# Patient Record
Sex: FEMALE | Race: WHITE | NOT HISPANIC OR LATINO | Employment: OTHER | URBAN - METROPOLITAN AREA
[De-identification: names, ages, dates, MRNs, and addresses within clinical notes are randomized per-mention and may not be internally consistent; named-entity substitution may affect disease eponyms.]

---

## 2018-11-01 ENCOUNTER — OFFICE VISIT (OUTPATIENT)
Dept: CARDIOLOGY | Facility: CLINIC | Age: 73
End: 2018-11-01

## 2018-11-01 VITALS
DIASTOLIC BLOOD PRESSURE: 72 MMHG | SYSTOLIC BLOOD PRESSURE: 160 MMHG | HEART RATE: 106 BPM | HEIGHT: 67 IN | BODY MASS INDEX: 27.34 KG/M2 | WEIGHT: 174.2 LBS

## 2018-11-01 DIAGNOSIS — R06.02 SOB (SHORTNESS OF BREATH): Primary | ICD-10-CM

## 2018-11-01 DIAGNOSIS — I25.10 CORONARY ARTERY DISEASE INVOLVING NATIVE CORONARY ARTERY OF NATIVE HEART WITHOUT ANGINA PECTORIS: Chronic | ICD-10-CM

## 2018-11-01 DIAGNOSIS — R00.0 SINUS TACHYCARDIA: ICD-10-CM

## 2018-11-01 DIAGNOSIS — I25.2 HISTORY OF MYOCARDIAL INFARCTION: Chronic | ICD-10-CM

## 2018-11-01 PROCEDURE — 99204 OFFICE O/P NEW MOD 45 MIN: CPT | Performed by: INTERNAL MEDICINE

## 2018-11-01 PROCEDURE — 93000 ELECTROCARDIOGRAM COMPLETE: CPT | Performed by: INTERNAL MEDICINE

## 2018-11-01 RX ORDER — OMEPRAZOLE 20 MG/1
20 CAPSULE, DELAYED RELEASE ORAL
COMMUNITY
End: 2019-07-18

## 2018-11-01 RX ORDER — HYDROCODONE BITARTRATE AND ACETAMINOPHEN 5; 300 MG/1; MG/1
1 TABLET ORAL
COMMUNITY
Start: 2012-08-29 | End: 2019-11-20 | Stop reason: SDUPTHER

## 2018-11-01 RX ORDER — LOSARTAN POTASSIUM 50 MG/1
50 TABLET ORAL 2 TIMES DAILY
COMMUNITY
End: 2019-11-01 | Stop reason: SDUPTHER

## 2018-11-01 NOTE — PROGRESS NOTES
Subjective:     Encounter Date:11/01/2018      Patient ID: Effie Donnelly is a 73 y.o. female.    Chief Complaint:  History of Present Illness    Dear Dr. Terrazas,    I had the pleasure of seeing this patient in the office today for initial evaluation and consultation.  I appreciate the you sent her to see us.    I've seen her in the past, although it is been many years.  She was previously seen at [facility].  She has a history of CAD and myocardial infarction.    She's been having issues of fatigue.  She notes if she tries to do anything she just gets more fatigued and has less energy.  She has been having some dyspnea at times.  That's only with more activity.  No dyspnea at rest.  She denies any orthopnea or PND.  She's not had any dizziness or lightheadedness and no presyncope or syncope.  No recent hospitalization.  She apparently had an echocardiogram back in 2013 although we do not have the results from that as yet.  We've called for records from [facility] but not yet received anything.    She states that she scheduled to be seen in your office Monday lab work at that time.    She's been compliant with her medical regimen.  She states that usually her blood pressures under good control.    The following portions of the patient's history were reviewed and updated as appropriate: allergies, current medications, past family history, past medical history, past social history, past surgical history and problem list.    Past Medical History:   Diagnosis Date   • CAD (coronary artery disease)    • Cancer (CMS/HCC)     skin   • Hypertension    • Myocardial infarction (CMS/HCC)     PER PT        Past Surgical History:   Procedure Laterality Date   • CARDIAC CATHETERIZATION     • SHOULDER SURGERY     • SKIN CANCER EXCISION      ABOVE THE LIP.    • TOE SURGERY         Social History     Social History   • Marital status:      Spouse name: N/A   • Number of children: 5   • Years of education: N/A      Occupational History   • RETIRED       Social History Main Topics   • Smoking status: Former Smoker     Quit date: 11/1/1973   • Smokeless tobacco: Never Used      Comment: CAFFEINE USE: 1 CUP DAILY   • Alcohol use No   • Drug use: Unknown   • Sexual activity: Not on file     Other Topics Concern   • Not on file     Social History Narrative   • No narrative on file       Review of Systems   Constitution: Negative for chills, decreased appetite, fever and night sweats.   HENT: Negative for ear discharge, ear pain, hearing loss, nosebleeds and sore throat.    Eyes: Negative for blurred vision, double vision and pain.   Cardiovascular: Negative for cyanosis.   Respiratory: Negative for hemoptysis and sputum production.    Endocrine: Negative for cold intolerance and heat intolerance.   Hematologic/Lymphatic: Negative for adenopathy.   Skin: Negative for dry skin, itching, nail changes, rash and suspicious lesions.   Musculoskeletal: Negative for arthritis, gout, muscle cramps, muscle weakness, myalgias and neck pain.   Gastrointestinal: Negative for anorexia, bowel incontinence, constipation, diarrhea, dysphagia, hematemesis and jaundice.   Genitourinary: Negative for bladder incontinence, dysuria, flank pain, frequency, hematuria and nocturia.   Neurological: Negative for focal weakness, numbness, paresthesias and seizures.   Psychiatric/Behavioral: Negative for altered mental status, hallucinations, hypervigilance, suicidal ideas and thoughts of violence.   Allergic/Immunologic: Negative for persistent infections.         ECG 12 Lead  Date/Time: 11/1/2018 2:16 PM  Performed by: GLORIA RODRIGUEZ III  Authorized by: GLORIA RODRIGUEZ III   Comparison: compared with previous ECG   Similar to previous ECG  Rhythm: sinus rhythm  Ectopy: atrial premature contractions  Rate: normal  Conduction: conduction normal  ST Segments: ST segments normal  T depression: V2, V1, I and aVL  QRS axis: left  Other: no  "other findings  Q waves: V1 and V2  Clinical impression: abnormal ECG               Objective:     Vitals:    11/01/18 1313   BP: 160/72   BP Location: Right arm   Pulse: 106   Weight: 79 kg (174 lb 3.2 oz)   Height: 170.2 cm (67\")         Physical Exam   Constitutional: She is oriented to person, place, and time. She appears well-developed and well-nourished. No distress.   HENT:   Head: Normocephalic and atraumatic.   Nose: Nose normal.   Mouth/Throat: Oropharynx is clear and moist.   Eyes: Pupils are equal, round, and reactive to light. Conjunctivae and EOM are normal. Right eye exhibits no discharge. Left eye exhibits no discharge.   Neck: Normal range of motion. Neck supple. No tracheal deviation present. No thyromegaly present.   Cardiovascular: Normal rate, regular rhythm, S1 normal, S2 normal, normal heart sounds and normal pulses.  Exam reveals no S3.    Pulmonary/Chest: Effort normal and breath sounds normal. No stridor. No respiratory distress. She exhibits no tenderness.   Abdominal: Soft. Bowel sounds are normal. She exhibits no distension and no mass. There is no tenderness. There is no rebound and no guarding.   Musculoskeletal: Normal range of motion. She exhibits no tenderness or deformity.   Lymphadenopathy:     She has no cervical adenopathy.   Neurological: She is alert and oriented to person, place, and time. She has normal reflexes.   Skin: Skin is warm and dry. No rash noted. She is not diaphoretic. No erythema.   Psychiatric: She has a normal mood and affect. Thought content normal.       Lab Review:             Performed        Assessment:          Diagnosis Plan   1. SOB (shortness of breath)  Adult Transthoracic Echo Complete W/ Cont if Necessary Per Protocol    ECG 12 Lead   2. Sinus tachycardia  Adult Transthoracic Echo Complete W/ Cont if Necessary Per Protocol    ECG 12 Lead   3. Coronary artery disease involving native coronary artery of native heart without angina pectoris  Adult " Transthoracic Echo Complete W/ Cont if Necessary Per Protocol    ECG 12 Lead   4. History of myocardial infarction  Adult Transthoracic Echo Complete W/ Cont if Necessary Per Protocol    ECG 12 Lead          Plan:       1. Coronary Artery Disease  Assessment  • The patient has no angina    Plan  • Lifestyle modifications discussed include adhering to a heart healthy diet, avoidance of tobacco products, maintenance of a healthy weight, medication compliance, regular exercise and regular monitoring of cholesterol and blood pressure    Subjective - Objective  • There is a history of past MI  • Current antiplatelet therapy includes aspirin 81 mg    2.  Dyspnea and fatigue-she also has a resting tachycardia at 106 bpm.  Patient informs me that she is can have blood work in your office on Monday-I've asked that I would given her a note so she can get thyroid function tests obtained at the same time.  We will also obtain an echocardiogram.  Further evaluation and treatment will then be predicated on the results.    Thank you very much for allowing us to participate in the care of this pleasant patient.  Please don't hesitate to call if I can be of assistance in any way.      Current Outpatient Prescriptions:   •  Ascorbic Acid (VITAMIN C PO), Take  by mouth Daily., Disp: , Rfl:   •  aspirin 81 MG tablet, Take 81 mg by mouth., Disp: , Rfl:   •  Cyanocobalamin (VITAMIN B 12 PO), Take  by mouth. DAILY, Disp: , Rfl:   •  Hydrocodone-Acetaminophen (XODOL) 5-300 MG per tablet, Take 1 tablet by mouth. PRN, Disp: , Rfl:   •  losartan (COZAAR) 50 MG tablet, Take 50 mg by mouth 2 (Two) Times a Day., Disp: , Rfl:   •  Omega 3-6-9 Fatty Acids (OMEGA-3-6-9 PO), Take  by mouth., Disp: , Rfl:   •  omeprazole (priLOSEC) 20 MG capsule, Take 20 mg by mouth. AS NEEDED, Disp: , Rfl:

## 2018-12-06 ENCOUNTER — HOSPITAL ENCOUNTER (OUTPATIENT)
Dept: CARDIOLOGY | Facility: HOSPITAL | Age: 73
Discharge: HOME | End: 2018-12-06
Attending: INTERNAL MEDICINE | Admitting: INTERNAL MEDICINE

## 2018-12-06 VITALS — HEIGHT: 67 IN | WEIGHT: 174 LBS | BODY MASS INDEX: 27.31 KG/M2

## 2018-12-06 PROCEDURE — 93306 TTE W/DOPPLER COMPLETE: CPT

## 2018-12-06 PROCEDURE — 25010000002 PERFLUTREN (DEFINITY) 8.476 MG IN SODIUM CHLORIDE 0.9 % 10 ML INJECTION: Performed by: INTERNAL MEDICINE

## 2018-12-06 PROCEDURE — 93306 TTE W/DOPPLER COMPLETE: CPT | Performed by: INTERNAL MEDICINE

## 2018-12-06 RX ADMIN — PERFLUTREN 2 ML: 6.52 INJECTION, SUSPENSION INTRAVENOUS at 12:50

## 2018-12-24 ENCOUNTER — APPOINTMENT (OUTPATIENT)
Dept: GENERAL RADIOLOGY | Facility: HOSPITAL | Age: 73
End: 2018-12-24

## 2018-12-24 ENCOUNTER — HOSPITAL ENCOUNTER (EMERGENCY)
Facility: HOSPITAL | Age: 73
Discharge: HOME | End: 2018-12-24
Attending: EMERGENCY MEDICINE | Admitting: EMERGENCY MEDICINE

## 2018-12-24 VITALS
RESPIRATION RATE: 18 BRPM | DIASTOLIC BLOOD PRESSURE: 71 MMHG | WEIGHT: 171.96 LBS | TEMPERATURE: 98.2 F | HEART RATE: 72 BPM | HEIGHT: 64 IN | SYSTOLIC BLOOD PRESSURE: 145 MMHG | BODY MASS INDEX: 29.36 KG/M2 | OXYGEN SATURATION: 99 %

## 2018-12-24 DIAGNOSIS — R19.7 DIARRHEA, UNSPECIFIED TYPE: ICD-10-CM

## 2018-12-24 DIAGNOSIS — E87.1 HYPONATREMIA: ICD-10-CM

## 2018-12-24 DIAGNOSIS — R11.2 NON-INTRACTABLE VOMITING WITH NAUSEA, UNSPECIFIED VOMITING TYPE: ICD-10-CM

## 2018-12-24 DIAGNOSIS — J10.1 INFLUENZA A: Primary | ICD-10-CM

## 2018-12-24 LAB
ALBUMIN SERPL-MCNC: 3.8 G/DL (ref 3.5–5.2)
ALBUMIN/GLOB SERPL: 1.1 G/DL
ALP SERPL-CCNC: 99 U/L (ref 40–129)
ALT SERPL W P-5'-P-CCNC: 15 U/L (ref 5–33)
ANION GAP SERPL CALCULATED.3IONS-SCNC: 14.8 MMOL/L
AST SERPL-CCNC: 22 U/L (ref 5–32)
BASOPHILS # BLD AUTO: 0.01 10*3/MM3 (ref 0–0.2)
BASOPHILS NFR BLD AUTO: 0.1 % (ref 0–2)
BILIRUB SERPL-MCNC: 0.5 MG/DL (ref 0.2–1.2)
BILIRUB UR QL STRIP: ABNORMAL
BUN BLD-MCNC: 13 MG/DL (ref 8–23)
BUN/CREAT SERPL: 16.5 (ref 7–25)
CALCIUM SPEC-SCNC: 8.6 MG/DL (ref 8.8–10.5)
CHLORIDE SERPL-SCNC: 92 MMOL/L (ref 98–107)
CLARITY UR: CLEAR
CO2 SERPL-SCNC: 22.2 MMOL/L (ref 22–29)
COLOR UR: ABNORMAL
CREAT BLD-MCNC: 0.79 MG/DL (ref 0.57–1)
DEPRECATED RDW RBC AUTO: 41.2 FL (ref 37–54)
EOSINOPHIL # BLD AUTO: 0.01 10*3/MM3 (ref 0.1–0.3)
EOSINOPHIL NFR BLD AUTO: 0.1 % (ref 0–4)
ERYTHROCYTE [DISTWIDTH] IN BLOOD BY AUTOMATED COUNT: 13.4 % (ref 11.5–14.5)
GFR SERPL CREATININE-BSD FRML MDRD: 72 ML/MIN/1.73
GLOBULIN UR ELPH-MCNC: 3.6 GM/DL
GLUCOSE BLD-MCNC: 102 MG/DL (ref 65–99)
GLUCOSE UR STRIP-MCNC: NEGATIVE MG/DL
HCT VFR BLD AUTO: 41.7 % (ref 37–47)
HGB BLD-MCNC: 13.6 G/DL (ref 12–16)
HGB UR QL STRIP.AUTO: NEGATIVE
IMM GRANULOCYTES # BLD AUTO: 0.03 10*3/MM3 (ref 0–0.03)
IMM GRANULOCYTES NFR BLD AUTO: 0.4 % (ref 0–0.5)
KETONES UR QL STRIP: ABNORMAL
LEUKOCYTE ESTERASE UR QL STRIP.AUTO: NEGATIVE
LIPASE SERPL-CCNC: 29 U/L (ref 13–60)
LYMPHOCYTES # BLD AUTO: 0.67 10*3/MM3 (ref 0.6–4.8)
LYMPHOCYTES NFR BLD AUTO: 7.9 % (ref 20–45)
MCH RBC QN AUTO: 27.4 PG (ref 27–31)
MCHC RBC AUTO-ENTMCNC: 32.6 G/DL (ref 31–37)
MCV RBC AUTO: 83.9 FL (ref 81–99)
MONOCYTES # BLD AUTO: 0.88 10*3/MM3 (ref 0–1)
MONOCYTES NFR BLD AUTO: 10.4 % (ref 3–8)
NEUTROPHILS # BLD AUTO: 6.89 10*3/MM3 (ref 1.5–8.3)
NEUTROPHILS NFR BLD AUTO: 81.1 % (ref 45–70)
NITRITE UR QL STRIP: NEGATIVE
NRBC BLD AUTO-RTO: 0 /100 WBC (ref 0–0)
PH UR STRIP.AUTO: 5.5 [PH] (ref 4.5–8)
PLATELET # BLD AUTO: 190 10*3/MM3 (ref 140–500)
PMV BLD AUTO: 12 FL (ref 7.4–10.4)
POTASSIUM BLD-SCNC: 3.9 MMOL/L (ref 3.5–5.2)
PROT SERPL-MCNC: 7.4 G/DL (ref 6–8.5)
PROT UR QL STRIP: NEGATIVE
RBC # BLD AUTO: 4.97 10*6/MM3 (ref 4.2–5.4)
SODIUM BLD-SCNC: 129 MMOL/L (ref 136–145)
SP GR UR STRIP: 1.02 (ref 1–1.03)
TROPONIN T SERPL-MCNC: <0.01 NG/ML (ref 0–0.03)
UROBILINOGEN UR QL STRIP: ABNORMAL
WBC NRBC COR # BLD: 8.49 10*3/MM3 (ref 4.8–10.8)

## 2018-12-24 PROCEDURE — 87070 CULTURE OTHR SPECIMN AEROBIC: CPT | Performed by: PHYSICIAN ASSISTANT

## 2018-12-24 PROCEDURE — 81003 URINALYSIS AUTO W/O SCOPE: CPT | Performed by: PHYSICIAN ASSISTANT

## 2018-12-24 PROCEDURE — 84484 ASSAY OF TROPONIN QUANT: CPT | Performed by: PHYSICIAN ASSISTANT

## 2018-12-24 PROCEDURE — 83690 ASSAY OF LIPASE: CPT | Performed by: PHYSICIAN ASSISTANT

## 2018-12-24 PROCEDURE — 96375 TX/PRO/DX INJ NEW DRUG ADDON: CPT

## 2018-12-24 PROCEDURE — 96361 HYDRATE IV INFUSION ADD-ON: CPT

## 2018-12-24 PROCEDURE — 80053 COMPREHEN METABOLIC PANEL: CPT | Performed by: PHYSICIAN ASSISTANT

## 2018-12-24 PROCEDURE — 99284 EMERGENCY DEPT VISIT MOD MDM: CPT | Performed by: PHYSICIAN ASSISTANT

## 2018-12-24 PROCEDURE — 96376 TX/PRO/DX INJ SAME DRUG ADON: CPT

## 2018-12-24 PROCEDURE — 96374 THER/PROPH/DIAG INJ IV PUSH: CPT

## 2018-12-24 PROCEDURE — 71046 X-RAY EXAM CHEST 2 VIEWS: CPT

## 2018-12-24 PROCEDURE — 87205 SMEAR GRAM STAIN: CPT | Performed by: PHYSICIAN ASSISTANT

## 2018-12-24 PROCEDURE — 85025 COMPLETE CBC W/AUTO DIFF WBC: CPT | Performed by: PHYSICIAN ASSISTANT

## 2018-12-24 PROCEDURE — 25010000002 ONDANSETRON PER 1 MG: Performed by: PHYSICIAN ASSISTANT

## 2018-12-24 PROCEDURE — 99284 EMERGENCY DEPT VISIT MOD MDM: CPT

## 2018-12-24 RX ORDER — ONDANSETRON 2 MG/ML
4 INJECTION INTRAMUSCULAR; INTRAVENOUS ONCE
Status: COMPLETED | OUTPATIENT
Start: 2018-12-24 | End: 2018-12-24

## 2018-12-24 RX ORDER — OSELTAMIVIR PHOSPHATE 75 MG/1
75 CAPSULE ORAL 2 TIMES DAILY
COMMUNITY
End: 2019-03-25 | Stop reason: ALTCHOICE

## 2018-12-24 RX ORDER — SACCHAROMYCES BOULARDII 250 MG
250 CAPSULE ORAL 2 TIMES DAILY
Qty: 28 CAPSULE | Refills: 0 | Status: SHIPPED | OUTPATIENT
Start: 2018-12-24 | End: 2019-01-07

## 2018-12-24 RX ORDER — ONDANSETRON 4 MG/1
4 TABLET, ORALLY DISINTEGRATING ORAL 4 TIMES DAILY PRN
Qty: 12 TABLET | Refills: 0 | Status: SHIPPED | OUTPATIENT
Start: 2018-12-24 | End: 2019-03-25 | Stop reason: ALTCHOICE

## 2018-12-24 RX ORDER — SODIUM CHLORIDE 0.9 % (FLUSH) 0.9 %
10 SYRINGE (ML) INJECTION AS NEEDED
Status: DISCONTINUED | OUTPATIENT
Start: 2018-12-24 | End: 2018-12-24 | Stop reason: HOSPADM

## 2018-12-24 RX ADMIN — SODIUM CHLORIDE 1000 ML: 9 INJECTION, SOLUTION INTRAVENOUS at 18:34

## 2018-12-24 RX ADMIN — FAMOTIDINE 20 MG: 10 INJECTION, SOLUTION INTRAVENOUS at 18:35

## 2018-12-24 RX ADMIN — ONDANSETRON 4 MG: 2 INJECTION, SOLUTION INTRAMUSCULAR; INTRAVENOUS at 18:34

## 2018-12-24 RX ADMIN — ONDANSETRON 4 MG: 2 INJECTION, SOLUTION INTRAMUSCULAR; INTRAVENOUS at 19:08

## 2018-12-26 LAB
BACTERIA SPEC RESP CULT: NORMAL
GRAM STN SPEC: NORMAL

## 2019-03-22 ENCOUNTER — TELEPHONE (OUTPATIENT)
Dept: CARDIOLOGY | Facility: CLINIC | Age: 74
End: 2019-03-22

## 2019-03-22 NOTE — TELEPHONE ENCOUNTER
No ECG in system, so I will have to see e ECG first.  I could see her Monday at [facility] if we need to- but will decide after we can see the ECG

## 2019-03-22 NOTE — TELEPHONE ENCOUNTER
Pt will need cardiac clearance for breast lumpectomy for dx of breast cancer.  PAT noted an abn ekg and elevated bp.     Pt's last OV 11/01/18.   Surgery is scheduled for Tues, but can be r/s if clearance is an issue.     Note: Surgeon is Dr. Gordon Carty.  I called back, but had to leave a msg.  I requested a copy of the ekg tracing to be faxed to the office if needed.

## 2019-03-25 ENCOUNTER — OFFICE VISIT (OUTPATIENT)
Dept: CARDIOLOGY | Facility: CLINIC | Age: 74
End: 2019-03-25

## 2019-03-25 VITALS
HEIGHT: 64 IN | SYSTOLIC BLOOD PRESSURE: 116 MMHG | DIASTOLIC BLOOD PRESSURE: 80 MMHG | BODY MASS INDEX: 29.71 KG/M2 | WEIGHT: 174 LBS | HEART RATE: 72 BPM

## 2019-03-25 DIAGNOSIS — Z01.810 PREOP CARDIOVASCULAR EXAM: Primary | ICD-10-CM

## 2019-03-25 DIAGNOSIS — I25.2 HISTORY OF MYOCARDIAL INFARCTION: Chronic | ICD-10-CM

## 2019-03-25 DIAGNOSIS — I25.10 CORONARY ARTERY DISEASE INVOLVING NATIVE CORONARY ARTERY OF NATIVE HEART WITHOUT ANGINA PECTORIS: Chronic | ICD-10-CM

## 2019-03-25 PROCEDURE — 93000 ELECTROCARDIOGRAM COMPLETE: CPT | Performed by: INTERNAL MEDICINE

## 2019-03-25 PROCEDURE — 99214 OFFICE O/P EST MOD 30 MIN: CPT | Performed by: INTERNAL MEDICINE

## 2019-03-25 RX ORDER — MELATONIN
4000 DAILY
COMMUNITY

## 2019-03-25 NOTE — PROGRESS NOTES
Subjective:     Encounter Date:3/25/2019      Patient ID: Effie Donnelly is a 73 y.o. female.    Chief Complaint: preop cards  History of Present Illness    Dear Dr. Terrazas,    I had the pleasure of seeing this patient in the office today for evaluation of cardiac risk for lumpectomy tomorrow.    I have seen her once in the past, last November. She was previously seen at [facility].  She has a history of CAD and myocardial infarction.  She had an echocardiogram in November that showed normal function.  She does have a history of an abnormal EKG with LVH as well as diffuse repolarization changes.  She just had an EKG performed at [facility] there was unchanged from prior EKGs.    She denies any chest pain or chest discomfort.  She does have some baseline dyspnea-she has a history of COPD.  She used to smoke but is stopped for many years.  She has not had any symptoms to suggest angina pectoris.  No lower extremity edema.  No orthopnea or PND.  No sensation tachycardia palpitations.  No presyncope or syncope.      She had an Echo 11/2018:  Interpretation Summary     · Left ventricular wall thickness is consistent with hypertrophy. Sigmoid-shaped ventricular septum is present.  · Left ventricular systolic function is normal. Estimated EF = 55%.  · Left ventricular diastolic dysfunction (grade I a) consistent with impaired relaxation.  · Left atrial cavity size is moderately dilated.  · Mild tricuspid valve regurgitation is present.  · Calculated right ventricular systolic pressure from tricuspid regurgitation is 29 mmHg.  · Mild pulmonic valve regurgitation is present.           The following portions of the patient's history were reviewed and updated as appropriate: allergies, current medications, past family history, past medical history, past social history, past surgical history and problem list.    Past Medical History:   Diagnosis Date   • CAD (coronary artery disease)    • Cancer (CMS/HCC)      skin   • Coronary artery disease involving native coronary artery of native heart without angina pectoris 2018   • History of myocardial infarction 2018   • Hypertension    • Myocardial infarction (CMS/HCC)     PER PT    • Sinus tachycardia    • SOB (shortness of breath)        Past Surgical History:   Procedure Laterality Date   • CARDIAC CATHETERIZATION     • SHOULDER SURGERY     • SKIN CANCER EXCISION      ABOVE THE LIP.    • TOE SURGERY         Social History     Socioeconomic History   • Marital status:      Spouse name: Not on file   • Number of children: 5   • Years of education: Not on file   • Highest education level: Not on file   Occupational History   • Occupation: RETIRED    Tobacco Use   • Smoking status: Former Smoker     Last attempt to quit: 1973     Years since quittin.4   • Smokeless tobacco: Never Used   • Tobacco comment: CAFFEINE USE: 1 CUP DAILY   Substance and Sexual Activity   • Alcohol use: No       Review of Systems   Constitution: Negative for chills, decreased appetite, fever and night sweats.   HENT: Negative for ear discharge, ear pain, hearing loss, nosebleeds and sore throat.    Eyes: Negative for blurred vision, double vision and pain.   Cardiovascular: Negative for cyanosis.   Respiratory: Negative for hemoptysis and sputum production.    Endocrine: Negative for cold intolerance and heat intolerance.   Hematologic/Lymphatic: Negative for adenopathy.   Skin: Negative for dry skin, itching, nail changes, rash and suspicious lesions.   Musculoskeletal: Negative for arthritis, gout, muscle cramps, muscle weakness, myalgias and neck pain.   Gastrointestinal: Negative for anorexia, bowel incontinence, constipation, diarrhea, dysphagia, hematemesis and jaundice.   Genitourinary: Negative for bladder incontinence, dysuria, flank pain, frequency, hematuria and nocturia.   Neurological: Negative for focal weakness, numbness, paresthesias and  "seizures.   Psychiatric/Behavioral: Negative for altered mental status, hallucinations, hypervigilance, suicidal ideas and thoughts of violence.   Allergic/Immunologic: Negative for persistent infections.         ECG 12 Lead  Date/Time: 3/25/2019 2:02 PM  Performed by: Jose Maria Brand III, MD  Authorized by: Jose Maria Brand III, MD   Comparison: compared with previous ECG   Similar to previous ECG  Rhythm: sinus rhythm  Rate: normal  Conduction: conduction normal  ST Segments: ST segments normal  T Waves: T waves normal  QRS axis: normal  Other findings: left ventricular hypertrophy with strain    Clinical impression: non-specific ECG               Objective:     Vitals:    03/25/19 1314   BP: 116/80   Pulse: 72   Weight: 78.9 kg (174 lb)   Height: 162.6 cm (64\")         Physical Exam   Constitutional: She is oriented to person, place, and time. She appears well-developed and well-nourished. No distress.   HENT:   Head: Normocephalic and atraumatic.   Nose: Nose normal.   Mouth/Throat: Oropharynx is clear and moist.   Eyes: Conjunctivae and EOM are normal. Pupils are equal, round, and reactive to light. Right eye exhibits no discharge. Left eye exhibits no discharge.   Neck: Normal range of motion. Neck supple. No tracheal deviation present. No thyromegaly present.   Cardiovascular: Normal rate, regular rhythm, S1 normal, S2 normal, normal heart sounds and normal pulses. Exam reveals no S3.   Pulmonary/Chest: Effort normal and breath sounds normal. No stridor. No respiratory distress. She exhibits no tenderness.   Abdominal: Soft. Bowel sounds are normal. She exhibits no distension and no mass. There is no tenderness. There is no rebound and no guarding.   Musculoskeletal: Normal range of motion. She exhibits no tenderness or deformity.   Lymphadenopathy:     She has no cervical adenopathy.   Neurological: She is alert and oriented to person, place, and time. She has normal reflexes.   Skin: Skin is warm and dry. No " rash noted. She is not diaphoretic. No erythema.   Psychiatric: She has a normal mood and affect. Thought content normal.       Lab Review:             Performed        Assessment:          Diagnosis Plan   1. Preop cardiovascular exam  ECG 12 Lead   2. Coronary artery disease involving native coronary artery of native heart without angina pectoris  ECG 12 Lead   3. History of myocardial infarction  ECG 12 Lead          Plan:       1. Coronary Artery Disease  Assessment  • The patient has no angina    Plan  • Lifestyle modifications discussed include adhering to a heart healthy diet, avoidance of tobacco products, maintenance of a healthy weight, medication compliance, regular exercise and regular monitoring of cholesterol and blood pressure    Subjective - Objective  • There is a history of past MI  • Current antiplatelet therapy includes aspirin 81 mg    2.   Preoperative cardiovascular exam-this patient is at low cardiac risk for the anticipated surgical procedure.  She has an estimated risk probability for perioperative myocardial infarction or cardiac arrest of 0.02% (Miranda).  She does not meet guideline recommendations for any additional cardiac testing at this time.  3.  Abnormal EKG-unchanged from prior, LVH with diffuse repolarization changes    Thank you very much for allowing us to participate in the care of this pleasant patient.  Please don't hesitate to call if I can be of assistance in any way.      Current Outpatient Medications:   •  aspirin 81 MG tablet, Take 81 mg by mouth., Disp: , Rfl:   •  B Complex Vitamins (VITAMIN B COMPLEX PO), Take 1 tablet by mouth Daily., Disp: , Rfl:   •  Balsalazide Disodium (COLAZAL PO), Take 750 mg by mouth 3 (Three) Times a Day. Takes 3 caps   2250mg   tid, Disp: , Rfl:   •  Cholecalciferol (VITAMIN D) 2000 units capsule, Take 2,000 Units by mouth Daily., Disp: , Rfl:   •  CRANBERRY EXTRACT PO, Take 1 capsule by mouth Daily., Disp: , Rfl:   •   Hydrocodone-Acetaminophen (XODOL) 5-300 MG per tablet, Take 1 tablet by mouth. PRN, Disp: , Rfl:   •  losartan (COZAAR) 50 MG tablet, Take 50 mg by mouth 2 (Two) Times a Day., Disp: , Rfl:   •  MEGARED OMEGA-3 KRILL OIL PO, Take 1 capsule by mouth Daily., Disp: , Rfl:   •  omeprazole (priLOSEC) 20 MG capsule, Take 20 mg by mouth. AS NEEDED, Disp: , Rfl:   •  PROBIOTIC PRODUCT PO, Take 2 capsules by mouth Daily., Disp: , Rfl:

## 2019-04-08 RX ORDER — BACLOFEN 10 MG/1
TABLET ORAL
Qty: 30 TABLET | Refills: 2 | Status: SHIPPED | OUTPATIENT
Start: 2019-04-08 | End: 2019-07-10 | Stop reason: SDUPTHER

## 2019-04-25 ENCOUNTER — TELEPHONE (OUTPATIENT)
Dept: SURGERY | Facility: CLINIC | Age: 74
End: 2019-04-25

## 2019-05-30 RX ORDER — LORATADINE/PSEUDOEPHEDRINE 10MG-240MG
TABLET, EXTENDED RELEASE 24 HR ORAL
Qty: 30 TABLET | Refills: 2 | Status: SHIPPED | OUTPATIENT
Start: 2019-05-30 | End: 2019-08-30 | Stop reason: SDUPTHER

## 2019-07-11 RX ORDER — BACLOFEN 10 MG/1
TABLET ORAL
Qty: 30 TABLET | Refills: 2 | Status: SHIPPED | OUTPATIENT
Start: 2019-07-11 | End: 2019-10-10 | Stop reason: SDUPTHER

## 2019-07-18 ENCOUNTER — OFFICE VISIT (OUTPATIENT)
Dept: FAMILY MEDICINE CLINIC | Facility: CLINIC | Age: 74
End: 2019-07-18

## 2019-07-18 ENCOUNTER — HOSPITAL ENCOUNTER (EMERGENCY)
Facility: HOSPITAL | Age: 74
Discharge: HOME | End: 2019-07-19
Attending: EMERGENCY MEDICINE | Admitting: EMERGENCY MEDICINE

## 2019-07-18 ENCOUNTER — APPOINTMENT (OUTPATIENT)
Dept: CT IMAGING | Facility: HOSPITAL | Age: 74
End: 2019-07-18

## 2019-07-18 ENCOUNTER — HOSPITAL ENCOUNTER (OUTPATIENT)
Dept: GENERAL RADIOLOGY | Facility: HOSPITAL | Age: 74
Discharge: HOME | End: 2019-07-18
Admitting: INTERNAL MEDICINE

## 2019-07-18 VITALS
HEIGHT: 64 IN | WEIGHT: 172 LBS | TEMPERATURE: 97.6 F | OXYGEN SATURATION: 96 % | RESPIRATION RATE: 14 BRPM | DIASTOLIC BLOOD PRESSURE: 84 MMHG | HEART RATE: 100 BPM | SYSTOLIC BLOOD PRESSURE: 158 MMHG | BODY MASS INDEX: 29.37 KG/M2

## 2019-07-18 DIAGNOSIS — I10 ESSENTIAL HYPERTENSION: Primary | ICD-10-CM

## 2019-07-18 DIAGNOSIS — K21.9 GASTROESOPHAGEAL REFLUX DISEASE, ESOPHAGITIS PRESENCE NOT SPECIFIED: ICD-10-CM

## 2019-07-18 DIAGNOSIS — J18.9 PNEUMONIA OF LEFT LOWER LOBE DUE TO INFECTIOUS ORGANISM: Primary | ICD-10-CM

## 2019-07-18 DIAGNOSIS — J06.9 ACUTE URI: ICD-10-CM

## 2019-07-18 DIAGNOSIS — M19.90 OSTEOARTHRITIS, UNSPECIFIED OSTEOARTHRITIS TYPE, UNSPECIFIED SITE: ICD-10-CM

## 2019-07-18 DIAGNOSIS — K21.9 GASTROESOPHAGEAL REFLUX DISEASE WITHOUT ESOPHAGITIS: ICD-10-CM

## 2019-07-18 PROBLEM — D64.9 ANEMIA: Status: ACTIVE | Noted: 2019-07-18

## 2019-07-18 PROBLEM — K51.90 ULCERATIVE COLITIS: Status: ACTIVE | Noted: 2019-07-18

## 2019-07-18 PROBLEM — D05.11 DUCTAL CARCINOMA IN SITU (DCIS) OF RIGHT BREAST: Status: ACTIVE | Noted: 2019-03-18

## 2019-07-18 LAB
ALBUMIN SERPL-MCNC: 3.8 G/DL (ref 3.5–5.2)
ALBUMIN SERPL-MCNC: 4.1 G/DL (ref 3.5–5.2)
ALBUMIN/GLOB SERPL: 1.1 G/DL
ALBUMIN/GLOB SERPL: 1.5 G/DL
ALP SERPL-CCNC: 118 U/L (ref 39–117)
ALP SERPL-CCNC: 125 U/L (ref 39–117)
ALT SERPL W P-5'-P-CCNC: 13 U/L (ref 1–33)
ALT SERPL-CCNC: 15 U/L (ref 1–33)
ANION GAP SERPL CALCULATED.3IONS-SCNC: 15.7 MMOL/L (ref 5–15)
APTT PPP: 40.1 SECONDS (ref 24.3–38.1)
AST SERPL-CCNC: 15 U/L (ref 1–32)
AST SERPL-CCNC: 18 U/L (ref 1–32)
BASOPHILS # BLD AUTO: 0.04 10*3/MM3 (ref 0–0.2)
BASOPHILS # BLD AUTO: 0.04 10*3/MM3 (ref 0–0.2)
BASOPHILS NFR BLD AUTO: 0.5 % (ref 0–1.5)
BASOPHILS NFR BLD AUTO: 0.5 % (ref 0–1.5)
BILIRUB SERPL-MCNC: 0.6 MG/DL (ref 0.2–1.2)
BILIRUB SERPL-MCNC: 0.9 MG/DL (ref 0.2–1.2)
BUN BLD-MCNC: 15 MG/DL (ref 8–23)
BUN SERPL-MCNC: 18 MG/DL (ref 8–23)
BUN/CREAT SERPL: 17.6 (ref 7–25)
BUN/CREAT SERPL: 20.2 (ref 7–25)
CALCIUM SERPL-MCNC: 9.1 MG/DL (ref 8.6–10.5)
CALCIUM SPEC-SCNC: 9 MG/DL (ref 8.6–10.5)
CHLORIDE SERPL-SCNC: 102 MMOL/L (ref 98–107)
CHLORIDE SERPL-SCNC: 103 MMOL/L (ref 98–107)
CHOLEST SERPL-MCNC: 177 MG/DL (ref 0–200)
CHOLEST/HDLC SERPL: 2.24 {RATIO}
CO2 SERPL-SCNC: 21.3 MMOL/L (ref 22–29)
CO2 SERPL-SCNC: 26 MMOL/L (ref 22–29)
CREAT BLD-MCNC: 0.85 MG/DL (ref 0.57–1)
CREAT SERPL-MCNC: 0.89 MG/DL (ref 0.57–1)
DEPRECATED RDW RBC AUTO: 43.2 FL (ref 37–54)
EOSINOPHIL # BLD AUTO: 0.09 10*3/MM3 (ref 0–0.4)
EOSINOPHIL # BLD AUTO: 0.11 10*3/MM3 (ref 0–0.4)
EOSINOPHIL NFR BLD AUTO: 1.2 % (ref 0.3–6.2)
EOSINOPHIL NFR BLD AUTO: 1.3 % (ref 0.3–6.2)
ERYTHROCYTE [DISTWIDTH] IN BLOOD BY AUTOMATED COUNT: 13.8 % (ref 12.3–15.4)
ERYTHROCYTE [DISTWIDTH] IN BLOOD BY AUTOMATED COUNT: 14 % (ref 12.3–15.4)
GFR SERPL CREATININE-BSD FRML MDRD: 66 ML/MIN/1.73
GLOBULIN SER CALC-MCNC: 2.8 GM/DL
GLOBULIN UR ELPH-MCNC: 3.6 GM/DL
GLUCOSE BLD-MCNC: 92 MG/DL (ref 65–99)
GLUCOSE SERPL-MCNC: 79 MG/DL (ref 65–99)
HCT VFR BLD AUTO: 41.7 % (ref 34–46.6)
HCT VFR BLD AUTO: 41.8 % (ref 34–46.6)
HDLC SERPL-MCNC: 79 MG/DL (ref 40–60)
HGB BLD-MCNC: 13 G/DL (ref 12–15.9)
HGB BLD-MCNC: 13.5 G/DL (ref 12–15.9)
IMM GRANULOCYTES # BLD AUTO: 0.03 10*3/MM3 (ref 0–0.05)
IMM GRANULOCYTES # BLD AUTO: 0.03 10*3/MM3 (ref 0–0.05)
IMM GRANULOCYTES NFR BLD AUTO: 0.4 % (ref 0–0.5)
IMM GRANULOCYTES NFR BLD AUTO: 0.4 % (ref 0–0.5)
INR PPP: 1.1 (ref 0.9–1.1)
LDLC SERPL CALC-MCNC: 80 MG/DL (ref 0–100)
LYMPHOCYTES # BLD AUTO: 1.29 10*3/MM3 (ref 0.7–3.1)
LYMPHOCYTES # BLD AUTO: 1.81 10*3/MM3 (ref 0.7–3.1)
LYMPHOCYTES NFR BLD AUTO: 16.7 % (ref 19.6–45.3)
LYMPHOCYTES NFR BLD AUTO: 21.6 % (ref 19.6–45.3)
MCH RBC QN AUTO: 27.2 PG (ref 26.6–33)
MCH RBC QN AUTO: 27.6 PG (ref 26.6–33)
MCHC RBC AUTO-ENTMCNC: 31.1 G/DL (ref 31.5–35.7)
MCHC RBC AUTO-ENTMCNC: 32.4 G/DL (ref 31.5–35.7)
MCV RBC AUTO: 85.3 FL (ref 79–97)
MCV RBC AUTO: 87.4 FL (ref 79–97)
MONOCYTES # BLD AUTO: 0.77 10*3/MM3 (ref 0.1–0.9)
MONOCYTES # BLD AUTO: 0.89 10*3/MM3 (ref 0.1–0.9)
MONOCYTES NFR BLD AUTO: 10.6 % (ref 5–12)
MONOCYTES NFR BLD AUTO: 9.9 % (ref 5–12)
NEUTROPHILS # BLD AUTO: 5.5 10*3/MM3 (ref 1.7–7)
NEUTROPHILS # BLD AUTO: 5.52 10*3/MM3 (ref 1.7–7)
NEUTROPHILS NFR BLD AUTO: 65.6 % (ref 42.7–76)
NEUTROPHILS NFR BLD AUTO: 71.3 % (ref 42.7–76)
NRBC BLD AUTO-RTO: 0 /100 WBC (ref 0–0.2)
NRBC BLD AUTO-RTO: 0 /100 WBC (ref 0–0.2)
NT-PROBNP SERPL-MCNC: 4801 PG/ML (ref 5–900)
PLATELET # BLD AUTO: 199 10*3/MM3 (ref 140–450)
PLATELET # BLD AUTO: 205 10*3/MM3 (ref 140–450)
PMV BLD AUTO: 11.7 FL (ref 6–12)
POTASSIUM BLD-SCNC: 4.5 MMOL/L (ref 3.5–5.2)
POTASSIUM SERPL-SCNC: 4.3 MMOL/L (ref 3.5–5.2)
PROT SERPL-MCNC: 6.9 G/DL (ref 6–8.5)
PROT SERPL-MCNC: 7.4 G/DL (ref 6–8.5)
PROTHROMBIN TIME: 13.9 SECONDS (ref 12.1–15)
RBC # BLD AUTO: 4.78 10*6/MM3 (ref 3.77–5.28)
RBC # BLD AUTO: 4.89 10*6/MM3 (ref 3.77–5.28)
SODIUM BLD-SCNC: 140 MMOL/L (ref 136–145)
SODIUM SERPL-SCNC: 139 MMOL/L (ref 136–145)
TRIGL SERPL-MCNC: 88 MG/DL (ref 0–150)
TROPONIN T SERPL-MCNC: <0.01 NG/ML (ref 0–0.03)
TSH SERPL DL<=0.005 MIU/L-ACNC: 3.26 MIU/ML (ref 0.27–4.2)
VLDLC SERPL CALC-MCNC: 17.6 MG/DL
WBC # BLD AUTO: 7.74 10*3/MM3 (ref 3.4–10.8)
WBC NRBC COR # BLD: 8.38 10*3/MM3 (ref 3.4–10.8)

## 2019-07-18 PROCEDURE — 71046 X-RAY EXAM CHEST 2 VIEWS: CPT

## 2019-07-18 PROCEDURE — 80053 COMPREHEN METABOLIC PANEL: CPT | Performed by: PHYSICIAN ASSISTANT

## 2019-07-18 PROCEDURE — 99284 EMERGENCY DEPT VISIT MOD MDM: CPT

## 2019-07-18 PROCEDURE — 83880 ASSAY OF NATRIURETIC PEPTIDE: CPT | Performed by: PHYSICIAN ASSISTANT

## 2019-07-18 PROCEDURE — 85610 PROTHROMBIN TIME: CPT | Performed by: PHYSICIAN ASSISTANT

## 2019-07-18 PROCEDURE — 96365 THER/PROPH/DIAG IV INF INIT: CPT

## 2019-07-18 PROCEDURE — 99214 OFFICE O/P EST MOD 30 MIN: CPT | Performed by: INTERNAL MEDICINE

## 2019-07-18 PROCEDURE — 0 IOPAMIDOL PER 1 ML: Performed by: EMERGENCY MEDICINE

## 2019-07-18 PROCEDURE — 93010 ELECTROCARDIOGRAM REPORT: CPT | Performed by: INTERNAL MEDICINE

## 2019-07-18 PROCEDURE — 84484 ASSAY OF TROPONIN QUANT: CPT | Performed by: PHYSICIAN ASSISTANT

## 2019-07-18 PROCEDURE — 93005 ELECTROCARDIOGRAM TRACING: CPT

## 2019-07-18 PROCEDURE — 85730 THROMBOPLASTIN TIME PARTIAL: CPT | Performed by: PHYSICIAN ASSISTANT

## 2019-07-18 PROCEDURE — 25010000002 CEFTRIAXONE SODIUM-DEXTROSE 1-3.74 GM-%(50ML) RECONSTITUTED SOLUTION: Performed by: PHYSICIAN ASSISTANT

## 2019-07-18 PROCEDURE — 99284 EMERGENCY DEPT VISIT MOD MDM: CPT | Performed by: PHYSICIAN ASSISTANT

## 2019-07-18 PROCEDURE — 93005 ELECTROCARDIOGRAM TRACING: CPT | Performed by: EMERGENCY MEDICINE

## 2019-07-18 PROCEDURE — 71275 CT ANGIOGRAPHY CHEST: CPT

## 2019-07-18 PROCEDURE — 85025 COMPLETE CBC W/AUTO DIFF WBC: CPT | Performed by: PHYSICIAN ASSISTANT

## 2019-07-18 RX ORDER — LIDOCAINE HYDROCHLORIDE 20 MG/ML
15 SOLUTION OROPHARYNGEAL ONCE
Status: COMPLETED | OUTPATIENT
Start: 2019-07-18 | End: 2019-07-18

## 2019-07-18 RX ORDER — AZITHROMYCIN 250 MG/1
TABLET, FILM COATED ORAL
Qty: 6 TABLET | Refills: 0 | Status: SHIPPED | OUTPATIENT
Start: 2019-07-18 | End: 2019-10-29

## 2019-07-18 RX ORDER — ALUMINA, MAGNESIA, AND SIMETHICONE 2400; 2400; 240 MG/30ML; MG/30ML; MG/30ML
15 SUSPENSION ORAL ONCE
Status: COMPLETED | OUTPATIENT
Start: 2019-07-18 | End: 2019-07-18

## 2019-07-18 RX ORDER — SODIUM CHLORIDE 9 MG/ML
125 INJECTION, SOLUTION INTRAVENOUS CONTINUOUS
Status: DISCONTINUED | OUTPATIENT
Start: 2019-07-18 | End: 2019-07-19 | Stop reason: HOSPADM

## 2019-07-18 RX ORDER — SODIUM CHLORIDE 0.9 % (FLUSH) 0.9 %
10 SYRINGE (ML) INJECTION AS NEEDED
Status: DISCONTINUED | OUTPATIENT
Start: 2019-07-18 | End: 2019-07-19 | Stop reason: HOSPADM

## 2019-07-18 RX ORDER — CEFTRIAXONE 1 G/50ML
1 INJECTION, SOLUTION INTRAVENOUS ONCE
Status: COMPLETED | OUTPATIENT
Start: 2019-07-18 | End: 2019-07-19

## 2019-07-18 RX ORDER — RANITIDINE 150 MG/1
150 CAPSULE ORAL 2 TIMES DAILY
Qty: 28 CAPSULE | Refills: 0 | Status: SHIPPED | OUTPATIENT
Start: 2019-07-18 | End: 2019-08-01

## 2019-07-18 RX ADMIN — SODIUM CHLORIDE 500 ML: 9 INJECTION, SOLUTION INTRAVENOUS at 21:52

## 2019-07-18 RX ADMIN — ALUMINUM HYDROXIDE, MAGNESIUM HYDROXIDE, AND DIMETHICONE: 400; 400; 40 SUSPENSION ORAL at 22:29

## 2019-07-18 RX ADMIN — CEFTRIAXONE 1 G: 1 INJECTION, SOLUTION INTRAVENOUS at 23:08

## 2019-07-18 RX ADMIN — LIDOCAINE HYDROCHLORIDE 15 ML: 20 SOLUTION ORAL; TOPICAL at 22:29

## 2019-07-18 RX ADMIN — IOPAMIDOL 100 ML: 755 INJECTION, SOLUTION INTRAVENOUS at 22:31

## 2019-07-18 RX ADMIN — SODIUM CHLORIDE 125 ML/HR: 9 INJECTION, SOLUTION INTRAVENOUS at 22:46

## 2019-07-18 RX ADMIN — IOPAMIDOL 50 ML: 755 INJECTION, SOLUTION INTRAVENOUS at 22:31

## 2019-07-18 NOTE — PROGRESS NOTES
CHANDAGIN@  Effie Donnelly is a 73 y.o. female.     Chief Complaint   Patient presents with   • URI     with productive cough- green   • Chest Pain     under breast   Hypertension, hyperlipidemia, DJD    History of Present Illness   Patient here for acute onset of cough, congestion, green sputum production, felt feverish no shortness of breath.  Reports that across upper abdomen epigastric pain.  No chest pain.  Pain mostly after coughing.  Symptoms for last 4 days.  Patient also has not been here for her regular checkup for blood pressure, DJD, hyperlipidemia.  She was newly diagnosed with the breast cancer had lumpectomy, seen Dr. Leonel Lyons for chemotherapy.  No chemotherapy done yet.  Reports her lymph nodes were negative.  She has seen Dr. Brand for preop clearance in March 2019 no work-up was recommended.  The following portions of the patient's history were reviewed and updated as appropriate: allergies, current medications, past family history, past medical history, past social history, past surgical history and problem list.    Review of Systems   Constitutional: Positive for chills and fatigue. Negative for activity change and appetite change.   HENT: Negative for congestion, ear discharge, ear pain, facial swelling, postnasal drip, rhinorrhea, sinus pressure, sneezing and sore throat.    Respiratory: Positive for cough. Negative for chest tightness, shortness of breath, wheezing and stridor.    Cardiovascular: Negative for chest pain, palpitations and leg swelling.   Gastrointestinal: Negative for abdominal distention, abdominal pain, anal bleeding, blood in stool, constipation, diarrhea and GERD.        Complaints of upper abdomen across pain with cough   Genitourinary: Negative for flank pain, frequency, genital sores and hematuria.   Neurological: Negative for dizziness, facial asymmetry, light-headedness, numbness, headache, memory problem and confusion.   Psychiatric/Behavioral: Negative for  behavioral problems, decreased concentration, dysphoric mood and depressed mood.       Allergies   Allergen Reactions   • Adhesive Tape Other (See Comments)     BLISTERS UNDER TAGADERM   • Nsaids Other (See Comments)     CROHN'S DISEASE       Current Outpatient Medications on File Prior to Visit   Medication Sig Dispense Refill   • aspirin 81 MG tablet Take 81 mg by mouth.     • B Complex Vitamins (VITAMIN B COMPLEX PO) Take 1 tablet by mouth Daily.     • baclofen (LIORESAL) 10 MG tablet TAKE ONE TABLET BY MOUTH EVERY NIGHT AT BEDTIME 30 tablet 2   • Cholecalciferol (VITAMIN D) 2000 units capsule Take 2,000 Units by mouth Daily.     • CRANBERRY EXTRACT PO Take 1 capsule by mouth Daily.     • Hydrocodone-Acetaminophen (XODOL) 5-300 MG per tablet Take 1 tablet by mouth. PRN     • LORATADINE-D 24HR  MG per 24 hr tablet TAKE ONE TABLET BY MOUTH DAILY 30 tablet 2   • losartan (COZAAR) 50 MG tablet Take 50 mg by mouth 2 (Two) Times a Day.     • MEGARED OMEGA-3 KRILL OIL PO Take 1 capsule by mouth Daily.     • omeprazole (priLOSEC) 20 MG capsule Take 20 mg by mouth. AS NEEDED     • Balsalazide Disodium (COLAZAL PO) Take 750 mg by mouth 3 (Three) Times a Day. Takes 3 caps   2250mg   tid     • PROBIOTIC PRODUCT PO Take 2 capsules by mouth Daily.       No current facility-administered medications on file prior to visit.        Family History   Problem Relation Age of Onset   • Hypertension Sister    • Cancer Sister    • Heart disease Brother 65        VALVE REPLACEMENT    • Hypertension Brother    • Hypertension Sister    • Hypertension Sister    • Hypertension Brother    • Diabetes Brother    • Hypertension Brother    • Cancer Brother    • Hypertension Brother    • Hypertension Brother    • Hypertension Brother        Past Medical History:   Diagnosis Date   • CAD (coronary artery disease)    • Cancer (CMS/HCC)     skin   • Coronary artery disease involving native coronary artery of native heart without angina pectoris  2018   • History of myocardial infarction 2018   • Hypertension    • Myocardial infarction (CMS/HCC)     PER PT    • Sinus tachycardia    • SOB (shortness of breath)        Past Surgical History:   Procedure Laterality Date   • CARDIAC CATHETERIZATION     • SHOULDER SURGERY     • SKIN CANCER EXCISION      ABOVE THE LIP.    • TOE SURGERY         Social History     Socioeconomic History   • Marital status:      Spouse name: Not on file   • Number of children: 5   • Years of education: Not on file   • Highest education level: Not on file   Occupational History   • Occupation: RETIRED    Tobacco Use   • Smoking status: Former Smoker     Last attempt to quit: 1973     Years since quittin.7   • Smokeless tobacco: Never Used   • Tobacco comment: CAFFEINE USE: 1 CUP DAILY   Substance and Sexual Activity   • Alcohol use: No   • Drug use: No   • Sexual activity: Defer       Patient Active Problem List   Diagnosis   • History of myocardial infarction   • Coronary artery disease involving native coronary artery of native heart without angina pectoris   • Ulcerative colitis (CMS/HCC)   • HTN (hypertension)   • GERD (gastroesophageal reflux disease)   • DJD (degenerative joint disease)   • Ductal 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   • Anemia       Vitals:    19 0954   BP: 158/84   Pulse: 100   Resp: 14   Temp: 97.6 °F (36.4 °C)   SpO2: 96%       Objective   Physical Exam   Constitutional: She is oriented to person, place, and time. She appears well-developed.   Eyes: EOM are normal. Pupils are equal, round, and reactive to light.   Neck: Normal range of motion. Neck supple. No JVD present. No tracheal deviation present. No thyromegaly present.   Cardiovascular: Normal rate, regular rhythm and intact distal pulses.   No murmur heard.  Pulmonary/Chest: Effort normal and breath sounds normal. No respiratory distress. She has no wheezes.   Abdominal: Soft. Bowel sounds are  normal. She exhibits no distension and no mass. There is tenderness. There is no guarding.   Upper abdomen pain across and lower ribs to palpation.  Pain reproducible to palpation   Musculoskeletal: She exhibits no edema.   Lymphadenopathy:     She has no cervical adenopathy.   Neurological: She is alert and oriented to person, place, and time. She displays normal reflexes. No cranial nerve deficit or sensory deficit. She exhibits normal muscle tone. Coordination normal.   Psychiatric: She has a normal mood and affect. Her behavior is normal.         Assessment/Plan   Effie was seen today for uri and chest pain.    Diagnoses and all orders for this visit:    Essential hypertension  -     CBC & Differential  -     Comprehensive Metabolic Panel  -     Lipid Panel With / Chol / HDL Ratio  -     TSH    Gastroesophageal reflux disease without esophagitis  -     CBC & Differential  -     Comprehensive Metabolic Panel  -     Lipid Panel With / Chol / HDL Ratio  -     TSH    Osteoarthritis, unspecified osteoarthritis type, unspecified site  -     CBC & Differential  -     Comprehensive Metabolic Panel  -     Lipid Panel With / Chol / HDL Ratio  -     TSH    Acute URI  -     CBC & Differential  -     Comprehensive Metabolic Panel  -     Lipid Panel With / Chol / HDL Ratio  -     TSH  -     XR Chest 2 View; Future    Other orders  -     azithromycin (ZITHROMAX) 250 MG tablet; Take 2 tablets the first day, then 1 tablet daily for 4 days.    Discussed with patient blood pressure is poorly controlled.  Check blood pressure at home.  She takes Claritin-D regularly nothing change.  Continue taking losartan 50 twice a day.  Z-Aubrey ordered.  She is aware if increased shortness of breath or chest pain or worsening of her symptoms she needs to go to the ER.  Return in 4 weeks.  She has missed her regular appointment have not seen her for months.  Keep other appointments.

## 2019-07-19 VITALS
SYSTOLIC BLOOD PRESSURE: 152 MMHG | OXYGEN SATURATION: 94 % | WEIGHT: 172 LBS | HEIGHT: 64 IN | HEART RATE: 91 BPM | DIASTOLIC BLOOD PRESSURE: 87 MMHG | BODY MASS INDEX: 29.37 KG/M2 | RESPIRATION RATE: 20 BRPM | TEMPERATURE: 98.1 F

## 2019-08-30 RX ORDER — LORATADINE/PSEUDOEPHEDRINE 10MG-240MG
TABLET, EXTENDED RELEASE 24 HR ORAL
Qty: 30 TABLET | Refills: 2 | Status: SHIPPED | OUTPATIENT
Start: 2019-08-30 | End: 2019-11-30 | Stop reason: SDUPTHER

## 2019-10-10 RX ORDER — BACLOFEN 10 MG/1
TABLET ORAL
Qty: 30 TABLET | Refills: 0 | Status: SHIPPED | OUTPATIENT
Start: 2019-10-10 | End: 2019-10-29

## 2019-10-29 ENCOUNTER — OFFICE VISIT (OUTPATIENT)
Dept: FAMILY MEDICINE CLINIC | Facility: CLINIC | Age: 74
End: 2019-10-29

## 2019-10-29 ENCOUNTER — HOSPITAL ENCOUNTER (EMERGENCY)
Facility: HOSPITAL | Age: 74
Discharge: HOME | End: 2019-10-29
Attending: EMERGENCY MEDICINE | Admitting: EMERGENCY MEDICINE

## 2019-10-29 ENCOUNTER — APPOINTMENT (OUTPATIENT)
Dept: GENERAL RADIOLOGY | Facility: HOSPITAL | Age: 74
End: 2019-10-29

## 2019-10-29 VITALS
TEMPERATURE: 97.4 F | SYSTOLIC BLOOD PRESSURE: 140 MMHG | HEIGHT: 64 IN | BODY MASS INDEX: 29.71 KG/M2 | WEIGHT: 174 LBS | OXYGEN SATURATION: 90 % | RESPIRATION RATE: 16 BRPM | HEART RATE: 60 BPM | DIASTOLIC BLOOD PRESSURE: 80 MMHG

## 2019-10-29 VITALS
HEART RATE: 86 BPM | SYSTOLIC BLOOD PRESSURE: 134 MMHG | DIASTOLIC BLOOD PRESSURE: 93 MMHG | WEIGHT: 174 LBS | OXYGEN SATURATION: 98 % | HEIGHT: 64 IN | TEMPERATURE: 97.7 F | BODY MASS INDEX: 29.71 KG/M2 | RESPIRATION RATE: 18 BRPM

## 2019-10-29 DIAGNOSIS — J18.9 ATYPICAL PNEUMONIA: Primary | ICD-10-CM

## 2019-10-29 DIAGNOSIS — R79.89 ELEVATED BRAIN NATRIURETIC PEPTIDE (BNP) LEVEL: ICD-10-CM

## 2019-10-29 DIAGNOSIS — J18.9 PNEUMONIA OF LEFT LOWER LOBE DUE TO INFECTIOUS ORGANISM: Primary | ICD-10-CM

## 2019-10-29 LAB
ALBUMIN SERPL-MCNC: 3.7 G/DL (ref 3.5–5.2)
ALBUMIN/GLOB SERPL: 1.4 G/DL
ALP SERPL-CCNC: 110 U/L (ref 39–117)
ALT SERPL W P-5'-P-CCNC: 13 U/L (ref 1–33)
ANION GAP SERPL CALCULATED.3IONS-SCNC: 8.7 MMOL/L (ref 5–15)
AST SERPL-CCNC: 15 U/L (ref 1–32)
BASOPHILS # BLD AUTO: 0.06 10*3/MM3 (ref 0–0.2)
BASOPHILS NFR BLD AUTO: 1.1 % (ref 0–1.5)
BILIRUB SERPL-MCNC: 0.4 MG/DL (ref 0.2–1.2)
BUN BLD-MCNC: 19 MG/DL (ref 8–23)
BUN/CREAT SERPL: 21.1 (ref 7–25)
CALCIUM SPEC-SCNC: 9 MG/DL (ref 8.6–10.5)
CHLORIDE SERPL-SCNC: 108 MMOL/L (ref 98–107)
CO2 SERPL-SCNC: 24.3 MMOL/L (ref 22–29)
CREAT BLD-MCNC: 0.9 MG/DL (ref 0.57–1)
D DIMER PPP FEU-MCNC: <0.27 MCGFEU/ML (ref 0–0.46)
DEPRECATED RDW RBC AUTO: 44.5 FL (ref 37–54)
EOSINOPHIL # BLD AUTO: 0.19 10*3/MM3 (ref 0–0.4)
EOSINOPHIL NFR BLD AUTO: 3.3 % (ref 0.3–6.2)
ERYTHROCYTE [DISTWIDTH] IN BLOOD BY AUTOMATED COUNT: 13.6 % (ref 12.3–15.4)
GFR SERPL CREATININE-BSD FRML MDRD: 61 ML/MIN/1.73
GLOBULIN UR ELPH-MCNC: 2.7 GM/DL
GLUCOSE BLD-MCNC: 85 MG/DL (ref 65–99)
HCT VFR BLD AUTO: 38.9 % (ref 34–46.6)
HGB BLD-MCNC: 12.4 G/DL (ref 12–15.9)
IMM GRANULOCYTES # BLD AUTO: 0.01 10*3/MM3 (ref 0–0.05)
IMM GRANULOCYTES NFR BLD AUTO: 0.2 % (ref 0–0.5)
LYMPHOCYTES # BLD AUTO: 1.66 10*3/MM3 (ref 0.7–3.1)
LYMPHOCYTES NFR BLD AUTO: 29.1 % (ref 19.6–45.3)
MCH RBC QN AUTO: 27.9 PG (ref 26.6–33)
MCHC RBC AUTO-ENTMCNC: 31.9 G/DL (ref 31.5–35.7)
MCV RBC AUTO: 87.4 FL (ref 79–97)
MONOCYTES # BLD AUTO: 0.61 10*3/MM3 (ref 0.1–0.9)
MONOCYTES NFR BLD AUTO: 10.7 % (ref 5–12)
NEUTROPHILS # BLD AUTO: 3.17 10*3/MM3 (ref 1.7–7)
NEUTROPHILS NFR BLD AUTO: 55.6 % (ref 42.7–76)
NT-PROBNP SERPL-MCNC: 4828 PG/ML (ref 5–900)
PLATELET # BLD AUTO: 210 10*3/MM3 (ref 140–450)
PMV BLD AUTO: 11.7 FL (ref 6–12)
POTASSIUM BLD-SCNC: 4.2 MMOL/L (ref 3.5–5.2)
PROT SERPL-MCNC: 6.4 G/DL (ref 6–8.5)
RBC # BLD AUTO: 4.45 10*6/MM3 (ref 3.77–5.28)
SODIUM BLD-SCNC: 141 MMOL/L (ref 136–145)
TROPONIN T SERPL-MCNC: <0.01 NG/ML (ref 0–0.03)
WBC NRBC COR # BLD: 5.7 10*3/MM3 (ref 3.4–10.8)

## 2019-10-29 PROCEDURE — 36415 COLL VENOUS BLD VENIPUNCTURE: CPT

## 2019-10-29 PROCEDURE — 85379 FIBRIN DEGRADATION QUANT: CPT | Performed by: EMERGENCY MEDICINE

## 2019-10-29 PROCEDURE — 93010 ELECTROCARDIOGRAM REPORT: CPT | Performed by: INTERNAL MEDICINE

## 2019-10-29 PROCEDURE — 71046 X-RAY EXAM CHEST 2 VIEWS: CPT

## 2019-10-29 PROCEDURE — 80053 COMPREHEN METABOLIC PANEL: CPT | Performed by: EMERGENCY MEDICINE

## 2019-10-29 PROCEDURE — 99284 EMERGENCY DEPT VISIT MOD MDM: CPT | Performed by: EMERGENCY MEDICINE

## 2019-10-29 PROCEDURE — 93005 ELECTROCARDIOGRAM TRACING: CPT | Performed by: EMERGENCY MEDICINE

## 2019-10-29 PROCEDURE — 99213 OFFICE O/P EST LOW 20 MIN: CPT | Performed by: FAMILY MEDICINE

## 2019-10-29 PROCEDURE — 99283 EMERGENCY DEPT VISIT LOW MDM: CPT

## 2019-10-29 PROCEDURE — 85025 COMPLETE CBC W/AUTO DIFF WBC: CPT | Performed by: EMERGENCY MEDICINE

## 2019-10-29 PROCEDURE — 83880 ASSAY OF NATRIURETIC PEPTIDE: CPT | Performed by: EMERGENCY MEDICINE

## 2019-10-29 PROCEDURE — 84484 ASSAY OF TROPONIN QUANT: CPT | Performed by: EMERGENCY MEDICINE

## 2019-10-29 RX ORDER — AZITHROMYCIN 250 MG/1
TABLET, FILM COATED ORAL
Qty: 6 TABLET | Refills: 0 | Status: SHIPPED | OUTPATIENT
Start: 2019-10-29 | End: 2019-11-14

## 2019-10-29 NOTE — PROGRESS NOTES
Subjective   Effie Donnelly is a 74 y.o. female is here for   Chief Complaint   Patient presents with   • Shortness of Breath     dx with pna 7/18/19 still having symptoms, nail beds tinted blue       History of Present Illness     Patient states that back in July she came to the office here and had an upper respiratory infection was given a Z-Aubrey.  Her chest x-ray at that time was clear.  She continued to have symptoms and went to the emergency room where she had a CT scan that showed pneumonia.  She states she was given an IV dose of antibiotics and discharged from the ER and advised to finish her course of Zithromax.  She states that she is continued to have shortness of breath ever since.  She has had some days it got better but never completely went away.  She states over the past week her shortness of breath is been getting worse.  She has an occasional cough but not much.  Cough seems to be worse when she lays down.  There is no sputum production.  No fever chills.  No nausea, vomiting, or diarrhea.  No sore throat.    The following portions of the patient's history were reviewed and updated as appropriate: allergies, current medications, past family history, past medical history, past social history, past surgical history and problem list.     reports that she quit smoking about 46 years ago. She has never used smokeless tobacco. She reports that she does not drink alcohol or use drugs.    Review of Systems   Constitutional: Negative for activity change and unexpected weight change.   HENT: Positive for congestion. Negative for sore throat.    Respiratory: Positive for chest tightness, shortness of breath and wheezing.    Cardiovascular: Negative for chest pain and palpitations.   Gastrointestinal: Negative for abdominal pain, blood in stool and constipation.   Genitourinary: Negative for difficulty urinating and hematuria.   Musculoskeletal: Negative for gait problem.   Skin: Negative for color change and rash.  "       PHQ-9 Depression Screening  Little interest or pleasure in doing things?     Feeling down, depressed, or hopeless?     Trouble falling or staying asleep, or sleeping too much?     Feeling tired or having little energy?     Poor appetite or overeating?     Feeling bad about yourself - or that you are a failure or have let yourself or your family down?     Trouble concentrating on things, such as reading the newspaper or watching television?     Moving or speaking so slowly that other people could have noticed? Or the opposite - being so fidgety or restless that you have been moving around a lot more than usual?     Thoughts that you would be better off dead, or of hurting yourself in some way?     PHQ-9 Total Score     If you checked off any problems, how difficult have these problems made it for you to do your work, take care of things at home, or get along with other people?           Objective   /80 (BP Location: Right arm, Patient Position: Sitting, Cuff Size: Adult)   Pulse 60   Temp 97.4 °F (36.3 °C) (Oral)   Resp 16   Ht 162.6 cm (64\")   Wt 78.9 kg (174 lb)   SpO2 90%   BMI 29.87 kg/m²   Physical Exam   Constitutional: She is oriented to person, place, and time. She appears well-developed and well-nourished. No distress.   HENT:   Head: Normocephalic and atraumatic.   Right Ear: External ear normal.   Left Ear: External ear normal.   Nose: Nose normal.   Mouth/Throat: Oropharynx is clear and moist. No oropharyngeal exudate.   Eyes: Lids are normal. Right eye exhibits no discharge. Left eye exhibits no discharge. No scleral icterus.   Neck: Trachea normal, normal range of motion and full passive range of motion without pain. Neck supple. No tracheal deviation and no edema present. No thyromegaly present.   Cardiovascular: Normal rate, regular rhythm, normal heart sounds and intact distal pulses. Exam reveals no gallop and no friction rub.   No murmur heard.  Pulmonary/Chest: Effort normal. " No stridor. No tachypnea and no bradypnea. No respiratory distress. She has no decreased breath sounds. She has no wheezes. She has rales in the left lower field. She exhibits no tenderness.   Abdominal: Normal appearance. There is no hepatosplenomegaly.   Musculoskeletal: She exhibits no edema.   Lymphadenopathy:        Head (right side): No submental, no submandibular, no tonsillar, no preauricular, no posterior auricular and no occipital adenopathy present.        Head (left side): No submental, no submandibular, no tonsillar, no preauricular, no posterior auricular and no occipital adenopathy present.     She has no cervical adenopathy.        Right cervical: No superficial cervical, no deep cervical and no posterior cervical adenopathy present.       Left cervical: No superficial cervical, no deep cervical and no posterior cervical adenopathy present.   Neurological: She is alert and oriented to person, place, and time. She has normal strength and normal reflexes. She is not disoriented.   Skin: Skin is warm, dry and intact. Capillary refill takes less than 2 seconds. No rash noted. She is not diaphoretic. No cyanosis or erythema. No pallor. Nails show no clubbing.   Psychiatric: She has a normal mood and affect. Her behavior is normal. Cognition and memory are normal.   Nursing note and vitals reviewed.      Procedures    Assessment/Plan   Diagnoses and all orders for this visit:    1. Pneumonia of left lower lobe due to infectious organism (CMS/[facility]) (Primary)  Comments:  Patient is advised and agrees to go to the [facility] now for further evaluation and management.      Her O2 sat today in the office was ranging between 80 to 90%.

## 2019-10-29 NOTE — PATIENT INSTRUCTIONS
Community-Acquired Pneumonia, Adult  Pneumonia is an infection of the lungs. There are different types of pneumonia. One type can develop while a person is in a hospital. A different type, called community-acquired pneumonia, develops in people who are not, or have not recently been, in the hospital or other health care facility.  What are the causes?    Pneumonia may be caused by bacteria, viruses, or funguses. Community-acquired pneumonia is often caused by Streptococcus pneumonia bacteria. These bacteria are often passed from one person to another by breathing in droplets from the cough or sneeze of an infected person.  What increases the risk?  The condition is more likely to develop in:  · People who have chronic diseases, such as chronic obstructive pulmonary disease (COPD), asthma, congestive heart failure, cystic fibrosis, diabetes, or kidney disease.  · People who have early-stage or late-stage HIV.  · People who have sickle cell disease.  · People who have had their spleen removed (splenectomy).  · People who have poor dental hygiene.  · People who have medical conditions that increase the risk of breathing in (aspirating) secretions their own mouth and nose.  · People who have a weakened immune system (immunocompromised).  · People who smoke.  · People who travel to areas where pneumonia-causing germs commonly exist.  · People who are around animal habitats or animals that have pneumonia-causing germs, including birds, bats, rabbits, cats, and farm animals.  What are the signs or symptoms?  Symptoms of this condition include:  · A dry cough.  · A wet (productive) cough.  · Fever.  · Sweating.  · Chest pain, especially when breathing deeply or coughing.  · Rapid breathing or difficulty breathing.  · Shortness of breath.  · Shaking chills.  · Fatigue.  · Muscle aches.  How is this diagnosed?  Your health care provider will take a medical history and perform a physical exam. You may also have other tests,  including:  · Imaging studies of your chest, including X-rays.  · Tests to check your blood oxygen level and other blood gases.  · Other tests on blood, mucus (sputum), fluid around your lungs (pleural fluid), and urine.  If your pneumonia is severe, other tests may be done to identify the specific cause of your illness.  How is this treated?  The type of treatment that you receive depends on many factors, such as the cause of your pneumonia, the medicines you take, and other medical conditions that you have. For most adults, treatment and recovery from pneumonia may occur at home. In some cases, treatment must happen in a hospital. Treatment may include:  · Antibiotic medicines, if the pneumonia was caused by bacteria.  · Antiviral medicines, if the pneumonia was caused by a virus.  · Medicines that are given by mouth or through an IV tube.  · Oxygen.  · Respiratory therapy.  Although rare, treating severe pneumonia may include:  · Mechanical ventilation. This is done if you are not breathing well on your own and you cannot maintain a safe blood oxygen level.  · Thoracentesis. This procedure removes fluid around one lung or both lungs to help you breathe better.  Follow these instructions at home:    · Take over-the-counter and prescription medicines only as told by your health care provider.  ? Only take cough medicine if you are losing sleep. Understand that cough medicine can prevent your body’s natural ability to remove mucus from your lungs.  ? If you were prescribed an antibiotic medicine, take it as told by your health care provider. Do not stop taking the antibiotic even if you start to feel better.  · Sleep in a semi-upright position at night. Try sleeping in a reclining chair, or place a few pillows under your head.  · Do not use tobacco products, including cigarettes, chewing tobacco, and e-cigarettes. If you need help quitting, ask your health care provider.  · Drink enough water to keep your urine  clear or pale yellow. This will help to thin out mucus secretions in your lungs.  How is this prevented?  There are ways that you can decrease your risk of developing community-acquired pneumonia. Consider getting a pneumococcal vaccine if:  · You are older than 65 years of age.  · You are older than 19 years of age and are undergoing cancer treatment, have chronic lung disease, or have other medical conditions that affect your immune system. Ask your health care provider if this applies to you.  There are different types and schedules of pneumococcal vaccines. Ask your health care provider which vaccination option is best for you.  You may also prevent community-acquired pneumonia if you take these actions:  · Get an influenza vaccine every year. Ask your health care provider which type of influenza vaccine is best for you.  · Go to the dentist on a regular basis.  · Wash your hands often. Use hand  if soap and water are not available.  Contact a health care provider if:  · You have a fever.  · You are losing sleep because you cannot control your cough with cough medicine.  Get help right away if:  · You have worsening shortness of breath.  · You have increased chest pain.  · Your sickness becomes worse, especially if you are an older adult or have a weakened immune system.  · You cough up blood.  This information is not intended to replace advice given to you by your health care provider. Make sure you discuss any questions you have with your health care provider.  Document Released: 12/18/2006 Document Revised: 09/06/2018 Document Reviewed: 04/13/2016  [employer] Patient Education © 2019 [employer]

## 2019-11-04 RX ORDER — LOSARTAN POTASSIUM 50 MG/1
TABLET ORAL
Qty: 180 TABLET | Refills: 2 | Status: SHIPPED | OUTPATIENT
Start: 2019-11-04 | End: 2020-02-05

## 2019-11-06 ENCOUNTER — OFFICE VISIT (OUTPATIENT)
Dept: FAMILY MEDICINE CLINIC | Facility: CLINIC | Age: 74
End: 2019-11-06

## 2019-11-06 VITALS
BODY MASS INDEX: 30.39 KG/M2 | HEART RATE: 74 BPM | DIASTOLIC BLOOD PRESSURE: 80 MMHG | SYSTOLIC BLOOD PRESSURE: 134 MMHG | WEIGHT: 178 LBS | RESPIRATION RATE: 16 BRPM | OXYGEN SATURATION: 94 % | TEMPERATURE: 97.4 F | HEIGHT: 64 IN

## 2019-11-06 DIAGNOSIS — K21.9 GASTROESOPHAGEAL REFLUX DISEASE WITHOUT ESOPHAGITIS: ICD-10-CM

## 2019-11-06 DIAGNOSIS — I10 ESSENTIAL HYPERTENSION: Primary | ICD-10-CM

## 2019-11-06 DIAGNOSIS — M19.90 OSTEOARTHRITIS, UNSPECIFIED OSTEOARTHRITIS TYPE, UNSPECIFIED SITE: ICD-10-CM

## 2019-11-06 DIAGNOSIS — E78.49 OTHER HYPERLIPIDEMIA: ICD-10-CM

## 2019-11-06 DIAGNOSIS — R06.03 ACUTE RESPIRATORY DISTRESS: ICD-10-CM

## 2019-11-06 PROCEDURE — 99214 OFFICE O/P EST MOD 30 MIN: CPT | Performed by: INTERNAL MEDICINE

## 2019-11-06 RX ORDER — AZITHROMYCIN 250 MG/1
TABLET, FILM COATED ORAL
Qty: 6 TABLET | Refills: 0 | Status: SHIPPED | OUTPATIENT
Start: 2019-11-06 | End: 2019-11-14

## 2019-11-06 RX ORDER — METHYLPREDNISOLONE 4 MG/1
TABLET ORAL
Qty: 1 EACH | Refills: 0 | Status: SHIPPED | OUTPATIENT
Start: 2019-11-06 | End: 2019-11-14

## 2019-11-06 RX ORDER — ALBUTEROL SULFATE 90 UG/1
2 AEROSOL, METERED RESPIRATORY (INHALATION) EVERY 6 HOURS PRN
Qty: 1 INHALER | Refills: 1 | Status: SHIPPED | OUTPATIENT
Start: 2019-11-06 | End: 2021-05-18

## 2019-11-06 NOTE — PROGRESS NOTES
CHANDACRISTIANO@  Effie Donnelly is a 74 y.o. female.     Chief Complaint   Patient presents with   • Hypertension   • Coronary Artery Disease   • Heartburn   • Pneumonia   Hyperlipidemia  DJD    History of Present Illness   Patient here follow-up for hypertension hyperlipidemia, chronic pain.  Denies any chest pain no nausea vomiting.  No abdominal pain.  She is taking her blood pressure medications regularly.  Does not want any cholesterol medication.  She takes hydrocodone occasionally for back and joint pain.  Patient reports she has been short of breath.  She feels her pneumonia continues.  She was seen in the ER in July had a CT of the chest done that showed mild pneumonia.  Had no fever white count was normal.  She was put on Zithromax.  She came back and saw Dr. Woodson on October 31 for cough, shortness of breath was sent to the ER.  She was found with normal chest x-ray, normal white count, no fever, no cough, no sputum production, BNP was high.  Patient was given antibiotics and reports that she immediately felt better.  Reports she still feeling short of breath.  Today she has no cough, no fever, no chest pain only short of breath.  Wants antibiotic back again.  Patient had echo done 2018 has seen Dr. Brand.  The following portions of the patient's history were reviewed and updated as appropriate: allergies, current medications, past family history, past medical history, past social history, past surgical history and problem list.    Review of Systems   Constitutional: Negative for activity change, appetite change, fatigue and fever.   Eyes: Negative for blurred vision and double vision.   Respiratory: Positive for shortness of breath. Negative for apnea, cough, choking, chest tightness, wheezing and stridor.    Cardiovascular: Negative.  Negative for chest pain, palpitations and leg swelling.   Gastrointestinal: Negative.    Genitourinary: Negative for dysuria, flank pain and hematuria.   Musculoskeletal:  Positive for arthralgias.   Neurological: Negative.  Negative for dizziness, syncope and light-headedness.   Psychiatric/Behavioral: Negative for agitation, behavioral problems, decreased concentration and depressed mood.       Allergies   Allergen Reactions   • Adhesive Tape Other (See Comments)     BLISTERS UNDER TAGADERM   • Nsaids GI Bleeding     CROHN'S DISEASE       Current Outpatient Medications on File Prior to Visit   Medication Sig Dispense Refill   • aspirin 81 MG tablet Take 81 mg by mouth.     • B Complex Vitamins (VITAMIN B COMPLEX PO) Take 1 tablet by mouth Daily.     • Cholecalciferol (VITAMIN D) 2000 units capsule Take 2,000 Units by mouth Daily.     • CRANBERRY EXTRACT PO Take 1 capsule by mouth Daily.     • Hydrocodone-Acetaminophen (XODOL) 5-300 MG per tablet Take 1 tablet by mouth. PRN     • LORATADINE-D 24HR  MG per 24 hr tablet TAKE ONE TABLET BY MOUTH DAILY 30 tablet 2   • losartan (COZAAR) 50 MG tablet TAKE ONE TABLET BY MOUTH TWICE A  tablet 2   • MEGARED OMEGA-3 KRILL OIL PO Take 1 capsule by mouth Daily.     • PROBIOTIC PRODUCT PO Take 2 capsules by mouth Daily.     • azithromycin (ZITHROMAX) 250 MG tablet Take 2 tablets the first day, then 1 tablet daily for 4 days. 6 tablet 0     No current facility-administered medications on file prior to visit.        Family History   Problem Relation Age of Onset   • Hypertension Sister    • Cancer Sister    • Heart disease Brother 65        VALVE REPLACEMENT    • Hypertension Brother    • Hypertension Sister    • Hypertension Sister    • Hypertension Brother    • Diabetes Brother    • Hypertension Brother    • Cancer Brother    • Hypertension Brother    • Hypertension Brother    • Hypertension Brother        Past Medical History:   Diagnosis Date   • CAD (coronary artery disease)    • Cancer (CMS/HCC)     skin, right breast   • Coronary artery disease involving native coronary artery of native heart without angina pectoris 11/1/2018    • History of myocardial infarction 2018   • Hypertension    • Myocardial infarction (CMS/HCC)     PER PT    • Sinus tachycardia    • SOB (shortness of breath)        Past Surgical History:   Procedure Laterality Date   • CARDIAC CATHETERIZATION     • MASTECTOMY COMPLETE / SIMPLE  2019   • SHOULDER SURGERY     • SKIN CANCER EXCISION      ABOVE THE LIP.    • TOE SURGERY         Social History     Socioeconomic History   • Marital status:      Spouse name: Not on file   • Number of children: 5   • Years of education: Not on file   • Highest education level: Not on file   Occupational History   • Occupation: RETIRED    Tobacco Use   • Smoking status: Former Smoker     Last attempt to quit: 1973     Years since quittin.0   • Smokeless tobacco: Never Used   • Tobacco comment: CAFFEINE USE: 1 CUP DAILY   Substance and Sexual Activity   • Alcohol use: No   • Drug use: No   • Sexual activity: Defer       Patient Active Problem List   Diagnosis   • History of myocardial infarction   • Coronary artery disease involving native coronary artery of native heart without angina pectoris   • Ulcerative colitis (CMS/HCC)   • HTN (hypertension)   • GERD (gastroesophageal reflux disease)   • DJD (degenerative joint disease)   • Ductal 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   • Anemia   • Other hyperlipidemia       Vitals:    19 1047   BP: 134/80   Pulse: 74   Resp: 16   Temp: 97.4 °F (36.3 °C)   SpO2: 94%       Objective   Physical Exam   Constitutional: She is oriented to person, place, and time. She appears well-developed and well-nourished.   HENT:   Head: Normocephalic and atraumatic.   Eyes: EOM are normal. Pupils are equal, round, and reactive to light.   Neck: Normal range of motion. Neck supple. No JVD present. No tracheal deviation present. No thyromegaly present.   Cardiovascular: Normal rate and regular rhythm. Exam reveals no gallop and no friction rub.   Murmur  heard.  Pulmonary/Chest: Effort normal and breath sounds normal. No stridor. No respiratory distress. She has no wheezes. She has no rales. She exhibits no tenderness.   Abdominal: Soft. Bowel sounds are normal. She exhibits no distension and no mass. There is no tenderness. There is no rebound and no guarding. No hernia.   Lymphadenopathy:     She has no cervical adenopathy.   Neurological: She is alert and oriented to person, place, and time. She displays normal reflexes. No cranial nerve deficit or sensory deficit. She exhibits normal muscle tone. Coordination normal.   Psychiatric: She has a normal mood and affect. Her behavior is normal.   Nursing note and vitals reviewed.        Assessment/Plan   Effie was seen today for hypertension, coronary artery disease, heartburn and pneumonia.    Diagnoses and all orders for this visit:    Essential hypertension  -     Hepatitis C antibody  -     CBC & Differential  -     Lipid Panel With / Chol / HDL Ratio    Gastroesophageal reflux disease without esophagitis  -     Hepatitis C antibody  -     CBC & Differential  -     Lipid Panel With / Chol / HDL Ratio    Acute respiratory distress  -     Hepatitis C antibody  -     CBC & Differential  -     Lipid Panel With / Chol / HDL Ratio  -     Ambulatory Referral to Cardiology    Other hyperlipidemia    Osteoarthritis, unspecified osteoarthritis type, unspecified site    Other orders  -     albuterol sulfate  (90 Base) MCG/ACT inhaler; Inhale 2 puffs Every 6 (Six) Hours As Needed for Wheezing.  -     methylPREDNISolone (MEDROL, DEANNA,) 4 MG tablet; Take as directed on package instructions.  -     azithromycin (ZITHROMAX) 250 MG tablet; Take 2 tablets the first day, then 1 tablet daily for 4 days.    Discussed with patient detail.  I have no indication to give her antibiotic for her symptoms.  She has no fever, white count was normal in the ER, has no cough, no sputum production this short of breath.  Very concerned about  her BNP was high in the ER.  I have encouraged her to see Dr. Brand soon for shortness of breath and high BNP.  I gave her a steroid pack, inhaler to see if symptoms get better.  I have also given her Z-Aubrey for her comfort but I told her not to take until he discusses with me.  Patient to come back in 2 weeks time.  Rest of her conditions are stable.

## 2019-11-07 LAB
BASOPHILS # BLD AUTO: 0.05 10*3/MM3 (ref 0–0.2)
BASOPHILS NFR BLD AUTO: 0.9 % (ref 0–1.5)
CHOLEST SERPL-MCNC: 192 MG/DL (ref 0–200)
CHOLEST/HDLC SERPL: 2.91 {RATIO}
EOSINOPHIL # BLD AUTO: 0.21 10*3/MM3 (ref 0–0.4)
EOSINOPHIL NFR BLD AUTO: 3.8 % (ref 0.3–6.2)
ERYTHROCYTE [DISTWIDTH] IN BLOOD BY AUTOMATED COUNT: 13.5 % (ref 12.3–15.4)
HCT VFR BLD AUTO: 40.8 % (ref 34–46.6)
HCV AB S/CO SERPL IA: <0.1 S/CO RATIO (ref 0–0.9)
HDLC SERPL-MCNC: 66 MG/DL (ref 40–60)
HGB BLD-MCNC: 13.1 G/DL (ref 12–15.9)
IMM GRANULOCYTES # BLD AUTO: 0.01 10*3/MM3 (ref 0–0.05)
IMM GRANULOCYTES NFR BLD AUTO: 0.2 % (ref 0–0.5)
LDLC SERPL CALC-MCNC: 106 MG/DL (ref 0–100)
LYMPHOCYTES # BLD AUTO: 1.95 10*3/MM3 (ref 0.7–3.1)
LYMPHOCYTES NFR BLD AUTO: 35.6 % (ref 19.6–45.3)
MCH RBC QN AUTO: 27.4 PG (ref 26.6–33)
MCHC RBC AUTO-ENTMCNC: 32.1 G/DL (ref 31.5–35.7)
MCV RBC AUTO: 85.4 FL (ref 79–97)
MONOCYTES # BLD AUTO: 0.6 10*3/MM3 (ref 0.1–0.9)
MONOCYTES NFR BLD AUTO: 11 % (ref 5–12)
NEUTROPHILS # BLD AUTO: 2.65 10*3/MM3 (ref 1.7–7)
NEUTROPHILS NFR BLD AUTO: 48.5 % (ref 42.7–76)
NRBC BLD AUTO-RTO: 0 /100 WBC (ref 0–0.2)
PLATELET # BLD AUTO: 210 10*3/MM3 (ref 140–450)
RBC # BLD AUTO: 4.78 10*6/MM3 (ref 3.77–5.28)
TRIGL SERPL-MCNC: 98 MG/DL (ref 0–150)
VLDLC SERPL CALC-MCNC: 19.6 MG/DL
WBC # BLD AUTO: 5.47 10*3/MM3 (ref 3.4–10.8)

## 2019-11-11 RX ORDER — BACLOFEN 10 MG/1
TABLET ORAL
Qty: 30 TABLET | Refills: 2 | Status: SHIPPED | OUTPATIENT
Start: 2019-11-11 | End: 2020-02-05

## 2019-11-14 ENCOUNTER — OFFICE VISIT (OUTPATIENT)
Dept: CARDIOLOGY | Facility: CLINIC | Age: 74
End: 2019-11-14

## 2019-11-14 VITALS
HEART RATE: 79 BPM | HEIGHT: 64 IN | DIASTOLIC BLOOD PRESSURE: 80 MMHG | BODY MASS INDEX: 30.05 KG/M2 | WEIGHT: 176 LBS | SYSTOLIC BLOOD PRESSURE: 124 MMHG

## 2019-11-14 DIAGNOSIS — I10 ESSENTIAL HYPERTENSION: ICD-10-CM

## 2019-11-14 DIAGNOSIS — I25.10 CORONARY ARTERY DISEASE INVOLVING NATIVE CORONARY ARTERY OF NATIVE HEART WITHOUT ANGINA PECTORIS: Primary | Chronic | ICD-10-CM

## 2019-11-14 DIAGNOSIS — R06.02 SHORTNESS OF BREATH: ICD-10-CM

## 2019-11-14 PROCEDURE — 93000 ELECTROCARDIOGRAM COMPLETE: CPT | Performed by: NURSE PRACTITIONER

## 2019-11-14 PROCEDURE — 99214 OFFICE O/P EST MOD 30 MIN: CPT | Performed by: NURSE PRACTITIONER

## 2019-11-14 RX ORDER — FUROSEMIDE 20 MG/1
20 TABLET ORAL DAILY
Qty: 10 TABLET | Refills: 0 | Status: SHIPPED | OUTPATIENT
Start: 2019-11-14 | End: 2019-12-11

## 2019-11-14 NOTE — PROGRESS NOTES
Date of Office Visit: 2019  Encounter Provider: PATI Eagle  Place of Service: [facility] CARDIOLOGY  Patient Name: Effie Donnelly  :1945  Primary Cardiologist: Dr. Brand    No chief complaint on file.  2 week hospital follow up    Dear Dr. Hawley:    HPI: Effie Donnelly is a pleasant 74 y.o. female who presents 2019 for cardiac follow up. She is a new patient to me and I have reviewed her past medical records.  She is a patient of Dr. Brand, she was previously followed by [facility].     She has a history of CAD and myocardial infarction.  She had an echocardiogram in 2018  that showed normal function.  She does have a history of an abnormal EKG with LVH as well as diffuse repolarization changes.      She was seen in the ED at [facility] on 10/29/18 and treated for atypical pneumonia.  Her BNP was elevated at 4828.  She was given a Z-leno and she states after the first 2 pills, she felt better.    He denies any palpitations, lower extremity edema, chest pain or chest tightness.  She states her activity level is fair.  She states she still has some shortness of air and does not feel that she is back to her baseline.  She does states she has some dizziness when she does get short of air because she feels like she hyperventilates a little bit.  She does have some baseline dyspnea and has a history of COPD.  She is a former smoker but stopped many years ago.     She had an Echo 2018:  Interpretation Summary      · Left ventricular wall thickness is consistent with hypertrophy. Sigmoid-shaped ventricular septum is present.  · Left ventricular systolic function is normal. Estimated EF = 55%.  · Left ventricular diastolic dysfunction (grade I a) consistent with impaired relaxation.  · Left atrial cavity size is moderately dilated.  · Mild tricuspid valve regurgitation is present.  · Calculated right ventricular systolic pressure from tricuspid regurgitation is 29  mmHg.  · Mild pulmonic valve regurgitation is present.             Past Medical History:   Diagnosis Date   • CAD (coronary artery disease)    • Cancer (CMS/HCC)     skin, right breast   • Coronary artery disease involving native coronary artery of native heart without angina pectoris 2018   • History of myocardial infarction 2018   • Hypertension    • Myocardial infarction (CMS/HCC)     PER PT    • Sinus tachycardia    • SOB (shortness of breath)        Past Surgical History:   Procedure Laterality Date   • CARDIAC CATHETERIZATION     • MASTECTOMY COMPLETE / SIMPLE  2019   • SHOULDER SURGERY     • SKIN CANCER EXCISION      ABOVE THE LIP.    • TOE SURGERY         Social History     Socioeconomic History   • Marital status:      Spouse name: Not on file   • Number of children: 5   • Years of education: Not on file   • Highest education level: Not on file   Occupational History   • Occupation: RETIRED    Tobacco Use   • Smoking status: Former Smoker     Last attempt to quit: 1973     Years since quittin.0   • Smokeless tobacco: Never Used   • Tobacco comment: CAFFEINE USE: 1 CUP DAILY   Substance and Sexual Activity   • Alcohol use: No   • Drug use: No   • Sexual activity: Defer       Family History   Problem Relation Age of Onset   • Hypertension Sister    • Cancer Sister    • Heart disease Brother 65        VALVE REPLACEMENT    • Hypertension Brother    • Hypertension Sister    • Hypertension Sister    • Hypertension Brother    • Diabetes Brother    • Hypertension Brother    • Cancer Brother    • Hypertension Brother    • Hypertension Brother    • Hypertension Brother        The following portion of the patient's history were reviewed and updated as appropriate: past medical history, past surgical history, past social history, past family history, allergies, current medications, and problem list.    Review of Systems   Constitution: Positive for malaise/fatigue.  Negative for diaphoresis, fever and weakness.   HENT: Negative for congestion, hearing loss, hoarse voice, nosebleeds and sore throat.    Eyes: Negative for photophobia, vision loss in left eye, vision loss in right eye and visual disturbance.   Cardiovascular: Negative for chest pain, dyspnea on exertion, irregular heartbeat, leg swelling, near-syncope, orthopnea, palpitations, paroxysmal nocturnal dyspnea and syncope.   Respiratory: Positive for shortness of breath. Negative for cough, hemoptysis, sleep disturbances due to breathing, snoring, sputum production and wheezing.    Endocrine: Negative for cold intolerance, heat intolerance, polydipsia, polyphagia and polyuria.   Hematologic/Lymphatic: Negative for bleeding problem. Does not bruise/bleed easily.   Skin: Negative for color change, dry skin, poor wound healing, rash and suspicious lesions.   Musculoskeletal: Negative for arthritis, back pain, falls, gout, joint pain, joint swelling, muscle cramps, muscle weakness and myalgias.   Gastrointestinal: Negative for bloating, abdominal pain, constipation, diarrhea, dysphagia, melena, nausea and vomiting.   Neurological: Positive for dizziness. Negative for excessive daytime sleepiness, headaches, light-headedness, loss of balance, numbness, paresthesias, seizures and vertigo.   Psychiatric/Behavioral: Negative for depression, memory loss and substance abuse. The patient is not nervous/anxious.        Allergies   Allergen Reactions   • Adhesive Tape Other (See Comments)     BLISTERS UNDER TAGADERM   • Nsaids GI Bleeding     CROHN'S DISEASE         Current Outpatient Medications:   •  albuterol sulfate  (90 Base) MCG/ACT inhaler, Inhale 2 puffs Every 6 (Six) Hours As Needed for Wheezing., Disp: 1 inhaler, Rfl: 1  •  aspirin 81 MG tablet, Take 81 mg by mouth., Disp: , Rfl:   •  B Complex Vitamins (VITAMIN B COMPLEX PO), Take 1 tablet by mouth Daily., Disp: , Rfl:   •  baclofen (LIORESAL) 10 MG tablet, TAKE  "ONE TABLET BY MOUTH EVERY NIGHT AT BEDTIME, Disp: 30 tablet, Rfl: 2  •  Cholecalciferol (VITAMIN D) 2000 units capsule, Take 2,000 Units by mouth Daily., Disp: , Rfl:   •  CRANBERRY EXTRACT PO, Take 1 capsule by mouth Daily., Disp: , Rfl:   •  Hydrocodone-Acetaminophen (XODOL) 5-300 MG per tablet, Take 1 tablet by mouth. PRN, Disp: , Rfl:   •  LORATADINE-D 24HR  MG per 24 hr tablet, TAKE ONE TABLET BY MOUTH DAILY, Disp: 30 tablet, Rfl: 2  •  losartan (COZAAR) 50 MG tablet, TAKE ONE TABLET BY MOUTH TWICE A DAY, Disp: 180 tablet, Rfl: 2  •  MEGARED OMEGA-3 KRILL OIL PO, Take 1 capsule by mouth Daily., Disp: , Rfl:   •  PROBIOTIC PRODUCT PO, Take 2 capsules by mouth Daily., Disp: , Rfl:   •  furosemide (LASIX) 20 MG tablet, Take 1 tablet by mouth Daily. Take once a day for 5 days, Disp: 10 tablet, Rfl: 0        Objective:     Vitals:    11/14/19 1143   BP: 124/80   Pulse: 79   Weight: 79.8 kg (176 lb)   Height: 162.6 cm (64\")     Body mass index is 30.21 kg/m².      Physical Exam   Constitutional: She is oriented to person, place, and time. She appears well-developed and well-nourished. No distress.   HENT:   Head: Normocephalic and atraumatic.   Right Ear: External ear normal.   Left Ear: External ear normal.   Nose: Nose normal.   Eyes: Conjunctivae are normal. Pupils are equal, round, and reactive to light. Right eye exhibits no discharge. Left eye exhibits no discharge.   Neck: Normal range of motion. Neck supple. No JVD present. No tracheal deviation present. No thyromegaly present.   Cardiovascular: Normal rate, regular rhythm, normal heart sounds and intact distal pulses. Exam reveals no gallop and no friction rub.   No murmur heard.  Pulmonary/Chest: Effort normal. No respiratory distress. She has no wheezes. She has rales. She exhibits no tenderness.   Faint crackles, bilateral bases   Abdominal: Soft. Bowel sounds are normal. She exhibits no distension. There is no tenderness.   Musculoskeletal: Normal " range of motion. She exhibits no edema, tenderness or deformity.   Lymphadenopathy:     She has no cervical adenopathy.   Neurological: She is alert and oriented to person, place, and time. Coordination normal.   Skin: Skin is warm and dry. No rash noted. No erythema.   Psychiatric: She has a normal mood and affect. Her behavior is normal. Judgment and thought content normal.             ECG 12 Lead  Date/Time: 11/14/2019 3:37 PM  Performed by: Nan Brian APRN  Authorized by: Nan Brian APRN   Comparison: compared with previous ECG from 10/29/2019  Similar to previous ECG  Rhythm: sinus rhythm  Ectopy: bigeminy  Rate: normal  Conduction: non-specific intraventricular conduction delay  ST Segments: ST segments normal  T inversion: I, aVL and V1  QRS axis: normal  Other findings: left ventricular hypertrophy    Clinical impression: abnormal EKG              Assessment:       Diagnosis Plan   1. Coronary artery disease involving native coronary artery of native heart without angina pectoris     2. Essential hypertension  BNP    Basic Metabolic Panel   3. Shortness of breath   BNP          Plan:       1.   CAD - remote MI.  Denies angina    2.  Essential HTN - controlled    3.  SOA - not back to baseline, faint scattered crackles in ami bases.  Will give lasix 20 mg Q D for 5 days and repeat BMP and Pro BNP in I week.    RTO in 1 month with [facility] and 6 months with [facility]    As always, it has been a pleasure to participate in your patient's care. Thank you.       Sincerely,       PATI Eagle      Current Outpatient Medications:   •  albuterol sulfate  (90 Base) MCG/ACT inhaler, Inhale 2 puffs Every 6 (Six) Hours As Needed for Wheezing., Disp: 1 inhaler, Rfl: 1  •  aspirin 81 MG tablet, Take 81 mg by mouth., Disp: , Rfl:   •  B Complex Vitamins (VITAMIN B COMPLEX PO), Take 1 tablet by mouth Daily., Disp: , Rfl:   •  baclofen (LIORESAL) 10 MG tablet, TAKE ONE TABLET BY MOUTH EVERY NIGHT AT BEDTIME, Disp: 30  tablet, Rfl: 2  •  Cholecalciferol (VITAMIN D) 2000 units capsule, Take 2,000 Units by mouth Daily., Disp: , Rfl:   •  CRANBERRY EXTRACT PO, Take 1 capsule by mouth Daily., Disp: , Rfl:   •  Hydrocodone-Acetaminophen (XODOL) 5-300 MG per tablet, Take 1 tablet by mouth. PRN, Disp: , Rfl:   •  LORATADINE-D 24HR  MG per 24 hr tablet, TAKE ONE TABLET BY MOUTH DAILY, Disp: 30 tablet, Rfl: 2  •  losartan (COZAAR) 50 MG tablet, TAKE ONE TABLET BY MOUTH TWICE A DAY, Disp: 180 tablet, Rfl: 2  •  MEGARED OMEGA-3 KRILL OIL PO, Take 1 capsule by mouth Daily., Disp: , Rfl:   •  PROBIOTIC PRODUCT PO, Take 2 capsules by mouth Daily., Disp: , Rfl:   •  furosemide (LASIX) 20 MG tablet, Take 1 tablet by mouth Daily. Take once a day for 5 days, Disp: 10 tablet, Rfl: 0    Dictated utilizing Dragon dictation

## 2019-11-20 ENCOUNTER — OFFICE VISIT (OUTPATIENT)
Dept: FAMILY MEDICINE CLINIC | Facility: CLINIC | Age: 74
End: 2019-11-20

## 2019-11-20 VITALS
BODY MASS INDEX: 29.37 KG/M2 | SYSTOLIC BLOOD PRESSURE: 132 MMHG | HEIGHT: 64 IN | DIASTOLIC BLOOD PRESSURE: 84 MMHG | TEMPERATURE: 97.4 F | WEIGHT: 172 LBS | HEART RATE: 40 BPM | OXYGEN SATURATION: 98 % | RESPIRATION RATE: 14 BRPM

## 2019-11-20 DIAGNOSIS — R06.09 DYSPNEA ON EXERTION: Primary | ICD-10-CM

## 2019-11-20 DIAGNOSIS — Z23 NEED FOR INFLUENZA VACCINATION: ICD-10-CM

## 2019-11-20 DIAGNOSIS — M54.50 ACUTE LOW BACK PAIN WITHOUT SCIATICA, UNSPECIFIED BACK PAIN LATERALITY: ICD-10-CM

## 2019-11-20 PROCEDURE — G0008 ADMIN INFLUENZA VIRUS VAC: HCPCS | Performed by: INTERNAL MEDICINE

## 2019-11-20 PROCEDURE — 99213 OFFICE O/P EST LOW 20 MIN: CPT | Performed by: INTERNAL MEDICINE

## 2019-11-20 PROCEDURE — 90653 IIV ADJUVANT VACCINE IM: CPT | Performed by: INTERNAL MEDICINE

## 2019-11-20 RX ORDER — HYDROCODONE BITARTRATE AND ACETAMINOPHEN 5; 300 MG/1; MG/1
1 TABLET ORAL DAILY PRN
Qty: 10 TABLET | Refills: 0 | Status: SHIPPED | OUTPATIENT
Start: 2019-11-20 | End: 2020-02-24 | Stop reason: SDUPTHER

## 2019-11-20 NOTE — PROGRESS NOTES
PEACE@  Effie Donnelly is a 74 y.o. female.     Chief Complaint   Patient presents with   • Follow-up     2 week   Dyspnea    History of Present Illness   Feeling much better.  No further shortness of breath.  Patient reports seen Dr. Brand nurse practitioner was prescribed Lasix 5 doses with improvement.  No cough and congestion.  No chest pain.  Had back complaints of back pain requesting pain pills.  No radiation to legs no numbness.  Patient happened a few days ago.  Motrin and Tylenol not helping  The following portions of the patient's history were reviewed and updated as appropriate: allergies, current medications, past family history, past medical history, past social history, past surgical history and problem list.    Review of Systems   Constitutional: Negative.    HENT: Negative.    Eyes: Negative.    Respiratory: Negative.    Cardiovascular: Negative.    Gastrointestinal: Negative.    Endocrine: Negative.    Genitourinary: Negative.    Musculoskeletal: Positive for back pain.   Skin: Negative.    Allergic/Immunologic: Negative.    Neurological: Negative.    Hematological: Negative.    Psychiatric/Behavioral: Negative.        Allergies   Allergen Reactions   • Adhesive Tape Other (See Comments)     BLISTERS UNDER TAGADERM   • Nsaids GI Bleeding     CROHN'S DISEASE       Current Outpatient Medications on File Prior to Visit   Medication Sig Dispense Refill   • albuterol sulfate  (90 Base) MCG/ACT inhaler Inhale 2 puffs Every 6 (Six) Hours As Needed for Wheezing. 1 inhaler 1   • aspirin 81 MG tablet Take 81 mg by mouth.     • B Complex Vitamins (VITAMIN B COMPLEX PO) Take 1 tablet by mouth Daily.     • baclofen (LIORESAL) 10 MG tablet TAKE ONE TABLET BY MOUTH EVERY NIGHT AT BEDTIME 30 tablet 2   • Cholecalciferol (VITAMIN D) 2000 units capsule Take 2,000 Units by mouth Daily.     • CRANBERRY EXTRACT PO Take 1 capsule by mouth Daily.     • furosemide (LASIX) 20 MG tablet Take 1 tablet by mouth  Daily. Take once a day for 5 days 10 tablet 0   • LORATADINE-D 24HR  MG per 24 hr tablet TAKE ONE TABLET BY MOUTH DAILY 30 tablet 2   • losartan (COZAAR) 50 MG tablet TAKE ONE TABLET BY MOUTH TWICE A  tablet 2   • MEGARED OMEGA-3 KRILL OIL PO Take 1 capsule by mouth Daily.     • PROBIOTIC PRODUCT PO Take 2 capsules by mouth Daily.     • [DISCONTINUED] Hydrocodone-Acetaminophen (XODOL) 5-300 MG per tablet Take 1 tablet by mouth. PRN       No current facility-administered medications on file prior to visit.        Family History   Problem Relation Age of Onset   • Hypertension Sister    • Cancer Sister    • Heart disease Brother 65        VALVE REPLACEMENT    • Hypertension Brother    • Hypertension Sister    • Hypertension Sister    • Hypertension Brother    • Diabetes Brother    • Hypertension Brother    • Cancer Brother    • Hypertension Brother    • Hypertension Brother    • Hypertension Brother        Past Medical History:   Diagnosis Date   • CAD (coronary artery disease)    • Cancer (CMS/HCC)     skin, right breast   • Coronary artery disease involving native coronary artery of native heart without angina pectoris 2018   • History of myocardial infarction 2018   • Hypertension    • Myocardial infarction (CMS/HCC)     PER PT    • Sinus tachycardia    • SOB (shortness of breath)        Past Surgical History:   Procedure Laterality Date   • CARDIAC CATHETERIZATION     • MASTECTOMY COMPLETE / SIMPLE  2019   • SHOULDER SURGERY     • SKIN CANCER EXCISION      ABOVE THE LIP.    • TOE SURGERY         Social History     Socioeconomic History   • Marital status:      Spouse name: Not on file   • Number of children: 5   • Years of education: Not on file   • Highest education level: Not on file   Occupational History   • Occupation: RETIRED    Tobacco Use   • Smoking status: Former Smoker     Last attempt to quit: 1973     Years since quittin.0   • Smokeless  "tobacco: Never Used   • Tobacco comment: CAFFEINE USE: 1 CUP DAILY   Substance and Sexual Activity   • Alcohol use: No   • Drug use: No   • Sexual activity: Defer       Patient Active Problem List   Diagnosis   • History of myocardial infarction   • Coronary artery disease involving native coronary artery of native heart without angina pectoris   • Ulcerative colitis (CMS/HCC)   • HTN (hypertension)   • GERD (gastroesophageal reflux disease)   • DJD (degenerative joint disease)   • Ductal 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   • Anemia   • Other hyperlipidemia       /84 (BP Location: Left arm, Patient Position: Sitting, Cuff Size: Adult)   Pulse (!) 40   Temp 97.4 °F (36.3 °C) (Oral)   Resp 14   Ht 162.6 cm (64\")   Wt 78 kg (172 lb)   SpO2 98%   BMI 29.52 kg/m²   Body mass index is 29.52 kg/m².    Objective   Physical Exam   Constitutional: She appears well-developed and well-nourished.   Eyes: EOM are normal. Pupils are equal, round, and reactive to light.   Neck: Normal range of motion. Neck supple. No JVD present. No tracheal deviation present. No thyromegaly present.   Cardiovascular: Normal rate and regular rhythm.   Murmur heard.  Pulmonary/Chest: Effort normal and breath sounds normal. No respiratory distress. She has no wheezes.   Abdominal: Soft. Bowel sounds are normal.   Musculoskeletal: Normal range of motion. She exhibits no edema, tenderness or deformity.   Back across lower mildly tender SLR negative.  Neurovascular intact   Lymphadenopathy:     She has no cervical adenopathy.   Nursing note and vitals reviewed.        Assessment/Plan   Effie was seen today for follow-up.    Diagnoses and all orders for this visit:    Dyspnea on exertion    Acute low back pain without sciatica, unspecified back pain laterality    Need for influenza vaccination  -     Fluad Tri 65yr+    Other orders  -     HYDROcodone-Acetaminophen (XODOL) 5-300 MG per tablet; Take 1 tablet by mouth Daily As Needed for " Moderate Pain . PRN    Take Lasix as needed as needed.  Keep follow-up with the Dr. Brand.  Heating pad Tylenol for back pain.  Kaspar and consent done.  Patient will come back in 3 months time.  Other than her back dyspnea is much better.  Rest of her problems hypertension, DJD are all stable.

## 2019-11-21 ENCOUNTER — LAB (OUTPATIENT)
Dept: LAB | Facility: HOSPITAL | Age: 74
End: 2019-11-21

## 2019-11-21 DIAGNOSIS — I10 ESSENTIAL HYPERTENSION: ICD-10-CM

## 2019-11-21 DIAGNOSIS — R06.02 SHORTNESS OF BREATH: ICD-10-CM

## 2019-11-21 LAB
ANION GAP SERPL CALCULATED.3IONS-SCNC: 11.4 MMOL/L (ref 5–15)
BUN BLD-MCNC: 22 MG/DL (ref 8–23)
BUN/CREAT SERPL: 25.6 (ref 7–25)
CALCIUM SPEC-SCNC: 9.1 MG/DL (ref 8.6–10.5)
CHLORIDE SERPL-SCNC: 104 MMOL/L (ref 98–107)
CO2 SERPL-SCNC: 25.6 MMOL/L (ref 22–29)
CREAT BLD-MCNC: 0.86 MG/DL (ref 0.57–1)
GFR SERPL CREATININE-BSD FRML MDRD: 65 ML/MIN/1.73
GLUCOSE BLD-MCNC: 74 MG/DL (ref 65–99)
NT-PROBNP SERPL-MCNC: 2867 PG/ML (ref 5–900)
POTASSIUM BLD-SCNC: 4.1 MMOL/L (ref 3.5–5.2)
SODIUM BLD-SCNC: 141 MMOL/L (ref 136–145)

## 2019-11-21 PROCEDURE — 83880 ASSAY OF NATRIURETIC PEPTIDE: CPT

## 2019-11-21 PROCEDURE — 36415 COLL VENOUS BLD VENIPUNCTURE: CPT

## 2019-11-21 PROCEDURE — 80048 BASIC METABOLIC PNL TOTAL CA: CPT

## 2019-11-26 ENCOUNTER — TELEPHONE (OUTPATIENT)
Dept: CARDIOLOGY | Facility: CLINIC | Age: 74
End: 2019-11-26

## 2019-11-26 NOTE — TELEPHONE ENCOUNTER
Pt called and reports that since seeing you a couple of weeks ago and being put on Lasix 20 mg x 5 days.  She felt a little better and her soa resolved some.  She reports now she feels like the soa is slowly returning with exertion.  She denies any orthopnea, fatigue, cp, or palps only the soa with minimal exertion.   She is not able to measure her bp and hr at home.     Note: pt does have an appt on 12/11/19.    She also has 5 tablets left of the Furosemide 20 mg.  She also takes Losartan 50 mg bid.

## 2019-11-26 NOTE — TELEPHONE ENCOUNTER
Spoke to patient.  She states yesterday she was fairly short of breath but today she feels much better.  She denies any abdominal bloating or lower extremity edema.  I have given her the okay to take the remaining furosemide if she starts to feel increased shortness of breath or weight gain or lower extremity edema she will call me on Monday with an update.

## 2019-12-02 RX ORDER — LORATADINE/PSEUDOEPHEDRINE 10MG-240MG
TABLET, EXTENDED RELEASE 24 HR ORAL
Qty: 30 TABLET | Refills: 2 | Status: SHIPPED | OUTPATIENT
Start: 2019-12-02 | End: 2020-02-24 | Stop reason: SDUPTHER

## 2019-12-11 ENCOUNTER — OFFICE VISIT (OUTPATIENT)
Dept: CARDIOLOGY | Facility: CLINIC | Age: 74
End: 2019-12-11

## 2019-12-11 VITALS
HEIGHT: 64 IN | SYSTOLIC BLOOD PRESSURE: 160 MMHG | DIASTOLIC BLOOD PRESSURE: 90 MMHG | WEIGHT: 176 LBS | BODY MASS INDEX: 30.05 KG/M2 | OXYGEN SATURATION: 92 % | HEART RATE: 104 BPM | RESPIRATION RATE: 16 BRPM

## 2019-12-11 DIAGNOSIS — R07.2 PRECORDIAL PAIN: ICD-10-CM

## 2019-12-11 DIAGNOSIS — I10 ESSENTIAL HYPERTENSION: ICD-10-CM

## 2019-12-11 DIAGNOSIS — R06.02 SHORTNESS OF BREATH: ICD-10-CM

## 2019-12-11 DIAGNOSIS — E78.49 OTHER HYPERLIPIDEMIA: ICD-10-CM

## 2019-12-11 DIAGNOSIS — I25.10 CORONARY ARTERY DISEASE INVOLVING NATIVE CORONARY ARTERY OF NATIVE HEART WITHOUT ANGINA PECTORIS: Primary | Chronic | ICD-10-CM

## 2019-12-11 PROCEDURE — 99214 OFFICE O/P EST MOD 30 MIN: CPT | Performed by: NURSE PRACTITIONER

## 2019-12-11 PROCEDURE — 93000 ELECTROCARDIOGRAM COMPLETE: CPT | Performed by: NURSE PRACTITIONER

## 2019-12-11 NOTE — PROGRESS NOTES
Date of Office Visit: 2019  Encounter Provider: PATI Eagle  Place of Service: [facility] CARDIOLOGY  Patient Name: Effie Donnelly  :1945  Primary Cardiologist: Dr. Brand    CC: one month follow up    Dear Dr. Terrazas:    HPI: Effie Donnelly is a pleasant 74 y.o. female who presents 2019 for cardiac follow up.      She has a history of CAD and myocardial infarction.  She had an echocardiogram in 2018  that showed normal function.  She does have a history of an abnormal EKG with LVH as well as diffuse repolarization changes.       She was seen in the ED at [facility] on 10/29/18 and treated for atypical pneumonia.  Her BNP was elevated at 4828.  She was given a Z-leno and she states after the first 2 pills, she felt better.     She does have some baseline dyspnea and has a history of COPD.  She is a former smoker but stopped many years ago.     She had an Echo 2018:  Interpretation Summary      · Left ventricular wall thickness is consistent with hypertrophy. Sigmoid-shaped ventricular septum is present.  · Left ventricular systolic function is normal. Estimated EF = 55%.  · Left ventricular diastolic dysfunction (grade I a) consistent with impaired relaxation.  · Left atrial cavity size is moderately dilated.  · Mild tricuspid valve regurgitation is present.  · Calculated right ventricular systolic pressure from tricuspid regurgitation is 29 mmHg.  · Mild pulmonic valve regurgitation is present.       On 19 she called the office.  She has stated that after having the 5 days of Lasix 20 mg she did felt better and her shortness of air resolved some.  She stated she again was feeling short of breath with exertion.  I spoke to her and she said the day before, she was fairly short of breath but today she feels much better.  She denied any lower extremity edema.  I did give her the okay to take the remaining 5 days of the furosemide if she felt her  shortness of breath was worsening, had weight gain, or lower extremity edema.  She voiced understanding.     She returns today and states that she continues to have shortness of breath and wakes up in the mornings with it.  She also describes some intermittent midsternal chest pressure that can happen anytime with or without exertion.  She states is a tightness around her left breast chest wall area.  He denies any palpitations, lower extremity edema, she will have some dizzy with sudden changes in position, she does complain of fatigue, she denies any leg pain, numbness or tingling her lower extremities.  She denies snoring, she denies having any PND or cough, she does admit to orthopnea.  She denies any unexplained bleeding.  She has been taking her medications as directed.    Past Medical History:   Diagnosis Date   • CAD (coronary artery disease)    • Cancer (CMS/HCC)     skin, right breast   • Coronary artery disease involving native coronary artery of native heart without angina pectoris 2018   • History of myocardial infarction 2018   • Hypertension    • Myocardial infarction (CMS/HCC)     PER PT    • Sinus tachycardia    • SOB (shortness of breath)        Past Surgical History:   Procedure Laterality Date   • CARDIAC CATHETERIZATION     • MASTECTOMY COMPLETE / SIMPLE  2019   • SHOULDER SURGERY     • SKIN CANCER EXCISION      ABOVE THE LIP.    • TOE SURGERY         Social History     Socioeconomic History   • Marital status:      Spouse name: Not on file   • Number of children: 5   • Years of education: Not on file   • Highest education level: Not on file   Occupational History   • Occupation: RETIRED    Tobacco Use   • Smoking status: Former Smoker     Last attempt to quit: 1973     Years since quittin.1   • Smokeless tobacco: Never Used   • Tobacco comment: CAFFEINE USE: 1 CUP DAILY   Substance and Sexual Activity   • Alcohol use: No   • Drug use: No   • Sexual  activity: Defer       Family History   Problem Relation Age of Onset   • Hypertension Sister    • Cancer Sister    • Heart disease Brother 65        VALVE REPLACEMENT    • Hypertension Brother    • Hypertension Sister    • Hypertension Sister    • Hypertension Brother    • Diabetes Brother    • Hypertension Brother    • Cancer Brother    • Hypertension Brother    • Hypertension Brother    • Hypertension Brother        The following portion of the patient's history were reviewed and updated as appropriate: past medical history, past surgical history, past social history, past family history, allergies, current medications, and problem list.    Review of Systems   Constitution: Positive for malaise/fatigue. Negative for diaphoresis and fever.   HENT: Negative for congestion, hearing loss, hoarse voice, nosebleeds and sore throat.    Eyes: Negative for photophobia, vision loss in left eye, vision loss in right eye and visual disturbance.   Cardiovascular: Positive for chest pain, dyspnea on exertion and orthopnea. Negative for irregular heartbeat, leg swelling, near-syncope, palpitations, paroxysmal nocturnal dyspnea and syncope.   Respiratory: Positive for shortness of breath. Negative for cough, hemoptysis, sleep disturbances due to breathing, snoring, sputum production and wheezing.    Endocrine: Negative for cold intolerance, heat intolerance, polydipsia, polyphagia and polyuria.   Hematologic/Lymphatic: Negative for bleeding problem. Does not bruise/bleed easily.   Skin: Negative for color change, dry skin, poor wound healing, rash and suspicious lesions.   Musculoskeletal: Negative for arthritis, back pain, falls, gout, joint pain, joint swelling, muscle cramps, muscle weakness and myalgias.   Gastrointestinal: Negative for bloating, abdominal pain, constipation, diarrhea, dysphagia, melena, nausea and vomiting.   Neurological: Positive for dizziness (with quick positional changes). Negative for excessive daytime  "sleepiness, headaches, light-headedness, loss of balance, numbness, paresthesias, seizures, vertigo and weakness.   Psychiatric/Behavioral: Negative for depression, memory loss and substance abuse. The patient is not nervous/anxious.        Allergies   Allergen Reactions   • Adhesive Tape Other (See Comments)     BLISTERS UNDER TAGADERM   • Nsaids GI Bleeding     CROHN'S DISEASE         Current Outpatient Medications:   •  albuterol sulfate  (90 Base) MCG/ACT inhaler, Inhale 2 puffs Every 6 (Six) Hours As Needed for Wheezing., Disp: 1 inhaler, Rfl: 1  •  aspirin 81 MG tablet, Take 81 mg by mouth., Disp: , Rfl:   •  B Complex Vitamins (VITAMIN B COMPLEX PO), Take 1 tablet by mouth Daily., Disp: , Rfl:   •  baclofen (LIORESAL) 10 MG tablet, TAKE ONE TABLET BY MOUTH EVERY NIGHT AT BEDTIME, Disp: 30 tablet, Rfl: 2  •  Cholecalciferol (VITAMIN D) 2000 units capsule, Take 2,000 Units by mouth Daily., Disp: , Rfl:   •  CRANBERRY EXTRACT PO, Take 1 capsule by mouth Daily., Disp: , Rfl:   •  HYDROcodone-Acetaminophen (XODOL) 5-300 MG per tablet, Take 1 tablet by mouth Daily As Needed for Moderate Pain . PRN, Disp: 10 tablet, Rfl: 0  •  LORATADINE-D 24HR  MG per 24 hr tablet, TAKE ONE TABLET BY MOUTH DAILY, Disp: 30 tablet, Rfl: 2  •  losartan (COZAAR) 50 MG tablet, TAKE ONE TABLET BY MOUTH TWICE A DAY, Disp: 180 tablet, Rfl: 2  •  MEGARED OMEGA-3 KRILL OIL PO, Take 1 capsule by mouth Daily., Disp: , Rfl:   •  PROBIOTIC PRODUCT PO, Take 2 capsules by mouth Daily., Disp: , Rfl:         Objective:     Vitals:    12/11/19 1046   BP: 160/90   BP Location: Left arm   Patient Position: Sitting   Cuff Size: Adult   Pulse: 104   Resp: 16   SpO2: 92%   Weight: 79.8 kg (176 lb)   Height: 162.6 cm (64\")     Body mass index is 30.21 kg/m².      Physical Exam   Constitutional: She is oriented to person, place, and time. She appears well-developed and well-nourished. No distress.   HENT:   Head: Normocephalic and atraumatic. "   Right Ear: External ear normal.   Left Ear: External ear normal.   Nose: Nose normal.   Eyes: Pupils are equal, round, and reactive to light. Conjunctivae are normal. Right eye exhibits no discharge. Left eye exhibits no discharge.   Neck: Normal range of motion. Neck supple. No JVD present. No tracheal deviation present. No thyromegaly present.   Cardiovascular: Normal rate, regular rhythm, normal heart sounds and intact distal pulses. Exam reveals no gallop and no friction rub.   No murmur heard.  Pulmonary/Chest: Effort normal and breath sounds normal. No respiratory distress. She has no wheezes. She has no rales. She exhibits no tenderness.   Abdominal: Soft. Bowel sounds are normal. She exhibits no distension. There is no tenderness.   Musculoskeletal: Normal range of motion. She exhibits no edema, tenderness or deformity.   Lymphadenopathy:     She has no cervical adenopathy.   Neurological: She is alert and oriented to person, place, and time. Coordination normal.   Skin: Skin is warm and dry. No rash noted. No erythema.   Psychiatric: She has a normal mood and affect. Her behavior is normal. Judgment and thought content normal.             ECG 12 Lead  Date/Time: 12/11/2019 10:50 AM  Performed by: Nan Brian APRN  Authorized by: Nan Brian APRN   Comparison: compared with previous ECG from 11/14/2019  Similar to previous ECG  Rhythm: sinus rhythm  Ectopy: unifocal PVCs and bigeminy  Rate: normal  Conduction: incomplete right bundle branch block and non-specific intraventricular conduction delay  ST Depression: I  T inversion: I, aVR and aVL  QRS axis: normal  Other findings: left ventricular hypertrophy    Clinical impression: abnormal EKG              Assessment:       Diagnosis Plan   1. Coronary artery disease involving native coronary artery of native heart without angina pectoris  Adult Transthoracic Echo Complete W/ Cont if Necessary Per Protocol    Stress Test With Myocardial Perfusion One  Day   2. Shortness of breath   Adult Transthoracic Echo Complete W/ Cont if Necessary Per Protocol    Stress Test With Myocardial Perfusion One Day   3. Precordial pain  Adult Transthoracic Echo Complete W/ Cont if Necessary Per Protocol    Stress Test With Myocardial Perfusion One Day   4. Essential hypertension     5. Other hyperlipidemia            Plan:       1/2/3. CAD/ SOA/ Angina - Remote history of MI.  Continues to complain of SOA and fatigue, intermittent chest pain with and without activity.  Check nuclear and echo.    4. Essential HTN - elevated today, normally controlled    5. Hyperlipidemia     Check echo and nuclear stress test.  Further evaluation and treatment will be based on those results.    As always, it has been a pleasure to participate in your patient's care. Thank you.       Sincerely,       PATI Eagle      Current Outpatient Medications:   •  albuterol sulfate  (90 Base) MCG/ACT inhaler, Inhale 2 puffs Every 6 (Six) Hours As Needed for Wheezing., Disp: 1 inhaler, Rfl: 1  •  aspirin 81 MG tablet, Take 81 mg by mouth., Disp: , Rfl:   •  B Complex Vitamins (VITAMIN B COMPLEX PO), Take 1 tablet by mouth Daily., Disp: , Rfl:   •  baclofen (LIORESAL) 10 MG tablet, TAKE ONE TABLET BY MOUTH EVERY NIGHT AT BEDTIME, Disp: 30 tablet, Rfl: 2  •  Cholecalciferol (VITAMIN D) 2000 units capsule, Take 2,000 Units by mouth Daily., Disp: , Rfl:   •  CRANBERRY EXTRACT PO, Take 1 capsule by mouth Daily., Disp: , Rfl:   •  HYDROcodone-Acetaminophen (XODOL) 5-300 MG per tablet, Take 1 tablet by mouth Daily As Needed for Moderate Pain . PRN, Disp: 10 tablet, Rfl: 0  •  LORATADINE-D 24HR  MG per 24 hr tablet, TAKE ONE TABLET BY MOUTH DAILY, Disp: 30 tablet, Rfl: 2  •  losartan (COZAAR) 50 MG tablet, TAKE ONE TABLET BY MOUTH TWICE A DAY, Disp: 180 tablet, Rfl: 2  •  MEGARED OMEGA-3 KRILL OIL PO, Take 1 capsule by mouth Daily., Disp: , Rfl:   •  PROBIOTIC PRODUCT PO, Take 2 capsules by mouth  Daily., Disp: , Rfl:   Dictated utilizing Dragon dictation

## 2019-12-23 ENCOUNTER — TELEPHONE (OUTPATIENT)
Dept: CARDIOLOGY | Facility: CLINIC | Age: 74
End: 2019-12-23

## 2019-12-23 RX ORDER — FUROSEMIDE 20 MG/1
20 TABLET ORAL DAILY
Qty: 30 TABLET | Refills: 5 | Status: SHIPPED | OUTPATIENT
Start: 2019-12-23 | End: 2020-01-22 | Stop reason: SDUPTHER

## 2019-12-23 RX ORDER — FUROSEMIDE 20 MG/1
20 TABLET ORAL DAILY
COMMUNITY
End: 2019-12-23 | Stop reason: SDUPTHER

## 2019-12-23 NOTE — TELEPHONE ENCOUNTER
Please call patient and let her know we are sending in RX for lasix 20 mg once a day.  She has an echo and stress appt for 1/6/20.  Please make an appt for her to see me on 1/13/20

## 2019-12-23 NOTE — TELEPHONE ENCOUNTER
Pt is requesting a refill on furosemide 20 mg. She says it seems to be helping her a lot.   Pharmacy: [facility]  PT HOME PHONE: [phone]

## 2019-12-23 NOTE — TELEPHONE ENCOUNTER
Scheduled Pt's appointment for 1/13, Pt agreeable. Rx for Furosemide 20 mg once daily as requested. Verbalized understanding

## 2020-01-06 ENCOUNTER — HOSPITAL ENCOUNTER (OUTPATIENT)
Dept: NUCLEAR MEDICINE | Facility: HOSPITAL | Age: 75
Discharge: HOME | End: 2020-01-06

## 2020-01-06 ENCOUNTER — HOSPITAL ENCOUNTER (OUTPATIENT)
Dept: CARDIOLOGY | Facility: HOSPITAL | Age: 75
Discharge: HOME | End: 2020-01-06

## 2020-01-06 ENCOUNTER — HOSPITAL ENCOUNTER (OUTPATIENT)
Dept: CARDIOLOGY | Facility: HOSPITAL | Age: 75
Discharge: HOME | End: 2020-01-06
Admitting: NURSE PRACTITIONER

## 2020-01-06 VITALS
BODY MASS INDEX: 30.05 KG/M2 | HEIGHT: 64 IN | SYSTOLIC BLOOD PRESSURE: 166 MMHG | DIASTOLIC BLOOD PRESSURE: 59 MMHG | WEIGHT: 176 LBS

## 2020-01-06 DIAGNOSIS — R07.2 PRECORDIAL PAIN: ICD-10-CM

## 2020-01-06 DIAGNOSIS — I25.10 CORONARY ARTERY DISEASE INVOLVING NATIVE CORONARY ARTERY OF NATIVE HEART WITHOUT ANGINA PECTORIS: ICD-10-CM

## 2020-01-06 DIAGNOSIS — R06.02 SHORTNESS OF BREATH: ICD-10-CM

## 2020-01-06 LAB
BH CV ECHO MEAS - ACS: 2.3 CM
BH CV ECHO MEAS - AO MAX PG (FULL): 3.2 MMHG
BH CV ECHO MEAS - AO MAX PG: 6.7 MMHG
BH CV ECHO MEAS - AO MEAN PG (FULL): 1 MMHG
BH CV ECHO MEAS - AO MEAN PG: 3 MMHG
BH CV ECHO MEAS - AO ROOT AREA (BSA CORRECTED): 1.7
BH CV ECHO MEAS - AO ROOT AREA: 7.5 CM^2
BH CV ECHO MEAS - AO ROOT DIAM: 3.1 CM
BH CV ECHO MEAS - AO V2 MAX: 129 CM/SEC
BH CV ECHO MEAS - AO V2 MEAN: 82.6 CM/SEC
BH CV ECHO MEAS - AO V2 VTI: 23.5 CM
BH CV ECHO MEAS - AVA(I,A): 2.6 CM^2
BH CV ECHO MEAS - AVA(I,D): 2.6 CM^2
BH CV ECHO MEAS - AVA(V,A): 2.7 CM^2
BH CV ECHO MEAS - AVA(V,D): 2.7 CM^2
BH CV ECHO MEAS - BSA(HAYCOCK): 1.9 M^2
BH CV ECHO MEAS - BSA: 1.9 M^2
BH CV ECHO MEAS - BZI_BMI: 30.2 KILOGRAMS/M^2
BH CV ECHO MEAS - BZI_METRIC_HEIGHT: 162.6 CM
BH CV ECHO MEAS - BZI_METRIC_WEIGHT: 79.8 KG
BH CV ECHO MEAS - EDV(CUBED): 290.1 ML
BH CV ECHO MEAS - EDV(MOD-SP2): 146 ML
BH CV ECHO MEAS - EDV(MOD-SP4): 233 ML
BH CV ECHO MEAS - EDV(TEICH): 225.1 ML
BH CV ECHO MEAS - EF(CUBED): 44.5 %
BH CV ECHO MEAS - EF(MOD-BP): 29 %
BH CV ECHO MEAS - EF(MOD-SP2): 38.6 %
BH CV ECHO MEAS - EF(MOD-SP4): 23.2 %
BH CV ECHO MEAS - EF(TEICH): 36.2 %
BH CV ECHO MEAS - ESV(CUBED): 161 ML
BH CV ECHO MEAS - ESV(MOD-SP2): 89.6 ML
BH CV ECHO MEAS - ESV(MOD-SP4): 179 ML
BH CV ECHO MEAS - ESV(TEICH): 143.7 ML
BH CV ECHO MEAS - FS: 17.8 %
BH CV ECHO MEAS - IVS/LVPW: 1.1
BH CV ECHO MEAS - IVSD: 1.1 CM
BH CV ECHO MEAS - LAT PEAK E' VEL: 4 CM/SEC
BH CV ECHO MEAS - LV DIASTOLIC VOL/BSA (35-75): 125.8 ML/M^2
BH CV ECHO MEAS - LV MASS(C)D: 321.9 GRAMS
BH CV ECHO MEAS - LV MASS(C)DI: 173.7 GRAMS/M^2
BH CV ECHO MEAS - LV MAX PG: 3.5 MMHG
BH CV ECHO MEAS - LV MEAN PG: 2 MMHG
BH CV ECHO MEAS - LV SYSTOLIC VOL/BSA (12-30): 96.6 ML/M^2
BH CV ECHO MEAS - LV V1 MAX: 93.1 CM/SEC
BH CV ECHO MEAS - LV V1 MEAN: 57.5 CM/SEC
BH CV ECHO MEAS - LV V1 VTI: 16.2 CM
BH CV ECHO MEAS - LVIDD: 6.6 CM
BH CV ECHO MEAS - LVIDS: 5.4 CM
BH CV ECHO MEAS - LVLD AP2: 7.7 CM
BH CV ECHO MEAS - LVLD AP4: 8.1 CM
BH CV ECHO MEAS - LVLS AP2: 6.5 CM
BH CV ECHO MEAS - LVLS AP4: 7.5 CM
BH CV ECHO MEAS - LVOT AREA (M): 3.8 CM^2
BH CV ECHO MEAS - LVOT AREA: 3.8 CM^2
BH CV ECHO MEAS - LVOT DIAM: 2.2 CM
BH CV ECHO MEAS - LVPWD: 1 CM
BH CV ECHO MEAS - MED PEAK E' VEL: 5 CM/SEC
BH CV ECHO MEAS - MV A DUR: 0.17 SEC
BH CV ECHO MEAS - MV A MAX VEL: 130 CM/SEC
BH CV ECHO MEAS - MV DEC SLOPE: 288 CM/SEC^2
BH CV ECHO MEAS - MV DEC TIME: 251 SEC
BH CV ECHO MEAS - MV E MAX VEL: 67.9 CM/SEC
BH CV ECHO MEAS - MV E/A: 0.52
BH CV ECHO MEAS - MV MAX PG: 6.5 MMHG
BH CV ECHO MEAS - MV MEAN PG: 2 MMHG
BH CV ECHO MEAS - MV P1/2T MAX VEL: 86.1 CM/SEC
BH CV ECHO MEAS - MV P1/2T: 87.6 MSEC
BH CV ECHO MEAS - MV V2 MAX: 127 CM/SEC
BH CV ECHO MEAS - MV V2 MEAN: 64 CM/SEC
BH CV ECHO MEAS - MV V2 VTI: 30.1 CM
BH CV ECHO MEAS - MVA P1/2T LCG: 2.6 CM^2
BH CV ECHO MEAS - MVA(P1/2T): 2.5 CM^2
BH CV ECHO MEAS - MVA(VTI): 2 CM^2
BH CV ECHO MEAS - PA ACC TIME: 0.1 SEC
BH CV ECHO MEAS - PA MAX PG (FULL): 3 MMHG
BH CV ECHO MEAS - PA MAX PG: 5 MMHG
BH CV ECHO MEAS - PA PR(ACCEL): 34 MMHG
BH CV ECHO MEAS - PA V2 MAX: 112 CM/SEC
BH CV ECHO MEAS - PI END-D VEL: 95 CM/SEC
BH CV ECHO MEAS - PULM A REVS DUR: 0.11 SEC
BH CV ECHO MEAS - PULM A REVS VEL: 39.5 CM/SEC
BH CV ECHO MEAS - PULM DIAS VEL: 34.3 CM/SEC
BH CV ECHO MEAS - PULM S/D: 2
BH CV ECHO MEAS - PULM SYS VEL: 67.1 CM/SEC
BH CV ECHO MEAS - PVA(V,A): 1.6 CM^2
BH CV ECHO MEAS - PVA(V,D): 1.6 CM^2
BH CV ECHO MEAS - QP/QS: 0.59
BH CV ECHO MEAS - RAP SYSTOLE: 3 MMHG
BH CV ECHO MEAS - RV MAX PG: 2 MMHG
BH CV ECHO MEAS - RV MEAN PG: 1 MMHG
BH CV ECHO MEAS - RV V1 MAX: 70.9 CM/SEC
BH CV ECHO MEAS - RV V1 MEAN: 48.6 CM/SEC
BH CV ECHO MEAS - RV V1 VTI: 14.2 CM
BH CV ECHO MEAS - RVOT AREA: 2.5 CM^2
BH CV ECHO MEAS - RVOT DIAM: 1.8 CM
BH CV ECHO MEAS - RVSP: 28.3 MMHG
BH CV ECHO MEAS - SI(AO): 95.7 ML/M^2
BH CV ECHO MEAS - SI(CUBED): 69.7 ML/M^2
BH CV ECHO MEAS - SI(LVOT): 33.2 ML/M^2
BH CV ECHO MEAS - SI(MOD-SP2): 30.4 ML/M^2
BH CV ECHO MEAS - SI(MOD-SP4): 29.1 ML/M^2
BH CV ECHO MEAS - SI(TEICH): 43.9 ML/M^2
BH CV ECHO MEAS - SV(AO): 177.4 ML
BH CV ECHO MEAS - SV(CUBED): 129.1 ML
BH CV ECHO MEAS - SV(LVOT): 61.6 ML
BH CV ECHO MEAS - SV(MOD-SP2): 56.4 ML
BH CV ECHO MEAS - SV(MOD-SP4): 54 ML
BH CV ECHO MEAS - SV(RVOT): 36.1 ML
BH CV ECHO MEAS - SV(TEICH): 81.4 ML
BH CV ECHO MEAS - TAPSE (>1.6): 2.5 CM
BH CV ECHO MEAS - TR MAX VEL: 251 CM/SEC
BH CV ECHO MEASUREMENTS AVERAGE E/E' RATIO: 15.09
BH CV NUCLEAR PRIOR STUDY: 3
BH CV STRESS BP STAGE 1: NORMAL
BH CV STRESS COMMENTS STAGE 1: NORMAL
BH CV STRESS DOSE REGADENOSON STAGE 1: 0.4
BH CV STRESS DURATION MIN STAGE 1: 0
BH CV STRESS DURATION SEC STAGE 1: 30
BH CV STRESS HR STAGE 1: 71
BH CV STRESS O2 STAGE 1: 98
BH CV STRESS PROTOCOL 1: NORMAL
BH CV STRESS RECOVERY BP: NORMAL MMHG
BH CV STRESS RECOVERY HR: 78 BPM
BH CV STRESS RECOVERY O2: 99 %
BH CV STRESS STAGE 1: 1
BH CV VAS BP LEFT ARM: NORMAL MMHG
BH CV XLRA - RV BASE: 2.9 CM
BH CV XLRA - TDI S': 19 CM/SEC
LEFT ATRIUM VOLUME INDEX: 52 ML/M2
LV EF 2D ECHO EST: 30 %
LV EF NUC BP: 29 %
MAXIMAL PREDICTED HEART RATE: 147 BPM
MAXIMAL PREDICTED HEART RATE: 147 BPM
PERCENT MAX PREDICTED HR: 60.54 %
STRESS BASELINE BP: NORMAL MMHG
STRESS BASELINE HR: 68 BPM
STRESS O2 SAT REST: 100 %
STRESS PERCENT HR: 71 %
STRESS POST ESTIMATED WORKLOAD: 1 METS
STRESS POST EXERCISE DUR SEC: 30 SEC
STRESS POST O2 SAT PEAK: 100 %
STRESS POST PEAK BP: NORMAL MMHG
STRESS POST PEAK HR: 89 BPM
STRESS TARGET HR: 125 BPM
STRESS TARGET HR: 125 BPM

## 2020-01-06 PROCEDURE — 25010000002 PERFLUTREN (DEFINITY) 8.476 MG IN SODIUM CHLORIDE 0.9 % 10 ML INJECTION: Performed by: NURSE PRACTITIONER

## 2020-01-06 PROCEDURE — 93306 TTE W/DOPPLER COMPLETE: CPT

## 2020-01-06 PROCEDURE — 0 TECHNETIUM SESTAMIBI: Performed by: NURSE PRACTITIONER

## 2020-01-06 PROCEDURE — 25010000002 REGADENOSON 0.4 MG/5ML SOLUTION: Performed by: NURSE PRACTITIONER

## 2020-01-06 PROCEDURE — A9500 TC99M SESTAMIBI: HCPCS | Performed by: NURSE PRACTITIONER

## 2020-01-06 PROCEDURE — 93018 CV STRESS TEST I&R ONLY: CPT | Performed by: INTERNAL MEDICINE

## 2020-01-06 PROCEDURE — 78452 HT MUSCLE IMAGE SPECT MULT: CPT | Performed by: INTERNAL MEDICINE

## 2020-01-06 PROCEDURE — 93306 TTE W/DOPPLER COMPLETE: CPT | Performed by: INTERNAL MEDICINE

## 2020-01-06 PROCEDURE — 78452 HT MUSCLE IMAGE SPECT MULT: CPT

## 2020-01-06 PROCEDURE — 93016 CV STRESS TEST SUPVJ ONLY: CPT | Performed by: INTERNAL MEDICINE

## 2020-01-06 PROCEDURE — 93017 CV STRESS TEST TRACING ONLY: CPT

## 2020-01-06 RX ADMIN — TECHNETIUM TC 99M SESTAMIBI 1 DOSE: 1 INJECTION INTRAVENOUS at 08:14

## 2020-01-06 RX ADMIN — REGADENOSON 0.4 MG: 0.08 INJECTION, SOLUTION INTRAVENOUS at 10:21

## 2020-01-06 RX ADMIN — PERFLUTREN 1.5 ML: 6.52 INJECTION, SUSPENSION INTRAVENOUS at 09:24

## 2020-01-06 RX ADMIN — TECHNETIUM TC 99M SESTAMIBI 1 DOSE: 1 INJECTION INTRAVENOUS at 10:21

## 2020-01-08 DIAGNOSIS — R06.02 SHORTNESS OF BREATH: ICD-10-CM

## 2020-01-08 DIAGNOSIS — I25.10 CORONARY ARTERY DISEASE INVOLVING NATIVE CORONARY ARTERY OF NATIVE HEART WITHOUT ANGINA PECTORIS: Primary | Chronic | ICD-10-CM

## 2020-01-08 DIAGNOSIS — I42.9 CARDIOMYOPATHY, UNSPECIFIED TYPE (HCC): ICD-10-CM

## 2020-01-13 ENCOUNTER — TRANSCRIBE ORDERS (OUTPATIENT)
Dept: CARDIOLOGY | Facility: CLINIC | Age: 75
End: 2020-01-13

## 2020-01-13 ENCOUNTER — LAB (OUTPATIENT)
Dept: LAB | Facility: HOSPITAL | Age: 75
End: 2020-01-13

## 2020-01-13 DIAGNOSIS — Z13.6 SCREENING FOR ISCHEMIC HEART DISEASE: ICD-10-CM

## 2020-01-13 DIAGNOSIS — Z01.810 PRE-OPERATIVE CARDIOVASCULAR EXAMINATION: Primary | ICD-10-CM

## 2020-01-13 DIAGNOSIS — I25.10 DISEASE OF CARDIOVASCULAR SYSTEM: ICD-10-CM

## 2020-01-13 PROBLEM — I42.9 CARDIOMYOPATHY (HCC): Status: ACTIVE | Noted: 2020-01-13

## 2020-01-13 PROBLEM — R06.02 SHORTNESS OF BREATH: Status: ACTIVE | Noted: 2020-01-13

## 2020-01-13 LAB
ANION GAP SERPL CALCULATED.3IONS-SCNC: 11.6 MMOL/L (ref 5–15)
BASOPHILS # BLD AUTO: 0.05 10*3/MM3 (ref 0–0.2)
BASOPHILS NFR BLD AUTO: 0.8 % (ref 0–1.5)
BUN BLD-MCNC: 18 MG/DL (ref 8–23)
BUN/CREAT SERPL: 18.6 (ref 7–25)
CALCIUM SPEC-SCNC: 9.5 MG/DL (ref 8.6–10.5)
CHLORIDE SERPL-SCNC: 101 MMOL/L (ref 98–107)
CO2 SERPL-SCNC: 24.4 MMOL/L (ref 22–29)
CREAT BLD-MCNC: 0.97 MG/DL (ref 0.57–1)
DEPRECATED RDW RBC AUTO: 39.5 FL (ref 37–54)
EOSINOPHIL # BLD AUTO: 0.19 10*3/MM3 (ref 0–0.4)
EOSINOPHIL NFR BLD AUTO: 3.1 % (ref 0.3–6.2)
ERYTHROCYTE [DISTWIDTH] IN BLOOD BY AUTOMATED COUNT: 13.1 % (ref 12.3–15.4)
GFR SERPL CREATININE-BSD FRML MDRD: 56 ML/MIN/1.73
GLUCOSE BLD-MCNC: 81 MG/DL (ref 65–99)
HCT VFR BLD AUTO: 38.6 % (ref 34–46.6)
HGB BLD-MCNC: 12.6 G/DL (ref 12–15.9)
IMM GRANULOCYTES # BLD AUTO: 0.02 10*3/MM3 (ref 0–0.05)
IMM GRANULOCYTES NFR BLD AUTO: 0.3 % (ref 0–0.5)
LYMPHOCYTES # BLD AUTO: 1.87 10*3/MM3 (ref 0.7–3.1)
LYMPHOCYTES NFR BLD AUTO: 30.4 % (ref 19.6–45.3)
MCH RBC QN AUTO: 27.1 PG (ref 26.6–33)
MCHC RBC AUTO-ENTMCNC: 32.6 G/DL (ref 31.5–35.7)
MCV RBC AUTO: 83 FL (ref 79–97)
MONOCYTES # BLD AUTO: 0.59 10*3/MM3 (ref 0.1–0.9)
MONOCYTES NFR BLD AUTO: 9.6 % (ref 5–12)
NEUTROPHILS # BLD AUTO: 3.44 10*3/MM3 (ref 1.7–7)
NEUTROPHILS NFR BLD AUTO: 55.8 % (ref 42.7–76)
NRBC BLD AUTO-RTO: 0 /100 WBC (ref 0–0.2)
PLATELET # BLD AUTO: 269 10*3/MM3 (ref 140–450)
PMV BLD AUTO: 11.5 FL (ref 6–12)
POTASSIUM BLD-SCNC: 4 MMOL/L (ref 3.5–5.2)
RBC # BLD AUTO: 4.65 10*6/MM3 (ref 3.77–5.28)
SODIUM BLD-SCNC: 137 MMOL/L (ref 136–145)
WBC NRBC COR # BLD: 6.16 10*3/MM3 (ref 3.4–10.8)

## 2020-01-13 PROCEDURE — 85025 COMPLETE CBC W/AUTO DIFF WBC: CPT

## 2020-01-13 PROCEDURE — 36415 COLL VENOUS BLD VENIPUNCTURE: CPT

## 2020-01-13 PROCEDURE — 80048 BASIC METABOLIC PNL TOTAL CA: CPT

## 2020-01-14 ENCOUNTER — HOSPITAL ENCOUNTER (OUTPATIENT)
Facility: HOSPITAL | Age: 75
Setting detail: HOSPITAL OUTPATIENT SURGERY
Discharge: HOME | End: 2020-01-14
Attending: INTERNAL MEDICINE | Admitting: INTERNAL MEDICINE

## 2020-01-14 VITALS
HEART RATE: 65 BPM | OXYGEN SATURATION: 97 % | WEIGHT: 168 LBS | TEMPERATURE: 98.1 F | RESPIRATION RATE: 16 BRPM | HEIGHT: 65 IN | SYSTOLIC BLOOD PRESSURE: 159 MMHG | BODY MASS INDEX: 27.99 KG/M2 | DIASTOLIC BLOOD PRESSURE: 74 MMHG

## 2020-01-14 DIAGNOSIS — I42.9 CARDIOMYOPATHY, UNSPECIFIED TYPE (HCC): ICD-10-CM

## 2020-01-14 DIAGNOSIS — R06.02 SHORTNESS OF BREATH: ICD-10-CM

## 2020-01-14 DIAGNOSIS — I25.10 CORONARY ARTERY DISEASE INVOLVING NATIVE CORONARY ARTERY OF NATIVE HEART WITHOUT ANGINA PECTORIS: ICD-10-CM

## 2020-01-14 LAB
HCT VFR BLDA CALC: 34 % (ref 38–51)
HCT VFR BLDA CALC: 34 % (ref 38–51)
HGB BLDA-MCNC: 11.6 G/DL (ref 12–17)
HGB BLDA-MCNC: 11.6 G/DL (ref 12–17)
SAO2 % BLDA: 66 % (ref 95–98)
SAO2 % BLDA: 96 % (ref 95–98)

## 2020-01-14 PROCEDURE — C1760 CLOSURE DEV, VASC: HCPCS | Performed by: INTERNAL MEDICINE

## 2020-01-14 PROCEDURE — 85014 HEMATOCRIT: CPT

## 2020-01-14 PROCEDURE — 93460 R&L HRT ART/VENTRICLE ANGIO: CPT | Performed by: INTERNAL MEDICINE

## 2020-01-14 PROCEDURE — C1894 INTRO/SHEATH, NON-LASER: HCPCS | Performed by: INTERNAL MEDICINE

## 2020-01-14 PROCEDURE — 25010000002 FENTANYL CITRATE (PF) 100 MCG/2ML SOLUTION: Performed by: INTERNAL MEDICINE

## 2020-01-14 PROCEDURE — C1769 GUIDE WIRE: HCPCS | Performed by: INTERNAL MEDICINE

## 2020-01-14 PROCEDURE — 25010000002 MIDAZOLAM PER 1 MG: Performed by: INTERNAL MEDICINE

## 2020-01-14 PROCEDURE — 85018 HEMOGLOBIN: CPT

## 2020-01-14 PROCEDURE — 0 IOPAMIDOL PER 1 ML: Performed by: INTERNAL MEDICINE

## 2020-01-14 RX ORDER — SODIUM CHLORIDE 9 MG/ML
250 INJECTION, SOLUTION INTRAVENOUS ONCE AS NEEDED
Status: DISCONTINUED | OUTPATIENT
Start: 2020-01-14 | End: 2020-01-14 | Stop reason: HOSPADM

## 2020-01-14 RX ORDER — SODIUM CHLORIDE 0.9 % (FLUSH) 0.9 %
3 SYRINGE (ML) INJECTION EVERY 12 HOURS SCHEDULED
Status: DISCONTINUED | OUTPATIENT
Start: 2020-01-14 | End: 2020-01-14 | Stop reason: HOSPADM

## 2020-01-14 RX ORDER — SODIUM CHLORIDE 9 MG/ML
75 INJECTION, SOLUTION INTRAVENOUS CONTINUOUS
Status: DISCONTINUED | OUTPATIENT
Start: 2020-01-14 | End: 2020-01-14 | Stop reason: HOSPADM

## 2020-01-14 RX ORDER — SODIUM CHLORIDE 0.9 % (FLUSH) 0.9 %
10 SYRINGE (ML) INJECTION AS NEEDED
Status: DISCONTINUED | OUTPATIENT
Start: 2020-01-14 | End: 2020-01-14 | Stop reason: HOSPADM

## 2020-01-14 RX ORDER — MIDAZOLAM HYDROCHLORIDE 1 MG/ML
INJECTION INTRAMUSCULAR; INTRAVENOUS AS NEEDED
Status: DISCONTINUED | OUTPATIENT
Start: 2020-01-14 | End: 2020-01-14 | Stop reason: HOSPADM

## 2020-01-14 RX ORDER — LIDOCAINE HYDROCHLORIDE 10 MG/ML
0.1 INJECTION, SOLUTION EPIDURAL; INFILTRATION; INTRACAUDAL; PERINEURAL ONCE AS NEEDED
Status: DISCONTINUED | OUTPATIENT
Start: 2020-01-14 | End: 2020-01-14 | Stop reason: HOSPADM

## 2020-01-14 RX ORDER — SODIUM CHLORIDE 9 MG/ML
100 INJECTION, SOLUTION INTRAVENOUS CONTINUOUS
Status: DISCONTINUED | OUTPATIENT
Start: 2020-01-14 | End: 2020-01-14 | Stop reason: HOSPADM

## 2020-01-14 RX ORDER — LIDOCAINE HYDROCHLORIDE 20 MG/ML
INJECTION, SOLUTION INFILTRATION; PERINEURAL AS NEEDED
Status: DISCONTINUED | OUTPATIENT
Start: 2020-01-14 | End: 2020-01-14 | Stop reason: HOSPADM

## 2020-01-14 RX ORDER — FENTANYL CITRATE 50 UG/ML
INJECTION, SOLUTION INTRAMUSCULAR; INTRAVENOUS AS NEEDED
Status: DISCONTINUED | OUTPATIENT
Start: 2020-01-14 | End: 2020-01-14 | Stop reason: HOSPADM

## 2020-01-14 RX ADMIN — SODIUM CHLORIDE 75 ML/HR: 9 INJECTION, SOLUTION INTRAVENOUS at 07:08

## 2020-01-14 NOTE — H&P
[facility] History and Physical       Encounter Date:20  Patient:Effie Donnelly  :1945  MRN:[id number]    Date of Admission: 2020  Date of Encounter Visit: 20  Encounter Provider: Bin Chacko MD  Place of Service: [facility]  Patient Care Team:  Terrence Hawley MD as PCP - General (Internal Medicine)      Chief Complaint: Shortness of breath      History of Presenting Illness:      Ms. Donnelly is a 74-year-old woman with past medical history notable for coronary artery disease who was recently evaluated in the cardiology office due to worsening dyspnea on exertion and shortness of breath.  Work-up at that time demonstrated a newly reduced ejection fraction of 30% with a nuclear stress test suggesting an anterior wall defect.  Given her symptoms and high risk stress test findings she was referred for cardiac catheterization.      Review of Systems:  Review of Systems   Constitution: Positive for malaise/fatigue.   HENT: Negative.    Eyes: Negative.    Cardiovascular: Positive for dyspnea on exertion.   Respiratory: Positive for shortness of breath.    Endocrine: Negative.    Hematologic/Lymphatic: Negative.    Skin: Negative.    Musculoskeletal: Negative.    Genitourinary: Negative.    Neurological: Negative.    Psychiatric/Behavioral: Negative.        Medications:    Current Facility-Administered Medications:   •  lidocaine PF 1% (XYLOCAINE) injection 0.1 mL, 0.1 mL, Intradermal, Once PRN, Bin Chacko MD  •  sodium chloride 0.9 % flush 10 mL, 10 mL, Intravenous, PRN, Bin Chacko MD  •  sodium chloride 0.9 % flush 3 mL, 3 mL, Intravenous, Q12H, Bin Chacko MD  •  sodium chloride 0.9 % infusion, 75 mL/hr, Intravenous, Continuous, Bin Chacko MD    Allergies   Allergen Reactions   • Adhesive Tape Other (See Comments)     BLISTERS UNDER TAGADERM   • Nsaids GI Bleeding     CROHN'S DISEASE       Past Medical History:   Diagnosis  Date   • CAD (coronary artery disease)    • Cancer (CMS/HCC)     skin, right breast   • CHF (congestive heart failure) (CMS/HCC)    • Coronary artery disease involving native coronary artery of native heart without angina pectoris 2018   • Heart murmur    • History of myocardial infarction 2018   • Hypertension    • Myocardial infarction (CMS/HCC)     PER PT    • Sinus tachycardia    • SOB (shortness of breath)        Past Surgical History:   Procedure Laterality Date   • BUNIONECTOMY Bilateral    • CARDIAC CATHETERIZATION     • MASTECTOMY COMPLETE / SIMPLE  2019   • SHOULDER SURGERY Right    • SKIN CANCER EXCISION      ABOVE THE LIP.        Social History     Socioeconomic History   • Marital status:      Spouse name: Not on file   • Number of children: 5   • Years of education: Not on file   • Highest education level: Not on file   Occupational History   • Occupation: RETIRED    Tobacco Use   • Smoking status: Former Smoker     Last attempt to quit: 1973     Years since quittin.2   • Smokeless tobacco: Never Used   • Tobacco comment: CAFFEINE USE: 1 CUP DAILY   Substance and Sexual Activity   • Alcohol use: No   • Drug use: No   • Sexual activity: Defer       Family History   Problem Relation Age of Onset   • Hypertension Sister    • Cancer Sister    • Heart disease Brother 65        VALVE REPLACEMENT    • Hypertension Brother    • Hypertension Sister    • Hypertension Sister    • Hypertension Brother    • Diabetes Brother    • Hypertension Brother    • Cancer Brother    • Hypertension Brother    • Hypertension Brother    • Hypertension Brother        The following portions of the patient's history were reviewed and updated as appropriate: allergies, current medications, past family history, past medical history, past social history, past surgical history and problem list.         Objective:      Temp:  [98.1 °F (36.7 °C)] 98.1 °F (36.7 °C)  Heart Rate:  [79]  79  Resp:  [20] 20  BP: (165)/(75) 165/75   No intake or output data in the 24 hours ending 01/14/20 0707  Body mass index is 27.96 kg/m².      01/14/20  0704   Weight: 76.2 kg (168 lb)           Physical Exam:  Constitutional: Well appearing, well developed, no acute distress   HENT: Oropharynx clear and membrane moist  Eyes: Normal conjunctiva, no sclera icterus.  Neck: Supple, no carotid bruit bilaterally.  Cardiovascular: Regular rate and rhythm, No Murmur, No bilateral lower extremity edema.  Pulmonary: Normal respiratory effort, normal lung sounds, no wheezing.  Abdominal: Soft, nontender, no hepatosplenomegaly, liver is non-pulsatile.  Neurological: Alert and orient x 3.   Skin: Warm, dry, no ecchymosis, no rash.  Psych: Appropriate mood and affect. Normal judgment and insight.        Lab Review:   Results from last 7 days   Lab Units 01/13/20  1253   SODIUM mmol/L 137   POTASSIUM mmol/L 4.0   CHLORIDE mmol/L 101   CO2 mmol/L 24.4   BUN mg/dL 18   CREATININE mg/dL 0.97   GLUCOSE mg/dL 81   CALCIUM mg/dL 9.5         Results from last 7 days   Lab Units 01/13/20  1253   WBC 10*3/mm3 6.16   HEMOGLOBIN g/dL 12.6   HEMATOCRIT % 38.6   PLATELETS 10*3/mm3 269                           Assessment:           Coronary artery disease involving native coronary artery of native heart without angina pectoris    Shortness of breath    Cardiomyopathy (CMS/HCC)         Plan:       Ms. Donnelly is a 74-year-old woman with history of coronary disease and prior myocardial infarction who presents for elective cardiac catheterization given new symptoms of dyspnea on exertion concerning for cardiac angina as well as a newly diagnosed cardiomyopathy with ejection fraction of 30% and anterior wall defect on stress testing.  She does have a high risk stress testing given her symptoms she was referred for elective cardiac catheterization and will proceed with that today.    Coronary artery disease with stable angina:  · High risk stress  test finding  · New cardiomyopathy  · Plan for cardiac catheterization    Chronic systolic congestive heart failure:  · Unclear etiology but concerning for ischemic etiology we will plan to further define with cardiac catheterization             Bin Chacko MD  [facility]  01/14/20  7:07 AM

## 2020-01-14 NOTE — DISCHARGE INSTRUCTIONS
[facility]  [address] [place] [address]    Coronary Angiogram (Radial/Ulnar Approach) After Care    Refer to this sheet in the next few weeks. These instructions provide you with information on caring for yourself after your procedure. Your caregiver may also give you more specific instructions. Your treatment has been planned according to current medical practices, but problems sometimes occur. Call your caregiver if you have any problems or questions after your procedure.    Home Care Instructions:  · You may shower the day after the procedure. Remove the bandage (dressing) and gently wash the site with plain soap and water. Gently pat the site dry. You may apply a band aid daily for 2 days if desired.    · Do not apply powder or lotion to the site.  · Do not submerge the affected site in water for 3 to 5 days or until the site is completely healed.   · Do not lift, push or pull anything over 10 pounds for 2 days after your procedure.  · Inspect the site at least twice daily. You may notice some bruising at the site and it may be tender for 1 to 2 weeks.     · Increase your fluid intake for the next 2 days.    · Keep arm elevated for 24 hours. For the remainder of the day, keep your arm in “Pledge of Allegiance” position when up and about.     · You may drive 24 hours after the procedure unless otherwise instructed by your caregiver.  · Do not operate machinery or power tools for 24 hours.  · A responsible adult should be with you for the first 24 hours after you arrive home. Do not make any important legal decisions or sign legal papers for 24 hours.  Do not drink alcohol for 24 hours.    · Metformin or any medications containing Metformin should not be taken for 48 hours after your procedure.      Call Your Doctor if:   · You have unusual pain at the radial/ulnar (wrist) site.  · You have redness, warmth, swelling, or pain at the radial/ulnar (wrist) site.  · You have drainage (other  than a small amount of blood on the dressing).  · You have chills or a fever > 101.  · Your arm becomes pale or dark, cool, tingly, or numb.  · You have heavy bleeding from the site, hold pressure on the site for 20 minutes.  If the bleeding stops, apply a fresh bandage and call your cardiologist.  However, if you continue to have bleeding, call 911.          [facility]  [address] [place] [address]      Coronary Angiogram (Femoral Approach) After Care     Refer to this sheet in the next few weeks. These instructions provide you with information on caring for yourself after your procedure. Your health care provider may also give you more specific instructions. Your treatment has been planned according to current medical practices, but problems sometimes occur. Call your health care provider if you have any problems or questions after your procedure.      What to Expect After the Procedure:  After your procedure, it is typical to have the following sensations:  · Minor discomfort or tenderness and a small bump at the catheter insertion site. The bump should usually decrease in size and tenderness within 1 to 2 weeks.  · Any bruising will usually fade within 2 to 4 weeks.  Home Care Instructions:  · Do not apply powder or lotion to the site.  · Do not take baths, swim, or use a hot tub until your health care provider approves and the site is completely healed.  · Do not bend, squat, or lift anything over 20 lb (9 kg) or as directed by your health care provider. However, we recommend lifting nothing heavier than a gallon of milk.    · You may shower 24 hours after the procedure. Remove the bandage (dressing) and gently wash the site with plain soap and water. Gently pat the site dry. You may apply a band aid daily for 2 days if desired.    · Inspect the site at least twice daily.  · Increase your fluid intake for the next 2 days.    · Limit your activity for the first 48 hours. .    · Avoid  strenuous activity for 1 week or as advised by your physician.    · Follow instructions about when you can drive or return to work as directed by your physician.    · Hold direct pressure over the site when you cough, sneeze, laugh or change positions.  Do this for the next 2 days.    · Do not operate machinery or power tools for 24 hours.  · A responsible adult should be with you for the first 24 hours after you arrive home. Do not make any important legal decisions or sign legal papers for 24 hours.  Do not drink alcohol for 24 hours.  · Metformin or any medications containing Metformin should not be taken for 48 hours after your procedure.    Call Your Doctor If:  · You have drainage (other than a small amount of blood on the dressing).  · You have chills or a fever > 101.  · You have redness, warmth, swelling(larger than a walnut), or pain at the insertion site  · .You have heavy bleeding from the site. If this happens, hold pressure on the site and call 911.  · You develop chest pain or shortness of breath, feel faint, or pass out.  · You develop pain, discoloration, coldness, numbness, tingling, or severe bruising in the leg that held the catheter.  · You develop bleeding from any other place, such as the bowels.    Make Sure You:  · Understand these instructions.  · Will watch your condition.  · Will get help right away if you are not doing well or get worse.

## 2020-01-20 NOTE — PROGRESS NOTES
Date of Office Visit: 2020  Encounter Provider: PATI Eagle  Place of Service: [facility] CARDIOLOGY  Patient Name: Effie Donnelly  :1945  Primary Cardiologist: Dr. Brand    CC:  !-2 Week hospital follow up    Dear      HPI: Effie Donnelly is a pleasant 74 y.o. female who presents 2020 for cardiac follow up.      She has a history of CAD and myocardial infarction.  She had an echocardiogram in 2018  that showed normal function.  She does have a history of an abnormal EKG with LVH as well as diffuse repolarization changes.       She was seen in the ED at [facility] on 10/29/18 and treated for atypical pneumonia.  Her BNP was elevated at 4828.  She was given a Z-leno and she states after the first 2 pills, she felt better.     She does have some baseline dyspnea and has a history of COPD.  She is a former smoker but stopped many years ago.     She had an Echo 2018:  Interpretation Summary      · Left ventricular wall thickness is consistent with hypertrophy. Sigmoid-shaped ventricular septum is present.  · Left ventricular systolic function is normal. Estimated EF = 55%.  · Left ventricular diastolic dysfunction (grade I a) consistent with impaired relaxation.  · Left atrial cavity size is moderately dilated.  · Mild tricuspid valve regurgitation is present.  · Calculated right ventricular systolic pressure from tricuspid regurgitation is 29 mmHg.  · Mild pulmonic valve regurgitation is present.       On 19 she called the office.  She has stated that after having the 5 days of Lasix 20 mg she did felt better and her shortness of air resolved some.  She stated she again was feeling short of breath with exertion.  I spoke to her and she said the day before, she was fairly short of breath but today she feels much better.  She denied any lower extremity edema.  I did give her the okay to take the remaining 5 days of the furosemide if she felt  her shortness of breath was worsening, had weight gain, or lower extremity edema.  She voiced understanding.      I saw her on 12/11/19 with continued complaints of shortness of breath and wakes up in the mornings with it.  She also describes some intermittent midsternal chest pressure that can happen anytime with or without exertion.  She states is a tightness around her left breast chest wall area.  He denies any palpitations, lower extremity edema, she will have some dizzy with sudden changes in position, she does complain of fatigue, she denies any leg pain, numbness or tingling her lower extremities.  She denies snoring, she denies having any PND or cough, she does admit to orthopnea. An echo and stress test were ordered with the following results:    Echo performed 1/6/2020:  · The left ventricular cavity is mildly dilated.  · Left ventricular wall thickness is consistent with hypertrophy. Sigmoid-shaped ventricular septum is present.  · Estimated EF = 30%.  · Left ventricular diastolic dysfunction (grade I a) consistent with impaired relaxation.  · Left atrial cavity size is severely dilated.  · Mild tricuspid valve regurgitation is present.  · Calculated right ventricular systolic pressure from tricuspid regurgitation is 28.3 mmHg.  · The following left ventricular wall segments are hypokinetic: mid anterior, apical anterior, basal anterolateral, mid anterolateral, apical lateral, basal inferolateral, mid inferolateral, apical inferior, mid inferior, apical septal, basal inferoseptal, mid inferoseptal, apex hypokinetic, mid anteroseptal, basal anterior, basal inferior and basal inferoseptal.     Stress test 1/6/2020:  Interpretation Summary     · Myocardial perfusion imaging indicates a medium-sized infarct located in the anterior wall with no significant ischemia noted.  · Left ventricular ejection fraction is severely reduced (Calculated EF = 29%).  · Impressions are consistent with an intermediate risk  study.  · Anterior wall defect noted may be artifact, this study may be compatible with nonischemic cardiomyopathy.        She was then sent for cardiac catheterization:  Conclusions: 1/14/2020  1. Left dominant system with diminutive LAD which may explain her stress test findings but otherwise nonischemic cardiomyopathy with only a 30 to 40% proximal LAD stenoses.  2. Currently elevated left-sided filling pressures with EDP of 23 mmHg  3. Currently reduced cardiac output and index of 3.5 and 1.9     Recommendations:   1. Further optimization of congestive heart failure regimen and diuresis per primary cardiologist    She states that she is feeling better since she is taking the Lasix daily.  Her shortness of breath is improving.  She denies any palpitations, lower extremity edema, chest pain or chest tightness.  She denies any dizziness lightheadedness or fatigue.  She has had no leg pain, numbness or tingling or upper lower extremities.  She denies any snoring, PND, cough, orthopnea.  She is been taking her medications as directed.  Long discussion about the results of her catheterization and optimizing her medical therapy for new diagnosis of nonischemic cardiomyopathy.     Past Medical History:   Diagnosis Date   • CAD (coronary artery disease)    • Cancer (CMS/HCC)     skin, right breast   • CHF (congestive heart failure) (CMS/HCC)    • Coronary artery disease involving native coronary artery of native heart without angina pectoris 11/1/2018   • Heart murmur    • History of myocardial infarction 11/1/2018   • Hypertension    • Myocardial infarction (CMS/HCC)     PER PT    • Sinus tachycardia    • SOB (shortness of breath)        Past Surgical History:   Procedure Laterality Date   • BUNIONECTOMY Bilateral    • CARDIAC CATHETERIZATION     • CARDIAC CATHETERIZATION N/A 1/14/2020    Procedure: Right and Left Heart Cath;  Surgeon: Bin Chacko MD;  Location: [facility] CATH INVASIVE LOCATION;  Service:  Cardiovascular   • CARDIAC CATHETERIZATION N/A 2020    Procedure: Coronary angiography;  Surgeon: Bin Chacko MD;  Location:  [facility] CATH INVASIVE LOCATION;  Service: Cardiovascular   • CARDIAC CATHETERIZATION N/A 2020    Procedure: Left ventriculography;  Surgeon: iBn Chacko MD;  Location:  [facility] CATH INVASIVE LOCATION;  Service: Cardiovascular   • MASTECTOMY COMPLETE / SIMPLE  2019   • SHOULDER SURGERY Right    • SKIN CANCER EXCISION      ABOVE THE LIP.        Social History     Socioeconomic History   • Marital status:      Spouse name: Not on file   • Number of children: 5   • Years of education: Not on file   • Highest education level: Not on file   Occupational History   • Occupation: RETIRED    Tobacco Use   • Smoking status: Former Smoker     Last attempt to quit: 1973     Years since quittin.2   • Smokeless tobacco: Never Used   • Tobacco comment: CAFFEINE USE: 1 CUP DAILY   Substance and Sexual Activity   • Alcohol use: No   • Drug use: No   • Sexual activity: Defer       Family History   Problem Relation Age of Onset   • Hypertension Sister    • Cancer Sister    • Heart disease Brother 65        VALVE REPLACEMENT    • Hypertension Brother    • Hypertension Sister    • Hypertension Sister    • Hypertension Brother    • Diabetes Brother    • Hypertension Brother    • Cancer Brother    • Hypertension Brother    • Hypertension Brother    • Hypertension Brother        The following portion of the patient's history were reviewed and updated as appropriate: past medical history, past surgical history, past social history, past family history, allergies, current medications, and problem list.    Review of Systems   Constitution: Negative for diaphoresis, fever and malaise/fatigue.   HENT: Negative for congestion, hearing loss, hoarse voice, nosebleeds and sore throat.    Eyes: Negative for photophobia, vision loss in left eye, vision loss in right eye  and visual disturbance.   Cardiovascular: Negative for chest pain, dyspnea on exertion, irregular heartbeat, leg swelling, near-syncope, orthopnea, palpitations, paroxysmal nocturnal dyspnea and syncope.   Respiratory: Negative for cough, hemoptysis, sleep disturbances due to breathing, snoring, sputum production and wheezing. Shortness of breath: improving.    Endocrine: Negative for cold intolerance, heat intolerance, polydipsia, polyphagia and polyuria.   Hematologic/Lymphatic: Negative for bleeding problem. Does not bruise/bleed easily.   Skin: Negative for color change, dry skin, poor wound healing, rash and suspicious lesions.   Musculoskeletal: Negative for arthritis, back pain, falls, gout, joint pain, joint swelling, muscle cramps, muscle weakness and myalgias.   Gastrointestinal: Negative for bloating, abdominal pain, constipation, diarrhea, dysphagia, melena, nausea and vomiting.   Neurological: Negative for excessive daytime sleepiness, dizziness, headaches, light-headedness, loss of balance, numbness, paresthesias, seizures, vertigo and weakness.   Psychiatric/Behavioral: Negative for depression, memory loss and substance abuse. The patient is not nervous/anxious.        Allergies   Allergen Reactions   • Adhesive Tape Other (See Comments)     BLISTERS UNDER TAGADERM   • Nsaids GI Bleeding     CROHN'S DISEASE         Current Outpatient Medications:   •  albuterol sulfate  (90 Base) MCG/ACT inhaler, Inhale 2 puffs Every 6 (Six) Hours As Needed for Wheezing., Disp: 1 inhaler, Rfl: 1  •  aspirin 81 MG tablet, Take 81 mg by mouth., Disp: , Rfl:   •  B Complex Vitamins (VITAMIN B COMPLEX PO), Take 1 tablet by mouth Daily., Disp: , Rfl:   •  baclofen (LIORESAL) 10 MG tablet, TAKE ONE TABLET BY MOUTH EVERY NIGHT AT BEDTIME (Patient taking differently: Take 10 mg by mouth Every Night.), Disp: 30 tablet, Rfl: 2  •  Cholecalciferol (VITAMIN D) 2000 units capsule, Take 2,000 Units by mouth Daily., Disp: ,  "Rfl:   •  CRANBERRY EXTRACT PO, Take 1 capsule by mouth Daily., Disp: , Rfl:   •  furosemide (LASIX) 20 MG tablet, Take 1 tablet by mouth Daily., Disp: 30 tablet, Rfl: 5  •  HYDROcodone-Acetaminophen (XODOL) 5-300 MG per tablet, Take 1 tablet by mouth Daily As Needed for Moderate Pain . PRN, Disp: 10 tablet, Rfl: 0  •  LORATADINE-D 24HR  MG per 24 hr tablet, TAKE ONE TABLET BY MOUTH DAILY (Patient taking differently: Take 1 tablet by mouth Daily.), Disp: 30 tablet, Rfl: 2  •  losartan (COZAAR) 50 MG tablet, TAKE ONE TABLET BY MOUTH TWICE A DAY (Patient taking differently: Take 50 mg by mouth 2 (Two) Times a Day.), Disp: 180 tablet, Rfl: 2  •  MEGARED OMEGA-3 KRILL OIL PO, Take 1 capsule by mouth Daily., Disp: , Rfl:   •  PROBIOTIC PRODUCT PO, Take 2 capsules by mouth Daily., Disp: , Rfl:         Objective:     Vitals:    01/22/20 1311   BP: 148/92   BP Location: Left arm   Patient Position: Sitting   Cuff Size: Adult   Pulse: 69   Resp: 16   SpO2: 98%   Weight: 79.4 kg (175 lb)   Height: 165.1 cm (65\")     Body mass index is 29.12 kg/m².      Physical Exam   Constitutional: She is oriented to person, place, and time. She appears well-developed and well-nourished. No distress.   HENT:   Head: Normocephalic and atraumatic.   Right Ear: External ear normal.   Left Ear: External ear normal.   Nose: Nose normal.   Eyes: Pupils are equal, round, and reactive to light. Conjunctivae are normal. Right eye exhibits no discharge. Left eye exhibits no discharge.   Neck: Normal range of motion. Neck supple. No JVD present. No tracheal deviation present. No thyromegaly present.   Cardiovascular: Normal rate, regular rhythm, normal heart sounds and intact distal pulses. Exam reveals no gallop and no friction rub.   No murmur heard.  Pulmonary/Chest: Effort normal and breath sounds normal. No respiratory distress. She has no wheezes. She has no rales. She exhibits no tenderness.   Abdominal: Soft. Bowel sounds are normal. She " exhibits no distension. There is no tenderness.   Musculoskeletal: Normal range of motion. She exhibits no edema, tenderness or deformity.   Lymphadenopathy:     She has no cervical adenopathy.   Neurological: She is alert and oriented to person, place, and time. Coordination normal.   Skin: Skin is warm and dry. No rash noted. No erythema.   Psychiatric: She has a normal mood and affect. Her behavior is normal. Judgment and thought content normal.             ECG 12 Lead  Date/Time: 1/22/2020 1:13 PM  Performed by: Nan Brian APRN  Authorized by: Nan Brian APRN   Comparison: compared with previous ECG from 12/11/2019  Similar to previous ECG  Rhythm: sinus rhythm  Ectopy: unifocal PVCs  Rate: normal  Conduction: non-specific intraventricular conduction delay  ST Segments: ST segments normal  T inversion: I, II, aVL, aVF, V2 and V4  T flattening: V5 and V6  QRS axis: normal  Other findings: left ventricular hypertrophy    Clinical impression: abnormal EKG              Assessment:       Diagnosis Plan   1. Cardiomyopathy, unspecified type (CMS/HCC)     2. Coronary artery disease involving native coronary artery of native heart without angina pectoris     3. Essential hypertension     4. Other hyperlipidemia            Plan:       1. Cardiomyopathy-  Non ischemic - Per cardiac catheterization 1/2020 - EF 20%.  Long discussion about guideline directed medical therapy  and reason for each class of medications.  Will continue on losartan 50 mg twice daily.  Will add carvedilol 6.25 mg twice daily.  She will return to the office in 2 weeks for follow-up.  At that time plan to change to Entresto.  She voiced understanding.    2. CAD- non obsructing -per cardiac catheterization 1/2020  - Left dominant system with diminutive LAD which may explain her stress test findings but otherwise nonischemic cardiomyopathy with only a 30 to 40% proximal LAD stenoses. Currently elevated left-sided filling pressures with EDP of  23 mmHg Currently reduced cardiac output and index of 3.5 and 1.9.  She is on aspirin 81 mg daily.    3.  HTN-borderline.  Starting carvedilol 6.25 mg twice daily.     4. Hyperlipidemia - she is taking Krill oil    Return to office in 2 weeks at that point plan to initiate Entresto.    As always, it has been a pleasure to participate in your patient's care. Thank you.       Sincerely,       PATI Eagle      Current Outpatient Medications:   •  albuterol sulfate  (90 Base) MCG/ACT inhaler, Inhale 2 puffs Every 6 (Six) Hours As Needed for Wheezing., Disp: 1 inhaler, Rfl: 1  •  aspirin 81 MG tablet, Take 81 mg by mouth., Disp: , Rfl:   •  B Complex Vitamins (VITAMIN B COMPLEX PO), Take 1 tablet by mouth Daily., Disp: , Rfl:   •  baclofen (LIORESAL) 10 MG tablet, TAKE ONE TABLET BY MOUTH EVERY NIGHT AT BEDTIME (Patient taking differently: Take 10 mg by mouth Every Night.), Disp: 30 tablet, Rfl: 2  •  Cholecalciferol (VITAMIN D) 2000 units capsule, Take 2,000 Units by mouth Daily., Disp: , Rfl:   •  CRANBERRY EXTRACT PO, Take 1 capsule by mouth Daily., Disp: , Rfl:   •  furosemide (LASIX) 20 MG tablet, Take 1 tablet by mouth Daily., Disp: 90 tablet, Rfl: 3  •  HYDROcodone-Acetaminophen (XODOL) 5-300 MG per tablet, Take 1 tablet by mouth Daily As Needed for Moderate Pain . PRN, Disp: 10 tablet, Rfl: 0  •  LORATADINE-D 24HR  MG per 24 hr tablet, TAKE ONE TABLET BY MOUTH DAILY (Patient taking differently: Take 1 tablet by mouth Daily.), Disp: 30 tablet, Rfl: 2  •  losartan (COZAAR) 50 MG tablet, TAKE ONE TABLET BY MOUTH TWICE A DAY (Patient taking differently: Take 50 mg by mouth 2 (Two) Times a Day.), Disp: 180 tablet, Rfl: 2  •  MEGARED OMEGA-3 KRILL OIL PO, Take 1 capsule by mouth Daily., Disp: , Rfl:   •  PROBIOTIC PRODUCT PO, Take 2 capsules by mouth Daily., Disp: , Rfl:   •  carvedilol (COREG) 6.25 MG tablet, Take 1 tablet by mouth 2 (Two) Times a Day., Disp: 60 tablet, Rfl: 3    Dictated utilizing  Mckay dictation

## 2020-01-22 ENCOUNTER — OFFICE VISIT (OUTPATIENT)
Dept: CARDIOLOGY | Facility: CLINIC | Age: 75
End: 2020-01-22

## 2020-01-22 VITALS
DIASTOLIC BLOOD PRESSURE: 92 MMHG | HEIGHT: 65 IN | RESPIRATION RATE: 16 BRPM | OXYGEN SATURATION: 98 % | BODY MASS INDEX: 29.16 KG/M2 | SYSTOLIC BLOOD PRESSURE: 148 MMHG | WEIGHT: 175 LBS | HEART RATE: 69 BPM

## 2020-01-22 DIAGNOSIS — E78.49 OTHER HYPERLIPIDEMIA: ICD-10-CM

## 2020-01-22 DIAGNOSIS — I42.9 CARDIOMYOPATHY, UNSPECIFIED TYPE (HCC): Primary | ICD-10-CM

## 2020-01-22 DIAGNOSIS — I25.10 CORONARY ARTERY DISEASE INVOLVING NATIVE CORONARY ARTERY OF NATIVE HEART WITHOUT ANGINA PECTORIS: Chronic | ICD-10-CM

## 2020-01-22 DIAGNOSIS — I10 ESSENTIAL HYPERTENSION: ICD-10-CM

## 2020-01-22 PROCEDURE — 99214 OFFICE O/P EST MOD 30 MIN: CPT | Performed by: NURSE PRACTITIONER

## 2020-01-22 PROCEDURE — 93000 ELECTROCARDIOGRAM COMPLETE: CPT | Performed by: NURSE PRACTITIONER

## 2020-01-22 RX ORDER — CARVEDILOL 6.25 MG/1
6.25 TABLET ORAL 2 TIMES DAILY
Qty: 60 TABLET | Refills: 3 | Status: SHIPPED | OUTPATIENT
Start: 2020-01-22 | End: 2020-02-05

## 2020-01-22 RX ORDER — FUROSEMIDE 20 MG/1
20 TABLET ORAL DAILY
Qty: 90 TABLET | Refills: 3 | Status: SHIPPED | OUTPATIENT
Start: 2020-01-22 | End: 2021-01-06

## 2020-02-04 NOTE — PROGRESS NOTES
Date of Office Visit: 2020  Encounter Provider: PATI Eagle  Place of Service: [facility] CARDIOLOGY  Patient Name: Effie Donnelly  :1945  Primary Cardiologist: Dr. Brand    CC: 2 week follow up    Dear      HPI: Effie Donnelly is a pleasant 74 y.o. female who presents 2020 for cardiac follow up.    She has a history of CAD and myocardial infarction.  She had an echocardiogram in 2018  that showed normal function.  She does have a history of an abnormal EKG with LVH as well as diffuse repolarization changes.       She was seen in the ED at [facility] on 10/29/18 and treated for atypical pneumonia.  Her BNP was elevated at 4828.  She was given a Z-leno and she states after the first 2 pills, she felt better.     She does have some baseline dyspnea and has a history of COPD.  She is a former smoker but stopped many years ago.     She had an Echo 2018:  Interpretation Summary      · Left ventricular wall thickness is consistent with hypertrophy. Sigmoid-shaped ventricular septum is present.  · Left ventricular systolic function is normal. Estimated EF = 55%.  · Left ventricular diastolic dysfunction (grade I a) consistent with impaired relaxation.  · Left atrial cavity size is moderately dilated.  · Mild tricuspid valve regurgitation is present.  · Calculated right ventricular systolic pressure from tricuspid regurgitation is 29 mmHg.  · Mild pulmonic valve regurgitation is present.       On 19 she called the office.  She has stated that after having the 5 days of Lasix 20 mg she did felt better and her shortness of air resolved some.  She stated she again was feeling short of breath with exertion.  I spoke to her and she said the day before, she was fairly short of breath but today she feels much better.  She denied any lower extremity edema.  I did give her the okay to take the remaining 5 days of the furosemide if she felt her shortness of  breath was worsening, had weight gain, or lower extremity edema.  She voiced understanding.      I saw her on 12/11/19 with continued complaints of shortness of breath and wakes up in the mornings with it.  She also describes some intermittent midsternal chest pressure that can happen anytime with or without exertion.  She states is a tightness around her left breast chest wall area.  He denies any palpitations, lower extremity edema, she will have some dizzy with sudden changes in position, she does complain of fatigue, she denies any leg pain, numbness or tingling her lower extremities.  She denies snoring, she denies having any PND or cough, she does admit to orthopnea. An echo and stress test were ordered with the following results:     Echo performed 1/6/2020:  · The left ventricular cavity is mildly dilated.  · Left ventricular wall thickness is consistent with hypertrophy. Sigmoid-shaped ventricular septum is present.  · Estimated EF = 30%.  · Left ventricular diastolic dysfunction (grade I a) consistent with impaired relaxation.  · Left atrial cavity size is severely dilated.  · Mild tricuspid valve regurgitation is present.  · Calculated right ventricular systolic pressure from tricuspid regurgitation is 28.3 mmHg.  · The following left ventricular wall segments are hypokinetic: mid anterior, apical anterior, basal anterolateral, mid anterolateral, apical lateral, basal inferolateral, mid inferolateral, apical inferior, mid inferior, apical septal, basal inferoseptal, mid inferoseptal, apex hypokinetic, mid anteroseptal, basal anterior, basal inferior and basal inferoseptal.     Stress test 1/6/2020:  Interpretation Summary      · Myocardial perfusion imaging indicates a medium-sized infarct located in the anterior wall with no significant ischemia noted.  · Left ventricular ejection fraction is severely reduced (Calculated EF = 29%).  · Impressions are consistent with an intermediate risk  study.  · Anterior wall defect noted may be artifact, this study may be compatible with nonischemic cardiomyopathy.         She was then sent for cardiac catheterization:  Conclusions: 1/14/2020  1. Left dominant system with diminutive LAD which may explain her stress test findings but otherwise nonischemic cardiomyopathy with only a 30 to 40% proximal LAD stenoses.  2. Currently elevated left-sided filling pressures with EDP of 23 mmHg  3. Currently reduced cardiac output and index of 3.5 and 1.9     Recommendations:   1. Further optimization of congestive heart failure regimen and diuresis per primary cardiologist     At her last visit following her hospitalization, she stated she felt better and her breathing had improved.  We had a long discussion about the results of her catheterization and about optimizing her medical therapy for her new diagnosis of nonischemic cardiomyopathy.  She was started on carvedilol with plans to switch her losartan over to Entresto.    She states she is feeling better and better every day.  She will occasionally feel some palpitations.  Her shortness of breath is greatly improved.  She is had no episode of chest pain or chest tightness.  She has had no lower extremity edema.  She is taking her Lasix daily.  She denies any dizziness or lightheadedness.  She denies any fatigue.  She has had no unexplained leg pain, numbness or tingling or upper lower extremities.  She denies any snoring, PND, cough, orthopnea.  She taken her medications as directed.  She has had no unexplained bleeding.      Past Medical History:   Diagnosis Date   • CAD (coronary artery disease)    • Cancer (CMS/HCC)     skin, right breast   • CHF (congestive heart failure) (CMS/[facility])    • Coronary artery disease involving native coronary artery of native heart without angina pectoris 11/1/2018   • Heart murmur    • History of myocardial infarction 11/1/2018   • Hypertension    • Myocardial infarction (CMS/HCC)     PER  PT    • Sinus tachycardia    • SOB (shortness of breath)        Past Surgical History:   Procedure Laterality Date   • BUNIONECTOMY Bilateral    • CARDIAC CATHETERIZATION     • CARDIAC CATHETERIZATION N/A 2020    Procedure: Right and Left Heart Cath;  Surgeon: Bin Chacko MD;  Location:  [facility];  Service: Cardiovascular   • CARDIAC CATHETERIZATION N/A 2020    Procedure: Coronary angiography;  Surgeon: Bin Chacko MD;  Location:  [facility];  Service: Cardiovascular   • CARDIAC CATHETERIZATION N/A 2020    Procedure: Left ventriculography;  Surgeon: Bin Chacko MD;  Location:  [facility];  Service: Cardiovascular   • MASTECTOMY COMPLETE / SIMPLE  2019   • SHOULDER SURGERY Right    • SKIN CANCER EXCISION      ABOVE THE LIP.        Social History     Socioeconomic History   • Marital status:      Spouse name: Not on file   • Number of children: 5   • Years of education: Not on file   • Highest education level: Not on file   Occupational History   • Occupation: RETIRED    Tobacco Use   • Smoking status: Former Smoker     Last attempt to quit: 1973     Years since quittin.2   • Smokeless tobacco: Never Used   • Tobacco comment: CAFFEINE USE: 1 CUP DAILY   Substance and Sexual Activity   • Alcohol use: No   • Drug use: No   • Sexual activity: Defer       Family History   Problem Relation Age of Onset   • Hypertension Sister    • Cancer Sister    • Heart disease Brother 65        VALVE REPLACEMENT    • Hypertension Brother    • Hypertension Sister    • Hypertension Sister    • Hypertension Brother    • Diabetes Brother    • Hypertension Brother    • Cancer Brother    • Hypertension Brother    • Hypertension Brother    • Hypertension Brother        The following portion of the patient's history were reviewed and updated as appropriate: past medical history, past surgical history, past social  history, past family history, allergies, current medications, and problem list.    Review of Systems   Constitution: Negative for diaphoresis, fever and malaise/fatigue.   HENT: Negative for congestion, hearing loss, hoarse voice, nosebleeds and sore throat.    Eyes: Negative for photophobia, vision loss in left eye, vision loss in right eye and visual disturbance.   Cardiovascular: Negative for chest pain, dyspnea on exertion, irregular heartbeat, leg swelling, near-syncope, orthopnea, palpitations, paroxysmal nocturnal dyspnea and syncope.   Respiratory: Positive for shortness of breath (vastly improved). Negative for cough, hemoptysis, sleep disturbances due to breathing, snoring, sputum production and wheezing.    Endocrine: Negative for cold intolerance, heat intolerance, polydipsia, polyphagia and polyuria.   Hematologic/Lymphatic: Negative for bleeding problem. Does not bruise/bleed easily.   Skin: Negative for color change, dry skin, poor wound healing, rash and suspicious lesions.   Musculoskeletal: Negative for arthritis, back pain, falls, gout, joint pain, joint swelling, muscle cramps, muscle weakness and myalgias.   Gastrointestinal: Negative for bloating, abdominal pain, constipation, diarrhea, dysphagia, melena, nausea and vomiting.   Neurological: Negative for excessive daytime sleepiness, dizziness, headaches, light-headedness, loss of balance, numbness, paresthesias, seizures, vertigo and weakness.   Psychiatric/Behavioral: Negative for depression, memory loss and substance abuse. The patient is not nervous/anxious.        Allergies   Allergen Reactions   • Adhesive Tape Other (See Comments)     BLISTERS UNDER TAGADERM   • Nsaids GI Bleeding     CROHN'S DISEASE         Current Outpatient Medications:   •  aspirin 81 MG tablet, Take 81 mg by mouth., Disp: , Rfl:   •  B Complex Vitamins (VITAMIN B COMPLEX PO), Take 1 tablet by mouth Daily., Disp: , Rfl:   •  baclofen (LIORESAL) 10 MG tablet, TAKE ONE  "TABLET BY MOUTH EVERY NIGHT AT BEDTIME (Patient taking differently: Take 10 mg by mouth Every Night.), Disp: 30 tablet, Rfl: 2  •  Cholecalciferol (VITAMIN D) 2000 units capsule, Take 2,000 Units by mouth Daily., Disp: , Rfl:   •  CRANBERRY EXTRACT PO, Take 1 capsule by mouth Daily., Disp: , Rfl:   •  furosemide (LASIX) 20 MG tablet, Take 1 tablet by mouth Daily., Disp: 90 tablet, Rfl: 3  •  HYDROcodone-Acetaminophen (XODOL) 5-300 MG per tablet, Take 1 tablet by mouth Daily As Needed for Moderate Pain . PRN, Disp: 10 tablet, Rfl: 0  •  LORATADINE-D 24HR  MG per 24 hr tablet, TAKE ONE TABLET BY MOUTH DAILY (Patient taking differently: Take 1 tablet by mouth Daily.), Disp: 30 tablet, Rfl: 2  •  MEGARED OMEGA-3 KRILL OIL PO, Take 1 capsule by mouth Daily., Disp: , Rfl:   •  PROBIOTIC PRODUCT PO, Take 2 capsules by mouth Daily., Disp: , Rfl:   •  albuterol sulfate  (90 Base) MCG/ACT inhaler, Inhale 2 puffs Every 6 (Six) Hours As Needed for Wheezing., Disp: 1 inhaler, Rfl: 1  •  carvedilol (COREG) 12.5 MG tablet, Take 1 tablet by mouth 2 (Two) Times a Day., Disp: 180 tablet, Rfl: 3  •  sacubitril-valsartan (ENTRESTO) 24-26 MG tablet, Take 1 tablet by mouth 2 (Two) Times a Day., Disp: 60 tablet, Rfl: 1        Objective:     Vitals:    02/05/20 1112   BP: 150/74   BP Location: Left arm   Patient Position: Sitting   Cuff Size: Adult   Pulse: 67   Resp: 16   SpO2: 98%   Weight: 80.3 kg (177 lb)   Height: 165.1 cm (65\")     Body mass index is 29.45 kg/m².      Physical Exam   Constitutional: She is oriented to person, place, and time. She appears well-developed and well-nourished. No distress.   HENT:   Head: Normocephalic and atraumatic.   Right Ear: External ear normal.   Left Ear: External ear normal.   Nose: Nose normal.   Eyes: Pupils are equal, round, and reactive to light. Conjunctivae are normal. Right eye exhibits no discharge. Left eye exhibits no discharge.   Neck: Normal range of motion. Neck supple. " No JVD present. No tracheal deviation present. No thyromegaly present.   Cardiovascular: Normal rate, regular rhythm, normal heart sounds and intact distal pulses. Exam reveals no gallop and no friction rub.   No murmur heard.  Pulmonary/Chest: Effort normal and breath sounds normal. No respiratory distress. She has no wheezes. She has no rales. She exhibits no tenderness.   Abdominal: Soft. Bowel sounds are normal. She exhibits no distension. There is no tenderness.   Musculoskeletal: Normal range of motion. She exhibits no edema, tenderness or deformity.   Lymphadenopathy:     She has no cervical adenopathy.   Neurological: She is alert and oriented to person, place, and time. Coordination normal.   Skin: Skin is warm and dry. No rash noted. No erythema.   Psychiatric: She has a normal mood and affect. Her behavior is normal. Judgment and thought content normal.             ECG 12 Lead  Date/Time: 2/5/2020 11:18 AM  Performed by: Nan Brian APRN  Authorized by: Nan Brian APRN   Comparison: compared with previous ECG from 1/22/2020  Similar to previous ECG  Rhythm: sinus rhythm  Rate: normal  Conduction: conduction normal  Conduction: incomplete right bundle branch block and non-specific intraventricular conduction delay  T inversion: I, aVL, V2 and V4  T flattening: V5 and V6  QRS axis: left  Other findings: left ventricular hypertrophy    Clinical impression: abnormal EKG              Assessment:       Diagnosis Plan   1. Other cardiomyopathy (CMS/HCC)     2. Essential hypertension  Basic Metabolic Panel   3. Coronary artery disease involving native coronary artery of native heart without angina pectoris     4. Other hyperlipidemia            Plan:       1.  Nonischemic cardiomyopathy- Per cardiac catheterization 1/2020 - EF 20%. we are increasing her guideline directed medical therapy.  She will stop her losartan today and start taking Entresto 24/26 Thursday evening.  We will also increase her  carvedilol to 12.5 mg twice daily.  I have written out step-by-step instructions for her.  We will check a BP and BMP in 1 week.     2.  Hypertension-still mildly elevated.  Changing from losartan to Entresto and increasing Coreg.  BP check in 1 week    3.  CAD- very mild nonobstructive per cardiac catheterization 1/2020.  Treat medically.  No angina.    4.  Hyperlipidemia- he is currently taking Krill oil.  Lipids from November 2019 show total cholesterol 192, triglycerides 98, HDL 66, .      RTO in 1 week for BP check and BMP    Keep appt with JA for 3/2020     As always, it has been a pleasure to participate in your patient's care. Thank you.       Sincerely,       PATI Eagle      Current Outpatient Medications:   •  aspirin 81 MG tablet, Take 81 mg by mouth., Disp: , Rfl:   •  B Complex Vitamins (VITAMIN B COMPLEX PO), Take 1 tablet by mouth Daily., Disp: , Rfl:   •  baclofen (LIORESAL) 10 MG tablet, TAKE ONE TABLET BY MOUTH EVERY NIGHT AT BEDTIME (Patient taking differently: Take 10 mg by mouth Every Night.), Disp: 30 tablet, Rfl: 2  •  Cholecalciferol (VITAMIN D) 2000 units capsule, Take 2,000 Units by mouth Daily., Disp: , Rfl:   •  CRANBERRY EXTRACT PO, Take 1 capsule by mouth Daily., Disp: , Rfl:   •  furosemide (LASIX) 20 MG tablet, Take 1 tablet by mouth Daily., Disp: 90 tablet, Rfl: 3  •  HYDROcodone-Acetaminophen (XODOL) 5-300 MG per tablet, Take 1 tablet by mouth Daily As Needed for Moderate Pain . PRN, Disp: 10 tablet, Rfl: 0  •  LORATADINE-D 24HR  MG per 24 hr tablet, TAKE ONE TABLET BY MOUTH DAILY (Patient taking differently: Take 1 tablet by mouth Daily.), Disp: 30 tablet, Rfl: 2  •  MEGARED OMEGA-3 KRILL OIL PO, Take 1 capsule by mouth Daily., Disp: , Rfl:   •  PROBIOTIC PRODUCT PO, Take 2 capsules by mouth Daily., Disp: , Rfl:   •  albuterol sulfate  (90 Base) MCG/ACT inhaler, Inhale 2 puffs Every 6 (Six) Hours As Needed for Wheezing., Disp: 1 inhaler, Rfl: 1  •   carvedilol (COREG) 12.5 MG tablet, Take 1 tablet by mouth 2 (Two) Times a Day., Disp: 180 tablet, Rfl: 3  •  sacubitril-valsartan (ENTRESTO) 24-26 MG tablet, Take 1 tablet by mouth 2 (Two) Times a Day., Disp: 60 tablet, Rfl: 1    Dictated utilizing Dragon dictation

## 2020-02-05 ENCOUNTER — OFFICE VISIT (OUTPATIENT)
Dept: CARDIOLOGY | Facility: CLINIC | Age: 75
End: 2020-02-05

## 2020-02-05 VITALS
HEART RATE: 67 BPM | BODY MASS INDEX: 29.49 KG/M2 | WEIGHT: 177 LBS | HEIGHT: 65 IN | RESPIRATION RATE: 16 BRPM | DIASTOLIC BLOOD PRESSURE: 74 MMHG | SYSTOLIC BLOOD PRESSURE: 150 MMHG | OXYGEN SATURATION: 98 %

## 2020-02-05 DIAGNOSIS — I25.10 CORONARY ARTERY DISEASE INVOLVING NATIVE CORONARY ARTERY OF NATIVE HEART WITHOUT ANGINA PECTORIS: Chronic | ICD-10-CM

## 2020-02-05 DIAGNOSIS — E78.49 OTHER HYPERLIPIDEMIA: ICD-10-CM

## 2020-02-05 DIAGNOSIS — I10 ESSENTIAL HYPERTENSION: ICD-10-CM

## 2020-02-05 DIAGNOSIS — I42.8 OTHER CARDIOMYOPATHY (HCC): ICD-10-CM

## 2020-02-05 PROBLEM — R06.02 SHORTNESS OF BREATH: Status: RESOLVED | Noted: 2020-01-13 | Resolved: 2020-02-05

## 2020-02-05 PROCEDURE — 99214 OFFICE O/P EST MOD 30 MIN: CPT | Performed by: NURSE PRACTITIONER

## 2020-02-05 PROCEDURE — 93000 ELECTROCARDIOGRAM COMPLETE: CPT | Performed by: NURSE PRACTITIONER

## 2020-02-05 RX ORDER — CARVEDILOL 12.5 MG/1
12.5 TABLET ORAL 2 TIMES DAILY
Qty: 180 TABLET | Refills: 3 | Status: SHIPPED | OUTPATIENT
Start: 2020-02-05 | End: 2021-03-05 | Stop reason: SDUPTHER

## 2020-02-05 RX ORDER — BACLOFEN 10 MG/1
10 TABLET ORAL NIGHTLY
Qty: 30 TABLET | Refills: 2 | Status: SHIPPED | OUTPATIENT
Start: 2020-02-05 | End: 2020-05-11

## 2020-02-12 ENCOUNTER — LAB (OUTPATIENT)
Dept: LAB | Facility: HOSPITAL | Age: 75
End: 2020-02-12

## 2020-02-12 ENCOUNTER — CLINICAL SUPPORT (OUTPATIENT)
Dept: CARDIOLOGY | Facility: CLINIC | Age: 75
End: 2020-02-12

## 2020-02-12 DIAGNOSIS — I10 ESSENTIAL HYPERTENSION: ICD-10-CM

## 2020-02-12 LAB
ANION GAP SERPL CALCULATED.3IONS-SCNC: 10.1 MMOL/L (ref 5–15)
BUN BLD-MCNC: 25 MG/DL (ref 8–23)
BUN/CREAT SERPL: 27.8 (ref 7–25)
CALCIUM SPEC-SCNC: 8.7 MG/DL (ref 8.6–10.5)
CHLORIDE SERPL-SCNC: 104 MMOL/L (ref 98–107)
CO2 SERPL-SCNC: 25.9 MMOL/L (ref 22–29)
CREAT BLD-MCNC: 0.9 MG/DL (ref 0.57–1)
GFR SERPL CREATININE-BSD FRML MDRD: 61 ML/MIN/1.73
GLUCOSE BLD-MCNC: 83 MG/DL (ref 65–99)
POTASSIUM BLD-SCNC: 4.1 MMOL/L (ref 3.5–5.2)
SODIUM BLD-SCNC: 140 MMOL/L (ref 136–145)

## 2020-02-12 PROCEDURE — 36415 COLL VENOUS BLD VENIPUNCTURE: CPT

## 2020-02-12 PROCEDURE — 80048 BASIC METABOLIC PNL TOTAL CA: CPT

## 2020-02-12 NOTE — PROGRESS NOTES
"Pwas last seen on 2/5/2020 in office where she was instructed to stop her Losartan, start Entresto 24/26 twice daily and increase her Carvedilol to 12.5 mg twice daily. Pt reports that she has had 2 mild episodes of rapid heart rate while at rest she did not measure her HR during this time. She reports that she continues to have shortness of breath at rest, it is not any worse than when seen in office. Pt reports that she has some intermittent dizziness with sudden changes in position. Pt reports that she has not had any change or improvement in her fatigue. She states that in general she has not \"felt as good\" since starting the entresto and increasing her carvedilol.    BP   L 130/80, 50 95%  R 140/72, 50  95%    She is not tolerating the increased dose of the carvedilol.  Will decrease to 6.25 mg BID and continue the rest of her medications.  She will call me in a week with an update.  Keep appt with MEGHANA in 2 months.  Labs today.  "

## 2020-02-24 ENCOUNTER — OFFICE VISIT (OUTPATIENT)
Dept: FAMILY MEDICINE CLINIC | Facility: CLINIC | Age: 75
End: 2020-02-24

## 2020-02-24 VITALS
HEART RATE: 102 BPM | HEIGHT: 65 IN | SYSTOLIC BLOOD PRESSURE: 132 MMHG | TEMPERATURE: 98.2 F | BODY MASS INDEX: 29.16 KG/M2 | OXYGEN SATURATION: 96 % | WEIGHT: 175 LBS | DIASTOLIC BLOOD PRESSURE: 80 MMHG

## 2020-02-24 DIAGNOSIS — I10 ESSENTIAL HYPERTENSION: ICD-10-CM

## 2020-02-24 DIAGNOSIS — K21.9 GASTROESOPHAGEAL REFLUX DISEASE WITHOUT ESOPHAGITIS: ICD-10-CM

## 2020-02-24 DIAGNOSIS — I25.10 CORONARY ARTERY DISEASE INVOLVING NATIVE CORONARY ARTERY OF NATIVE HEART WITHOUT ANGINA PECTORIS: Primary | Chronic | ICD-10-CM

## 2020-02-24 DIAGNOSIS — M25.50 CHRONIC JOINT PAIN: ICD-10-CM

## 2020-02-24 DIAGNOSIS — E78.49 OTHER HYPERLIPIDEMIA: ICD-10-CM

## 2020-02-24 DIAGNOSIS — I42.8 OTHER CARDIOMYOPATHY (HCC): ICD-10-CM

## 2020-02-24 DIAGNOSIS — G89.29 CHRONIC JOINT PAIN: ICD-10-CM

## 2020-02-24 PROCEDURE — 99214 OFFICE O/P EST MOD 30 MIN: CPT | Performed by: INTERNAL MEDICINE

## 2020-02-24 RX ORDER — HYDROCODONE BITARTRATE AND ACETAMINOPHEN 5; 300 MG/1; MG/1
1 TABLET ORAL DAILY PRN
Qty: 15 TABLET | Refills: 0 | Status: SHIPPED | OUTPATIENT
Start: 2020-02-24 | End: 2021-03-05

## 2020-02-24 NOTE — PROGRESS NOTES
PEACE@  Effie Donnelly is a 74 y.o. female.     Chief Complaint   Patient presents with   • Hypertension   • Coronary Artery Disease   Chronic pain  Cardiomyopathy, hyperlipidemia, breast cancer  History of Present Illness   Patient here follow-up for hypertension, cardiomyopathy, congestive heart failure.  Patient also has chronic pain.  She is on Coreg aspirin Lasix and Entresto.  Patient reports being compliant.  Follows with Dr. Brand.  No further shortness of breath, chest pain.  She had a cardiac cath did not show obstructive coronary disease.  However her ejection fraction was 20%.  Does not have any swelling on her legs.  She has chronic pain and joint back pain wants refill on her Norco.  She does not take it every day.  She has a chronic anemia.  Not take any medication for GERD stable at this time.  Dieting and exercising.  Blood pressure is under control.  The following portions of the patient's history were reviewed and updated as appropriate: allergies, current medications, past family history, past medical history, past social history, past surgical history and problem list.    Review of Systems   Constitutional: Negative for activity change, appetite change, fatigue and fever.   Eyes: Negative for blurred vision and double vision.   Respiratory: Negative.  Negative for shortness of breath.    Cardiovascular: Negative for chest pain, palpitations and leg swelling.   Genitourinary: Negative for dysuria, flank pain and hematuria.   Musculoskeletal: Positive for arthralgias and back pain.        Both chronic   Neurological: Negative.  Negative for dizziness, syncope and light-headedness.   Psychiatric/Behavioral: Negative for agitation, behavioral problems, self-injury, sleep disturbance and suicidal ideas.       Allergies   Allergen Reactions   • Adhesive Tape Other (See Comments)     BLISTERS UNDER TAGADERM   • Nsaids GI Bleeding     CROHN'S DISEASE       Current Outpatient Medications on File  Prior to Visit   Medication Sig Dispense Refill   • albuterol sulfate  (90 Base) MCG/ACT inhaler Inhale 2 puffs Every 6 (Six) Hours As Needed for Wheezing. 1 inhaler 1   • aspirin 81 MG tablet Take 81 mg by mouth.     • B Complex Vitamins (VITAMIN B COMPLEX PO) Take 1 tablet by mouth Daily.     • baclofen (LIORESAL) 10 MG tablet Take 1 tablet by mouth Every Night. 30 tablet 2   • carvedilol (COREG) 12.5 MG tablet Take 1 tablet by mouth 2 (Two) Times a Day. 180 tablet 3   • Cholecalciferol (VITAMIN D) 2000 units capsule Take 2,000 Units by mouth Daily.     • CRANBERRY EXTRACT PO Take 1 capsule by mouth Daily.     • furosemide (LASIX) 20 MG tablet Take 1 tablet by mouth Daily. 90 tablet 3   • MEGARED OMEGA-3 KRILL OIL PO Take 1 capsule by mouth Daily.     • PROBIOTIC PRODUCT PO Take 2 capsules by mouth Daily.     • sacubitril-valsartan (ENTRESTO) 24-26 MG tablet Take 1 tablet by mouth 2 (Two) Times a Day. 60 tablet 1   • [DISCONTINUED] HYDROcodone-Acetaminophen (XODOL) 5-300 MG per tablet Take 1 tablet by mouth Daily As Needed for Moderate Pain . PRN 10 tablet 0   • [DISCONTINUED] LORATADINE-D 24HR  MG per 24 hr tablet TAKE ONE TABLET BY MOUTH DAILY (Patient taking differently: Take 1 tablet by mouth Daily.) 30 tablet 2     No current facility-administered medications on file prior to visit.        Family History   Problem Relation Age of Onset   • Hypertension Sister    • Cancer Sister    • Heart disease Brother 65        VALVE REPLACEMENT    • Hypertension Brother    • Hypertension Sister    • Hypertension Sister    • Hypertension Brother    • Diabetes Brother    • Hypertension Brother    • Cancer Brother    • Hypertension Brother    • Hypertension Brother    • Hypertension Brother        Past Medical History:   Diagnosis Date   • CAD (coronary artery disease)    • Cancer (CMS/HCC)     skin, right breast   • CHF (congestive heart failure) (CMS/HCC)    • Coronary artery disease involving native coronary  artery of native heart without angina pectoris 2018   • Heart murmur    • History of myocardial infarction 2018   • Hypertension    • Myocardial infarction (CMS/HCC)     PER PT    • Sinus tachycardia    • SOB (shortness of breath)        Past Surgical History:   Procedure Laterality Date   • BUNIONECTOMY Bilateral    • CARDIAC CATHETERIZATION     • CARDIAC CATHETERIZATION N/A 2020    Procedure: Right and Left Heart Cath;  Surgeon: Bin Chacko MD;  Location:  [facility];  Service: Cardiovascular   • CARDIAC CATHETERIZATION N/A 2020    Procedure: Coronary angiography;  Surgeon: Bin Chacko MD;  Location:  [facility];  Service: Cardiovascular   • CARDIAC CATHETERIZATION N/A 2020    Procedure: Left ventriculography;  Surgeon: Bin Chacko MD;  Location:  [facility];  Service: Cardiovascular   • MASTECTOMY COMPLETE / SIMPLE  2019   • SHOULDER SURGERY Right    • SKIN CANCER EXCISION      ABOVE THE LIP.        Social History     Socioeconomic History   • Marital status:      Spouse name: Not on file   • Number of children: 5   • Years of education: Not on file   • Highest education level: Not on file   Occupational History   • Occupation: RETIRED    Tobacco Use   • Smoking status: Former Smoker     Last attempt to quit: 1973     Years since quittin.3   • Smokeless tobacco: Never Used   • Tobacco comment: CAFFEINE USE: 1 CUP DAILY   Substance and Sexual Activity   • Alcohol use: No   • Drug use: No   • Sexual activity: Defer       Patient Active Problem List   Diagnosis   • History of myocardial infarction   • Coronary artery disease involving native coronary artery of native heart without angina pectoris   • Ulcerative colitis (CMS/HCC)   • HTN (hypertension)   • GERD (gastroesophageal reflux disease)   • DJD (degenerative joint disease)   • Ductal 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  "  • Anemia   • Other hyperlipidemia   • Cardiomyopathy (CMS/HCC)       /80 (BP Location: Left arm, Patient Position: Sitting, Cuff Size: Adult)   Pulse 102   Temp 98.2 °F (36.8 °C) (Oral)   Ht 165.1 cm (65\")   Wt 79.4 kg (175 lb)   SpO2 96%   BMI 29.12 kg/m²   Body mass index is 29.12 kg/m².    Objective   Physical Exam   Constitutional: She is oriented to person, place, and time. She appears well-developed and well-nourished.   HENT:   Head: Normocephalic and atraumatic.   Eyes: Pupils are equal, round, and reactive to light. EOM are normal.   Neck: Normal range of motion. Neck supple. No JVD present. No tracheal deviation present. No thyromegaly present.   Cardiovascular: Normal rate and regular rhythm. Exam reveals no friction rub.   No murmur heard.  Pulmonary/Chest: Effort normal and breath sounds normal. No respiratory distress. She has no wheezes. She exhibits no tenderness.   Abdominal: Soft. Bowel sounds are normal. She exhibits no distension and no mass. There is no tenderness. There is no guarding.   Musculoskeletal: She exhibits tenderness. She exhibits no edema.   Lymphadenopathy:     She has no cervical adenopathy.   Neurological: She is alert and oriented to person, place, and time. She displays normal reflexes. No cranial nerve deficit or sensory deficit. She exhibits normal muscle tone. Coordination normal.   Skin: Skin is warm and dry. No rash noted.   Psychiatric: She has a normal mood and affect. Her behavior is normal.   Nursing note and vitals reviewed.        Assessment/Plan   Effie was seen today for hypertension and coronary artery disease.    Diagnoses and all orders for this visit:    Coronary artery disease involving native coronary artery of native heart without angina pectoris  -     CBC & Differential; Future  -     Comprehensive Metabolic Panel; Future  -     Lipid Panel With / Chol / HDL Ratio; Future  -     TSH; Future    Essential hypertension  -     CBC & Differential; " Future  -     Comprehensive Metabolic Panel; Future  -     Lipid Panel With / Chol / HDL Ratio; Future  -     TSH; Future    Other hyperlipidemia  -     CBC & Differential; Future  -     Comprehensive Metabolic Panel; Future  -     Lipid Panel With / Chol / HDL Ratio; Future  -     TSH; Future    Other cardiomyopathy (CMS/HCC)  -     CBC & Differential; Future  -     Comprehensive Metabolic Panel; Future  -     Lipid Panel With / Chol / HDL Ratio; Future  -     TSH; Future    Gastroesophageal reflux disease without esophagitis  -     CBC & Differential; Future  -     Comprehensive Metabolic Panel; Future  -     Lipid Panel With / Chol / HDL Ratio; Future  -     TSH; Future    Chronic joint pain  -     HYDROcodone-Acetaminophen (XODOL) 5-300 MG per tablet; Take 1 tablet by mouth Daily As Needed for Moderate Pain . PRN    Continue diet and exercise.  Continue all medications.  Including Lasix, Coreg, Entresto.  Continue aspirin.  Pain medication was given patient to take occasionally.  She wants to come back tomorrow to get labs done.  She is not fasting today.  Keep appointment with specialist.  Regarding her cancer reports she is in remission.  No radiation therapy was needed.  Not getting any chemotherapy.  All of her problems are chronic and stable.  Return in 3 months

## 2020-02-25 ENCOUNTER — RESULTS ENCOUNTER (OUTPATIENT)
Dept: FAMILY MEDICINE CLINIC | Facility: CLINIC | Age: 75
End: 2020-02-25

## 2020-02-25 DIAGNOSIS — I42.8 OTHER CARDIOMYOPATHY (HCC): ICD-10-CM

## 2020-02-25 DIAGNOSIS — E78.49 OTHER HYPERLIPIDEMIA: ICD-10-CM

## 2020-02-25 DIAGNOSIS — I10 ESSENTIAL HYPERTENSION: ICD-10-CM

## 2020-02-25 DIAGNOSIS — K21.9 GASTROESOPHAGEAL REFLUX DISEASE WITHOUT ESOPHAGITIS: ICD-10-CM

## 2020-02-25 DIAGNOSIS — I25.10 CORONARY ARTERY DISEASE INVOLVING NATIVE CORONARY ARTERY OF NATIVE HEART WITHOUT ANGINA PECTORIS: Chronic | ICD-10-CM

## 2020-02-27 LAB
ALBUMIN SERPL-MCNC: 3.9 G/DL (ref 3.5–5.2)
ALBUMIN/GLOB SERPL: 1.6 G/DL
ALP SERPL-CCNC: 95 U/L (ref 39–117)
ALT SERPL-CCNC: 16 U/L (ref 1–33)
AST SERPL-CCNC: 13 U/L (ref 1–32)
BASOPHILS # BLD AUTO: 0.05 10*3/MM3 (ref 0–0.2)
BASOPHILS NFR BLD AUTO: 1.1 % (ref 0–1.5)
BILIRUB SERPL-MCNC: 0.5 MG/DL (ref 0.2–1.2)
BUN SERPL-MCNC: 27 MG/DL (ref 8–23)
BUN/CREAT SERPL: 31.4 (ref 7–25)
CALCIUM SERPL-MCNC: 8.7 MG/DL (ref 8.6–10.5)
CHLORIDE SERPL-SCNC: 104 MMOL/L (ref 98–107)
CHOLEST SERPL-MCNC: 175 MG/DL (ref 0–200)
CHOLEST/HDLC SERPL: 3.65 {RATIO}
CO2 SERPL-SCNC: 27.9 MMOL/L (ref 22–29)
CREAT SERPL-MCNC: 0.86 MG/DL (ref 0.57–1)
EOSINOPHIL # BLD AUTO: 0.23 10*3/MM3 (ref 0–0.4)
EOSINOPHIL NFR BLD AUTO: 5.3 % (ref 0.3–6.2)
ERYTHROCYTE [DISTWIDTH] IN BLOOD BY AUTOMATED COUNT: 13.6 % (ref 12.3–15.4)
GLOBULIN SER CALC-MCNC: 2.5 GM/DL
GLUCOSE SERPL-MCNC: 85 MG/DL (ref 65–99)
HCT VFR BLD AUTO: 38.7 % (ref 34–46.6)
HDLC SERPL-MCNC: 48 MG/DL (ref 40–60)
HGB BLD-MCNC: 13.1 G/DL (ref 12–15.9)
IMM GRANULOCYTES # BLD AUTO: 0.01 10*3/MM3 (ref 0–0.05)
IMM GRANULOCYTES NFR BLD AUTO: 0.2 % (ref 0–0.5)
LDLC SERPL CALC-MCNC: 95 MG/DL (ref 0–100)
LYMPHOCYTES # BLD AUTO: 1.63 10*3/MM3 (ref 0.7–3.1)
LYMPHOCYTES NFR BLD AUTO: 37.5 % (ref 19.6–45.3)
MCH RBC QN AUTO: 28.5 PG (ref 26.6–33)
MCHC RBC AUTO-ENTMCNC: 33.9 G/DL (ref 31.5–35.7)
MCV RBC AUTO: 84.1 FL (ref 79–97)
MONOCYTES # BLD AUTO: 0.41 10*3/MM3 (ref 0.1–0.9)
MONOCYTES NFR BLD AUTO: 9.4 % (ref 5–12)
NEUTROPHILS # BLD AUTO: 2.02 10*3/MM3 (ref 1.7–7)
NEUTROPHILS NFR BLD AUTO: 46.5 % (ref 42.7–76)
NRBC BLD AUTO-RTO: 0 /100 WBC (ref 0–0.2)
PLATELET # BLD AUTO: 197 10*3/MM3 (ref 140–450)
POTASSIUM SERPL-SCNC: 4.5 MMOL/L (ref 3.5–5.2)
PROT SERPL-MCNC: 6.4 G/DL (ref 6–8.5)
RBC # BLD AUTO: 4.6 10*6/MM3 (ref 3.77–5.28)
SODIUM SERPL-SCNC: 142 MMOL/L (ref 136–145)
TRIGL SERPL-MCNC: 160 MG/DL (ref 0–150)
TSH SERPL DL<=0.005 MIU/L-ACNC: 3.73 UIU/ML (ref 0.27–4.2)
VLDLC SERPL CALC-MCNC: 32 MG/DL
WBC # BLD AUTO: 4.35 10*3/MM3 (ref 3.4–10.8)

## 2020-03-19 ENCOUNTER — TELEPHONE (OUTPATIENT)
Dept: CARDIOLOGY | Facility: CLINIC | Age: 75
End: 2020-03-19

## 2020-03-19 NOTE — TELEPHONE ENCOUNTER
I spoke to the pt regarding her appointment. She was not having any symptoms or concern at this time for . She agree to postpone her appointment for 3-6 month. She is aware that we will place her on a recall list and call her to schedule her an appointment in the future.    Thanks Elle

## 2020-03-31 ENCOUNTER — TELEPHONE (OUTPATIENT)
Dept: CARDIOLOGY | Facility: CLINIC | Age: 75
End: 2020-03-31

## 2020-03-31 NOTE — TELEPHONE ENCOUNTER
[facility]     [facility] is pending.    Thanks Elle

## 2020-05-11 RX ORDER — BACLOFEN 10 MG/1
TABLET ORAL
Qty: 30 TABLET | Refills: 2 | Status: SHIPPED | OUTPATIENT
Start: 2020-05-11 | End: 2020-08-10

## 2020-07-08 ENCOUNTER — OFFICE VISIT (OUTPATIENT)
Dept: CARDIOLOGY | Facility: CLINIC | Age: 75
End: 2020-07-08

## 2020-07-08 VITALS
BODY MASS INDEX: 29.02 KG/M2 | SYSTOLIC BLOOD PRESSURE: 119 MMHG | DIASTOLIC BLOOD PRESSURE: 55 MMHG | HEART RATE: 67 BPM | HEIGHT: 64 IN | WEIGHT: 170 LBS

## 2020-07-08 DIAGNOSIS — I25.2 HISTORY OF MYOCARDIAL INFARCTION: Chronic | ICD-10-CM

## 2020-07-08 DIAGNOSIS — I42.8 NICM (NONISCHEMIC CARDIOMYOPATHY) (HCC): Chronic | ICD-10-CM

## 2020-07-08 DIAGNOSIS — I10 ESSENTIAL HYPERTENSION: ICD-10-CM

## 2020-07-08 DIAGNOSIS — I25.10 CORONARY ARTERY DISEASE INVOLVING NATIVE CORONARY ARTERY OF NATIVE HEART WITHOUT ANGINA PECTORIS: Primary | Chronic | ICD-10-CM

## 2020-07-08 PROCEDURE — 99442 PR PHYS/QHP TELEPHONE EVALUATION 11-20 MIN: CPT | Performed by: INTERNAL MEDICINE

## 2020-07-08 NOTE — PROGRESS NOTES
Subjective:     Encounter Date: 07/08/20        Patient ID: Effie Donnelly is a 74 y.o. female.    Chief Complaint: preop cards  History of Present Illness    Dear Dr. Terrazas,    This patient has consented to a telehealth visit via audio. The visit was scheduled as a audio visit to comply with patient safety concerns in accordance with CDC recommendations.  All vitals recorded within this visit are reported by the patient.  I spent  20 minutes in total including but not limited to the 14 minutes spent in direct conversation with this patient.     She has a history of a nonischemic cardiomyopathy.  She also has a history of underlying mild CAD and a old myocardial infarction.  She is on guideline directed medical therapy, which was titrated in January.    She has been doing well. In the past she had some SOB and that is much better. She has rare CONNELL with greater degrees of activity. Mrs Donnelly denies any chest pain, pressure, tightness, squeezing, or heartburn.  She has not experienced any feeling of palpitations, tachycardia or heart racing and no presyncope or syncope.  There has not been any problems with dizziness or lightheadedness.  There has not been any orthopnea or PND, and no problems with lower extremity edema.  She denies any shortness of breath at rest or with activity and has not had any wheezing.  She has continued to perform daily activities of living without any specific problem or change in the level of activity.        Cath 1/2020:  Findings:  1. Coronary Artery Anatomy:  Dominance: Left  Left Main: Angiographically normal  Left Anterior Descending: The LAD is somewhat small in caliber size.  Contains a 30 to 40% proximal segment stenosis followed by luminal irregularities distally.  There are no appreciable diagonals from the LAD.  Circumflex Artery: Large in caliber size.  Luminal irregularities throughout supplies a mid and inferior marginal branch vessels.  Also supplies a left-sided PDA  which contains luminal regularities.  Ramus: Large ramus almost takes over for LAD and is branching and bifurcating.  Luminal irregularities throughout  Right Coronary Artery: Nondominant     2. Hemodynamics:  Right Atrium: 8/5/4 mmHg  Right Ventricle: 31/2/5 mmHg  Pulmonary Artery: 34/11/23 mmHg  Pulmonary Wedge: 22/21/20 mmHg  Left Ventricle: 184/16/23 mmHg  Aorta: 178/64/107 mmHg  Cardiac Output/Index: 3.53 L/min 1.92 L/min/m2  PA Sat: 66 %  AO Sat: 96 %     3. Left Ventriculogram:  Ejection Fraction: 20 %  Wall Motion: Global  Mitral Regurgitation: None     Conclusions:  1. Left dominant system with diminutive LAD which may explain her stress test findings but otherwise nonischemic cardiomyopathy with only a 30 to 40% proximal LAD stenoses.  2. Currently elevated left-sided filling pressures with EDP of 23 mmHg  3. Currently reduced cardiac output and index of 3.5 and 1.9       I saw her initially November 2018. She was previously seen at [facility].  She has a history of CAD and myocardial infarction.  She had an echocardiogram in November that showed normal function.  She does have a history of an abnormal EKG with LVH as well as diffuse repolarization changes.  She just had an EKG performed at [facility] there was unchanged from prior EKGs.              The following portions of the patient's history were reviewed and updated as appropriate: allergies, current medications, past family history, past medical history, past social history, past surgical history and problem list.    Past Medical History:   Diagnosis Date   • CAD (coronary artery disease)    • Cancer (CMS/HCC)     skin, right breast   • CHF (congestive heart failure) (CMS/HCC)    • Coronary artery disease involving native coronary artery of native heart without angina pectoris 11/1/2018   • Heart murmur    • History of myocardial infarction 11/1/2018   • Hypertension    • Myocardial infarction (CMS/HCC)     PER PT    • Sinus  tachycardia    • SOB (shortness of breath)        Past Surgical History:   Procedure Laterality Date   • BUNIONECTOMY Bilateral    • CARDIAC CATHETERIZATION     • CARDIAC CATHETERIZATION N/A 2020    Procedure: Right and Left Heart Cath;  Surgeon: Bin Chacko MD;  Location:  [facility];  Service: Cardiovascular   • CARDIAC CATHETERIZATION N/A 2020    Procedure: Coronary angiography;  Surgeon: Bin Chacko MD;  Location:  [facility];  Service: Cardiovascular   • CARDIAC CATHETERIZATION N/A 2020    Procedure: Left ventriculography;  Surgeon: Bin Chacko MD;  Location:  [facility];  Service: Cardiovascular   • MASTECTOMY COMPLETE / SIMPLE  2019   • SHOULDER SURGERY Right    • SKIN CANCER EXCISION      ABOVE THE LIP.        Social History     Socioeconomic History   • Marital status:      Spouse name: Not on file   • Number of children: 5   • Years of education: Not on file   • Highest education level: Not on file   Occupational History   • Occupation: RETIRED    Tobacco Use   • Smoking status: Former Smoker     Last attempt to quit: 1973     Years since quittin.7   • Smokeless tobacco: Never Used   • Tobacco comment: CAFFEINE USE: 1 CUP DAILY   Substance and Sexual Activity   • Alcohol use: No   • Drug use: No   • Sexual activity: Defer       Review of Systems   Constitution: Negative for chills, decreased appetite, fever and night sweats.   HENT: Negative for ear discharge, ear pain, hearing loss, nosebleeds and sore throat.    Eyes: Negative for blurred vision, double vision and pain.   Cardiovascular: Negative for cyanosis.   Respiratory: Negative for hemoptysis and sputum production.    Endocrine: Negative for cold intolerance and heat intolerance.   Hematologic/Lymphatic: Negative for adenopathy.   Skin: Negative for dry skin, itching, nail changes, rash and suspicious lesions.  "  Musculoskeletal: Negative for arthritis, gout, muscle cramps, muscle weakness, myalgias and neck pain.   Gastrointestinal: Negative for anorexia, bowel incontinence, constipation, diarrhea, dysphagia, hematemesis and jaundice.   Genitourinary: Negative for bladder incontinence, dysuria, flank pain, frequency, hematuria and nocturia.   Neurological: Negative for focal weakness, numbness, paresthesias and seizures.   Psychiatric/Behavioral: Negative for altered mental status, hallucinations, hypervigilance, suicidal ideas and thoughts of violence.   Allergic/Immunologic: Negative for persistent infections.       Procedures       Objective:     Vitals:    07/08/20 1334   BP: 119/55   Pulse: 67   Weight: 77.1 kg (170 lb)   Height: 162.6 cm (64\")     Alert and oriented x3, thought processes normal, judgment normal, speaking in full sentences with no wheezing and no respiratory distress. Hearing is appropriate without difficulty.      Lab Review:             Performed        Assessment:          Diagnosis Plan   1. Coronary artery disease involving native coronary artery of native heart without angina pectoris  Adult Transthoracic Echo Complete W/ Cont if Necessary Per Protocol   2. NICM (nonischemic cardiomyopathy) (CMS/HCC)  Adult Transthoracic Echo Complete W/ Cont if Necessary Per Protocol   3. Essential hypertension  Adult Transthoracic Echo Complete W/ Cont if Necessary Per Protocol   4. History of myocardial infarction  Adult Transthoracic Echo Complete W/ Cont if Necessary Per Protocol          Plan:       1. Coronary Artery Disease  Assessment  • The patient has no angina    Plan  • Lifestyle modifications discussed include adhering to a heart healthy diet, avoidance of tobacco products, maintenance of a healthy weight, medication compliance, regular exercise and regular monitoring of cholesterol and blood pressure    Subjective - Objective  • There is a history of past MI  • Current antiplatelet therapy " includes aspirin 81 mg    2.   NICM with chronic systolic CHF-continue guideline directed medical therapy, we will repeat an echocardiogram.    Thank you very much for allowing us to participate in the care of this pleasant patient.  Please don't hesitate to call if I can be of assistance in any way.      Current Outpatient Medications:   •  albuterol sulfate  (90 Base) MCG/ACT inhaler, Inhale 2 puffs Every 6 (Six) Hours As Needed for Wheezing., Disp: 1 inhaler, Rfl: 1  •  aspirin 81 MG tablet, Take 81 mg by mouth., Disp: , Rfl:   •  B Complex Vitamins (VITAMIN B COMPLEX PO), Take 1 tablet by mouth Daily., Disp: , Rfl:   •  baclofen (LIORESAL) 10 MG tablet, TAKE ONE TABLET BY MOUTH EVERY NIGHT, Disp: 30 tablet, Rfl: 2  •  carvedilol (COREG) 12.5 MG tablet, Take 1 tablet by mouth 2 (Two) Times a Day., Disp: 180 tablet, Rfl: 3  •  Cholecalciferol (VITAMIN D) 2000 units capsule, Take 2,000 Units by mouth Daily., Disp: , Rfl:   •  CRANBERRY EXTRACT PO, Take 1 capsule by mouth Daily., Disp: , Rfl:   •  furosemide (LASIX) 20 MG tablet, Take 1 tablet by mouth Daily., Disp: 90 tablet, Rfl: 3  •  HYDROcodone-Acetaminophen (XODOL) 5-300 MG per tablet, Take 1 tablet by mouth Daily As Needed for Moderate Pain . PRN, Disp: 15 tablet, Rfl: 0  •  loratadine-pseudoephedrine (Loratadine-D 24HR)  MG per 24 hr tablet, Take 1 tablet by mouth Daily., Disp: 30 tablet, Rfl: 2  •  MEGARED OMEGA-3 KRILL OIL PO, Take 1 capsule by mouth Daily., Disp: , Rfl:   •  PROBIOTIC PRODUCT PO, Take 2 capsules by mouth Daily., Disp: , Rfl:   •  sacubitril-valsartan (ENTRESTO) 24-26 MG tablet, Take 1 tablet by mouth 2 (Two) Times a Day., Disp: 180 tablet, Rfl: 1

## 2020-07-22 ENCOUNTER — HOSPITAL ENCOUNTER (OUTPATIENT)
Dept: CARDIOLOGY | Facility: HOSPITAL | Age: 75
Discharge: HOME | End: 2020-07-22
Admitting: INTERNAL MEDICINE

## 2020-07-22 VITALS
HEIGHT: 64 IN | SYSTOLIC BLOOD PRESSURE: 188 MMHG | BODY MASS INDEX: 29.02 KG/M2 | DIASTOLIC BLOOD PRESSURE: 75 MMHG | WEIGHT: 170 LBS

## 2020-07-22 DIAGNOSIS — I25.2 HISTORY OF MYOCARDIAL INFARCTION: ICD-10-CM

## 2020-07-22 DIAGNOSIS — I10 ESSENTIAL HYPERTENSION: ICD-10-CM

## 2020-07-22 DIAGNOSIS — I42.8 NICM (NONISCHEMIC CARDIOMYOPATHY) (HCC): ICD-10-CM

## 2020-07-22 DIAGNOSIS — I25.10 CORONARY ARTERY DISEASE INVOLVING NATIVE CORONARY ARTERY OF NATIVE HEART WITHOUT ANGINA PECTORIS: ICD-10-CM

## 2020-07-22 LAB
BH CV ECHO MEAS - ACS: 2.3 CM
BH CV ECHO MEAS - AO MAX PG: 9 MMHG
BH CV ECHO MEAS - AO MEAN PG (FULL): 2.5 MMHG
BH CV ECHO MEAS - AO MEAN PG: 4.5 MMHG
BH CV ECHO MEAS - AO ROOT AREA (BSA CORRECTED): 2
BH CV ECHO MEAS - AO ROOT AREA: 10.2 CM^2
BH CV ECHO MEAS - AO ROOT DIAM: 3.6 CM
BH CV ECHO MEAS - AO V2 MAX: 153 CM/SEC
BH CV ECHO MEAS - AO V2 MEAN: 96 CM/SEC
BH CV ECHO MEAS - AO V2 VTI: 33.6 CM
BH CV ECHO MEAS - AVA(I,A): 2.8 CM^2
BH CV ECHO MEAS - AVA(I,D): 2.8 CM^2
BH CV ECHO MEAS - BSA(HAYCOCK): 1.9 M^2
BH CV ECHO MEAS - BSA: 1.8 M^2
BH CV ECHO MEAS - BZI_BMI: 29.2 KILOGRAMS/M^2
BH CV ECHO MEAS - BZI_METRIC_HEIGHT: 162.6 CM
BH CV ECHO MEAS - BZI_METRIC_WEIGHT: 77.1 KG
BH CV ECHO MEAS - CONTRAST EF 4CH: 45 CM2
BH CV ECHO MEAS - EDV(CUBED): 176.1 ML
BH CV ECHO MEAS - EDV(MOD-SP2): 116 ML
BH CV ECHO MEAS - EDV(MOD-SP4): 136 ML
BH CV ECHO MEAS - EDV(TEICH): 154 ML
BH CV ECHO MEAS - EF(CUBED): 29.4 %
BH CV ECHO MEAS - EF(MOD-BP): 45 %
BH CV ECHO MEAS - EF(MOD-SP2): 39.7 %
BH CV ECHO MEAS - EF(MOD-SP4): 46.6 %
BH CV ECHO MEAS - EF(TEICH): 23.6 %
BH CV ECHO MEAS - ESV(CUBED): 124.3 ML
BH CV ECHO MEAS - ESV(MOD-SP2): 70 ML
BH CV ECHO MEAS - ESV(MOD-SP4): 72.6 ML
BH CV ECHO MEAS - ESV(TEICH): 117.7 ML
BH CV ECHO MEAS - FS: 11 %
BH CV ECHO MEAS - IVS/LVPW: 1.1
BH CV ECHO MEAS - IVSD: 0.99 CM
BH CV ECHO MEAS - LAT PEAK E' VEL: 2.9 CM/SEC
BH CV ECHO MEAS - LV DIASTOLIC VOL/BSA (35-75): 74.5 ML/M^2
BH CV ECHO MEAS - LV MASS(C)D: 209 GRAMS
BH CV ECHO MEAS - LV MASS(C)DI: 114.5 GRAMS/M^2
BH CV ECHO MEAS - LV MAX PG: 4 MMHG
BH CV ECHO MEAS - LV MEAN PG: 2 MMHG
BH CV ECHO MEAS - LV SYSTOLIC VOL/BSA (12-30): 39.8 ML/M^2
BH CV ECHO MEAS - LV V1 MAX: 95 CM/SEC
BH CV ECHO MEAS - LV V1 MEAN: 66.5 CM/SEC
BH CV ECHO MEAS - LV V1 VTI: 22.3 CM
BH CV ECHO MEAS - LVIDD: 5.6 CM
BH CV ECHO MEAS - LVIDS: 5 CM
BH CV ECHO MEAS - LVLD AP2: 6.9 CM
BH CV ECHO MEAS - LVLD AP4: 7.6 CM
BH CV ECHO MEAS - LVLS AP2: 6.1 CM
BH CV ECHO MEAS - LVLS AP4: 6.3 CM
BH CV ECHO MEAS - LVOT AREA (M): 4.2 CM^2
BH CV ECHO MEAS - LVOT AREA: 4.2 CM^2
BH CV ECHO MEAS - LVOT DIAM: 2.3 CM
BH CV ECHO MEAS - LVPWD: 0.93 CM
BH CV ECHO MEAS - MED PEAK E' VEL: 3.9 CM/SEC
BH CV ECHO MEAS - MV A DUR: 0.17 SEC
BH CV ECHO MEAS - MV A MAX VEL: 102 CM/SEC
BH CV ECHO MEAS - MV DEC SLOPE: 171 CM/SEC^2
BH CV ECHO MEAS - MV DEC TIME: 306 SEC
BH CV ECHO MEAS - MV E MAX VEL: 68.9 CM/SEC
BH CV ECHO MEAS - MV E/A: 0.68
BH CV ECHO MEAS - MV MEAN PG: 1 MMHG
BH CV ECHO MEAS - MV P1/2T MAX VEL: 74.7 CM/SEC
BH CV ECHO MEAS - MV P1/2T: 127.9 MSEC
BH CV ECHO MEAS - MV V2 MEAN: 53.3 CM/SEC
BH CV ECHO MEAS - MV V2 VTI: 31.5 CM
BH CV ECHO MEAS - MVA P1/2T LCG: 2.9 CM^2
BH CV ECHO MEAS - MVA(P1/2T): 1.7 CM^2
BH CV ECHO MEAS - MVA(VTI): 2.9 CM^2
BH CV ECHO MEAS - PA ACC TIME: 0.13 SEC
BH CV ECHO MEAS - PA MAX PG: 5 MMHG
BH CV ECHO MEAS - PA PR(ACCEL): 21.9 MMHG
BH CV ECHO MEAS - PA V2 MAX: 112 CM/SEC
BH CV ECHO MEAS - PI END-D VEL: 88 CM/SEC
BH CV ECHO MEAS - PULM A REVS DUR: 0.16 SEC
BH CV ECHO MEAS - PULM A REVS VEL: 31.6 CM/SEC
BH CV ECHO MEAS - PULM DIAS VEL: 44 CM/SEC
BH CV ECHO MEAS - PULM S/D: 1.1
BH CV ECHO MEAS - PULM SYS VEL: 49.4 CM/SEC
BH CV ECHO MEAS - QP/QS: 0.5
BH CV ECHO MEAS - RAP SYSTOLE: 3 MMHG
BH CV ECHO MEAS - RV MEAN PG: 2 MMHG
BH CV ECHO MEAS - RV V1 MEAN: 59.7 CM/SEC
BH CV ECHO MEAS - RV V1 VTI: 20.4 CM
BH CV ECHO MEAS - RVOT AREA: 2.3 CM^2
BH CV ECHO MEAS - RVOT DIAM: 1.7 CM
BH CV ECHO MEAS - RVSP: 30 MMHG
BH CV ECHO MEAS - SI(AO): 187.1 ML/M^2
BH CV ECHO MEAS - SI(CUBED): 28.4 ML/M^2
BH CV ECHO MEAS - SI(LVOT): 50.8 ML/M^2
BH CV ECHO MEAS - SI(MOD-SP2): 25.2 ML/M^2
BH CV ECHO MEAS - SI(MOD-SP4): 34.7 ML/M^2
BH CV ECHO MEAS - SI(TEICH): 19.9 ML/M^2
BH CV ECHO MEAS - SV(AO): 341.5 ML
BH CV ECHO MEAS - SV(CUBED): 51.8 ML
BH CV ECHO MEAS - SV(LVOT): 92.7 ML
BH CV ECHO MEAS - SV(MOD-SP2): 46 ML
BH CV ECHO MEAS - SV(MOD-SP4): 63.4 ML
BH CV ECHO MEAS - SV(RVOT): 46.3 ML
BH CV ECHO MEAS - SV(TEICH): 36.3 ML
BH CV ECHO MEAS - TAPSE (>1.6): 2.3 CM
BH CV ECHO MEAS - TR MAX VEL: 259 CM/SEC
BH CV ECHO MEASUREMENTS AVERAGE E/E' RATIO: 20.26
BH CV VAS BP LEFT ARM: NORMAL MMHG
BH CV XLRA - RV BASE: 3.4 CM
BH CV XLRA - RV LENGTH: 5.9 CM
BH CV XLRA - RV MID: 2.3 CM
BH CV XLRA - TDI S': 17 CM/SEC
LEFT ATRIUM VOLUME INDEX: 53 ML/M2
LV EF 2D ECHO EST: 45 %
MAXIMAL PREDICTED HEART RATE: 147 BPM
STRESS TARGET HR: 125 BPM

## 2020-07-22 PROCEDURE — 25010000002 PERFLUTREN (DEFINITY) 8.476 MG IN SODIUM CHLORIDE 0.9 % 10 ML INJECTION: Performed by: INTERNAL MEDICINE

## 2020-07-22 PROCEDURE — 93306 TTE W/DOPPLER COMPLETE: CPT

## 2020-07-22 PROCEDURE — 93306 TTE W/DOPPLER COMPLETE: CPT | Performed by: INTERNAL MEDICINE

## 2020-07-22 RX ADMIN — PERFLUTREN 2 ML: 6.52 INJECTION, SUSPENSION INTRAVENOUS at 15:05

## 2020-08-10 RX ORDER — BACLOFEN 10 MG/1
TABLET ORAL
Qty: 30 TABLET | Refills: 1 | Status: SHIPPED | OUTPATIENT
Start: 2020-08-10 | End: 2020-09-24 | Stop reason: SDUPTHER

## 2020-09-21 RX ORDER — SACUBITRIL AND VALSARTAN 24; 26 MG/1; MG/1
TABLET, FILM COATED ORAL
Qty: 180 TABLET | Refills: 2 | Status: SHIPPED | OUTPATIENT
Start: 2020-09-21 | End: 2021-07-02

## 2020-09-24 RX ORDER — BACLOFEN 10 MG/1
10 TABLET ORAL
Qty: 30 TABLET | Refills: 1 | Status: SHIPPED | OUTPATIENT
Start: 2020-09-24 | End: 2021-03-05

## 2020-10-09 ENCOUNTER — APPOINTMENT (OUTPATIENT)
Dept: SLEEP MEDICINE | Facility: HOSPITAL | Age: 75
End: 2020-10-09

## 2020-10-30 ENCOUNTER — OFFICE VISIT (OUTPATIENT)
Dept: SLEEP MEDICINE | Facility: HOSPITAL | Age: 75
End: 2020-10-30

## 2020-10-30 VITALS
DIASTOLIC BLOOD PRESSURE: 64 MMHG | HEIGHT: 64 IN | BODY MASS INDEX: 31.76 KG/M2 | HEART RATE: 63 BPM | SYSTOLIC BLOOD PRESSURE: 166 MMHG | WEIGHT: 186 LBS

## 2020-10-30 DIAGNOSIS — G47.10 HYPERSOMNIA: ICD-10-CM

## 2020-10-30 DIAGNOSIS — G47.30 OBSERVED SLEEP APNEA: Primary | ICD-10-CM

## 2020-10-30 DIAGNOSIS — I50.22 CHRONIC SYSTOLIC CONGESTIVE HEART FAILURE (HCC): ICD-10-CM

## 2020-10-30 DIAGNOSIS — E66.09 CLASS 1 OBESITY DUE TO EXCESS CALORIES WITHOUT SERIOUS COMORBIDITY WITH BODY MASS INDEX (BMI) OF 31.0 TO 31.9 IN ADULT: ICD-10-CM

## 2020-10-30 DIAGNOSIS — I42.8 NICM (NONISCHEMIC CARDIOMYOPATHY) (HCC): Chronic | ICD-10-CM

## 2020-10-30 DIAGNOSIS — R06.83 SNORING: ICD-10-CM

## 2020-10-30 PROBLEM — E66.811 CLASS 1 OBESITY DUE TO EXCESS CALORIES WITHOUT SERIOUS COMORBIDITY WITH BODY MASS INDEX (BMI) OF 31.0 TO 31.9 IN ADULT: Status: ACTIVE | Noted: 2020-10-30

## 2020-10-30 PROCEDURE — G0463 HOSPITAL OUTPT CLINIC VISIT: HCPCS

## 2020-10-30 PROCEDURE — 99204 OFFICE O/P NEW MOD 45 MIN: CPT | Performed by: INTERNAL MEDICINE

## 2020-10-30 NOTE — PROGRESS NOTES
[facility]  [address]  [address]  [place] [address]  Phone   Fax       Effie Donnelly  1945  75 y.o.  female      Referring Provider and PCP Terrence Hawley MD    Type of service: Initial consult  Date of service: 10/30/2020      Chief Complaint   Patient presents with   • Witnessed Apnea   • Snoring   • Fatigue   • Daytime Sleepiness       History of present illness;  Thank you for asking to see Effie Donnelly, 75 y.o.  for evaluation of sleep apnea. The patient was seen today on 10/30/2020 at [facility].   This patient has been sent for evaluation of sleep apnea because of snoring.  She recently underwent a home sleep test in the month of September and it was a inconclusive test.  It was done somewhere else and I do not have any information apnea test.  However she reports that she has significance snoring and feels tired and exhausted.  She also has a history of cardiomyopathy with the congestive heart failure with ejection fraction of 20%.  She gets short of breath with exertion.    Patient gives the following sleep history.  Sleep schedule:  Bedtime: 12 midnight  Wake time: 7 AM  Normally takes about 30 minutes to fall asleep  Average hours of sleep 6-7  Number of naps per day none  When patient wakes up still feels tired and not rested  Snoring; yes  Witnessed apneas; yes  Have you ever awakened gasping for breath, coughing, choking: No      Past Medical History:   Diagnosis Date   • CAD (coronary artery disease)    • Cancer (CMS/HCC)     skin, right breast   • CHF (congestive heart failure) (CMS/[facility])    • Coronary artery disease involving native coronary artery of native heart without angina pectoris 11/1/2018   • Heart murmur    • History of myocardial infarction 11/1/2018   • Hypertension    • Myocardial infarction (CMS/HCC)     PER PT    • NICM (nonischemic cardiomyopathy) (CMS/HCC) 7/8/2020   • Sinus tachycardia    • SOB  (shortness of breath)        Past Surgical history   has a past surgical history that includes Shoulder surgery (Right); Cardiac catheterization; Skin cancer excision; Mastectomy complete / simple (06/2019); Bunionectomy (Bilateral); Cardiac catheterization (N/A, 1/14/2020); Cardiac catheterization (N/A, 1/14/2020); and Cardiac catheterization (N/A, 1/14/2020).     Social history:  Shift work: No  Tobacco use: No  Alcohol use: No  Caffeinated drinks: 1 cup of coffee  Over-the-counter sleeping aid and medications: None  Narcotic medications: Hydrocodone as needed    Family Hx  Family history of sleep apnea, no  Family History   Problem Relation Age of Onset   • Hypertension Sister    • Cancer Sister    • Heart disease Brother 65        VALVE REPLACEMENT    • Hypertension Brother    • Hypertension Sister    • Hypertension Sister    • Hypertension Brother    • Diabetes Brother    • Hypertension Brother    • Cancer Brother    • Hypertension Brother    • Hypertension Brother    • Hypertension Brother        Medications: reviewed    Current Outpatient Medications:   •  albuterol sulfate  (90 Base) MCG/ACT inhaler, Inhale 2 puffs Every 6 (Six) Hours As Needed for Wheezing., Disp: 1 inhaler, Rfl: 1  •  aspirin 81 MG tablet, Take 81 mg by mouth., Disp: , Rfl:   •  B Complex Vitamins (VITAMIN B COMPLEX PO), Take 1 tablet by mouth Daily., Disp: , Rfl:   •  baclofen (LIORESAL) 10 MG tablet, Take 1 tablet by mouth every night at bedtime., Disp: 30 tablet, Rfl: 1  •  carvedilol (COREG) 12.5 MG tablet, Take 1 tablet by mouth 2 (Two) Times a Day., Disp: 180 tablet, Rfl: 3  •  Cholecalciferol (VITAMIN D) 2000 units capsule, Take 2,000 Units by mouth Daily., Disp: , Rfl:   •  CRANBERRY EXTRACT PO, Take 1 capsule by mouth Daily., Disp: , Rfl:   •  Entresto 24-26 MG tablet, TAKE ONE TABLET BY MOUTH TWICE A DAY, Disp: 180 tablet, Rfl: 2  •  furosemide (LASIX) 20 MG tablet, Take 1 tablet by mouth Daily., Disp: 90 tablet, Rfl: 3  •  " HYDROcodone-Acetaminophen (XODOL) 5-300 MG per tablet, Take 1 tablet by mouth Daily As Needed for Moderate Pain . PRN, Disp: 15 tablet, Rfl: 0  •  loratadine-pseudoephedrine (Loratadine-D 24HR)  MG per 24 hr tablet, Take 1 tablet by mouth Daily., Disp: 30 tablet, Rfl: 2  •  MEGARED OMEGA-3 KRILL OIL PO, Take 1 capsule by mouth Daily., Disp: , Rfl:   •  PROBIOTIC PRODUCT PO, Take 2 capsules by mouth Daily., Disp: , Rfl:     Review of systems:  [place] Sleepiness Scale: Total score: 7   Positive for snoring, witnessed apneas, fatigue and daytime excessive sleepiness, complains of shortness of breath with exertion and also sometimes swelling of feet  Negative for cough, wheezing, chest pain, nausea and vomiting, palpitation,     Morning headaches: No  Awaken with sore throat or dry mouth : Yes  Leg jerking at night: No  Crawly feeling/urge sensation to move in the legs: No  Teeth grinding: No  Sleepwalking, nightmares, muscle weakness with laughing or anger,sleep paralysis: No  Nasal Congestion: No  Nocturia (how many times/night): 0-1  Memory Problem: No  Change in weight, no    Physical exam:  Vitals:    10/30/20 0900   BP: 166/64   Pulse: 63   Weight: 84.4 kg (186 lb)   Height: 162.6 cm (64\")    Body mass index is 31.93 kg/m². Neck Circumference: 14.25 inches  HEENT: Head is atraumatic, normocephalic   Eyes:pupils are round equal and reacting to light and accommodation, conjunctiva normal  Nose:no nasal septal defects or deviation and the nasal passages are clear, no nasal polyps,   Throat: tonsils are not enlarged, tongue normal size, oral airway Mallampati class 3  NECK: No lymphadenopathy, trachea is in the midline, thyroid not enlarged  RESPIRATORY SYSTEM: Breath sounds are equal on both sides, there are no wheezes or crackles  CARDIOVASULAR SYSTEM: Heart sounds are regular rhythm and dre rate, there are no murmurs or thrills  ABDOMEN: Soft, no hepatosplenomegaly, no evidence of ascites  EXTREMITES: No " cyanosis, clubbing or edema   NEUROLOGICAL SYSTEM: Oriented x 3, no gross neurological defects, gait normal    Medical records and labs reviewed in [facility] reviewed    Assessment and plan:  · Sleep apnea unspecified, (G47.30) Patient's symptoms and examination is consistent with sleep apnea.  I have talked to the patient about the signs and symptoms of sleep apnea and consequences of untreated sleep apnea. Discussed sleep testing.  I have placed a order in epic for a a polysomnography.  Patient will have a follow-up after this sleep test is done.  I am also concerned about central sleep apnea because of our heart failure  · Obesity, patient's BMI is Body mass index is 31.93 kg/m².. I have discussed the relationship between weight and sleep apnea.There is direct correction between weight and severity of sleep apnea.  Weight reduction is encouraged. I have also discussed with the patient diet and exercise.  · Congestive heart failure with ejection fraction of 20% on medical management  · Snoring secondary to sleep apnea  · Hypersomnia with [place] Sleepiness Scale of Total score: 7 due to sleep apnea.      I once again thank you for asking me to see this patient in consultation and I have forwarded my opinion and treatment plan.  Please do not hesitate to call me if you have any questions.     Ezra Junior MD  Sleep Medicine.(Board-certified)  [facility]  Medical Director for [facility]  10/30/2020 ,

## 2020-11-12 ENCOUNTER — APPOINTMENT (OUTPATIENT)
Dept: SLEEP MEDICINE | Facility: HOSPITAL | Age: 75
End: 2020-11-12

## 2020-11-17 ENCOUNTER — HOSPITAL ENCOUNTER (OUTPATIENT)
Dept: SLEEP MEDICINE | Facility: HOSPITAL | Age: 75
Discharge: HOME | End: 2020-11-17
Admitting: INTERNAL MEDICINE

## 2020-11-17 DIAGNOSIS — G47.30 OBSERVED SLEEP APNEA: ICD-10-CM

## 2020-11-17 DIAGNOSIS — E66.09 CLASS 1 OBESITY DUE TO EXCESS CALORIES WITHOUT SERIOUS COMORBIDITY WITH BODY MASS INDEX (BMI) OF 31.0 TO 31.9 IN ADULT: ICD-10-CM

## 2020-11-17 DIAGNOSIS — I50.22 CHRONIC SYSTOLIC CONGESTIVE HEART FAILURE (HCC): ICD-10-CM

## 2020-11-17 DIAGNOSIS — R06.83 SNORING: ICD-10-CM

## 2020-11-17 DIAGNOSIS — G47.10 HYPERSOMNIA: ICD-10-CM

## 2020-11-17 DIAGNOSIS — I42.8 NICM (NONISCHEMIC CARDIOMYOPATHY) (HCC): ICD-10-CM

## 2020-11-17 PROCEDURE — 95810 POLYSOM 6/> YRS 4/> PARAM: CPT

## 2020-11-17 PROCEDURE — 95810 POLYSOM 6/> YRS 4/> PARAM: CPT | Performed by: INTERNAL MEDICINE

## 2020-11-22 RX ORDER — LORATADINE/PSEUDOEPHEDRINE 10MG-240MG
TABLET, EXTENDED RELEASE 24 HR ORAL
Qty: 30 TABLET | Refills: 2 | Status: SHIPPED | OUTPATIENT
Start: 2020-11-22 | End: 2021-03-02

## 2020-11-23 ENCOUNTER — TELEPHONE (OUTPATIENT)
Dept: SLEEP MEDICINE | Facility: HOSPITAL | Age: 75
End: 2020-11-23

## 2020-12-04 ENCOUNTER — TELEPHONE (OUTPATIENT)
Dept: SLEEP MEDICINE | Facility: HOSPITAL | Age: 75
End: 2020-12-04

## 2020-12-18 ENCOUNTER — DOCUMENTATION (OUTPATIENT)
Dept: SLEEP MEDICINE | Facility: HOSPITAL | Age: 75
End: 2020-12-18

## 2020-12-18 NOTE — PROGRESS NOTES
Patient called in today and stated she thought someone had called her already to be set up on cpap. Did a little digging and it looks as though Dr Robles office sent orders to [facility]. Called [facility] to confirm this and yes they do have everything, called patient back to her her know to call Zo

## 2021-01-06 RX ORDER — FUROSEMIDE 20 MG/1
TABLET ORAL
Qty: 90 TABLET | Refills: 1 | Status: SHIPPED | OUTPATIENT
Start: 2021-01-06 | End: 2021-03-05 | Stop reason: SDUPTHER

## 2021-02-19 ENCOUNTER — APPOINTMENT (OUTPATIENT)
Dept: SLEEP MEDICINE | Facility: HOSPITAL | Age: 76
End: 2021-02-19

## 2021-03-02 RX ORDER — LORATADINE/PSEUDOEPHEDRINE 10MG-240MG
TABLET, EXTENDED RELEASE 24 HR ORAL
Qty: 30 TABLET | Refills: 1 | Status: SHIPPED | OUTPATIENT
Start: 2021-03-02 | End: 2021-05-05

## 2021-03-05 ENCOUNTER — LAB (OUTPATIENT)
Dept: LAB | Facility: HOSPITAL | Age: 76
End: 2021-03-05

## 2021-03-05 ENCOUNTER — OFFICE VISIT (OUTPATIENT)
Dept: SLEEP MEDICINE | Facility: HOSPITAL | Age: 76
End: 2021-03-05

## 2021-03-05 ENCOUNTER — OFFICE VISIT (OUTPATIENT)
Dept: CARDIOLOGY | Facility: CLINIC | Age: 76
End: 2021-03-05

## 2021-03-05 VITALS
WEIGHT: 179 LBS | HEART RATE: 72 BPM | DIASTOLIC BLOOD PRESSURE: 76 MMHG | BODY MASS INDEX: 30.56 KG/M2 | SYSTOLIC BLOOD PRESSURE: 118 MMHG | HEIGHT: 64 IN

## 2021-03-05 VITALS
OXYGEN SATURATION: 98 % | HEART RATE: 103 BPM | HEIGHT: 64 IN | SYSTOLIC BLOOD PRESSURE: 150 MMHG | WEIGHT: 177 LBS | DIASTOLIC BLOOD PRESSURE: 100 MMHG | BODY MASS INDEX: 30.22 KG/M2

## 2021-03-05 DIAGNOSIS — I42.8 NICM (NONISCHEMIC CARDIOMYOPATHY) (HCC): Chronic | ICD-10-CM

## 2021-03-05 DIAGNOSIS — I25.2 HISTORY OF MYOCARDIAL INFARCTION: Chronic | ICD-10-CM

## 2021-03-05 DIAGNOSIS — I25.10 CORONARY ARTERY DISEASE INVOLVING NATIVE CORONARY ARTERY OF NATIVE HEART WITHOUT ANGINA PECTORIS: Chronic | ICD-10-CM

## 2021-03-05 DIAGNOSIS — R06.02 SHORTNESS OF BREATH: ICD-10-CM

## 2021-03-05 DIAGNOSIS — E78.49 OTHER HYPERLIPIDEMIA: ICD-10-CM

## 2021-03-05 DIAGNOSIS — G47.33 OSA ON CPAP: Primary | ICD-10-CM

## 2021-03-05 DIAGNOSIS — I50.22 CHRONIC SYSTOLIC CONGESTIVE HEART FAILURE (HCC): ICD-10-CM

## 2021-03-05 DIAGNOSIS — I10 ESSENTIAL HYPERTENSION: ICD-10-CM

## 2021-03-05 DIAGNOSIS — Z99.89 OSA ON CPAP: Primary | ICD-10-CM

## 2021-03-05 DIAGNOSIS — G47.33 OSA ON CPAP: ICD-10-CM

## 2021-03-05 DIAGNOSIS — Z99.89 OSA ON CPAP: ICD-10-CM

## 2021-03-05 DIAGNOSIS — R00.0 SINUS TACHYCARDIA: ICD-10-CM

## 2021-03-05 DIAGNOSIS — E66.09 CLASS 1 OBESITY DUE TO EXCESS CALORIES WITHOUT SERIOUS COMORBIDITY WITH BODY MASS INDEX (BMI) OF 31.0 TO 31.9 IN ADULT: ICD-10-CM

## 2021-03-05 DIAGNOSIS — R06.02 SHORTNESS OF BREATH: Primary | ICD-10-CM

## 2021-03-05 PROBLEM — G47.10 HYPERSOMNIA: Status: RESOLVED | Noted: 2020-10-30 | Resolved: 2021-03-05

## 2021-03-05 PROBLEM — R06.83 SNORING: Status: RESOLVED | Noted: 2020-10-30 | Resolved: 2021-03-05

## 2021-03-05 LAB — NT-PROBNP SERPL-MCNC: 899.2 PG/ML (ref 0–900)

## 2021-03-05 PROCEDURE — 93000 ELECTROCARDIOGRAM COMPLETE: CPT | Performed by: NURSE PRACTITIONER

## 2021-03-05 PROCEDURE — 99214 OFFICE O/P EST MOD 30 MIN: CPT | Performed by: NURSE PRACTITIONER

## 2021-03-05 PROCEDURE — 36415 COLL VENOUS BLD VENIPUNCTURE: CPT

## 2021-03-05 PROCEDURE — 99213 OFFICE O/P EST LOW 20 MIN: CPT | Performed by: INTERNAL MEDICINE

## 2021-03-05 PROCEDURE — 83880 ASSAY OF NATRIURETIC PEPTIDE: CPT

## 2021-03-05 PROCEDURE — G0463 HOSPITAL OUTPT CLINIC VISIT: HCPCS

## 2021-03-05 RX ORDER — FUROSEMIDE 20 MG/1
20 TABLET ORAL DAILY
Qty: 90 TABLET | Refills: 3 | Status: SHIPPED | OUTPATIENT
Start: 2021-03-05 | End: 2022-02-25 | Stop reason: SDUPTHER

## 2021-03-05 RX ORDER — CARVEDILOL 12.5 MG/1
12.5 TABLET ORAL 2 TIMES DAILY
Qty: 180 TABLET | Refills: 3 | Status: SHIPPED | OUTPATIENT
Start: 2021-03-05 | End: 2021-07-19

## 2021-03-05 NOTE — PROGRESS NOTES
[facility]  [address]  [address]  [place] [address]  Phone   Fax       SLEEP CLINIC FOLLOW UP PROGRESS NOTE.    Effie Donnelly  1945  75 y.o.  female      PCP: Terrence Hawley MD   Copy to Dr. Jose Maria Brand MD        Date of visit: 3/5/2021    Chief Complaint   Patient presents with   • Sleep Apnea   • Follow-up       INTERM HISTORY:  This is a 75 y.o. years old patient who has a history of sleep apnea and is on CPAP.  She is on CPAP used it for couple of weeks but unfortunately stopped using it because it did not improve her shortness of breath.  She also has a history of congestive heart failure with ejection fraction of 20%.  She says that she is trying to sleep on a recliner now and feels much better with the shortness of breath.    I had a long talk with the patient about the sleep apnea and shortness of breath.  The CPAP treats the sleep apnea but not to shortness of breath.  Her shortness of breath is coming from heart failure.  I have explained the pathophysiology of shortness of breath due to heart failure and now she understands and will continue to use the CPAP.    The patient was previously seen on October 30, 2020, the note was reviewed.  The diagnostic study done on November 18, 2020, was also reviewed with the patient.  She had a polysomnography which showed sleep apnea moderate with AHI of 15/h.    Sleep schedule  Normally goes to bed at 11:30 PM  Wakes up at 7 AM  Number of times you wake up once asleep: 1  Do you feel refreshed after waking up ?  Yes    The Smart card downloaded on 3/5/2021 has been reviewed by me for compliance and discussed the data with the patient  Poor compliance  DME: [employer]      Past Medical History:   Diagnosis Date   • CAD (coronary artery disease)    • Cancer (CMS/HCC)     skin, right breast   • CHF (congestive heart failure) (CMS/HCC)    • Coronary artery disease involving native coronary artery of native heart  without angina pectoris 11/1/2018   • Heart murmur    • History of myocardial infarction 11/1/2018   • Hypertension    • Myocardial infarction (CMS/HCC)     PER PT    • NICM (nonischemic cardiomyopathy) (CMS/HCC) 7/8/2020   • Sinus tachycardia    • SOB (shortness of breath)        MEDICATIONS: reviewed by me    Current Outpatient Medications:   •  albuterol sulfate  (90 Base) MCG/ACT inhaler, Inhale 2 puffs Every 6 (Six) Hours As Needed for Wheezing., Disp: 1 inhaler, Rfl: 1  •  aspirin 81 MG tablet, Take 81 mg by mouth., Disp: , Rfl:   •  B Complex Vitamins (VITAMIN B COMPLEX PO), Take 1 tablet by mouth Daily., Disp: , Rfl:   •  baclofen (LIORESAL) 10 MG tablet, Take 1 tablet by mouth every night at bedtime., Disp: 30 tablet, Rfl: 1  •  carvedilol (COREG) 12.5 MG tablet, Take 1 tablet by mouth 2 (Two) Times a Day., Disp: 180 tablet, Rfl: 3  •  Cholecalciferol (VITAMIN D) 2000 units capsule, Take 2,000 Units by mouth Daily., Disp: , Rfl:   •  CRANBERRY EXTRACT PO, Take 1 capsule by mouth Daily., Disp: , Rfl:   •  Entresto 24-26 MG tablet, TAKE ONE TABLET BY MOUTH TWICE A DAY, Disp: 180 tablet, Rfl: 2  •  furosemide (LASIX) 20 MG tablet, TAKE ONE TABLET BY MOUTH DAILY, Disp: 90 tablet, Rfl: 1  •  HYDROcodone-Acetaminophen (XODOL) 5-300 MG per tablet, Take 1 tablet by mouth Daily As Needed for Moderate Pain . PRN, Disp: 15 tablet, Rfl: 0  •  Loratadine-D 24HR  MG per 24 hr tablet, TAKE ONE TABLET BY MOUTH DAILY, Disp: 30 tablet, Rfl: 1  •  MEGARED OMEGA-3 KRILL OIL PO, Take 1 capsule by mouth Daily., Disp: , Rfl:   •  PROBIOTIC PRODUCT PO, Take 2 capsules by mouth Daily., Disp: , Rfl:     Allergies   Allergen Reactions   • Adhesive Tape Other (See Comments)     BLISTERS UNDER TAGADERM   • Nsaids GI Bleeding     CROHN'S DISEASE    reviewed by me    SOCIAL, FAMILY HISTORY: Medical records are reviewed and noted by me.  History of smoking no  History of alcohol use no  Caffeine use 1 cup    REVIEW OF  "SYSTEMS:   [place] Sleepiness Scale :Total score: 0   Snoring: Resolved  Morning headache: No  Nasal congestion: No  Shortness of breath, yes    PHYSICAL EXAMINATION:  Vitals:    03/05/21 0900   BP: 118/76   Pulse: 72   Weight: 81.2 kg (179 lb)   Height: 162.6 cm (64\")    Body mass index is 30.73 kg/m².    HEENT: pupils are round equal and reacting to light and accommodation, nasal passage is clear, no nasal polyps, no lymphadenopathy, throat is clear, oral airway Mallampati class 3  RESPRATORY SYSTEM: Breath sounds are equal on both sides and are normal, no wheezes or crackles  CARDIOVASULAR SYSTEM: Heart rate is regular without murmur  ABDOMEN: Soft, no ascites, no hepatosplenomegaly.  EXTREMITIES: No cyanosis, clubbing or edema       ASSESSMENT AND PLAN:  · Obstructive sleep apnea, patient is using the device with poor compliance for treatment of sleep apnea.  I have reviewed the smart card down load and discussed with the patient the download data and encouarged the patient to continue to use the device.  When she uses the CPAP residual AHI is acceptable.  The device is benefiting the patient and the device is medically necessary.  Going to see her again in about 6 weeks for another compliance check.  · Obesity, patient's BMI is Body mass index is 30.73 kg/m²..  I have discussed weight reduction and the health benefits.  I have also discuss the relationship between the weight and sleep apnea and encouraged the patient to lose weight which is beneficial in treating sleep apnea. Discussed diet and exercise with the patient.  · Congestive heart failure, chronic systolic with ejection fraction of 20%.  I had a long talk with the patient about the shortness of breath and heart failure and the sleep apnea.  Now she understands that these 2 symptoms are different and she needs to use the CPAP on a regular basis even though she is trying to sleep in a recliner her CPAP will help her.  · Return to clinic in 2 months for " follow-up and patient also instructed to call the sleep clinic for any problems.  All the patient's questions were answered.        Ezra Junior MD  Sleep Medicine  Medical Director for [facility]  3/5/2021

## 2021-03-05 NOTE — PROGRESS NOTES
Date of Office Visit: 2021  Encounter Provider: PATI Eagle  Place of Service: [facility] CARDIOLOGY  Patient Name: Effie Donnelly  :1945  Primary Cardiologist: Dr. Brand    Chief Complaint   Patient presents with   • Cardiomyopathy     follow up    • Shortness of Breath   :     Dear Dr. Hawley    HPI: Effie Donnelly is a pleasant 75 y.o. female who presents 2021 for cardiac follow up.  I have reviewed past medical records including notes, testing and labs in preparation for today's visit.    She has a history of a nonischemic cardiomyopathy.  She also has a history of underlying mild CAD and a old myocardial infarction.  She is on guideline directed medical therapy, which was titrated in January.     She had a telehealth 2020 with Dr. Brand.  She had been doing well.  She did have some shortness of breath but had had gotten better.  She really had no other complaints.  She had had an echo as well.      Echo 2020 Interpretation Summary    · Left ventricular wall thickness is consistent with hypertrophy. Sigmoid-shaped ventricular septum is present.  · Estimated EF = 45%.  · Left ventricular systolic function is low normal.  · Left ventricular diastolic dysfunction (grade I a) consistent with impaired relaxation.  · Left atrial cavity size is severely dilated.  · Mild tricuspid valve regurgitation is present.  · Calculated right ventricular systolic pressure from tricuspid regurgitation is 30 mmHg.        Previous catheterization  Cath 2020:  Findings:  1. Coronary Artery Anatomy:  Dominance: Left  Left Main: Angiographically normal  Left Anterior Descending: The LAD is somewhat small in caliber size.  Contains a 30 to 40% proximal segment stenosis followed by luminal irregularities distally.  There are no appreciable diagonals from the LAD.  Circumflex Artery: Large in caliber size.  Luminal irregularities throughout supplies a mid and inferior marginal  branch vessels.  Also supplies a left-sided PDA which contains luminal regularities.  Ramus: Large ramus almost takes over for LAD and is branching and bifurcating.  Luminal irregularities throughout  Right Coronary Artery: Nondominant     2. Hemodynamics:  Right Atrium: 8/5/4 mmHg  Right Ventricle: 31/2/5 mmHg  Pulmonary Artery: 34/11/23 mmHg  Pulmonary Wedge: 22/21/20 mmHg  Left Ventricle: 184/16/23 mmHg  Aorta: 178/64/107 mmHg  Cardiac Output/Index: 3.53 L/min 1.92 L/min/m2  PA Sat: 66 %  AO Sat: 96 %     3. Left Ventriculogram:  Ejection Fraction: 20 %  Wall Motion: Global  Mitral Regurgitation: None     Conclusions:  1. Left dominant system with diminutive LAD which may explain her stress test findings but otherwise nonischemic cardiomyopathy with only a 30 to 40% proximal LAD stenoses.  2. Currently elevated left-sided filling pressures with EDP of 23 mmHg  3. Currently reduced cardiac output and index of 3.5 and 1.9     Dr. Brand initially saw her in November 2018. She was previously seen at [facility].  She has a history of CAD and myocardial infarction.  She had an echocardiogram in November that showed normal function.  She does have a history of an abnormal EKG with LVH as well as diffuse repolarization changes.  She just had an EKG performed at [facility] there was unchanged from prior EKGs.     She was an add-on today.  She actually had been scheduled in [place] but is a patient of [facility].  I was happy to see her.  She states she has just had increasing fatigue and shortness of breath over the last several weeks.  She also stated that over the last 3 to 4 months she has been sleeping at an incline that has been very beneficial.  She states by sleeping on an incline she felt so much better.  She also states she started using a CPAP in December 2020.  But noted she had been sleeping on the incline prior to that.  She denies any palpitations.  She cannot feel her heart racing or skipping.   She denies any lower extremity edema.  She has not had any chest pain or chest pressure.  She has occasional dizziness.  She states if she gets up to do just minimal activity she gets short of breath and cannot complete the task.  She feels that her abdomen gets swollen in the evenings.  She denies that it feels tight or hard.  Just a feeling of being swollen.  Her blood pressure is elevated at today's visit.  She states she has not missed any doses of her medications.       Past Medical History:   Diagnosis Date   • CAD (coronary artery disease)    • Cancer (CMS/[facility])     skin, right breast   • CHF (congestive heart failure) (CMS/[facility])    • Coronary artery disease involving native coronary artery of native heart without angina pectoris 11/1/2018   • Heart murmur    • History of myocardial infarction 11/1/2018   • Hypertension    • Myocardial infarction (CMS/[facility])     PER PT    • NICM (nonischemic cardiomyopathy) (CMS/[facility]) 7/8/2020   • DELORES on CPAP     AHI 15/h   • Sinus tachycardia    • SOB (shortness of breath)        Past Surgical History:   Procedure Laterality Date   • BUNIONECTOMY Bilateral    • CARDIAC CATHETERIZATION     • CARDIAC CATHETERIZATION N/A 1/14/2020    Procedure: Right and Left Heart Cath;  Surgeon: Bin Chacko MD;  Location:  [facility];  Service: Cardiovascular   • CARDIAC CATHETERIZATION N/A 1/14/2020    Procedure: Coronary angiography;  Surgeon: Bin Chacko MD;  Location:  [facility];  Service: Cardiovascular   • CARDIAC CATHETERIZATION N/A 1/14/2020    Procedure: Left ventriculography;  Surgeon: Bin Chacko MD;  Location:  [facility];  Service: Cardiovascular   • MASTECTOMY COMPLETE / SIMPLE  06/2019   • SHOULDER SURGERY Right    • SKIN CANCER EXCISION      ABOVE THE LIP.        Social History     Socioeconomic History   • Marital status:      Spouse name: Not on file   • Number of children: 5   • Years of  education: Not on file   • Highest education level: Not on file   Occupational History   • Occupation: RETIRED    Tobacco Use   • Smoking status: Former Smoker     Quit date: 1973     Years since quittin.3   • Smokeless tobacco: Never Used   • Tobacco comment: CAFFEINE USE: 1 CUP DAILY   Substance and Sexual Activity   • Alcohol use: No   • Drug use: No   • Sexual activity: Defer       Family History   Problem Relation Age of Onset   • Hypertension Sister    • Cancer Sister    • Heart disease Brother 65        VALVE REPLACEMENT    • Hypertension Brother    • Hypertension Sister    • Hypertension Sister    • Hypertension Brother    • Diabetes Brother    • Hypertension Brother    • Cancer Brother    • Hypertension Brother    • Hypertension Brother    • Hypertension Brother        The following portion of the patient's history were reviewed and updated as appropriate: past medical history, past surgical history, past social history, past family history, allergies, current medications, and problem list.    Review of Systems   Constitution: Positive for malaise/fatigue. Negative for diaphoresis and fever.   HENT: Negative for congestion, hearing loss, hoarse voice, nosebleeds and sore throat.    Eyes: Negative for photophobia, vision loss in left eye, vision loss in right eye and visual disturbance.   Cardiovascular: Positive for orthopnea. Negative for chest pain, dyspnea on exertion, irregular heartbeat, leg swelling, near-syncope, palpitations, paroxysmal nocturnal dyspnea and syncope.   Respiratory: Positive for shortness of breath. Negative for cough, hemoptysis, sleep disturbances due to breathing, snoring, sputum production and wheezing.    Endocrine: Negative for cold intolerance, heat intolerance, polydipsia, polyphagia and polyuria.   Hematologic/Lymphatic: Negative for bleeding problem. Does not bruise/bleed easily.   Skin: Negative for color change, dry skin, poor wound healing,  rash and suspicious lesions.   Musculoskeletal: Negative for arthritis, back pain, falls, gout, joint pain, joint swelling, muscle cramps, muscle weakness and myalgias.   Gastrointestinal: Positive for bloating. Negative for abdominal pain, constipation, diarrhea, dysphagia, melena, nausea and vomiting.   Neurological: Positive for dizziness (occasional). Negative for excessive daytime sleepiness, headaches, light-headedness, loss of balance, numbness, paresthesias, seizures, vertigo and weakness.   Psychiatric/Behavioral: Negative for depression, memory loss and substance abuse. The patient is not nervous/anxious.        Allergies   Allergen Reactions   • Adhesive Tape Other (See Comments)     BLISTERS UNDER TAGADERM   • Nsaids GI Bleeding     CROHN'S DISEASE         Current Outpatient Medications:   •  albuterol sulfate  (90 Base) MCG/ACT inhaler, Inhale 2 puffs Every 6 (Six) Hours As Needed for Wheezing., Disp: 1 inhaler, Rfl: 1  •  aspirin 81 MG tablet, Take 81 mg by mouth., Disp: , Rfl:   •  B Complex Vitamins (VITAMIN B COMPLEX PO), Take 1 tablet by mouth Daily., Disp: , Rfl:   •  carvedilol (COREG) 12.5 MG tablet, Take 1 tablet by mouth 2 (Two) Times a Day., Disp: 180 tablet, Rfl: 3  •  Cholecalciferol (VITAMIN D) 2000 units capsule, Take 2,000 Units by mouth Daily., Disp: , Rfl:   •  CRANBERRY EXTRACT PO, Take 1 capsule by mouth Daily., Disp: , Rfl:   •  Entresto 24-26 MG tablet, TAKE ONE TABLET BY MOUTH TWICE A DAY, Disp: 180 tablet, Rfl: 2  •  furosemide (LASIX) 20 MG tablet, Take 1 tablet by mouth Daily., Disp: 90 tablet, Rfl: 3  •  Loratadine-D 24HR  MG per 24 hr tablet, TAKE ONE TABLET BY MOUTH DAILY, Disp: 30 tablet, Rfl: 1  •  MEGARED OMEGA-3 KRILL OIL PO, Take 1 capsule by mouth Daily., Disp: , Rfl:   •  PROBIOTIC PRODUCT PO, Take 2 capsules by mouth Daily., Disp: , Rfl:         Objective:     Vitals:    03/05/21 1028   BP: 150/100   Pulse: 103   SpO2: 98%   Weight: 80.3 kg (177 lb)  "  Height: 162.6 cm (64\")     Body mass index is 30.38 kg/m².      Vitals signs reviewed.   Constitutional:       General: Not in acute distress.     Appearance: Normal and healthy appearance. Well-developed and well-groomed.   Eyes:      General:         Right eye: No discharge.         Left eye: No discharge.      Conjunctiva/sclera: Conjunctivae normal.   HENT:      Head: Normocephalic and atraumatic.      Right Ear: External ear normal.      Left Ear: External ear normal.      Nose: Nose normal.   Neck:      Musculoskeletal: Normal range of motion and neck supple.      Thyroid: No thyromegaly.      Vascular: No JVD.      Trachea: No tracheal deviation.      Lymphadenopathy: No cervical adenopathy.   Pulmonary:      Effort: Pulmonary effort is normal. No respiratory distress.      Breath sounds: Normal breath sounds. No wheezing. No rales.   Chest:      Chest wall: Not tender to palpatation.   Cardiovascular:      Tachycardia present. Regular rhythm.      No gallop.   Pulses:     Intact distal pulses.   Edema:     Peripheral edema absent.   Abdominal:      General: There is no distension.      Palpations: Abdomen is soft.      Tenderness: There is no abdominal tenderness.   Musculoskeletal: Normal range of motion.         General: No tenderness or deformity.   Skin:     General: Skin is warm and dry.      Findings: No erythema or rash.   Neurological:      Mental Status: Alert and oriented to person, place, and time.      Coordination: Coordination normal.   Psychiatric:         Attention and Perception: Attention normal.         Mood and Affect: Mood normal.         Speech: Speech normal.         Behavior: Behavior normal. Behavior is cooperative.         Thought Content: Thought content normal.         Cognition and Memory: Cognition normal.         Judgment: Judgment normal.               ECG 12 Lead    Date/Time: 3/5/2021 10:40 AM  Performed by: Nan Brian APRN  Authorized by: Nan Brian APRN "   Comparison: compared with previous ECG from 2/5/2020  Rhythm: sinus tachycardia  Ectopy: unifocal PVCs  Rate: tachycardic  Conduction: conduction normal  ST Segments: ST segments normal  ST Depression: aVL  T inversion: aVL and V2  T flattening: V6  QRS axis: left  Other findings: left ventricular hypertrophy    Clinical impression: abnormal EKG  Comments: D/W Dr. Tyler              Assessment:       Diagnosis Plan   1. Shortness of breath   Adult Transthoracic Echo Complete W/ Cont if Necessary Per Protocol    BNP    Holter Monitor - 72 Hour Up To 15 Days   2. NICM (nonischemic cardiomyopathy) (CMS/HCC)  Adult Transthoracic Echo Complete W/ Cont if Necessary Per Protocol    BNP   3. Coronary artery disease involving native coronary artery of native heart without angina pectoris     4. History of myocardial infarction     5. Essential hypertension     6. Chronic systolic congestive heart failure (CMS/HCC)  Adult Transthoracic Echo Complete W/ Cont if Necessary Per Protocol    BNP   7. Other hyperlipidemia     8. DELORES on CPAP     9. Sinus tachycardia  Adult Transthoracic Echo Complete W/ Cont if Necessary Per Protocol    Holter Monitor - 72 Hour Up To 15 Days          Plan:       1.  NICM - She is on GDMT.  Last echo 7/2020.  Since she is symptomatic with a heart rate of 103, I am going to recheck an echo, 48 Hour Holter and ProBNP.    2./3 Coronary Artery Disease  Assessment  • Denies angina   Plan  • Lifestyle modifications discussed include adhering to a heart healthy diet, avoidance of tobacco products, maintenance of a healthy weight, medication compliance, regular exercise and regular monitoring of cholesterol and blood pressure   Subjective - Objective  • There is a history of past MI  • Current antiplatelet therapy includes aspirin 81 mg     4.  HTN - high at today's visit.  For her increased HR and symptoms of CHF, I am increasing her lasix to 40 mg for the next 3 days and also her carvedilol to 25 mg  "BID.  I will callher on Monday to see if she feels better.    5.  Chronic sCHF /SOA- she is having symptoms of SOA and abdominal \"swelling\".  Increasing lasix and coreg for the next 3 days and will call Monday for an update.  Also check ProBNP and echo.    6.  Hyperlipidemia - currently not treated    7- DELORES - started therapy in December 2020    8.  Sinus tachycardia - I  Am increasing her BB to 25 mg BID.    Check ProBNP, echo and 48 hour Holter.  Increase lasix to 40 mg daily and carvedilol to 25 mg BID.  Will call on Monday.     As always, it has been a pleasure to participate in your patient's care. Thank you.       Sincerely,       PATI Eagle      Current Outpatient Medications:   •  albuterol sulfate  (90 Base) MCG/ACT inhaler, Inhale 2 puffs Every 6 (Six) Hours As Needed for Wheezing., Disp: 1 inhaler, Rfl: 1  •  aspirin 81 MG tablet, Take 81 mg by mouth., Disp: , Rfl:   •  B Complex Vitamins (VITAMIN B COMPLEX PO), Take 1 tablet by mouth Daily., Disp: , Rfl:   •  carvedilol (COREG) 12.5 MG tablet, Take 1 tablet by mouth 2 (Two) Times a Day., Disp: 180 tablet, Rfl: 3  •  Cholecalciferol (VITAMIN D) 2000 units capsule, Take 2,000 Units by mouth Daily., Disp: , Rfl:   •  CRANBERRY EXTRACT PO, Take 1 capsule by mouth Daily., Disp: , Rfl:   •  Entresto 24-26 MG tablet, TAKE ONE TABLET BY MOUTH TWICE A DAY, Disp: 180 tablet, Rfl: 2  •  furosemide (LASIX) 20 MG tablet, Take 1 tablet by mouth Daily., Disp: 90 tablet, Rfl: 3  •  Loratadine-D 24HR  MG per 24 hr tablet, TAKE ONE TABLET BY MOUTH DAILY, Disp: 30 tablet, Rfl: 1  •  MEGARED OMEGA-3 KRILL OIL PO, Take 1 capsule by mouth Daily., Disp: , Rfl:   •  PROBIOTIC PRODUCT PO, Take 2 capsules by mouth Daily., Disp: , Rfl:     Dictated utilizing Dragon dictation        "

## 2021-03-08 NOTE — PROGRESS NOTES
PATI Kwan spoke to patient with instructions from me.  Patient sttes she is feeling about the same.  Was told BNP OK.  Will decrease the lasix back to 20 mg daily.  Await results of testing.

## 2021-03-08 NOTE — PROGRESS NOTES
PATI Keller spoke to patient today to check to see how she was feeling.  She states she really did not feel too much different.  Also gave her the results of her pro BNP which were within normal limits.  We will decrease the Lasix back to 20 mg daily.  She will continue on the increased dose of carvedilol.  Await the results of echo and Holter.  Patient voiced understanding.

## 2021-03-23 ENCOUNTER — HOSPITAL ENCOUNTER (OUTPATIENT)
Dept: CARDIOLOGY | Facility: HOSPITAL | Age: 76
Discharge: HOME | End: 2021-03-23
Admitting: NURSE PRACTITIONER

## 2021-03-23 VITALS
DIASTOLIC BLOOD PRESSURE: 89 MMHG | HEART RATE: 68 BPM | BODY MASS INDEX: 30.11 KG/M2 | HEIGHT: 64 IN | WEIGHT: 176.37 LBS | SYSTOLIC BLOOD PRESSURE: 165 MMHG

## 2021-03-23 DIAGNOSIS — I50.22 CHRONIC SYSTOLIC CONGESTIVE HEART FAILURE (HCC): ICD-10-CM

## 2021-03-23 DIAGNOSIS — R00.0 SINUS TACHYCARDIA: ICD-10-CM

## 2021-03-23 DIAGNOSIS — R06.02 SHORTNESS OF BREATH: ICD-10-CM

## 2021-03-23 DIAGNOSIS — I42.8 NICM (NONISCHEMIC CARDIOMYOPATHY) (HCC): ICD-10-CM

## 2021-03-23 LAB
BH CV ECHO MEAS - ACS: 2.2 CM
BH CV ECHO MEAS - AO MAX PG: 7 MMHG
BH CV ECHO MEAS - AO MEAN PG (FULL): 1 MMHG
BH CV ECHO MEAS - AO MEAN PG: 3 MMHG
BH CV ECHO MEAS - AO ROOT AREA (BSA CORRECTED): 1.9
BH CV ECHO MEAS - AO ROOT AREA: 9.1 CM^2
BH CV ECHO MEAS - AO ROOT DIAM: 3.4 CM
BH CV ECHO MEAS - AO V2 MAX: 130 CM/SEC
BH CV ECHO MEAS - AO V2 MEAN: 84.4 CM/SEC
BH CV ECHO MEAS - AO V2 VTI: 27.4 CM
BH CV ECHO MEAS - AVA(I,A): 3.1 CM^2
BH CV ECHO MEAS - AVA(I,D): 3.1 CM^2
BH CV ECHO MEAS - BSA(HAYCOCK): 1.8 M^2
BH CV ECHO MEAS - BSA: 1.8 M^2
BH CV ECHO MEAS - BZI_BMI: 28.2 KILOGRAMS/M^2
BH CV ECHO MEAS - BZI_METRIC_HEIGHT: 162 CM
BH CV ECHO MEAS - BZI_METRIC_WEIGHT: 74 KG
BH CV ECHO MEAS - EDV(CUBED): 125 ML
BH CV ECHO MEAS - EDV(MOD-SP2): 129 ML
BH CV ECHO MEAS - EDV(MOD-SP4): 142 ML
BH CV ECHO MEAS - EDV(TEICH): 118.2 ML
BH CV ECHO MEAS - EF(CUBED): 60.5 %
BH CV ECHO MEAS - EF(MOD-BP): 40.3 %
BH CV ECHO MEAS - EF(MOD-SP2): 45.4 %
BH CV ECHO MEAS - EF(MOD-SP4): 36.5 %
BH CV ECHO MEAS - EF(TEICH): 51.8 %
BH CV ECHO MEAS - ESV(CUBED): 49.4 ML
BH CV ECHO MEAS - ESV(MOD-SP2): 70.4 ML
BH CV ECHO MEAS - ESV(MOD-SP4): 90.1 ML
BH CV ECHO MEAS - ESV(TEICH): 57 ML
BH CV ECHO MEAS - FS: 26.6 %
BH CV ECHO MEAS - IVS/LVPW: 0.98
BH CV ECHO MEAS - IVSD: 1.3 CM
BH CV ECHO MEAS - LAT PEAK E' VEL: 9.3 CM/SEC
BH CV ECHO MEAS - LV DIASTOLIC VOL/BSA (35-75): 79.4 ML/M^2
BH CV ECHO MEAS - LV MASS(C)D: 251.8 GRAMS
BH CV ECHO MEAS - LV MASS(C)DI: 140.7 GRAMS/M^2
BH CV ECHO MEAS - LV MAX PG: 4.2 MMHG
BH CV ECHO MEAS - LV MEAN PG: 2 MMHG
BH CV ECHO MEAS - LV SYSTOLIC VOL/BSA (12-30): 50.4 ML/M^2
BH CV ECHO MEAS - LV V1 MAX: 103 CM/SEC
BH CV ECHO MEAS - LV V1 MEAN: 68.9 CM/SEC
BH CV ECHO MEAS - LV V1 VTI: 24.4 CM
BH CV ECHO MEAS - LVIDD: 5 CM
BH CV ECHO MEAS - LVIDS: 3.7 CM
BH CV ECHO MEAS - LVLD AP2: 7 CM
BH CV ECHO MEAS - LVLD AP4: 7.1 CM
BH CV ECHO MEAS - LVLS AP2: 6.1 CM
BH CV ECHO MEAS - LVLS AP4: 6.4 CM
BH CV ECHO MEAS - LVOT AREA (M): 3.5 CM^2
BH CV ECHO MEAS - LVOT AREA: 3.5 CM^2
BH CV ECHO MEAS - LVOT DIAM: 2.1 CM
BH CV ECHO MEAS - LVPWD: 1.3 CM
BH CV ECHO MEAS - MED PEAK E' VEL: 4.8 CM/SEC
BH CV ECHO MEAS - MV A DUR: 185 SEC
BH CV ECHO MEAS - MV A MAX VEL: 92.6 CM/SEC
BH CV ECHO MEAS - MV DEC SLOPE: 274 CM/SEC^2
BH CV ECHO MEAS - MV DEC TIME: 201 SEC
BH CV ECHO MEAS - MV E MAX VEL: 50.4 CM/SEC
BH CV ECHO MEAS - MV E/A: 0.54
BH CV ECHO MEAS - MV MEAN PG: 1 MMHG
BH CV ECHO MEAS - MV P1/2T MAX VEL: 84.2 CM/SEC
BH CV ECHO MEAS - MV P1/2T: 90 MSEC
BH CV ECHO MEAS - MV V2 MEAN: 52.2 CM/SEC
BH CV ECHO MEAS - MV V2 VTI: 29.2 CM
BH CV ECHO MEAS - MVA P1/2T LCG: 2.6 CM^2
BH CV ECHO MEAS - MVA(P1/2T): 2.4 CM^2
BH CV ECHO MEAS - MVA(VTI): 2.9 CM^2
BH CV ECHO MEAS - PA ACC TIME: 0.11 SEC
BH CV ECHO MEAS - PA MAX PG: 3.8 MMHG
BH CV ECHO MEAS - PA PR(ACCEL): 31.3 MMHG
BH CV ECHO MEAS - PA V2 MAX: 97.1 CM/SEC
BH CV ECHO MEAS - RAP SYSTOLE: 3 MMHG
BH CV ECHO MEAS - RV MEAN PG: 1 MMHG
BH CV ECHO MEAS - RV V1 MEAN: 53.8 CM/SEC
BH CV ECHO MEAS - RV V1 VTI: 17.9 CM
BH CV ECHO MEAS - RVSP: 30 MMHG
BH CV ECHO MEAS - SI(AO): 139 ML/M^2
BH CV ECHO MEAS - SI(CUBED): 42.2 ML/M^2
BH CV ECHO MEAS - SI(LVOT): 47.2 ML/M^2
BH CV ECHO MEAS - SI(MOD-SP2): 32.7 ML/M^2
BH CV ECHO MEAS - SI(MOD-SP4): 29 ML/M^2
BH CV ECHO MEAS - SI(TEICH): 34.2 ML/M^2
BH CV ECHO MEAS - SV(AO): 248.8 ML
BH CV ECHO MEAS - SV(CUBED): 75.6 ML
BH CV ECHO MEAS - SV(LVOT): 84.5 ML
BH CV ECHO MEAS - SV(MOD-SP2): 58.6 ML
BH CV ECHO MEAS - SV(MOD-SP4): 51.9 ML
BH CV ECHO MEAS - SV(TEICH): 61.2 ML
BH CV ECHO MEAS - TAPSE (>1.6): 3.3 CM
BH CV ECHO MEAS - TR MAX PG: 27 MMHG
BH CV ECHO MEAS - TR MAX VEL: 262 CM/SEC
BH CV ECHO MEASUREMENTS AVERAGE E/E' RATIO: 7.15
BH CV XLRA - RV BASE: 3.8 CM
BH CV XLRA - RV LENGTH: 6.2 CM
BH CV XLRA - RV MID: 2.3 CM
BH CV XLRA - TDI S': 18.3 CM/SEC
LEFT ATRIUM VOLUME INDEX: 44.2 ML/M2
MAXIMAL PREDICTED HEART RATE: 146 BPM
STRESS TARGET HR: 124 BPM

## 2021-03-23 PROCEDURE — 93306 TTE W/DOPPLER COMPLETE: CPT | Performed by: INTERNAL MEDICINE

## 2021-03-23 PROCEDURE — 93306 TTE W/DOPPLER COMPLETE: CPT

## 2021-03-23 PROCEDURE — 25010000002 PERFLUTREN (DEFINITY) 8.476 MG IN SODIUM CHLORIDE (PF) 0.9 % 10 ML INJECTION: Performed by: NURSE PRACTITIONER

## 2021-03-23 RX ADMIN — SODIUM CHLORIDE 2 ML: 9 INJECTION INTRAMUSCULAR; INTRAVENOUS; SUBCUTANEOUS at 14:25

## 2021-03-26 ENCOUNTER — TELEPHONE (OUTPATIENT)
Dept: CARDIOLOGY | Facility: CLINIC | Age: 76
End: 2021-03-26

## 2021-03-26 NOTE — TELEPHONE ENCOUNTER
----- Message from PATI Madrid sent at 3/25/2021  4:42 PM EDT -----  Please call and see how she is feeling.  I increased her carvedilol and lasix for 3 days and she was supposed to call the following Monday but did not.  Is she still taking the increased dose, or did she go back to her normal dose after 3 days.

## 2021-03-26 NOTE — TELEPHONE ENCOUNTER
Remained the increased dose of carvediloland stopped the increased of lasix on Monday 3/8/2021 per TK    Pt states that there has been no real change in her symptoms    She did complete her echo and she has to go back and get her heart monitor

## 2021-03-26 NOTE — PROGRESS NOTES
Patient was supposed to have a holter the same day that was not ordered.  That will now be scheduled.  Will talk with Dr. Brand about results of echo while we wait for the holter.

## 2021-04-01 ENCOUNTER — HOSPITAL ENCOUNTER (OUTPATIENT)
Dept: CARDIOLOGY | Facility: HOSPITAL | Age: 76
Discharge: HOME | End: 2021-04-01
Admitting: NURSE PRACTITIONER

## 2021-04-01 DIAGNOSIS — R06.02 SHORTNESS OF BREATH: ICD-10-CM

## 2021-04-01 DIAGNOSIS — R00.0 SINUS TACHYCARDIA: ICD-10-CM

## 2021-04-01 PROCEDURE — 93246 EXT ECG>7D<15D RECORDING: CPT

## 2021-04-16 ENCOUNTER — APPOINTMENT (OUTPATIENT)
Dept: SLEEP MEDICINE | Facility: HOSPITAL | Age: 76
End: 2021-04-16

## 2021-04-23 ENCOUNTER — APPOINTMENT (OUTPATIENT)
Dept: SLEEP MEDICINE | Facility: HOSPITAL | Age: 76
End: 2021-04-23

## 2021-04-26 PROCEDURE — 93248 EXT ECG>7D<15D REV&INTERPJ: CPT | Performed by: INTERNAL MEDICINE

## 2021-05-05 RX ORDER — LORATADINE/PSEUDOEPHEDRINE 10MG-240MG
TABLET, EXTENDED RELEASE 24 HR ORAL
Qty: 30 TABLET | Refills: 3 | Status: SHIPPED | OUTPATIENT
Start: 2021-05-05 | End: 2021-09-09

## 2021-05-07 ENCOUNTER — OFFICE VISIT (OUTPATIENT)
Dept: SLEEP MEDICINE | Facility: HOSPITAL | Age: 76
End: 2021-05-07

## 2021-05-07 VITALS
WEIGHT: 171 LBS | SYSTOLIC BLOOD PRESSURE: 164 MMHG | HEIGHT: 64 IN | BODY MASS INDEX: 29.19 KG/M2 | DIASTOLIC BLOOD PRESSURE: 69 MMHG | HEART RATE: 58 BPM

## 2021-05-07 DIAGNOSIS — I50.22 CHRONIC SYSTOLIC CONGESTIVE HEART FAILURE (HCC): ICD-10-CM

## 2021-05-07 DIAGNOSIS — R06.83 SNORING: ICD-10-CM

## 2021-05-07 DIAGNOSIS — G47.33 OSA ON CPAP: Primary | ICD-10-CM

## 2021-05-07 DIAGNOSIS — Z99.89 OSA ON CPAP: Primary | ICD-10-CM

## 2021-05-07 DIAGNOSIS — I42.8 OTHER CARDIOMYOPATHY (HCC): ICD-10-CM

## 2021-05-07 PROCEDURE — 99214 OFFICE O/P EST MOD 30 MIN: CPT | Performed by: INTERNAL MEDICINE

## 2021-05-07 PROCEDURE — G0463 HOSPITAL OUTPT CLINIC VISIT: HCPCS

## 2021-05-07 NOTE — PROGRESS NOTES
"  [facility]  [address] [address]  [place] [address]  Phone   Fax       SLEEP CLINIC FOLLOW UP PROGRESS NOTE.    Effie Donnelly  1945  75 y.o.  female      PCP: Terrence Hawley MD      Date of visit: 5/7/2021    Chief Complaint   Patient presents with   • Sleep Apnea   • Daytime Sleepiness   • Snoring       HPI:  This is a 75 y.o. years old patient who has a history of obstructive sleep apnea is here for  the initial compliance follow-up.  Sleep apnea is moderate in severity with a AHI of 15/hr. It was diagnosed with a polysomnography sleep study.  Patient is not using positive airway pressure therapy with auto CPAP. Normally goes to bed at 1130 and wakes up at 7 AM.  The patient wakes up 0 time(s) during the night and has no problem going back to sleep.  Feels refreshed after waking up.  Patient also denies headaches and nasal congestion.   Unfortunately patient is not using the machine.  Patient has no specific reason but I feel that she has little understanding about her sleep apnea.    Mediactions and allergies are reviewed by me and documented in the encounter.     SOCIAL ( habits pertaining to sleep medicine)  History of smoking:No   History of alcohol use: 0 per week  Caffeine use: 1     REVIEW OF SYSTEMS:   [place] Sleepiness Scale :Total score: 0   Nsaal congestion:No   Dry mouth/nose:No   Post nasal drip; No   Snoring, yes  Fatigue, yes    PHYSICAL EXAMINATION:  CONSTITUTIONAL:  Vitals:    05/07/21 1100   BP: 164/69   Pulse: 58   Weight: 77.6 kg (171 lb)   Height: 162.6 cm (64\")    Body mass index is 29.35 kg/m².     NOSE: nasal passages are clear, no nasal polyps, septum in the midline.  THROAT: throat is clear, oral airway Mallampati class 3  RESP SYSTEM: Breath sounds are normal, no wheezes or crackles  CARDIOVASULAR: Heart rate is regular without murmur. No edema      Data reviewed:  The Smart card downloaded on 5/7/2021 has been reviewed " independently by me for compliance and discussed   Compliance poor she used it only 4 days in the last 30 days  DME: [facility]      ASSESSMENT AND PLAN:  · Obstructive sleep apnea ( G 47.33).  The symptoms of sleep apnea have not improved with the device and the treatment.  I had a long talk with the patient about the sleep study and explained that she has moderate sleep apnea and she was stopped breathing 15 times an hour which translates to about every 4 minutes.  After talking to her about the findings on the sleep study she understands the gravity of the situation given the fact that she has heart disease with cardiomyopathy and hand heart failure it is very important for her to use the CPAP.  Without proper control of sleep apnea and good compliance there is a increased risk for hypertension, diabetes mellitus and nonrestorative sleep with hypersomnia which can increase risk for motor vehicle accidents.  Untreated sleep apnea is also a risk factor for development of atrial fibrillation, pulmonary hypertension and stroke.  She expressed understanding of the importance of using the CPAP and she is going to start using the CPAP and see me back in about 6 hours for another compliance check.  · Obesity, class 1 with BMI is Body mass index is 29.35 kg/m².. I have discuss the relationship between the weight and sleep apnea. The benefit of weight loss in reducing severity of sleep apnea was discussed. Discussed diet and exercise with the patient to archive ideal BMI.  · Cardiomyopathy, enlarged heart is also associated with untreated sleep apnea.  Treating sleep apnea would improve the outcome  · Congestive heart failure on medical therapy and 1 more reason for her to use CPAP on a regular basis.  · Return in about 6 weeks (around 6/18/2021) for Recheck with smart card down load. . Patient's questions were answered.        Ezra Junior MD  Sleep Medicine  Medical Director for [facility]  5/7/2021

## 2021-05-18 ENCOUNTER — OFFICE VISIT (OUTPATIENT)
Dept: CARDIOLOGY | Facility: CLINIC | Age: 76
End: 2021-05-18

## 2021-05-18 VITALS
DIASTOLIC BLOOD PRESSURE: 68 MMHG | SYSTOLIC BLOOD PRESSURE: 140 MMHG | HEART RATE: 65 BPM | RESPIRATION RATE: 16 BRPM | OXYGEN SATURATION: 96 % | BODY MASS INDEX: 28.34 KG/M2 | WEIGHT: 166 LBS | HEIGHT: 64 IN

## 2021-05-18 DIAGNOSIS — I42.8 NICM (NONISCHEMIC CARDIOMYOPATHY) (HCC): Chronic | ICD-10-CM

## 2021-05-18 DIAGNOSIS — G47.33 OSA ON CPAP: ICD-10-CM

## 2021-05-18 DIAGNOSIS — I25.10 CORONARY ARTERY DISEASE INVOLVING NATIVE CORONARY ARTERY OF NATIVE HEART WITHOUT ANGINA PECTORIS: Primary | Chronic | ICD-10-CM

## 2021-05-18 DIAGNOSIS — E66.09 CLASS 1 OBESITY DUE TO EXCESS CALORIES WITHOUT SERIOUS COMORBIDITY WITH BODY MASS INDEX (BMI) OF 31.0 TO 31.9 IN ADULT: ICD-10-CM

## 2021-05-18 DIAGNOSIS — I50.22 CHRONIC SYSTOLIC CONGESTIVE HEART FAILURE (HCC): ICD-10-CM

## 2021-05-18 DIAGNOSIS — E78.49 OTHER HYPERLIPIDEMIA: ICD-10-CM

## 2021-05-18 DIAGNOSIS — I10 ESSENTIAL HYPERTENSION: ICD-10-CM

## 2021-05-18 DIAGNOSIS — Z99.89 OSA ON CPAP: ICD-10-CM

## 2021-05-18 PROCEDURE — 93000 ELECTROCARDIOGRAM COMPLETE: CPT | Performed by: NURSE PRACTITIONER

## 2021-05-18 PROCEDURE — 99214 OFFICE O/P EST MOD 30 MIN: CPT | Performed by: NURSE PRACTITIONER

## 2021-05-18 NOTE — PROGRESS NOTES
Date of Office Visit: 2021  Encounter Provider: PATI Eagle  Place of Service: [facility] CARDIOLOGY  Patient Name: Effie Donnelly  :1945  Primary Cardiologist:      CC:  2 week follow up    Dear Dr. Hawley    HPI: Effie Donnelly is a pleasant 75 y.o. female who presents 2021 for cardiac follow up. I have reviewed her past medical records including labs, notes and testing in preparation for today's visit.    She has a history of a nonischemic cardiomyopathy.  She also has a history of underlying mild CAD and a old myocardial infarction.  She is on guideline directed medical therapy, which was titrated in January.     Dr. Brand initially saw her in 2018. She was previously seen at [facility].  She has a history of CAD and myocardial infarction.  She had an echocardiogram in November that showed normal function.  She does have a history of an abnormal EKG with LVH as well as diffuse repolarization changes.  She just had an EKG performed at [facility] there was unchanged from prior EKGs.    Previous catheterization  Cath 2020: Findings:  1. Coronary Artery Anatomy:  Dominance: Left  Left Main: Angiographically normal  Left Anterior Descending: The LAD is somewhat small in caliber size.  Contains a 30 to 40% proximal segment stenosis followed by luminal irregularities distally.  There are no appreciable diagonals from the LAD.  Circumflex Artery: Large in caliber size.  Luminal irregularities throughout supplies a mid and inferior marginal branch vessels.  Also supplies a left-sided PDA which contains luminal regularities.  Ramus: Large ramus almost takes over for LAD and is branching and bifurcating.  Luminal irregularities throughout  Right Coronary Artery: Nondominant   2. Hemodynamics:  Right Atrium: 8/5/4 mmHg  Right Ventricle: 31/2/5 mmHg  Pulmonary Artery: 34/11/23 mmHg  Pulmonary Wedge: 22/21/20 mmHg  Left Ventricle: 184/16/23  mmHg  Aorta: 178/64/107 mmHg  Cardiac Output/Index: 3.53 L/min 1.92 L/min/m2  PA Sat: 66 %  AO Sat: 96 %   3. Left Ventriculogram:  Ejection Fraction: 20 %  Wall Motion: Global  Mitral Regurgitation: None   Conclusions:  1. Left dominant system with diminutive LAD which may explain her stress test findings but otherwise nonischemic cardiomyopathy with only a 30 to 40% proximal LAD stenoses.  2. Currently elevated left-sided filling pressures with EDP of 23 mmHg  3. Currently reduced cardiac output and index of 3.5 and 1.9      Echo 7/2020 Interpretation Summary     · Left ventricular wall thickness is consistent with hypertrophy. Sigmoid-shaped ventricular septum is present.  · Estimated EF = 45%.  · Left ventricular systolic function is low normal.  · Left ventricular diastolic dysfunction (grade I a) consistent with impaired relaxation.  · Left atrial cavity size is severely dilated.  · Mild tricuspid valve regurgitation is present.  · Calculated right ventricular systolic pressure from tricuspid regurgitation is 30 mmHg.         I saw her on 3/25/2021. She  had increasing fatigue and shortness of breath over the prior several weeks.  She also stated that over the prior 3 to 4 months she had been sleeping at an incline that had been very beneficial.  She started using a CPAP in December 2020, but noted she had been sleeping on the incline prior to that.  She denied any palpitations.  She cannot feel her heart racing or skipping.  She had occasional dizziness.  She states if she gets up to do just minimal activity she gets short of breath and cannot complete the task.  She complained of her abdomen getting swollen in the evenings.  A ProBNP was within normal limits. I increased her lasix and carvedilol. I ordered an echo and Zio as below:     Echo 3/23/2021 - Interpretation Summary  · Calculated left ventricular EF = 40.3% Estimated left ventricular EF was in disagreement with the calculated left ventricular EF.  Left ventricular ejection fraction appears to be 51 - 55%. Left ventricular systolic function is low normal. Normal left ventricular cavity size noted. Left ventricular wall thickness is consistent with moderate concentric hypertrophy. All left ventricular wall segments contract normally. Left ventricular diastolic function is consistent with (grade I) impaired relaxation. [place] appears to exhibit more hypertrophy than the other walls, but not in a spade shape. The apex is foreshortened, and that may be the cause, although an atypical apical hypertrophic cardiomyopathy cannot be excluded.         Destiney 4/26/2021- Interpretation Summary  · An abnormal monitor study.  · Multiple supraventricular tachycardias reported. The longest episode lasted for 6 hours and 56 minutes with an average heart rate of 116. P wave morphology during some of the reported SVT looked l more like underlying sinus rhythm P waves uggesting sinus tachycardia.  · Total PVC burden of 1.9% reported but no ventricular runs.  · No patient diaries submitted        She returns today in follow-up.  She states she is feeling so much better.  She still has some fatigue but that is improved greatly.  She denies any palpitations, she cannot feel her heart racing or skipping.  She has not had any trouble with shortness of breath, lower extremity edema, dizziness or lightheadedness.  She has not had any chest pain or chest pressure.  She states she has had intermittent success with her CPAP and has an appointment coming up soon with sleep medicine.  She is taking her medications as directed.    Past Medical History:   Diagnosis Date   • CAD (coronary artery disease)    • Cancer (CMS/HCC)     skin, right breast   • CHF (congestive heart failure) (CMS/HCC)    • Coronary artery disease involving native coronary artery of native heart without angina pectoris 11/1/2018   • Heart murmur    • History of myocardial infarction 11/1/2018   • Hypertension    • Myocardial  infarction (CMS/HCC)     PER PT    • NICM (nonischemic cardiomyopathy) (CMS/HCC) 2020   • DELORES on CPAP     AHI 15/h   • Sinus tachycardia    • SOB (shortness of breath)        Past Surgical History:   Procedure Laterality Date   • BUNIONECTOMY Bilateral    • CARDIAC CATHETERIZATION     • CARDIAC CATHETERIZATION N/A 2020    Procedure: Right and Left Heart Cath;  Surgeon: Bin Chacko MD;  Location:  [facility] INVASIVE [facility];  Service: Cardiovascular   • CARDIAC CATHETERIZATION N/A 2020    Procedure: Coronary angiography;  Surgeon: Bin Chacko MD;  Location:  [facility];  Service: Cardiovascular   • CARDIAC CATHETERIZATION N/A 2020    Procedure: Left ventriculography;  Surgeon: Bin Chcako MD;  Location:  [facility] INVAS[facility];  Service: Cardiovascular   • MASTECTOMY COMPLETE / SIMPLE  2019   • SHOULDER SURGERY Right    • SKIN CANCER EXCISION      ABOVE THE LIP.        Social History     Socioeconomic History   • Marital status:      Spouse name: Not on file   • Number of children: 5   • Years of education: Not on file   • Highest education level: Not on file   Tobacco Use   • Smoking status: Former Smoker     Quit date: 1973     Years since quittin.5   • Smokeless tobacco: Never Used   • Tobacco comment: CAFFEINE USE: 1 CUP DAILY   Substance and Sexual Activity   • Alcohol use: No   • Drug use: No   • Sexual activity: Defer       Family History   Problem Relation Age of Onset   • Hypertension Sister    • Cancer Sister    • Heart disease Brother 65        VALVE REPLACEMENT    • Hypertension Brother    • Hypertension Sister    • Hypertension Sister    • Hypertension Brother    • Diabetes Brother    • Hypertension Brother    • Cancer Brother    • Hypertension Brother    • Hypertension Brother    • Hypertension Brother        The following portion of the patient's history were reviewed and updated as appropriate: past medical history,  past surgical history, past social history, past family history, allergies, current medications, and problem list.    Review of Systems   Constitutional: Negative for diaphoresis, fever and malaise/fatigue.   HENT: Negative for congestion, hearing loss, hoarse voice, nosebleeds and sore throat.    Eyes: Negative for photophobia, vision loss in left eye, vision loss in right eye and visual disturbance.   Cardiovascular: Negative for chest pain, dyspnea on exertion, irregular heartbeat, leg swelling, near-syncope, orthopnea, palpitations, paroxysmal nocturnal dyspnea and syncope.   Respiratory: Negative for cough, hemoptysis, shortness of breath, sleep disturbances due to breathing, snoring, sputum production and wheezing.    Endocrine: Negative for cold intolerance, heat intolerance, polydipsia, polyphagia and polyuria.   Hematologic/Lymphatic: Negative for bleeding problem. Does not bruise/bleed easily.   Skin: Negative for color change, dry skin, poor wound healing, rash and suspicious lesions.   Musculoskeletal: Negative for arthritis, back pain, falls, gout, joint pain, joint swelling, muscle cramps, muscle weakness and myalgias.   Gastrointestinal: Negative for bloating, abdominal pain, constipation, diarrhea, dysphagia, melena, nausea and vomiting.   Neurological: Negative for excessive daytime sleepiness, dizziness, headaches, light-headedness, loss of balance, numbness, paresthesias, seizures, vertigo and weakness.   Psychiatric/Behavioral: Negative for depression, memory loss and substance abuse. The patient is not nervous/anxious.        Allergies   Allergen Reactions   • Adhesive Tape Other (See Comments)     BLISTERS UNDER TAGADERM   • Nsaids GI Bleeding     CROHN'S DISEASE         Current Outpatient Medications:   •  aspirin 81 MG tablet, Take 81 mg by mouth., Disp: , Rfl:   •  B Complex Vitamins (VITAMIN B COMPLEX PO), Take 1 tablet by mouth Daily., Disp: , Rfl:   •  carvedilol (COREG) 12.5 MG tablet,  "Take 1 tablet by mouth 2 (Two) Times a Day., Disp: 180 tablet, Rfl: 3  •  Cholecalciferol (VITAMIN D) 2000 units capsule, Take 2,000 Units by mouth Daily., Disp: , Rfl:   •  CRANBERRY EXTRACT PO, Take 1 capsule by mouth Daily., Disp: , Rfl:   •  dilTIAZem (Cardizem) 30 MG tablet, Take 1 tablet by mouth 2 (two) times a day., Disp: 180 tablet, Rfl: 3  •  Entresto 24-26 MG tablet, TAKE ONE TABLET BY MOUTH TWICE A DAY, Disp: 180 tablet, Rfl: 2  •  furosemide (LASIX) 20 MG tablet, Take 1 tablet by mouth Daily., Disp: 90 tablet, Rfl: 3  •  Loratadine-D 24HR  MG per 24 hr tablet, TAKE ONE TABLET BY MOUTH DAILY, Disp: 30 tablet, Rfl: 3  •  MEGARED OMEGA-3 KRILL OIL PO, Take 1 capsule by mouth Daily., Disp: , Rfl:   •  PROBIOTIC PRODUCT PO, Take 2 capsules by mouth Daily., Disp: , Rfl:         Objective:     Vitals:    05/18/21 1134   BP: 140/68   Pulse: 65   Resp: 16   SpO2: 96%   Weight: 75.3 kg (166 lb)   Height: 162.6 cm (64\")     Body mass index is 28.49 kg/m².      Vitals reviewed.   Constitutional:       General: Not in acute distress.     Appearance: Healthy appearance. Well-developed and well-groomed.   Eyes:      General:         Right eye: No discharge.         Left eye: No discharge.      Conjunctiva/sclera: Conjunctivae normal.   HENT:      Head: Normocephalic and atraumatic.      Right Ear: External ear normal.      Left Ear: External ear normal.      Nose: Nose normal.   Neck:      Thyroid: No thyromegaly.      Vascular: No JVD.      Trachea: No tracheal deviation.      Lymphadenopathy: No cervical adenopathy.   Pulmonary:      Effort: Pulmonary effort is normal. No respiratory distress.      Breath sounds: Normal breath sounds. No wheezing. No rales.   Chest:      Chest wall: Not tender to palpatation.   Cardiovascular:      Normal rate. Regular rhythm.      No gallop.   Pulses:     Intact distal pulses.   Edema:     Peripheral edema absent.   Abdominal:      General: There is no distension.      " Palpations: Abdomen is soft.      Tenderness: There is no abdominal tenderness.   Musculoskeletal: Normal range of motion.         General: No tenderness or deformity.      Cervical back: Normal range of motion and neck supple. Skin:     General: Skin is warm and dry.      Findings: No erythema or rash.   Neurological:      Mental Status: Alert and oriented to person, place, and time.      Coordination: Coordination normal.   Psychiatric:         Attention and Perception: Attention normal.         Mood and Affect: Mood normal.         Speech: Speech normal.         Behavior: Behavior normal. Behavior is cooperative.         Thought Content: Thought content normal.         Cognition and Memory: Cognition normal.         Judgment: Judgment normal.               ECG 12 Lead    Date/Time: 5/18/2021 11:39 AM  Performed by: Nan Brian APRN  Authorized by: Nan Brian APRN   Comparison: compared with previous ECG from 3/5/2021  Similar to previous ECG  Rhythm: sinus rhythm  Rate: normal  Conduction: conduction normal  ST Depression: I and aVL  T inversion: I, aVL, V2, V5 and V6  QRS axis: left  Other findings: left ventricular hypertrophy    Clinical impression: abnormal EKG              Assessment:       Diagnosis Plan   1. Coronary artery disease involving native coronary artery of native heart without angina pectoris     2. Chronic systolic congestive heart failure (CMS/HCC)     3. NICM (nonischemic cardiomyopathy) (CMS/HCC)     4. Essential hypertension     5. Other hyperlipidemia     6. Class 1 obesity due to excess calories without serious comorbidity with body mass index (BMI) of 31.0 to 31.9 in adult     7. DELORES on CPAP            Plan:       1.  NICM - She is on GDMT. Normal BNP.  Echo shows ED 51-55%.  Did discuss with Dr. Brand.  Doing well, appears euvolemic     2./3 Coronary Artery Disease  Assessment  Denies angina  Plan  • Lifestyle modifications discussed include adhering to a heart healthy  diet, avoidance of tobacco products, maintenance of a healthy weight, medication compliance, regular exercise and regular monitoring of cholesterol and blood pressure   Subjective - Objective  • There is a history of past MI  • Current antiplatelet therapy includes aspirin 81 mg      4.  HTN -  controlled     5.  Chronic sCHF /SOA-  Feeling much better.  Echo as above.  Continue DGMT.     6.  Hyperlipidemia - currently not treated     7- DELORES - intermittent success.  Sees sleep medicine next month     8.  Sinus tachycardia -  SR 60s.  Doing well    No changes  RTO in 6 months with JA    As always, it has been a pleasure to participate in your patient's care. Thank you.       Sincerely,       PATI Egale      Current Outpatient Medications:   •  aspirin 81 MG tablet, Take 81 mg by mouth., Disp: , Rfl:   •  B Complex Vitamins (VITAMIN B COMPLEX PO), Take 1 tablet by mouth Daily., Disp: , Rfl:   •  carvedilol (COREG) 12.5 MG tablet, Take 1 tablet by mouth 2 (Two) Times a Day., Disp: 180 tablet, Rfl: 3  •  Cholecalciferol (VITAMIN D) 2000 units capsule, Take 2,000 Units by mouth Daily., Disp: , Rfl:   •  CRANBERRY EXTRACT PO, Take 1 capsule by mouth Daily., Disp: , Rfl:   •  dilTIAZem (Cardizem) 30 MG tablet, Take 1 tablet by mouth 2 (two) times a day., Disp: 180 tablet, Rfl: 3  •  Entresto 24-26 MG tablet, TAKE ONE TABLET BY MOUTH TWICE A DAY, Disp: 180 tablet, Rfl: 2  •  furosemide (LASIX) 20 MG tablet, Take 1 tablet by mouth Daily., Disp: 90 tablet, Rfl: 3  •  Loratadine-D 24HR  MG per 24 hr tablet, TAKE ONE TABLET BY MOUTH DAILY, Disp: 30 tablet, Rfl: 3  •  MEGARED OMEGA-3 KRILL OIL PO, Take 1 capsule by mouth Daily., Disp: , Rfl:   •  PROBIOTIC PRODUCT PO, Take 2 capsules by mouth Daily., Disp: , Rfl:     Dictated utilizing Dragon dictation

## 2021-06-25 ENCOUNTER — APPOINTMENT (OUTPATIENT)
Dept: SLEEP MEDICINE | Facility: HOSPITAL | Age: 76
End: 2021-06-25

## 2021-07-02 RX ORDER — SACUBITRIL AND VALSARTAN 24; 26 MG/1; MG/1
TABLET, FILM COATED ORAL
Qty: 180 TABLET | Refills: 3 | Status: SHIPPED | OUTPATIENT
Start: 2021-07-02 | End: 2022-02-25 | Stop reason: SDUPTHER

## 2021-07-19 RX ORDER — CARVEDILOL 12.5 MG/1
TABLET ORAL
Qty: 180 TABLET | Refills: 2 | Status: SHIPPED | OUTPATIENT
Start: 2021-07-19 | End: 2021-07-22 | Stop reason: SDUPTHER

## 2021-07-22 RX ORDER — CARVEDILOL 25 MG/1
25 TABLET ORAL 2 TIMES DAILY
Qty: 180 TABLET | Refills: 2 | Status: SHIPPED | OUTPATIENT
Start: 2021-07-22 | End: 2022-02-25 | Stop reason: SDUPTHER

## 2021-07-22 NOTE — TELEPHONE ENCOUNTER
Pt has been taking 2 tablets of 12.5 mg twice daily. She was instructed to do so back in 3/2021.  New rx was not submitted. Can you please send in a new a Rx. Pt is aware that it will now be a 25 mg tablet, and to only take one tablet twice daily

## 2021-09-02 LAB
MAXIMAL PREDICTED HEART RATE: 146 BPM
STRESS TARGET HR: 124 BPM

## 2021-09-08 RX ORDER — LORATADINE/PSEUDOEPHEDRINE 10MG-240MG
TABLET, EXTENDED RELEASE 24 HR ORAL
Qty: 30 TABLET | Refills: 3 | OUTPATIENT
Start: 2021-09-08

## 2021-09-09 ENCOUNTER — OFFICE VISIT (OUTPATIENT)
Dept: FAMILY MEDICINE CLINIC | Facility: CLINIC | Age: 76
End: 2021-09-09

## 2021-09-09 VITALS
WEIGHT: 172 LBS | OXYGEN SATURATION: 97 % | TEMPERATURE: 96.9 F | HEART RATE: 57 BPM | DIASTOLIC BLOOD PRESSURE: 72 MMHG | HEIGHT: 64 IN | BODY MASS INDEX: 29.37 KG/M2 | SYSTOLIC BLOOD PRESSURE: 128 MMHG

## 2021-09-09 DIAGNOSIS — J01.10 ACUTE NON-RECURRENT FRONTAL SINUSITIS: Primary | ICD-10-CM

## 2021-09-09 PROCEDURE — 99213 OFFICE O/P EST LOW 20 MIN: CPT | Performed by: INTERNAL MEDICINE

## 2021-09-09 RX ORDER — LORATADINE/PSEUDOEPHEDRINE 10MG-240MG
TABLET, EXTENDED RELEASE 24 HR ORAL
Qty: 30 TABLET | Refills: 3 | Status: SHIPPED | OUTPATIENT
Start: 2021-09-09

## 2021-09-09 RX ORDER — AMOXICILLIN AND CLAVULANATE POTASSIUM 500; 125 MG/1; MG/1
1 TABLET, FILM COATED ORAL 3 TIMES DAILY
Qty: 30 TABLET | Refills: 0 | Status: SHIPPED | OUTPATIENT
Start: 2021-09-09 | End: 2021-10-07

## 2021-09-09 NOTE — PROGRESS NOTES
Subjective   Effie Donnelly is a 75 y.o. female.     Chief Complaint   Patient presents with   • Sinusitis       History of Present Illness   Complains of sinus infection going on for about couple weeks.  Yellow-green drainage.  No fever or chills.  Patient is reports that she has been followed by cardiologist.  I do not see any blood work done except for BNP in March.  Last time I saw her was February 2020.  She reports Dr. Brand her cardiologist has cleared her to take Claritin-D.  The following portions of the patient's history were reviewed and updated as appropriate: allergies, current medications, past family history, past medical history, past social history, past surgical history and problem list.    Review of Systems   Constitutional: Negative for activity change, appetite change, fatigue and fever.   HENT: Positive for congestion, postnasal drip and sinus pressure.    Eyes: Negative for blurred vision and double vision.   Respiratory: Negative.  Negative for shortness of breath.    Cardiovascular: Negative for chest pain, palpitations and leg swelling.   Gastrointestinal: Negative.    Neurological: Negative for dizziness, syncope, light-headedness and headache.       Allergies   Allergen Reactions   • Adhesive Tape Other (See Comments)     BLISTERS UNDER TAGADERM   • Nsaids GI Bleeding     CROHN'S DISEASE       Current Outpatient Medications on File Prior to Visit   Medication Sig Dispense Refill   • aspirin 81 MG tablet Take 81 mg by mouth.     • B Complex Vitamins (VITAMIN B COMPLEX PO) Take 1 tablet by mouth Daily.     • carvedilol (COREG) 25 MG tablet Take 1 tablet by mouth 2 (Two) Times a Day. 180 tablet 2   • Cholecalciferol (VITAMIN D) 2000 units capsule Take 2,000 Units by mouth Daily.     • CRANBERRY EXTRACT PO Take 1 capsule by mouth Daily.     • dilTIAZem (Cardizem) 30 MG tablet Take 1 tablet by mouth 2 (two) times a day. 180 tablet 3   • Entresto 24-26 MG tablet TAKE ONE TABLET BY MOUTH TWICE A   tablet 3   • furosemide (LASIX) 20 MG tablet Take 1 tablet by mouth Daily. 90 tablet 3   • Loratadine-D 24HR  MG per 24 hr tablet TAKE ONE TABLET BY MOUTH DAILY 30 tablet 3   • MEGARED OMEGA-3 KRILL OIL PO Take 1 capsule by mouth Daily.     • PROBIOTIC PRODUCT PO Take 2 capsules by mouth Daily.       No current facility-administered medications on file prior to visit.       Family History   Problem Relation Age of Onset   • Hypertension Sister    • Cancer Sister    • Heart disease Brother 65        VALVE REPLACEMENT    • Hypertension Brother    • Hypertension Sister    • Hypertension Sister    • Hypertension Brother    • Diabetes Brother    • Hypertension Brother    • Cancer Brother    • Hypertension Brother    • Hypertension Brother    • Hypertension Brother        Past Medical History:   Diagnosis Date   • CAD (coronary artery disease)    • Cancer (CMS/HCC)     skin, right breast   • CHF (congestive heart failure) (CMS/HCC)    • Coronary artery disease involving native coronary artery of native heart without angina pectoris 11/1/2018   • Heart murmur    • History of myocardial infarction 11/1/2018   • Hypertension    • Myocardial infarction (CMS/HCC)     PER PT    • NICM (nonischemic cardiomyopathy) (CMS/HCC) 7/8/2020   • DELORES on CPAP     AHI 15/h   • Sinus tachycardia    • SOB (shortness of breath)        Past Surgical History:   Procedure Laterality Date   • BUNIONECTOMY Bilateral    • CARDIAC CATHETERIZATION     • CARDIAC CATHETERIZATION N/A 1/14/2020    Procedure: Right and Left Heart Cath;  Surgeon: Bin Chacko MD;  Location: [facility] CATH INVASIVE LOCATION;  Service: Cardiovascular   • CARDIAC CATHETERIZATION N/A 1/14/2020    Procedure: Coronary angiography;  Surgeon: Bin Chacko MD;  Location: [facility] CATH INVASIVE LOCATION;  Service: Cardiovascular   • CARDIAC CATHETERIZATION N/A 1/14/2020    Procedure: Left ventriculography;  Surgeon: Bin Chacko MD;  Location: [facility] CATH  "INVASIVE LOCATION;  Service: Cardiovascular   • MASTECTOMY COMPLETE / SIMPLE  2019   • SHOULDER SURGERY Right    • SKIN CANCER EXCISION      ABOVE THE LIP.        Social History     Socioeconomic History   • Marital status:      Spouse name: Not on file   • Number of children: 5   • Years of education: Not on file   • Highest education level: Not on file   Tobacco Use   • Smoking status: Former Smoker     Quit date: 1973     Years since quittin.8   • Smokeless tobacco: Never Used   • Tobacco comment: CAFFEINE USE: 1 CUP DAILY   Substance and Sexual Activity   • Alcohol use: No   • Drug use: No   • Sexual activity: Defer       Patient Active Problem List   Diagnosis   • History of myocardial infarction   • Coronary artery disease involving native coronary artery of native heart without angina pectoris   • Ulcerative colitis (CMS/HCC)   • HTN (hypertension)   • GERD (gastroesophageal reflux disease)   • DJD (degenerative joint disease)   • Ductal 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   • Anemia   • Other hyperlipidemia   • NICM (nonischemic cardiomyopathy) (CMS/HCC)   • DELORES on CPAP   • Snoring   • Class 1 obesity due to excess calories without serious comorbidity with body mass index (BMI) of 31.0 to 31.9 in adult   • Chronic systolic congestive heart failure (CMS/HCC)       /72 (BP Location: Left arm, Patient Position: Sitting, Cuff Size: Large Adult)   Pulse 57   Temp 96.9 °F (36.1 °C) (Temporal)   Ht 162.6 cm (64\")   Wt 78 kg (172 lb)   SpO2 97%   BMI 29.52 kg/m²   Body mass index is 29.52 kg/m².    Objective   Physical Exam  Constitutional:       Appearance: She is well-developed.   HENT:      Right Ear: Tympanic membrane and ear canal normal.      Left Ear: Tympanic membrane normal.      Mouth/Throat:      Mouth: Mucous membranes are dry.      Pharynx: Oropharynx is clear. No oropharyngeal exudate or posterior oropharyngeal erythema.   Eyes:      Pupils: Pupils are equal, round, " and reactive to light.   Neck:      Thyroid: No thyromegaly.      Vascular: No JVD.      Trachea: No tracheal deviation.   Cardiovascular:      Rate and Rhythm: Normal rate and regular rhythm.      Heart sounds: No murmur heard.     Pulmonary:      Effort: Pulmonary effort is normal. No respiratory distress.      Breath sounds: Normal breath sounds. No wheezing.   Musculoskeletal:      Cervical back: Normal range of motion and neck supple.   Lymphadenopathy:      Cervical: No cervical adenopathy.   Neurological:      Mental Status: She is alert and oriented to person, place, and time.           Assessment/Plan   Diagnoses and all orders for this visit:    1. Acute non-recurrent frontal sinusitis (Primary)    Other orders  -     amoxicillin-clavulanate (Augmentin) 500-125 MG per tablet; Take 1 tablet by mouth 3 (Three) Times a Day.  Dispense: 30 tablet; Refill: 0    Discussed with patient she is on several medications.  Have not had any blood work done.  I am not sure cardiology is aware of it.  Patient is willing to come back he is going to come back within next 2 to 4 weeks to see me for a regular appointment.  We will do blood work at that time.  Continue to follow-up with cardiology.

## 2021-10-07 ENCOUNTER — OFFICE VISIT (OUTPATIENT)
Dept: FAMILY MEDICINE CLINIC | Facility: CLINIC | Age: 76
End: 2021-10-07

## 2021-10-07 VITALS
HEART RATE: 74 BPM | TEMPERATURE: 97.1 F | OXYGEN SATURATION: 93 % | BODY MASS INDEX: 29.37 KG/M2 | WEIGHT: 172 LBS | DIASTOLIC BLOOD PRESSURE: 74 MMHG | HEIGHT: 64 IN | SYSTOLIC BLOOD PRESSURE: 120 MMHG

## 2021-10-07 DIAGNOSIS — Z12.31 ENCOUNTER FOR SCREENING MAMMOGRAM FOR MALIGNANT NEOPLASM OF BREAST: ICD-10-CM

## 2021-10-07 DIAGNOSIS — I50.22 CHRONIC SYSTOLIC CONGESTIVE HEART FAILURE (HCC): ICD-10-CM

## 2021-10-07 DIAGNOSIS — I10 PRIMARY HYPERTENSION: ICD-10-CM

## 2021-10-07 DIAGNOSIS — I25.10 CORONARY ARTERY DISEASE INVOLVING NATIVE CORONARY ARTERY OF NATIVE HEART WITHOUT ANGINA PECTORIS: Chronic | ICD-10-CM

## 2021-10-07 DIAGNOSIS — Z23 FLU VACCINE NEED: Primary | ICD-10-CM

## 2021-10-07 DIAGNOSIS — D05.11 DUCTAL CARCINOMA IN SITU (DCIS) OF RIGHT BREAST: ICD-10-CM

## 2021-10-07 DIAGNOSIS — K21.9 GASTROESOPHAGEAL REFLUX DISEASE WITHOUT ESOPHAGITIS: ICD-10-CM

## 2021-10-07 DIAGNOSIS — E78.49 OTHER HYPERLIPIDEMIA: ICD-10-CM

## 2021-10-07 LAB
ALBUMIN SERPL-MCNC: 4.4 G/DL (ref 3.5–5.2)
ALBUMIN/GLOB SERPL: 2 G/DL
ALP SERPL-CCNC: 79 U/L (ref 39–117)
ALT SERPL-CCNC: 15 U/L (ref 1–33)
AST SERPL-CCNC: 18 U/L (ref 1–32)
BASOPHILS # BLD AUTO: 0.04 10*3/MM3 (ref 0–0.2)
BASOPHILS NFR BLD AUTO: 0.7 % (ref 0–1.5)
BILIRUB SERPL-MCNC: 0.3 MG/DL (ref 0–1.2)
BUN SERPL-MCNC: 31 MG/DL (ref 8–23)
BUN/CREAT SERPL: 24.6 (ref 7–25)
CALCIUM SERPL-MCNC: 9.2 MG/DL (ref 8.6–10.5)
CHLORIDE SERPL-SCNC: 107 MMOL/L (ref 98–107)
CHOLEST SERPL-MCNC: 199 MG/DL (ref 0–200)
CHOLEST/HDLC SERPL: 2.97 {RATIO}
CO2 SERPL-SCNC: 26.6 MMOL/L (ref 22–29)
CREAT SERPL-MCNC: 1.26 MG/DL (ref 0.57–1)
EOSINOPHIL # BLD AUTO: 0.24 10*3/MM3 (ref 0–0.4)
EOSINOPHIL NFR BLD AUTO: 4.1 % (ref 0.3–6.2)
ERYTHROCYTE [DISTWIDTH] IN BLOOD BY AUTOMATED COUNT: 13 % (ref 12.3–15.4)
GLOBULIN SER CALC-MCNC: 2.2 GM/DL
GLUCOSE SERPL-MCNC: 94 MG/DL (ref 65–99)
HCT VFR BLD AUTO: 40.5 % (ref 34–46.6)
HDLC SERPL-MCNC: 67 MG/DL (ref 40–60)
HGB BLD-MCNC: 12.8 G/DL (ref 12–15.9)
IMM GRANULOCYTES # BLD AUTO: 0.01 10*3/MM3 (ref 0–0.05)
IMM GRANULOCYTES NFR BLD AUTO: 0.2 % (ref 0–0.5)
LDLC SERPL CALC-MCNC: 116 MG/DL (ref 0–100)
LYMPHOCYTES # BLD AUTO: 1.9 10*3/MM3 (ref 0.7–3.1)
LYMPHOCYTES NFR BLD AUTO: 32.1 % (ref 19.6–45.3)
MCH RBC QN AUTO: 28.7 PG (ref 26.6–33)
MCHC RBC AUTO-ENTMCNC: 31.6 G/DL (ref 31.5–35.7)
MCV RBC AUTO: 90.8 FL (ref 79–97)
MONOCYTES # BLD AUTO: 0.52 10*3/MM3 (ref 0.1–0.9)
MONOCYTES NFR BLD AUTO: 8.8 % (ref 5–12)
NEUTROPHILS # BLD AUTO: 3.21 10*3/MM3 (ref 1.7–7)
NEUTROPHILS NFR BLD AUTO: 54.1 % (ref 42.7–76)
NRBC BLD AUTO-RTO: 0 /100 WBC (ref 0–0.2)
PLATELET # BLD AUTO: 218 10*3/MM3 (ref 140–450)
POTASSIUM SERPL-SCNC: 4.3 MMOL/L (ref 3.5–5.2)
PROT SERPL-MCNC: 6.6 G/DL (ref 6–8.5)
RBC # BLD AUTO: 4.46 10*6/MM3 (ref 3.77–5.28)
SODIUM SERPL-SCNC: 144 MMOL/L (ref 136–145)
TRIGL SERPL-MCNC: 92 MG/DL (ref 0–150)
TSH SERPL DL<=0.005 MIU/L-ACNC: 3.35 UIU/ML (ref 0.27–4.2)
VLDLC SERPL CALC-MCNC: 16 MG/DL (ref 5–40)
WBC # BLD AUTO: 5.92 10*3/MM3 (ref 3.4–10.8)

## 2021-10-07 PROCEDURE — 99214 OFFICE O/P EST MOD 30 MIN: CPT | Performed by: INTERNAL MEDICINE

## 2021-10-07 PROCEDURE — 90662 IIV NO PRSV INCREASED AG IM: CPT | Performed by: INTERNAL MEDICINE

## 2021-10-07 PROCEDURE — G0008 ADMIN INFLUENZA VIRUS VAC: HCPCS | Performed by: INTERNAL MEDICINE

## 2021-10-07 NOTE — PROGRESS NOTES
Subjective   Effie Donnelly is a 76 y.o. female.     Chief Complaint   Patient presents with   • 4 wk f/u   Hypertension, hyperlipidemia, CHF, GERD, ulcerative colitis, chronic sinusitis.    History of Present Illness   Patient follow-up for multiple medical problems.  She has had no labs done for 1 year.  Her son symptoms have resolved.  Seen follow-up for Hypertension, hyperlipidemia, CHF, GERD, ulcerative colitis, chronic sinusitis.  Denies any chest pain or shortness of breath.  Went to the list of medications.  Patient had a mammogram since her mastectomy on the right side.  Patient had any colonoscopy done either.  The following portions of the patient's history were reviewed and updated as appropriate: allergies, current medications, past family history, past medical history, past social history, past surgical history and problem list.    Review of Systems   Constitutional: Negative for activity change, appetite change, fatigue and fever.   Eyes: Negative for blurred vision and double vision.   Respiratory: Negative.  Negative for shortness of breath.    Cardiovascular: Negative.  Negative for chest pain, palpitations and leg swelling.   Gastrointestinal: Negative.    Genitourinary: Negative for hematuria.   Neurological: Negative for dizziness, syncope, light-headedness and headache.       Allergies   Allergen Reactions   • Adhesive Tape Other (See Comments)     BLISTERS UNDER TAGADERM   • Nsaids GI Bleeding     CROHN'S DISEASE       Current Outpatient Medications on File Prior to Visit   Medication Sig Dispense Refill   • aspirin 81 MG tablet Take 81 mg by mouth.     • B Complex Vitamins (VITAMIN B COMPLEX PO) Take 1 tablet by mouth Daily.     • carvedilol (COREG) 25 MG tablet Take 1 tablet by mouth 2 (Two) Times a Day. 180 tablet 2   • Cholecalciferol (VITAMIN D) 2000 units capsule Take 2,000 Units by mouth Daily.     • CRANBERRY EXTRACT PO Take 1 capsule by mouth Daily.     • dilTIAZem (Cardizem) 30 MG  tablet Take 1 tablet by mouth 2 (two) times a day. 180 tablet 3   • Entresto 24-26 MG tablet TAKE ONE TABLET BY MOUTH TWICE A  tablet 3   • furosemide (LASIX) 20 MG tablet Take 1 tablet by mouth Daily. 90 tablet 3   • Loratadine-D 24HR  MG per 24 hr tablet TAKE ONE TABLET BY MOUTH DAILY 30 tablet 3   • MEGARED OMEGA-3 KRILL OIL PO Take 1 capsule by mouth Daily.     • PROBIOTIC PRODUCT PO Take 2 capsules by mouth Daily.     • [DISCONTINUED] amoxicillin-clavulanate (Augmentin) 500-125 MG per tablet Take 1 tablet by mouth 3 (Three) Times a Day. 30 tablet 0     No current facility-administered medications on file prior to visit.       Family History   Problem Relation Age of Onset   • Hypertension Sister    • Cancer Sister    • Heart disease Brother 65        VALVE REPLACEMENT    • Hypertension Brother    • Hypertension Sister    • Hypertension Sister    • Hypertension Brother    • Diabetes Brother    • Hypertension Brother    • Cancer Brother    • Hypertension Brother    • Hypertension Brother    • Hypertension Brother        Past Medical History:   Diagnosis Date   • CAD (coronary artery disease)    • Cancer (HCC)     skin, right breast   • CHF (congestive heart failure) ([facility])    • Coronary artery disease involving native coronary artery of native heart without angina pectoris 11/1/2018   • Heart murmur    • History of myocardial infarction 11/1/2018   • Hypertension    • Myocardial infarction ([facility])     PER PT    • NICM (nonischemic cardiomyopathy) ([facility]) 7/8/2020   • DELORES on CPAP     AHI 15/h   • Sinus tachycardia    • SOB (shortness of breath)        Past Surgical History:   Procedure Laterality Date   • BUNIONECTOMY Bilateral    • CARDIAC CATHETERIZATION     • CARDIAC CATHETERIZATION N/A 1/14/2020    Procedure: Right and Left Heart Cath;  Surgeon: Bin Chacko MD;  Location: [facility] CATH INVASIVE LOCATION;  Service: Cardiovascular   • CARDIAC CATHETERIZATION N/A 1/14/2020    Procedure: Coronary  "angiography;  Surgeon: Bin Chacko MD;  Location:  [facility] CATH INVASIVE LOCATION;  Service: Cardiovascular   • CARDIAC CATHETERIZATION N/A 2020    Procedure: Left ventriculography;  Surgeon: Bin Chacko MD;  Location:  [facility] CATH INVASIVE LOCATION;  Service: Cardiovascular   • MASTECTOMY COMPLETE / SIMPLE  2019   • SHOULDER SURGERY Right    • SKIN CANCER EXCISION      ABOVE THE LIP.        Social History     Socioeconomic History   • Marital status:      Spouse name: Not on file   • Number of children: 5   • Years of education: Not on file   • Highest education level: Not on file   Tobacco Use   • Smoking status: Former Smoker     Quit date: 1973     Years since quittin.9   • Smokeless tobacco: Never Used   • Tobacco comment: CAFFEINE USE: 1 CUP DAILY   Substance and Sexual Activity   • Alcohol use: No   • Drug use: No   • Sexual activity: Defer       Patient Active Problem List   Diagnosis   • History of myocardial infarction   • Coronary artery disease involving native coronary artery of native heart without angina pectoris   • Ulcerative colitis (HCC)   • HTN (hypertension)   • GERD (gastroesophageal reflux disease)   • DJD (degenerative joint disease)   • Ductal 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   • Anemia   • Other hyperlipidemia   • NICM (nonischemic cardiomyopathy) (HCC)   • DELORES on CPAP   • Snoring   • Class 1 obesity due to excess calories without serious comorbidity with body mass index (BMI) of 31.0 to 31.9 in adult   • Chronic systolic congestive heart failure (HCC)       /74 (BP Location: Left arm, Patient Position: Sitting, Cuff Size: Adult)   Pulse 74   Temp 97.1 °F (36.2 °C) (Temporal)   Ht 162.6 cm (64\")   Wt 78 kg (172 lb)   SpO2 93%   BMI 29.52 kg/m²   Body mass index is 29.52 kg/m².    Objective   Physical Exam  Vitals and nursing note reviewed.   Constitutional:       Appearance: She is well-developed.   Eyes:      Pupils: Pupils are equal, " round, and reactive to light.   Neck:      Thyroid: No thyromegaly.      Vascular: No JVD.      Trachea: No tracheal deviation.   Cardiovascular:      Rate and Rhythm: Normal rate and regular rhythm.      Heart sounds: No murmur heard.     Pulmonary:      Effort: Pulmonary effort is normal. No respiratory distress.      Breath sounds: Normal breath sounds. No wheezing.   Abdominal:      General: Bowel sounds are normal. There is no distension.      Palpations: Abdomen is soft. There is no mass.      Tenderness: There is no abdominal tenderness. There is no guarding or rebound.      Hernia: No hernia is present.   Musculoskeletal:      Cervical back: Normal range of motion and neck supple.   Lymphadenopathy:      Cervical: No cervical adenopathy.   Neurological:      General: No focal deficit present.      Mental Status: She is alert and oriented to person, place, and time. Mental status is at baseline.           Assessment/Plan   Diagnoses and all orders for this visit:    1. Flu vaccine need (Primary)  -     Fluzone High-Dose 65+yrs    2. Coronary artery disease involving native coronary artery of native heart without angina pectoris  -     CBC & Differential  -     Comprehensive Metabolic Panel  -     Lipid Panel With / Chol / HDL Ratio  -     TSH    3. Primary hypertension  -     CBC & Differential  -     Comprehensive Metabolic Panel  -     Lipid Panel With / Chol / HDL Ratio  -     TSH    4. Other hyperlipidemia  -     CBC & Differential  -     Comprehensive Metabolic Panel  -     Lipid Panel With / Chol / HDL Ratio  -     TSH    5. Gastroesophageal reflux disease without esophagitis    6. Chronic systolic congestive heart failure (HCC)    7. Encounter for screening mammogram for malignant neoplasm of breast  -     Mammo Screening Digital Tomosynthesis Bilateral With CAD; Future    8. Ductal 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    Ordered mammogram not sure patient just will need unilateral unable to get  the twice for just screening 1 side.  Patient wants to get done at [place] where she had her surgery.  Keep follow-up with Dr. Brand.  Continue diet exercise.  Patient promises to call Dr. Esquivel to schedule her colonoscopy.  Continue aspirin, Coreg, Entresto, Lasix.  Patient is to have a history of high CPK and had declined statin before.  She is also on Cardizem.  I will see her back in 3 months.  Discussed with patient getting regular follow-ups and labs.  EHR dragon/transcription disclaimer:  Part of this note are created by electronic transcription/translation of spoken language to printed text and thus may lead to erroneous, or at times, nonsensical words or phrases inadvertently transcribed.  Although I have reviewed for such errors, some may still exist.

## 2021-12-09 ENCOUNTER — HOSPITAL ENCOUNTER (OUTPATIENT)
Dept: GENERAL RADIOLOGY | Facility: HOSPITAL | Age: 76
Discharge: HOME | End: 2021-12-09

## 2021-12-09 ENCOUNTER — OFFICE VISIT (OUTPATIENT)
Dept: CARDIOLOGY | Facility: CLINIC | Age: 76
End: 2021-12-09

## 2021-12-09 ENCOUNTER — LAB (OUTPATIENT)
Dept: LAB | Facility: HOSPITAL | Age: 76
End: 2021-12-09

## 2021-12-09 VITALS
OXYGEN SATURATION: 93 % | HEIGHT: 64 IN | DIASTOLIC BLOOD PRESSURE: 80 MMHG | WEIGHT: 173 LBS | BODY MASS INDEX: 29.53 KG/M2 | HEART RATE: 57 BPM | RESPIRATION RATE: 16 BRPM | SYSTOLIC BLOOD PRESSURE: 140 MMHG

## 2021-12-09 DIAGNOSIS — R06.02 SHORTNESS OF BREATH: Primary | ICD-10-CM

## 2021-12-09 DIAGNOSIS — R53.83 FATIGUE, UNSPECIFIED TYPE: ICD-10-CM

## 2021-12-09 DIAGNOSIS — R06.02 SHORTNESS OF BREATH: ICD-10-CM

## 2021-12-09 LAB
ANION GAP SERPL CALCULATED.3IONS-SCNC: 11.7 MMOL/L (ref 5–15)
BUN SERPL-MCNC: 28 MG/DL (ref 8–23)
BUN/CREAT SERPL: 23.9 (ref 7–25)
CALCIUM SPEC-SCNC: 9.3 MG/DL (ref 8.6–10.5)
CHLORIDE SERPL-SCNC: 102 MMOL/L (ref 98–107)
CO2 SERPL-SCNC: 26.3 MMOL/L (ref 22–29)
CREAT SERPL-MCNC: 1.17 MG/DL (ref 0.57–1)
D DIMER PPP FEU-MCNC: 0.29 MCGFEU/ML (ref 0–0.46)
GFR SERPL CREATININE-BSD FRML MDRD: 45 ML/MIN/1.73
GLUCOSE SERPL-MCNC: 93 MG/DL (ref 65–99)
NT-PROBNP SERPL-MCNC: 1638 PG/ML (ref 0–1800)
POTASSIUM SERPL-SCNC: 4.4 MMOL/L (ref 3.5–5.2)
SODIUM SERPL-SCNC: 140 MMOL/L (ref 136–145)
T-UPTAKE NFR SERPL: 1.05 TBI (ref 0.8–1.3)
T4 SERPL-MCNC: 7.54 MCG/DL (ref 4.5–11.7)
TSH SERPL DL<=0.05 MIU/L-ACNC: 4.16 UIU/ML (ref 0.27–4.2)

## 2021-12-09 PROCEDURE — 84479 ASSAY OF THYROID (T3 OR T4): CPT

## 2021-12-09 PROCEDURE — 85379 FIBRIN DEGRADATION QUANT: CPT

## 2021-12-09 PROCEDURE — 36415 COLL VENOUS BLD VENIPUNCTURE: CPT

## 2021-12-09 PROCEDURE — 93000 ELECTROCARDIOGRAM COMPLETE: CPT | Performed by: INTERNAL MEDICINE

## 2021-12-09 PROCEDURE — 84436 ASSAY OF TOTAL THYROXINE: CPT

## 2021-12-09 PROCEDURE — 84443 ASSAY THYROID STIM HORMONE: CPT

## 2021-12-09 PROCEDURE — 80048 BASIC METABOLIC PNL TOTAL CA: CPT

## 2021-12-09 PROCEDURE — 99214 OFFICE O/P EST MOD 30 MIN: CPT | Performed by: INTERNAL MEDICINE

## 2021-12-09 PROCEDURE — 83880 ASSAY OF NATRIURETIC PEPTIDE: CPT

## 2021-12-09 PROCEDURE — 71046 X-RAY EXAM CHEST 2 VIEWS: CPT

## 2021-12-09 NOTE — PROGRESS NOTES
Subjective:     Encounter Date: 12/09/21        Patient ID: Effie Donnelly is a 76 y.o. female.    Chief Complaint: preop cards  History of Present Illness    Dear Dr. Terrazas,    She has a history of a nonischemic cardiomyopathy.  She also has a history of underlying mild CAD and a old myocardial infarction.  She is on guideline directed medical therapy, which was titrated in January.    Weight has been stable.  She denies any chest pain or chest discomfort.  However she has been getting very short of breath with any activity.  No shortness of breath at rest.  No wheezing.  No lower extremity edema at all.  No orthopnea or PND.  He has not had any chills or fevers.  No COVID that she knows of.    Cath 1/2020:  Findings:  1. Coronary Artery Anatomy:  Dominance: Left  Left Main: Angiographically normal  Left Anterior Descending: The LAD is somewhat small in caliber size.  Contains a 30 to 40% proximal segment stenosis followed by luminal irregularities distally.  There are no appreciable diagonals from the LAD.  Circumflex Artery: Large in caliber size.  Luminal irregularities throughout supplies a mid and inferior marginal branch vessels.  Also supplies a left-sided PDA which contains luminal regularities.  Ramus: Large ramus almost takes over for LAD and is branching and bifurcating.  Luminal irregularities throughout  Right Coronary Artery: Nondominant     2. Hemodynamics:  Right Atrium: 8/5/4 mmHg  Right Ventricle: 31/2/5 mmHg  Pulmonary Artery: 34/11/23 mmHg  Pulmonary Wedge: 22/21/20 mmHg  Left Ventricle: 184/16/23 mmHg  Aorta: 178/64/107 mmHg  Cardiac Output/Index: 3.53 L/min 1.92 L/min/m2  PA Sat: 66 %  AO Sat: 96 %     3. Left Ventriculogram:  Ejection Fraction: 20 %  Wall Motion: Global  Mitral Regurgitation: None     Conclusions:  1. Left dominant system with diminutive LAD which may explain her stress test findings but otherwise nonischemic cardiomyopathy with only a 30 to 40% proximal LAD  stenoses.  2. Currently elevated left-sided filling pressures with EDP of 23 mmHg  3. Currently reduced cardiac output and index of 3.5 and 1.9       I saw her initially November 2018. She was previously seen at [facility].  She has a history of CAD and myocardial infarction.  She had an echocardiogram in November that showed normal function.  She does have a history of an abnormal EKG with LVH as well as diffuse repolarization changes.  She just had an EKG performed at [facility] there was unchanged from prior EKGs.      The following portions of the patient's history were reviewed and updated as appropriate: allergies, current medications, past family history, past medical history, past social history, past surgical history and problem list.    Past Medical History:   Diagnosis Date   • CAD (coronary artery disease)    • Cancer ([facility])     skin, right breast   • CHF (congestive heart failure) ([facility])    • Coronary artery disease involving native coronary artery of native heart without angina pectoris 11/1/2018   • Heart murmur    • History of myocardial infarction 11/1/2018   • Hypertension    • Myocardial infarction ([facility])     PER PT    • NICM (nonischemic cardiomyopathy) ([facility]) 7/8/2020   • DELORES on CPAP     AHI 15/h   • Sinus tachycardia    • SOB (shortness of breath)        Past Surgical History:   Procedure Laterality Date   • BUNIONECTOMY Bilateral    • CARDIAC CATHETERIZATION     • CARDIAC CATHETERIZATION N/A 1/14/2020    Procedure: Right and Left Heart Cath;  Surgeon: Bin Chacko MD;  Location: [facility] CATH INVASIVE LOCATION;  Service: Cardiovascular   • CARDIAC CATHETERIZATION N/A 1/14/2020    Procedure: Coronary angiography;  Surgeon: Bin Chacko MD;  Location: [facility] CATH INVASIVE LOCATION;  Service: Cardiovascular   • CARDIAC CATHETERIZATION N/A 1/14/2020    Procedure: Left ventriculography;  Surgeon: Bin Chacko MD;  Location: [facility] CATH INVASIVE LOCATION;  Service:  "Cardiovascular   • MASTECTOMY COMPLETE / SIMPLE  2019   • SHOULDER SURGERY Right    • SKIN CANCER EXCISION      ABOVE THE LIP.        Social History     Socioeconomic History   • Marital status:    • Number of children: 5   Tobacco Use   • Smoking status: Former Smoker     Quit date: 1973     Years since quittin.1   • Smokeless tobacco: Never Used   • Tobacco comment: CAFFEINE USE: 1 CUP DAILY   Substance and Sexual Activity   • Alcohol use: No   • Drug use: No   • Sexual activity: Defer           ECG 12 Lead    Date/Time: 2021 9:55 AM  Performed by: Jose Maria Brand III, MD  Authorized by: Jose Maria Brand III, MD   Comparison: compared with previous ECG   Similar to previous ECG  Rhythm: sinus rhythm  Rate: normal  Conduction: conduction normal  QRS axis: normal  Other findings: non-specific ST-T wave changes and left ventricular hypertrophy with strain    Clinical impression: non-specific ECG               Objective:     Vitals:    21 0908   BP: 140/80   Pulse: 57   Resp: 16   SpO2: 93%   Weight: 78.5 kg (173 lb)   Height: 162.6 cm (64\")     General Appearance:    Alert, cooperative, in no acute distress   Head:    Normocephalic, without obvious abnormality   Eyes:            Lids and lashes normal, conjunctivae and sclerae normal, no icterus, no pallor, corneas clear   Ears:    Ears appear intact with no abnormalities noted   Throat:   No oral lesions, oral mucosa moist   Neck:   No adenopathy, supple, trachea midline, no thyromegaly, no carotid bruit, no JVD   Back:     No kyphosis present, no erythema or scars, no tenderness to palpation    Lungs:     Clear to auscultation,respirations regular, even and unlabored    Heart:    Regular rhythm and normal rate, normal S1 and S2, no murmur, no gallop, no rub, no click   Chest Wall:    No abnormalities observed   Abdomen:     Normal bowel sounds, no masses, no organomegaly, soft        non-tender, non-distended, no guarding   Extremities:  "  Moves all extremities well, no edema, no cyanosis, no redness   Pulses:  Bilateral carotids brisk   Skin:  Psychiatric:   No bleeding or rash    Alert and oriented, normal mood and affect           Lab Review:             Lab Results   Component Value Date    GLUCOSE 94 10/07/2021    BUN 31 (H) 10/07/2021    CREATININE 1.26 (H) 10/07/2021    EGFRIFNONA 41 (L) 10/07/2021    EGFRIFAFRI 50 (L) 10/07/2021    BCR 24.6 10/07/2021    K 4.3 10/07/2021    CO2 26.6 10/07/2021    CALCIUM 9.2 10/07/2021    PROTENTOTREF 6.6 10/07/2021    ALBUMIN 4.40 10/07/2021    LABIL2 2.0 10/07/2021    AST 18 10/07/2021    ALT 15 10/07/2021             Assessment:          Diagnosis Plan   1. Shortness of breath  XR Chest 2 View    Basic Metabolic Panel    Thyroid Panel With TSH    BNP    D-dimer, Quantitative   2. Fatigue, unspecified type  XR Chest 2 View    Basic Metabolic Panel    Thyroid Panel With TSH    BNP    D-dimer, Quantitative          Plan:       1. Coronary Artery Disease  Assessment  • The patient has no angina    Plan  • Lifestyle modifications discussed include adhering to a heart healthy diet, avoidance of tobacco products, maintenance of a healthy weight, medication compliance, regular exercise and regular monitoring of cholesterol and blood pressure    Subjective - Objective  • There is a history of past MI  • Current antiplatelet therapy includes aspirin 81 mg    2.   NICM with chronic systolic CHF-continue guideline directed medical therapy,   3.  Dyspnea on exertion, more shortness of breath although weight is stable and she remains consistent with her medical regimen.  We obtained a chest x-ray as well as lab work, further evaluation treatment predicated on the results.    Thank you very much for allowing us to participate in the care of this pleasant patient.  Please don't hesitate to call if I can be of assistance in any way.      Current Outpatient Medications:   •  aspirin 81 MG tablet, Take 81 mg by mouth., Disp:  , Rfl:   •  B Complex Vitamins (VITAMIN B COMPLEX PO), Take 1 tablet by mouth Daily., Disp: , Rfl:   •  carvedilol (COREG) 25 MG tablet, Take 1 tablet by mouth 2 (Two) Times a Day., Disp: 180 tablet, Rfl: 2  •  Cholecalciferol (VITAMIN D) 2000 units capsule, Take 2,000 Units by mouth Daily., Disp: , Rfl:   •  CRANBERRY EXTRACT PO, Take 1 capsule by mouth Daily., Disp: , Rfl:   •  dilTIAZem (Cardizem) 30 MG tablet, Take 1 tablet by mouth 2 (two) times a day., Disp: 180 tablet, Rfl: 3  •  Entresto 24-26 MG tablet, TAKE ONE TABLET BY MOUTH TWICE A DAY, Disp: 180 tablet, Rfl: 3  •  furosemide (LASIX) 20 MG tablet, Take 1 tablet by mouth Daily., Disp: 90 tablet, Rfl: 3  •  Loratadine-D 24HR  MG per 24 hr tablet, TAKE ONE TABLET BY MOUTH DAILY, Disp: 30 tablet, Rfl: 3  •  MEGARED OMEGA-3 KRILL OIL PO, Take 1 capsule by mouth Daily., Disp: , Rfl:   •  PROBIOTIC PRODUCT PO, Take 2 capsules by mouth Daily., Disp: , Rfl:

## 2021-12-28 ENCOUNTER — TELEPHONE (OUTPATIENT)
Dept: GASTROENTEROLOGY | Facility: CLINIC | Age: 76
End: 2021-12-28

## 2021-12-28 NOTE — TELEPHONE ENCOUNTER
SPOKE WITH PATIENT.  SHE STATES PAST DUE FOR COLONOSCOPY.  VERIFY ADDRESS.  MAILED FAST TRACK PACKET TODAY.

## 2021-12-28 NOTE — TELEPHONE ENCOUNTER
----- Message from Tran Hernandez RN sent at 12/28/2021  8:43 AM EST -----  Regarding: Colonoscopy  Pt LVM to schedule colonoscopy

## 2022-01-11 ENCOUNTER — OFFICE VISIT (OUTPATIENT)
Dept: FAMILY MEDICINE CLINIC | Facility: CLINIC | Age: 77
End: 2022-01-11

## 2022-01-11 VITALS
HEART RATE: 49 BPM | DIASTOLIC BLOOD PRESSURE: 68 MMHG | HEIGHT: 64 IN | BODY MASS INDEX: 29.53 KG/M2 | TEMPERATURE: 96.9 F | SYSTOLIC BLOOD PRESSURE: 110 MMHG | RESPIRATION RATE: 14 BRPM | WEIGHT: 173 LBS | OXYGEN SATURATION: 96 %

## 2022-01-11 DIAGNOSIS — K21.9 GASTROESOPHAGEAL REFLUX DISEASE WITHOUT ESOPHAGITIS: ICD-10-CM

## 2022-01-11 DIAGNOSIS — I25.10 CORONARY ARTERY DISEASE INVOLVING NATIVE CORONARY ARTERY OF NATIVE HEART WITHOUT ANGINA PECTORIS: Primary | Chronic | ICD-10-CM

## 2022-01-11 DIAGNOSIS — M19.90 OSTEOARTHRITIS, UNSPECIFIED OSTEOARTHRITIS TYPE, UNSPECIFIED SITE: ICD-10-CM

## 2022-01-11 DIAGNOSIS — E78.49 OTHER HYPERLIPIDEMIA: ICD-10-CM

## 2022-01-11 DIAGNOSIS — I10 PRIMARY HYPERTENSION: ICD-10-CM

## 2022-01-11 DIAGNOSIS — I50.22 CHRONIC SYSTOLIC CONGESTIVE HEART FAILURE: ICD-10-CM

## 2022-01-11 PROCEDURE — 99214 OFFICE O/P EST MOD 30 MIN: CPT | Performed by: INTERNAL MEDICINE

## 2022-01-11 NOTE — PROGRESS NOTES
Subjective   Effie Donnelly is a 76 y.o. female.     Chief Complaint   Patient presents with   • Hypertension   Hyperlipidemia CAD, CHF, GERD, allergies.    History of Present Illness   Here for follow-up for multiple medical problems.  Has history of CHF, CAD, hypertension, hyperlipidemia, GERD, anemia.  Denies any chest pain shortness of breath.  No dizziness.  Reports heart rate has been okay at home.  I rechecked the heart rate was 55.  She is on Coreg.  Went to the list of medications.  Blood pressure has been okay.  The following portions of the patient's history were reviewed and updated as appropriate: allergies, current medications, past family history, past medical history, past social history, past surgical history and problem list.    Review of Systems   Constitutional: Negative for activity change, appetite change, fatigue and fever.   Eyes: Negative for blurred vision and double vision.   Respiratory: Negative.  Negative for shortness of breath.    Cardiovascular: Negative for chest pain, palpitations and leg swelling.   Gastrointestinal: Negative.    Neurological: Negative for dizziness, syncope, light-headedness and headache.   Psychiatric/Behavioral: Negative for agitation and behavioral problems.       Allergies   Allergen Reactions   • Adhesive Tape Other (See Comments)     BLISTERS UNDER TAGADERM   • Nsaids GI Bleeding     CROHN'S DISEASE       Current Outpatient Medications on File Prior to Visit   Medication Sig Dispense Refill   • aspirin 81 MG tablet Take 81 mg by mouth.     • B Complex Vitamins (VITAMIN B COMPLEX PO) Take 1 tablet by mouth Daily.     • carvedilol (COREG) 25 MG tablet Take 1 tablet by mouth 2 (Two) Times a Day. 180 tablet 2   • Cholecalciferol (VITAMIN D) 2000 units capsule Take 2,000 Units by mouth Daily.     • CRANBERRY EXTRACT PO Take 1 capsule by mouth Daily.     • dilTIAZem (Cardizem) 30 MG tablet Take 1 tablet by mouth 2 (two) times a day. 180 tablet 3   • Entresto 24-26  MG tablet TAKE ONE TABLET BY MOUTH TWICE A  tablet 3   • furosemide (LASIX) 20 MG tablet Take 1 tablet by mouth Daily. 90 tablet 3   • Loratadine-D 24HR  MG per 24 hr tablet TAKE ONE TABLET BY MOUTH DAILY 30 tablet 3   • MEGARED OMEGA-3 KRILL OIL PO Take 1 capsule by mouth Daily.     • PROBIOTIC PRODUCT PO Take 2 capsules by mouth Daily.       No current facility-administered medications on file prior to visit.       Family History   Problem Relation Age of Onset   • Hypertension Sister    • Cancer Sister    • Heart disease Brother 65        VALVE REPLACEMENT    • Hypertension Brother    • Hypertension Sister    • Hypertension Sister    • Hypertension Brother    • Diabetes Brother    • Hypertension Brother    • Cancer Brother    • Hypertension Brother    • Hypertension Brother    • Hypertension Brother        Past Medical History:   Diagnosis Date   • CAD (coronary artery disease)    • Cancer ([facility])     skin, right breast   • CHF (congestive heart failure) ([facility])    • Coronary artery disease involving native coronary artery of native heart without angina pectoris 11/1/2018   • Heart murmur    • History of myocardial infarction 11/1/2018   • Hypertension    • Myocardial infarction ([facility])     PER PT    • NICM (nonischemic cardiomyopathy) ([facility]) 7/8/2020   • DELORES on CPAP     AHI 15/h   • Sinus tachycardia    • SOB (shortness of breath)        Past Surgical History:   Procedure Laterality Date   • BUNIONECTOMY Bilateral    • CARDIAC CATHETERIZATION     • CARDIAC CATHETERIZATION N/A 1/14/2020    Procedure: Right and Left Heart Cath;  Surgeon: Bin Chacko MD;  Location:  [facility] CATH INVASIVE LOCATION;  Service: Cardiovascular   • CARDIAC CATHETERIZATION N/A 1/14/2020    Procedure: Coronary angiography;  Surgeon: Bin Chacko MD;  Location:  [facility] CATH INVASIVE LOCATION;  Service: Cardiovascular   • CARDIAC CATHETERIZATION N/A 1/14/2020    Procedure: Left ventriculography;  Surgeon: Bin Chacko  "MD;  Location: [facility] INVASIVE LOCATION;  Service: Cardiovascular   • MASTECTOMY COMPLETE / SIMPLE  2019   • SHOULDER SURGERY Right    • SKIN CANCER EXCISION      ABOVE THE LIP.        Social History     Socioeconomic History   • Marital status:    • Number of children: 5   Tobacco Use   • Smoking status: Former Smoker     Quit date: 1973     Years since quittin.2   • Smokeless tobacco: Never Used   • Tobacco comment: CAFFEINE USE: 1 CUP DAILY   Substance and Sexual Activity   • Alcohol use: No   • Drug use: No   • Sexual activity: Defer       Patient Active Problem List   Diagnosis   • History of myocardial infarction   • Coronary artery disease involving native coronary artery of native heart without angina pectoris   • Ulcerative colitis (HCC)   • HTN (hypertension)   • GERD (gastroesophageal reflux disease)   • DJD (degenerative joint disease)   • Ductal 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   • Anemia   • Other hyperlipidemia   • NICM (nonischemic cardiomyopathy) (HCC)   • DELORES on CPAP   • Snoring   • Class 1 obesity due to excess calories without serious comorbidity with body mass index (BMI) of 31.0 to 31.9 in adult   • Chronic systolic congestive heart failure (HCC)       /68   Pulse (!) 49   Temp 96.9 °F (36.1 °C)   Resp 14   Ht 162.6 cm (64\")   Wt 78.5 kg (173 lb)   SpO2 96%   BMI 29.70 kg/m²   Body mass index is 29.7 kg/m².    Objective   Physical Exam  Vitals and nursing note reviewed.   Constitutional:       Appearance: She is well-developed.   Eyes:      Pupils: Pupils are equal, round, and reactive to light.   Neck:      Thyroid: No thyromegaly.      Vascular: No JVD.      Trachea: No tracheal deviation.   Cardiovascular:      Rate and Rhythm: Normal rate and regular rhythm.      Heart sounds: No murmur heard.      Pulmonary:      Effort: Pulmonary effort is normal. No respiratory distress.      Breath sounds: Normal breath sounds. No wheezing.   Abdominal:      " General: Bowel sounds are normal. There is no distension.      Palpations: Abdomen is soft. There is no mass.      Tenderness: There is no abdominal tenderness. There is no right CVA tenderness, left CVA tenderness, guarding or rebound.      Hernia: No hernia is present.   Musculoskeletal:      Cervical back: Normal range of motion and neck supple.   Lymphadenopathy:      Cervical: No cervical adenopathy.   Neurological:      General: No focal deficit present.      Mental Status: She is alert and oriented to person, place, and time. Mental status is at baseline.      Cranial Nerves: No cranial nerve deficit.      Sensory: No sensory deficit.      Motor: No weakness.      Coordination: Coordination normal.      Gait: Gait normal.      Deep Tendon Reflexes: Reflexes normal.   Psychiatric:         Mood and Affect: Mood normal.         Behavior: Behavior normal.           Assessment/Plan   Diagnoses and all orders for this visit:    1. Coronary artery disease involving native coronary artery of native heart without angina pectoris (Primary)  -     Comprehensive Metabolic Panel  -     CBC & Differential  -     CK  -     Lipid Panel With / Chol / HDL Ratio  -     TSH    2. Primary hypertension  -     Comprehensive Metabolic Panel  -     CBC & Differential  -     CK  -     Lipid Panel With / Chol / HDL Ratio  -     TSH    3. Other hyperlipidemia  -     Comprehensive Metabolic Panel  -     CBC & Differential  -     CK  -     Lipid Panel With / Chol / HDL Ratio  -     TSH    4. Gastroesophageal reflux disease without esophagitis    5. Osteoarthritis, unspecified osteoarthritis type, unspecified site    6. Chronic systolic congestive heart failure (HCC)    Continue diet and exercise.  Continue all current medication.  Check blood pressure and pulse at home.  Currently on Cardizem Entresto, Lasix, aspirin.  Continue diet and exercise.  Follow-up with specialist.  Return in 3 months time.  Problems are chronic and stable.   Breast cancer is in remission.  EHR dragon/transcription disclaimer:  Part of this note are created by electronic transcription/translation of spoken language to printed text and thus may lead to erroneous, or at times, nonsensical words or phrases inadvertently transcribed.  Although I have reviewed for such errors, some may still exist.

## 2022-01-12 DIAGNOSIS — N17.0 ACUTE KIDNEY INJURY (AKI) WITH ACUTE TUBULAR NECROSIS (ATN): Primary | ICD-10-CM

## 2022-01-12 LAB
ALBUMIN SERPL-MCNC: 4.2 G/DL (ref 3.7–4.7)
ALBUMIN/GLOB SERPL: 1.4 {RATIO} (ref 1.2–2.2)
ALP SERPL-CCNC: 90 IU/L (ref 44–121)
ALT SERPL-CCNC: 14 IU/L (ref 0–32)
AST SERPL-CCNC: 18 IU/L (ref 0–40)
BASOPHILS # BLD AUTO: 0.1 X10E3/UL (ref 0–0.2)
BASOPHILS NFR BLD AUTO: 1 %
BILIRUB SERPL-MCNC: 0.4 MG/DL (ref 0–1.2)
BUN SERPL-MCNC: 27 MG/DL (ref 8–27)
BUN/CREAT SERPL: 20 (ref 12–28)
CALCIUM SERPL-MCNC: 9 MG/DL (ref 8.7–10.3)
CHLORIDE SERPL-SCNC: 105 MMOL/L (ref 96–106)
CHOLEST SERPL-MCNC: 224 MG/DL (ref 100–199)
CHOLEST/HDLC SERPL: 3.3 RATIO (ref 0–4.4)
CK SERPL-CCNC: 96 U/L (ref 32–182)
CO2 SERPL-SCNC: 21 MMOL/L (ref 20–29)
CREAT SERPL-MCNC: 1.38 MG/DL (ref 0.57–1)
EOSINOPHIL # BLD AUTO: 0.3 X10E3/UL (ref 0–0.4)
EOSINOPHIL NFR BLD AUTO: 6 %
ERYTHROCYTE [DISTWIDTH] IN BLOOD BY AUTOMATED COUNT: 13.2 % (ref 11.7–15.4)
GLOBULIN SER CALC-MCNC: 2.9 G/DL (ref 1.5–4.5)
GLUCOSE SERPL-MCNC: 86 MG/DL (ref 65–99)
HCT VFR BLD AUTO: 38.1 % (ref 34–46.6)
HDLC SERPL-MCNC: 67 MG/DL
HGB BLD-MCNC: 12.9 G/DL (ref 11.1–15.9)
IMM GRANULOCYTES # BLD AUTO: 0 X10E3/UL (ref 0–0.1)
IMM GRANULOCYTES NFR BLD AUTO: 0 %
LDLC SERPL CALC-MCNC: 138 MG/DL (ref 0–99)
LYMPHOCYTES # BLD AUTO: 1.8 X10E3/UL (ref 0.7–3.1)
LYMPHOCYTES NFR BLD AUTO: 30 %
MCH RBC QN AUTO: 29.3 PG (ref 26.6–33)
MCHC RBC AUTO-ENTMCNC: 33.9 G/DL (ref 31.5–35.7)
MCV RBC AUTO: 87 FL (ref 79–97)
MONOCYTES # BLD AUTO: 0.5 X10E3/UL (ref 0.1–0.9)
MONOCYTES NFR BLD AUTO: 8 %
NEUTROPHILS # BLD AUTO: 3.2 X10E3/UL (ref 1.4–7)
NEUTROPHILS NFR BLD AUTO: 55 %
PLATELET # BLD AUTO: 234 X10E3/UL (ref 150–450)
POTASSIUM SERPL-SCNC: 4.3 MMOL/L (ref 3.5–5.2)
PROT SERPL-MCNC: 7.1 G/DL (ref 6–8.5)
RBC # BLD AUTO: 4.4 X10E6/UL (ref 3.77–5.28)
SODIUM SERPL-SCNC: 140 MMOL/L (ref 134–144)
TRIGL SERPL-MCNC: 106 MG/DL (ref 0–149)
TSH SERPL DL<=0.005 MIU/L-ACNC: 3.85 UIU/ML (ref 0.45–4.5)
VLDLC SERPL CALC-MCNC: 19 MG/DL (ref 5–40)
WBC # BLD AUTO: 5.9 X10E3/UL (ref 3.4–10.8)

## 2022-02-08 DIAGNOSIS — N17.0 ACUTE KIDNEY INJURY (AKI) WITH ACUTE TUBULAR NECROSIS (ATN): ICD-10-CM

## 2022-02-09 ENCOUNTER — OFFICE VISIT (OUTPATIENT)
Dept: FAMILY MEDICINE CLINIC | Facility: CLINIC | Age: 77
End: 2022-02-09

## 2022-02-09 VITALS
RESPIRATION RATE: 20 BRPM | WEIGHT: 176 LBS | BODY MASS INDEX: 30.05 KG/M2 | SYSTOLIC BLOOD PRESSURE: 120 MMHG | HEART RATE: 68 BPM | OXYGEN SATURATION: 98 % | DIASTOLIC BLOOD PRESSURE: 60 MMHG | TEMPERATURE: 97.1 F | HEIGHT: 64 IN

## 2022-02-09 DIAGNOSIS — R55 SYNCOPE, UNSPECIFIED SYNCOPE TYPE: ICD-10-CM

## 2022-02-09 DIAGNOSIS — I50.22 CHRONIC SYSTOLIC CONGESTIVE HEART FAILURE: Primary | ICD-10-CM

## 2022-02-09 DIAGNOSIS — N17.9 AKI (ACUTE KIDNEY INJURY): ICD-10-CM

## 2022-02-09 DIAGNOSIS — I42.8 OTHER CARDIOMYOPATHY: ICD-10-CM

## 2022-02-09 LAB
BUN SERPL-MCNC: 29 MG/DL (ref 8–27)
BUN/CREAT SERPL: 24 (ref 12–28)
CALCIUM SERPL-MCNC: 9.2 MG/DL (ref 8.7–10.3)
CHLORIDE SERPL-SCNC: 106 MMOL/L (ref 96–106)
CO2 SERPL-SCNC: 24 MMOL/L (ref 20–29)
CREAT SERPL-MCNC: 1.22 MG/DL (ref 0.57–1)
GLUCOSE SERPL-MCNC: 64 MG/DL (ref 65–99)
POTASSIUM SERPL-SCNC: 4.6 MMOL/L (ref 3.5–5.2)
SODIUM SERPL-SCNC: 142 MMOL/L (ref 134–144)

## 2022-02-09 PROCEDURE — 99213 OFFICE O/P EST LOW 20 MIN: CPT | Performed by: INTERNAL MEDICINE

## 2022-02-09 NOTE — PROGRESS NOTES
Subjective   Effie Donnelly is a 76 y.o. female.     Chief Complaint   Patient presents with   • Labs Only     Follow up about blood work taken yesterday   CHF, abnormal kidney test, nonischemic cardiomyopathy    History of Present Illness   Patient with multiple medical problems.  She is following up on her elevated creatinine.  Has normalized now.  Still a bit elevated but back to her baseline.  Patient reports she had 2 syncopal episodes 2 weeks ago.  EMS came to the house her blood pressure was low.  She cut down her Coreg down to half of 25 twice a day.  Blood pressure has been doing okay.  Blood sugar has been a bit high in the morning.  I went to the all her labs the blood sugar has been for several months.  The following portions of the patient's history were reviewed and updated as appropriate: allergies, current medications, past family history, past medical history, past social history, past surgical history and problem list.    Review of Systems   Constitutional: Negative for activity change, appetite change, fatigue and fever.   Eyes: Negative for blurred vision and double vision.   Respiratory: Negative.  Negative for shortness of breath.    Cardiovascular: Negative for chest pain, palpitations and leg swelling.   Gastrointestinal: Negative.    Neurological: Positive for dizziness and syncope. Negative for light-headedness and headache.   Psychiatric/Behavioral: Negative for agitation and behavioral problems.       Allergies   Allergen Reactions   • Adhesive Tape Other (See Comments)     BLISTERS UNDER TAGADERM   • Nsaids GI Bleeding     CROHN'S DISEASE       Current Outpatient Medications on File Prior to Visit   Medication Sig Dispense Refill   • aspirin 81 MG tablet Take 81 mg by mouth.     • B Complex Vitamins (VITAMIN B COMPLEX PO) Take 1 tablet by mouth Daily.     • carvedilol (COREG) 25 MG tablet Take 1 tablet by mouth 2 (Two) Times a Day. 180 tablet 2   • Cholecalciferol (VITAMIN D) 2000 units  capsule Take 2,000 Units by mouth Daily.     • CRANBERRY EXTRACT PO Take 1 capsule by mouth Daily.     • dilTIAZem (Cardizem) 30 MG tablet Take 1 tablet by mouth 2 (two) times a day. 180 tablet 3   • Entresto 24-26 MG tablet TAKE ONE TABLET BY MOUTH TWICE A  tablet 3   • furosemide (LASIX) 20 MG tablet Take 1 tablet by mouth Daily. 90 tablet 3   • Loratadine-D 24HR  MG per 24 hr tablet TAKE ONE TABLET BY MOUTH DAILY 30 tablet 3   • MEGARED OMEGA-3 KRILL OIL PO Take 1 capsule by mouth Daily.     • PROBIOTIC PRODUCT PO Take 2 capsules by mouth Daily.       No current facility-administered medications on file prior to visit.       Family History   Problem Relation Age of Onset   • Hypertension Sister    • Cancer Sister    • Heart disease Brother 65        VALVE REPLACEMENT    • Hypertension Brother    • Hypertension Sister    • Hypertension Sister    • Hypertension Brother    • Diabetes Brother    • Hypertension Brother    • Cancer Brother    • Hypertension Brother    • Hypertension Brother    • Hypertension Brother        Past Medical History:   Diagnosis Date   • CAD (coronary artery disease)    • Cancer (HCC)     skin, right breast   • CHF (congestive heart failure) ([facility])    • Coronary artery disease involving native coronary artery of native heart without angina pectoris 11/1/2018   • Heart murmur    • History of myocardial infarction 11/1/2018   • Hypertension    • Myocardial infarction (HCC)     PER PT    • NICM (nonischemic cardiomyopathy) ([facility]) 7/8/2020   • DELORES on CPAP     AHI 15/h   • Sinus tachycardia    • SOB (shortness of breath)        Past Surgical History:   Procedure Laterality Date   • BUNIONECTOMY Bilateral    • CARDIAC CATHETERIZATION     • CARDIAC CATHETERIZATION N/A 1/14/2020    Procedure: Right and Left Heart Cath;  Surgeon: Bin Chacko MD;  Location: [facility] CATH INVASIVE LOCATION;  Service: Cardiovascular   • CARDIAC CATHETERIZATION N/A 1/14/2020    Procedure: Coronary  "angiography;  Surgeon: Bin Chacko MD;  Location:  [facility]ATH INVASIVE LOC[facility];  Service: Cardiovascular   • CARDIAC CATHETERIZATION N/A 2020    Procedure: Left ventriculography;  Surgeon: Bin Chacko MD;  Location:  [facility] CATH INVASIVE LOCATION;  Service: Cardiovascular   • MASTECTOMY COMPLETE / SIMPLE  2019   • SHOULDER SURGERY Right    • SKIN CANCER EXCISION      ABOVE THE LIP.        Social History     Socioeconomic History   • Marital status:    • Number of children: 5   Tobacco Use   • Smoking status: Former Smoker     Quit date: 1973     Years since quittin.3   • Smokeless tobacco: Never Used   • Tobacco comment: CAFFEINE USE: 1 CUP DAILY   Substance and Sexual Activity   • Alcohol use: No   • Drug use: No   • Sexual activity: Defer       Patient Active Problem List   Diagnosis   • History of myocardial infarction   • Coronary artery disease involving native coronary artery of native heart without angina pectoris   • Ulcerative colitis (HCC)   • HTN (hypertension)   • GERD (gastroesophageal reflux disease)   • DJD (degenerative joint disease)   • Ductal 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   • Anemia   • Other hyperlipidemia   • NICM (nonischemic cardiomyopathy) (HCC)   • DELORES on CPAP   • Snoring   • Class 1 obesity due to excess calories without serious comorbidity with body mass index (BMI) of 31.0 to 31.9 in adult   • Chronic systolic congestive heart failure (HCC)       /60   Pulse 68   Temp 97.1 °F (36.2 °C)   Resp 20   Ht 162.6 cm (64\")   Wt 79.8 kg (176 lb)   SpO2 98%   BMI 30.21 kg/m²   Body mass index is 30.21 kg/m².    Objective   Physical Exam  Vitals and nursing note reviewed.   Constitutional:       Appearance: She is well-developed.   Eyes:      Pupils: Pupils are equal, round, and reactive to light.   Neck:      Thyroid: No thyromegaly.      Vascular: No JVD.      Trachea: No tracheal deviation.   Cardiovascular:      Rate and Rhythm: " Normal rate and regular rhythm.      Heart sounds: Murmur heard.       Pulmonary:      Effort: Pulmonary effort is normal. No respiratory distress.      Breath sounds: Normal breath sounds. No wheezing.   Abdominal:      General: Bowel sounds are normal.      Palpations: Abdomen is soft.   Musculoskeletal:      Cervical back: Normal range of motion and neck supple.   Lymphadenopathy:      Cervical: No cervical adenopathy.   Neurological:      General: No focal deficit present.      Mental Status: She is alert and oriented to person, place, and time. Mental status is at baseline.      Cranial Nerves: No cranial nerve deficit.      Sensory: No sensory deficit.      Motor: No weakness.      Coordination: Coordination normal.      Gait: Gait normal.      Deep Tendon Reflexes: Reflexes normal.           Assessment/Plan   Diagnoses and all orders for this visit:    1. Chronic systolic congestive heart failure (HCC) (Primary)  -     Ambulatory Referral to Cardiology    2. Other cardiomyopathy (HCC)  -     Ambulatory Referral to Cardiology    3. SHELLEY (acute kidney injury) ([facility])    4. Syncope, unspecified syncope type    Patient labs are back to baseline.  Continue all current medication went to the list.  She has cut down her Coreg down to 12.5 twice a.  She is on Entresto, diltiazem, Lasix.  Patient reports that her syncopal episodes has resolved, she is not dizzy anymore since she cut down her Coreg.  Blood pressure and pulse are under control will be okay at this time.  She does not have appointment with Dr. Brand.  Will make an appointment for follow-up.  Patient understands if she has further syncopal episode at that she needs to go to the hospital or come back.  Rest of her problems are chronic and stable.  Return as scheduled.  EHR dragon/transcription disclaimer:  Part of this note are created by electronic transcription/translation of spoken language to printed text and thus may lead to erroneous, or at times,  nonsensical words or phrases inadvertently transcribed.  Although I have reviewed for such errors, some may still exist.

## 2022-02-25 ENCOUNTER — OFFICE VISIT (OUTPATIENT)
Dept: CARDIOLOGY | Facility: CLINIC | Age: 77
End: 2022-02-25

## 2022-02-25 VITALS
DIASTOLIC BLOOD PRESSURE: 80 MMHG | SYSTOLIC BLOOD PRESSURE: 112 MMHG | RESPIRATION RATE: 16 BRPM | WEIGHT: 178 LBS | HEART RATE: 65 BPM | HEIGHT: 64 IN | BODY MASS INDEX: 30.39 KG/M2

## 2022-02-25 DIAGNOSIS — I50.22 CHRONIC SYSTOLIC CONGESTIVE HEART FAILURE: ICD-10-CM

## 2022-02-25 DIAGNOSIS — I25.10 CORONARY ARTERY DISEASE INVOLVING NATIVE CORONARY ARTERY OF NATIVE HEART WITHOUT ANGINA PECTORIS: Primary | Chronic | ICD-10-CM

## 2022-02-25 DIAGNOSIS — I42.8 NICM (NONISCHEMIC CARDIOMYOPATHY): Chronic | ICD-10-CM

## 2022-02-25 DIAGNOSIS — E78.49 OTHER HYPERLIPIDEMIA: ICD-10-CM

## 2022-02-25 DIAGNOSIS — I25.2 HISTORY OF MYOCARDIAL INFARCTION: Chronic | ICD-10-CM

## 2022-02-25 DIAGNOSIS — I10 PRIMARY HYPERTENSION: ICD-10-CM

## 2022-02-25 PROCEDURE — 93000 ELECTROCARDIOGRAM COMPLETE: CPT | Performed by: NURSE PRACTITIONER

## 2022-02-25 PROCEDURE — 99214 OFFICE O/P EST MOD 30 MIN: CPT | Performed by: NURSE PRACTITIONER

## 2022-02-25 RX ORDER — SACUBITRIL AND VALSARTAN 24; 26 MG/1; MG/1
1 TABLET, FILM COATED ORAL 2 TIMES DAILY
Qty: 180 TABLET | Refills: 3 | Status: SHIPPED | OUTPATIENT
Start: 2022-02-25

## 2022-02-25 RX ORDER — CARVEDILOL 12.5 MG/1
12.5 TABLET ORAL 2 TIMES DAILY
Qty: 180 TABLET | Refills: 3 | Status: SHIPPED | OUTPATIENT
Start: 2022-02-25 | End: 2023-02-14 | Stop reason: HOSPADM

## 2022-02-25 RX ORDER — FUROSEMIDE 20 MG/1
20 TABLET ORAL DAILY
Qty: 135 TABLET | Refills: 3 | Status: SHIPPED | OUTPATIENT
Start: 2022-02-25

## 2022-02-25 NOTE — PROGRESS NOTES
Date of Office Visit: 2022  Encounter Provider: PATI Eagle  Place of Service: [facility] CARDIOLOGY  Patient Name: Effie Donnelly  :1945  Primary Cardiologist: Dr. Brand    CC:  3 month follow up    Dear Dr. Hawley    HPI: Effie Donnelly is a pleasant 76 y.o. female who presents 2022 for cardiac follow up.  I reviewed her past medical records including notes, labs and testing in preparation for today's visit.  She has a history of a nonischemic cardiomyopathy.  She also has a history of underlying mild CAD and a old myocardial infarction.  She is on guideline directed medical therapy, which was titrated in January.     Dr. Gray saw him her initially 2018. She was previously seen at [facility].  She has a history of CAD and myocardial infarction.  She had an echocardiogram in November that showed normal function.  She does have a history of an abnormal EKG with LVH as well as diffuse repolarization changes.  She just had an EKG performed at [facility] there was unchanged from prior EKGs.     Cath 2020:  Findings:  1. Coronary Artery Anatomy:  Dominance: Left  Left Main: Angiographically normal  Left Anterior Descending: The LAD is somewhat small in caliber size.  Contains a 30 to 40% proximal segment stenosis followed by luminal irregularities distally.  There are no appreciable diagonals from the LAD.  Circumflex Artery: Large in caliber size.  Luminal irregularities throughout supplies a mid and inferior marginal branch vessels.  Also supplies a left-sided PDA which contains luminal regularities.  Ramus: Large ramus almost takes over for LAD and is branching and bifurcating.  Luminal irregularities throughout  Right Coronary Artery: Nondominant     2. Hemodynamics:  Right Atrium: 8/5/4 mmHg  Right Ventricle: 31/2/5 mmHg  Pulmonary Artery: 34/11/23 mmHg  Pulmonary Wedge: 22/21/20 mmHg  Left Ventricle: 184/16/23 mmHg  Aorta: 178/64/107  mmHg  Cardiac Output/Index: 3.53 L/min 1.92 L/min/m2  PA Sat: 66 %  AO Sat: 96 %     3. Left Ventriculogram:  Ejection Fraction: 20 %  Wall Motion: Global  Mitral Regurgitation: None     Conclusions:  1. Left dominant system with diminutive LAD which may explain her stress test findings but otherwise nonischemic cardiomyopathy with only a 30 to 40% proximal LAD stenoses.  2. Currently elevated left-sided filling pressures with EDP of 23 mmHg  3. Currently reduced cardiac output and index of 3.5 and 1.9     She returns today for 3-month follow-up.  She denies any chest pain chest pressure.  She states her shortness of breath remains the same is been unchanged for the past year and a half.  She continues to complain of fatigue.  She has not had any dizziness, lightheadedness, syncope or presyncopal episodes.  She denies any lower extremity edema.  She denies any palpitations, she states she cannot feel her heart racing or skipping.  She states over the last 2 weeks she has been fainting in the mornings.  She states is associated with high blood sugar.  She states she self decreased her carvedilol to 12.5 mg twice a day and all of the symptoms went away.  I have asked her to follow-up with you about her blood sugars.  Her blood pressure today is within normal limits.  She states she has a cold and feels like she is congested in her chest.  She does have some mild crackles in her lung bases.  I have reviewed her recent labs.  Her most recent labs were from 2/8/2022.  CMP revealed a total glucose of 64, sodium 142, potassium 4.6 and a creatinine 1.22.  She had labs performed on January 11 that showed a CMP with a glucose of 86, sodium 140, potassium 4.3 and her creatinine up to 1.38.  TSH 3.850.  CBC unremarkable.  Lipid profile showed total cholesterol 224, HDL 67, , triglycerides 106.  Of note in December 2021, CMP showed a glucose of 93 and a creatinine of 1.17.       Past Medical History:   Diagnosis Date   •  CAD (coronary artery disease)    • Cancer ([facility])     skin, right breast   • CHF (congestive heart failure) ([facility])    • Coronary artery disease involving native coronary artery of native heart without angina pectoris 2018   • Heart murmur    • History of myocardial infarction 2018   • Hypertension    • Myocardial infarction ([facility])     PER PT    • NICM (nonischemic cardiomyopathy) ([facility]) 2020   • DELORES on CPAP     AHI 15/h   • Sinus tachycardia    • SOB (shortness of breath)        Past Surgical History:   Procedure Laterality Date   • BUNIONECTOMY Bilateral    • CARDIAC CATHETERIZATION     • CARDIAC CATHETERIZATION N/A 2020    Procedure: Right and Left Heart Cath;  Surgeon: Bin Chacko MD;  Location:  [facility];  Service: Cardiovascular   • CARDIAC CATHETERIZATION N/A 2020    Procedure: Coronary angiography;  Surgeon: Bin Chacko MD;  Location:  [facility];  Service: Cardiovascular   • CARDIAC CATHETERIZATION N/A 2020    Procedure: Left ventriculography;  Surgeon: Bin Chacko MD;  Location:  [facility];  Service: Cardiovascular   • MASTECTOMY COMPLETE / SIMPLE  2019   • SHOULDER SURGERY Right    • SKIN CANCER EXCISION      ABOVE THE LIP.        Social History     Socioeconomic History   • Marital status:    • Number of children: 5   Tobacco Use   • Smoking status: Former Smoker     Quit date: 1973     Years since quittin.3   • Smokeless tobacco: Never Used   • Tobacco comment: CAFFEINE USE: 1 CUP DAILY   Substance and Sexual Activity   • Alcohol use: No   • Drug use: No   • Sexual activity: Defer       Family History   Problem Relation Age of Onset   • Hypertension Sister    • Cancer Sister    • Heart disease Brother 65        VALVE REPLACEMENT    • Hypertension Brother    • Hypertension Sister    • Hypertension Sister    • Hypertension Brother    • Diabetes Brother    • Hypertension Brother    •  Cancer Brother    • Hypertension Brother    • Hypertension Brother    • Hypertension Brother        The following portion of the patient's history were reviewed and updated as appropriate: past medical history, past surgical history, past social history, past family history, allergies, current medications, and problem list.    Review of Systems   Constitutional: Positive for malaise/fatigue. Negative for diaphoresis and fever.   HENT: Negative for congestion, hearing loss, hoarse voice, nosebleeds and sore throat.    Eyes: Negative for photophobia, vision loss in left eye, vision loss in right eye and visual disturbance.   Cardiovascular: Positive for near-syncope and syncope. Negative for chest pain, dyspnea on exertion, irregular heartbeat, leg swelling, orthopnea, palpitations and paroxysmal nocturnal dyspnea.   Respiratory: Positive for shortness of breath (unchanged). Negative for cough, hemoptysis, sleep disturbances due to breathing, snoring, sputum production and wheezing.    Endocrine: Negative for cold intolerance, heat intolerance, polydipsia, polyphagia and polyuria.   Hematologic/Lymphatic: Negative for bleeding problem. Does not bruise/bleed easily.   Skin: Negative for color change, dry skin, poor wound healing, rash and suspicious lesions.   Musculoskeletal: Negative for arthritis, back pain, falls, gout, joint pain, joint swelling, muscle cramps, muscle weakness and myalgias.   Gastrointestinal: Negative for bloating, abdominal pain, constipation, diarrhea, dysphagia, melena, nausea and vomiting.   Neurological: Positive for dizziness. Negative for excessive daytime sleepiness, headaches, light-headedness, loss of balance, numbness, paresthesias, seizures, vertigo and weakness.   Psychiatric/Behavioral: Negative for depression, memory loss and substance abuse. The patient is not nervous/anxious.        Allergies   Allergen Reactions   • Adhesive Tape Other (See Comments)     BLISTERS UNDER TAGADERM  "  • Nsaids GI Bleeding     CROHN'S DISEASE         Current Outpatient Medications:   •  aspirin 81 MG tablet, Take 81 mg by mouth., Disp: , Rfl:   •  B Complex Vitamins (VITAMIN B COMPLEX PO), Take 1 tablet by mouth Daily., Disp: , Rfl:   •  carvedilol (COREG) 25 MG tablet, Take 1 tablet by mouth 2 (Two) Times a Day. (Patient taking differently: Take 12.5 mg by mouth 2 (Two) Times a Day.), Disp: 180 tablet, Rfl: 2  •  Cholecalciferol (VITAMIN D) 2000 units capsule, Take 2,000 Units by mouth Daily., Disp: , Rfl:   •  CRANBERRY EXTRACT PO, Take 1 capsule by mouth Daily., Disp: , Rfl:   •  dilTIAZem (Cardizem) 30 MG tablet, Take 1 tablet by mouth 2 (two) times a day., Disp: 180 tablet, Rfl: 3  •  Entresto 24-26 MG tablet, TAKE ONE TABLET BY MOUTH TWICE A DAY, Disp: 180 tablet, Rfl: 3  •  furosemide (LASIX) 20 MG tablet, Take 1 tablet by mouth Daily., Disp: 90 tablet, Rfl: 3  •  Loratadine-D 24HR  MG per 24 hr tablet, TAKE ONE TABLET BY MOUTH DAILY, Disp: 30 tablet, Rfl: 3  •  MEGARED OMEGA-3 KRILL OIL PO, Take 1 capsule by mouth Daily., Disp: , Rfl:   •  PROBIOTIC PRODUCT PO, Take 2 capsules by mouth Daily., Disp: , Rfl:         Objective:     Vitals:    02/25/22 1136   BP: 112/80   Pulse: 65   Resp: 16   Weight: 80.7 kg (178 lb)   Height: 162.6 cm (64\")     Body mass index is 30.55 kg/m².      Vitals reviewed.   Constitutional:       General: Not in acute distress.     Appearance: Healthy appearance. Well-developed and not in distress.   Eyes:      General:         Right eye: No discharge.         Left eye: No discharge.      Conjunctiva/sclera: Conjunctivae normal.   HENT:      Head: Normocephalic and atraumatic.      Right Ear: External ear normal.      Left Ear: External ear normal.      Nose: Nose normal.   Neck:      Thyroid: No thyromegaly.      Vascular: No JVD.      Trachea: No tracheal deviation.      Lymphadenopathy: No cervical adenopathy.   Pulmonary:      Effort: Pulmonary effort is normal. No " respiratory distress.      Breath sounds: Normal breath sounds. No wheezing. No rales.   Chest:      Chest wall: Not tender to palpatation.   Cardiovascular:      Normal rate. Regular rhythm.      No gallop.   Pulses:     Intact distal pulses.   Edema:     Peripheral edema absent.   Abdominal:      General: There is no distension.      Palpations: Abdomen is soft.      Tenderness: There is no abdominal tenderness.   Musculoskeletal: Normal range of motion.         General: No tenderness or deformity.      Cervical back: Normal range of motion and neck supple. Skin:     General: Skin is warm and dry.      Findings: No erythema or rash.   Neurological:      Mental Status: Alert and oriented to person, place, and time.      Coordination: Coordination normal.   Psychiatric:         Attention and Perception: Attention normal.         Mood and Affect: Mood normal.         Speech: Speech normal.         Behavior: Behavior normal. Behavior is cooperative.         Thought Content: Thought content normal.         Cognition and Memory: Cognition normal.         Judgment: Judgment normal.               ECG 12 Lead    Date/Time: 2/25/2022 11:42 AM  Performed by: Nan Brian APRN  Authorized by: Nan Brian APRN   Comparison: compared with previous ECG from 12/9/2021  Similar to previous ECG  Rhythm: sinus rhythm  Ectopy: infrequent PVCs  Rate: normal  Conduction: non-specific intraventricular conduction delay  ST Segments: ST segments normal  T Waves: T waves normal  QRS axis: left  Other findings: left ventricular hypertrophy    Clinical impression: abnormal EKG              Assessment:       Diagnosis Plan   1. Coronary artery disease involving native coronary artery of native heart without angina pectoris     2. History of myocardial infarction     3. Chronic systolic congestive heart failure (HCC)     4. NICM (nonischemic cardiomyopathy) (HCC)     5. Primary hypertension     6. Other hyperlipidemia            Plan:         1. Coronary Artery Disease  Assessment  • Denies angina  Plan  • Lifestyle modifications discussed include adhering to a heart healthy diet, avoidance of tobacco products, maintenance of a healthy weight, medication compliance, regular exercise and regular monitoring of cholesterol and blood pressure  Subjective - Objective  • There is a history of past MI  • Current antiplatelet therapy includes aspirin 81 mg     2.   NICM with chronic systolic CHF-continue guideline directed medical therapy.  She is still SOA that she states is unchanged.  She is self reporting had glucose associated with taking carvedilol and having syncopal episodes.  She cut the carvedilol in half and is now been taking 12.5 mg twice daily for the last 2 to 3 weeks has not felt or had any more syncopal episodes and states her blood sugar has returned to normal.  We will keep her on the 12.5 mg twice daily.  I do not know if the symptoms of fainting were from orthostatic blood pressures or if she just got dizzy and really passed out.  It was a bit hard to follow her descriptions.  She did not report any low blood sugars only high.  She states he got as high as 358 at one point.    3.  CHF-she does have some very mild crackles in her bases.  She feels like she has a cold right now.  Going to increase her Lasix for 2 weeks.  She will take 20 mg in the morning and 10 mg in the early afternoon.  I will have her get a BMP in 7 to 10 days.    5.  Hypertension-normal.  I do not know what it was when she was having her presyncopal or syncopal episodes.    6.  Hyperlipidemia-continue on lipid-lowering therapy.  Currently on Krill oil only.  Labs as above.    Increase Lasix as above.  Check echo.  Check BMP in 7 to 10 days  RTO in 3 months with JF      As always, it has been a pleasure to participate in your patient's care. Thank you.       Sincerely,       PATI Eagle      Current Outpatient Medications:   •  aspirin 81 MG tablet, Take 81 mg by  mouth., Disp: , Rfl:   •  B Complex Vitamins (VITAMIN B COMPLEX PO), Take 1 tablet by mouth Daily., Disp: , Rfl:   •  carvedilol (COREG) 12.5 MG tablet, Take 1 tablet by mouth 2 (Two) Times a Day., Disp: 180 tablet, Rfl: 3  •  Cholecalciferol (VITAMIN D) 2000 units capsule, Take 2,000 Units by mouth Daily., Disp: , Rfl:   •  CRANBERRY EXTRACT PO, Take 1 capsule by mouth Daily., Disp: , Rfl:   •  dilTIAZem (Cardizem) 30 MG tablet, Take 1 tablet by mouth 2 (Two) Times a Day., Disp: 180 tablet, Rfl: 3  •  furosemide (LASIX) 20 MG tablet, Take 1 tablet by mouth Daily. Take 1 tab in the morning and 1/2 tab at 2 pm, Disp: 135 tablet, Rfl: 3  •  Loratadine-D 24HR  MG per 24 hr tablet, TAKE ONE TABLET BY MOUTH DAILY, Disp: 30 tablet, Rfl: 3  •  MEGARED OMEGA-3 KRILL OIL PO, Take 1 capsule by mouth Daily., Disp: , Rfl:   •  PROBIOTIC PRODUCT PO, Take 2 capsules by mouth Daily., Disp: , Rfl:   •  sacubitril-valsartan (Entresto) 24-26 MG tablet, Take 1 tablet by mouth 2 (Two) Times a Day., Disp: 180 tablet, Rfl: 3      Dictated utilizing Dragon dictation

## 2022-02-28 ENCOUNTER — TELEPHONE (OUTPATIENT)
Dept: FAMILY MEDICINE CLINIC | Facility: CLINIC | Age: 77
End: 2022-02-28

## 2022-02-28 NOTE — TELEPHONE ENCOUNTER
Caller: Effie Donnelly    Relationship: Self    Best call back number: [phone]    What medication are you requesting: SOMETHING FOR HEAD COLD (NO COUGH SYRUP)    What are your current symptoms: CONGESTION, SORE THROAT, COUGHING    How long have you been experiencing symptoms: FRI    Have you had these symptoms before:    [x] Yes  [] No    Have you been treated for these symptoms before:   [x] Yes  [] No    If a prescription is needed, what is your preferred pharmacy and phone number: [facility] [address] [facility] - [phone]  - [phone]      Additional notes:  PATIENT STATED THAT HER HEAD COLD IS GETTING WORSE AND WOULD LIKE MEDICATION CALLED IN.    PATIENT IS FULLY VAXED

## 2022-03-01 ENCOUNTER — OFFICE VISIT (OUTPATIENT)
Dept: FAMILY MEDICINE CLINIC | Facility: CLINIC | Age: 77
End: 2022-03-01

## 2022-03-01 VITALS
TEMPERATURE: 96.8 F | WEIGHT: 174 LBS | DIASTOLIC BLOOD PRESSURE: 82 MMHG | SYSTOLIC BLOOD PRESSURE: 98 MMHG | BODY MASS INDEX: 29.71 KG/M2 | RESPIRATION RATE: 16 BRPM | HEART RATE: 56 BPM | HEIGHT: 64 IN

## 2022-03-01 DIAGNOSIS — J06.9 VIRAL UPPER RESPIRATORY TRACT INFECTION: ICD-10-CM

## 2022-03-01 DIAGNOSIS — I10 PRIMARY HYPERTENSION: Primary | ICD-10-CM

## 2022-03-01 PROCEDURE — 99213 OFFICE O/P EST LOW 20 MIN: CPT | Performed by: NURSE PRACTITIONER

## 2022-03-01 NOTE — PROGRESS NOTES
Subjective   Effie Donnelly is a 76 y.o. female.     History of Present Illness   Patient is new to this provider.  Patient presents today for sick visit.    Patient presents with acute complaint of sinus pressure and congestion x 5 days.  Patient denies fever, mild cough, + shortness of breath (this is her baseline with CHF), denies ear pain, sore throat, facial or dental pain, reports slight frontal headache.  Patient denies sick contacts. She lives with son, daughter in law and granddaughter age 7.  Patient has tried Claritin D, tylenol and Dimetap and  Breathright strips.  Patient is fully vaccinated for COVID-19.  She denies any Covid contacts. She uses humidifier in bedroom.  Patient is asking for medication for cold and sinus symptoms today.    Patient presents with chronic hypertension/cardiomyopathy/coronary artery disease and h/o MI.  Patient is managed on carvedilol 12.5 twice daily (this was recently decreased from 25 due to syncopal episodes), diltiazem 30 mg twice daily, Lasix 20 mg daily (this was increased to 30 mg total for 2 weeks per cardio) and Entresto 24-26 2 times a day.  She denies chest pain, palpitations, headache or vision change.  Patient denies syncopal episodes or lightheadedness.  Patient denies weight change or worsening edema.  Per chart review, patient saw cardiology NP 2-25-22.  She is to return in 3 months for follow-up.    The following portions of the patient's history were reviewed and updated as appropriate: allergies, current medications, past family history, past medical history, past social history, past surgical history and problem list.    Review of Systems   Constitutional: Negative for activity change, chills, fatigue, fever, unexpected weight gain and unexpected weight loss.   HENT: Positive for congestion, postnasal drip, rhinorrhea and sinus pressure. Negative for dental problem, ear pain, facial swelling, sneezing, sore throat, swollen glands, trouble swallowing and  voice change.    Eyes: Negative for blurred vision, double vision and visual disturbance.   Respiratory: Positive for cough and shortness of breath (Chronic). Negative for chest tightness and wheezing.    Cardiovascular: Negative for chest pain, palpitations and leg swelling.   Gastrointestinal: Negative for abdominal pain, constipation, diarrhea and GERD.   Endocrine: Negative for cold intolerance and heat intolerance.   Genitourinary: Negative for dysuria and frequency.   Musculoskeletal: Negative for arthralgias.   Neurological: Negative for dizziness and syncope.   Hematological: Does not bruise/bleed easily.       Objective   Physical Exam  Constitutional:       Appearance: Normal appearance. She is normal weight.   HENT:      Head: Normocephalic.      Right Ear: Tympanic membrane and ear canal normal.      Left Ear: Tympanic membrane and ear canal normal.      Nose: Rhinorrhea (Clear) present.      Left Turbinates: Swollen.      Right Sinus: No maxillary sinus tenderness or frontal sinus tenderness.      Left Sinus: No maxillary sinus tenderness or frontal sinus tenderness.      Mouth/Throat:      Mouth: Mucous membranes are moist.      Pharynx: No oropharyngeal exudate or posterior oropharyngeal erythema.   Eyes:      Pupils: Pupils are equal, round, and reactive to light.   Cardiovascular:      Rate and Rhythm: Normal rate and regular rhythm.      Pulses: Normal pulses.      Heart sounds: Normal heart sounds.   Pulmonary:      Effort: Pulmonary effort is normal. No respiratory distress.      Breath sounds: Normal breath sounds. No wheezing or rhonchi.   Abdominal:      General: Abdomen is flat. Bowel sounds are normal.      Palpations: Abdomen is soft.   Musculoskeletal:         General: Normal range of motion.      Cervical back: Normal range of motion. No tenderness.   Lymphadenopathy:      Cervical: No cervical adenopathy.   Skin:     General: Skin is warm.   Neurological:      General: No focal deficit  present.      Mental Status: She is alert.   Psychiatric:         Mood and Affect: Mood normal.         Vitals:    03/01/22 1128   BP: 98/82   Pulse: 56   Resp: 16   Temp: 96.8 °F (36 °C)     Body mass index is 29.87 kg/m².    Procedures    Assessment/Plan   Problems Addressed this Visit        Cardiac and Vasculature    HTN (hypertension) - Primary      Other Visit Diagnoses     Viral upper respiratory tract infection          Diagnoses       Codes Comments    Primary hypertension    -  Primary ICD-10-CM: I10  ICD-9-CM: 401.9     Viral upper respiratory tract infection     ICD-10-CM: J06.9  ICD-9-CM: 465.9         Hypertension-continue with med changes per cardio, encourage walking, hydrate with water, low-sodium diet.  Patient will return to cardio for lab recheck March 7.    URI-supportive treatment, no symptoms of bacterial infection, continue with over-the-counter allergy pill, Dimetapp, over-the-counter nasal spray, humidifier.  May take over-the-counter Tylenol as directed for headache.  May add nasal saline.  Please call office or send MyChart if worsening symptoms, fever, purulent drainage, facial pain or headaches.    Return in 6 weeks for follow-up with Dr. Hawley  Send MyChart with concerns or questions  Education on URI attached       Return in about 6 weeks (around 4/12/2022) for Recheck.

## 2022-03-02 ENCOUNTER — HOSPITAL ENCOUNTER (OUTPATIENT)
Dept: CARDIOLOGY | Facility: HOSPITAL | Age: 77
Discharge: HOME | End: 2022-03-02
Admitting: NURSE PRACTITIONER

## 2022-03-02 VITALS
HEIGHT: 64 IN | BODY MASS INDEX: 29.71 KG/M2 | WEIGHT: 174 LBS | DIASTOLIC BLOOD PRESSURE: 88 MMHG | SYSTOLIC BLOOD PRESSURE: 100 MMHG

## 2022-03-02 DIAGNOSIS — I50.22 CHRONIC SYSTOLIC CONGESTIVE HEART FAILURE: ICD-10-CM

## 2022-03-02 DIAGNOSIS — I42.8 NICM (NONISCHEMIC CARDIOMYOPATHY): ICD-10-CM

## 2022-03-02 LAB
AORTIC DIMENSIONLESS INDEX: 0.6 (DI)
BH CV ECHO MEAS - ACS: 1.71 CM
BH CV ECHO MEAS - AO MAX PG: 12 MMHG
BH CV ECHO MEAS - AO MEAN PG: 5.8 MMHG
BH CV ECHO MEAS - AO ROOT DIAM: 3.3 CM
BH CV ECHO MEAS - AO V2 MAX: 169 CM/SEC
BH CV ECHO MEAS - AO V2 VTI: 39.1 CM
BH CV ECHO MEAS - AVA(I,D): 2.15 CM2
BH CV ECHO MEAS - EDV(CUBED): 227.8 ML
BH CV ECHO MEAS - EDV(MOD-SP2): 123 ML
BH CV ECHO MEAS - EDV(MOD-SP4): 113 ML
BH CV ECHO MEAS - EF(MOD-BP): 66 %
BH CV ECHO MEAS - EF(MOD-SP2): 67.2 %
BH CV ECHO MEAS - EF(MOD-SP4): 67.3 %
BH CV ECHO MEAS - ESV(CUBED): 60.7 ML
BH CV ECHO MEAS - ESV(MOD-SP2): 40.3 ML
BH CV ECHO MEAS - ESV(MOD-SP4): 37 ML
BH CV ECHO MEAS - FS: 35.7 %
BH CV ECHO MEAS - IVS/LVPW: 1.14 CM
BH CV ECHO MEAS - IVSD: 1.14 CM
BH CV ECHO MEAS - LAT PEAK E' VEL: 7.7 CM/SEC
BH CV ECHO MEAS - LV DIASTOLIC VOL/BSA (35-75): 61.3 CM2
BH CV ECHO MEAS - LV MASS(C)D: 277.6 GRAMS
BH CV ECHO MEAS - LV MAX PG: 4.7 MMHG
BH CV ECHO MEAS - LV MEAN PG: 2.8 MMHG
BH CV ECHO MEAS - LV SYSTOLIC VOL/BSA (12-30): 20.1 CM2
BH CV ECHO MEAS - LV V1 MAX: 109 CM/SEC
BH CV ECHO MEAS - LV V1 VTI: 24.7 CM
BH CV ECHO MEAS - LVIDD: 6.1 CM
BH CV ECHO MEAS - LVIDS: 3.9 CM
BH CV ECHO MEAS - LVOT AREA: 3.4 CM2
BH CV ECHO MEAS - LVOT DIAM: 2.08 CM
BH CV ECHO MEAS - LVPWD: 1 CM
BH CV ECHO MEAS - MED PEAK E' VEL: 4.5 CM/SEC
BH CV ECHO MEAS - MR MAX PG: 73.1 MMHG
BH CV ECHO MEAS - MR MAX VEL: 427.6 CM/SEC
BH CV ECHO MEAS - MV A MAX VEL: 116.4 CM/SEC
BH CV ECHO MEAS - MV DEC SLOPE: 257.5 CM/SEC2
BH CV ECHO MEAS - MV DEC TIME: 0.28 MSEC
BH CV ECHO MEAS - MV E MAX VEL: 79.2 CM/SEC
BH CV ECHO MEAS - MV E/A: 0.68
BH CV ECHO MEAS - MV MEAN PG: 1.72 MMHG
BH CV ECHO MEAS - MV V2 VTI: 35.7 CM
BH CV ECHO MEAS - MVA(VTI): 2.35 CM2
BH CV ECHO MEAS - PA V2 MAX: 108.6 CM/SEC
BH CV ECHO MEAS - RAP SYSTOLE: 3 MMHG
BH CV ECHO MEAS - RV V1 VTI: 18 CM
BH CV ECHO MEAS - RVOT DIAM: 2.37 CM
BH CV ECHO MEAS - RVSP: 25 MMHG
BH CV ECHO MEAS - SI(MOD-SP2): 44.9 ML/M2
BH CV ECHO MEAS - SI(MOD-SP4): 41.2 ML/M2
BH CV ECHO MEAS - SV(LVOT): 84 ML
BH CV ECHO MEAS - SV(MOD-SP2): 82.7 ML
BH CV ECHO MEAS - SV(MOD-SP4): 76 ML
BH CV ECHO MEAS - SV(RVOT): 79.4 ML
BH CV ECHO MEAS - TAPSE (>1.6): 1.99 CM
BH CV ECHO MEAS - TR MAX PG: 22.6 MMHG
BH CV ECHO MEAS - TR MAX VEL: 237.5 CM/SEC
BH CV ECHO MEASUREMENTS AVERAGE E/E' RATIO: 12.98
LEFT ATRIUM VOLUME INDEX: 50.8 ML/M2
MAXIMAL PREDICTED HEART RATE: 145 BPM
SINUS: 3.2 CM
STRESS TARGET HR: 123 BPM

## 2022-03-02 PROCEDURE — 93306 TTE W/DOPPLER COMPLETE: CPT | Performed by: INTERNAL MEDICINE

## 2022-03-02 PROCEDURE — 25010000002 PERFLUTREN (DEFINITY) 8.476 MG IN SODIUM CHLORIDE (PF) 0.9 % 10 ML INJECTION: Performed by: NURSE PRACTITIONER

## 2022-03-02 PROCEDURE — 93306 TTE W/DOPPLER COMPLETE: CPT

## 2022-03-02 RX ADMIN — SODIUM CHLORIDE 2 ML: 9 INJECTION INTRAMUSCULAR; INTRAVENOUS; SUBCUTANEOUS at 16:22

## 2022-03-03 ENCOUNTER — OFFICE VISIT (OUTPATIENT)
Dept: GASTROENTEROLOGY | Facility: CLINIC | Age: 77
End: 2022-03-03

## 2022-03-03 VITALS
BODY MASS INDEX: 30.59 KG/M2 | HEIGHT: 64 IN | SYSTOLIC BLOOD PRESSURE: 128 MMHG | WEIGHT: 179.2 LBS | DIASTOLIC BLOOD PRESSURE: 70 MMHG

## 2022-03-03 DIAGNOSIS — R13.19 ESOPHAGEAL DYSPHAGIA: Primary | ICD-10-CM

## 2022-03-03 DIAGNOSIS — K51.90 ULCERATIVE COLITIS WITHOUT COMPLICATIONS, UNSPECIFIED LOCATION: ICD-10-CM

## 2022-03-03 DIAGNOSIS — K21.9 GASTROESOPHAGEAL REFLUX DISEASE WITHOUT ESOPHAGITIS: ICD-10-CM

## 2022-03-03 PROCEDURE — 99204 OFFICE O/P NEW MOD 45 MIN: CPT | Performed by: INTERNAL MEDICINE

## 2022-03-03 RX ORDER — OMEPRAZOLE 40 MG/1
40 CAPSULE, DELAYED RELEASE ORAL DAILY
Qty: 90 CAPSULE | Refills: 3 | Status: SHIPPED | OUTPATIENT
Start: 2022-03-03

## 2022-03-03 NOTE — PROGRESS NOTES
PATIENT INFORMATION  Effie Donnelly       - 1945    CHIEF COMPLAINT  Chief Complaint   Patient presents with   • Difficulty Swallowing       HISTORY OF PRESENT ILLNESS  Here for dysphagia and feels its been 17 years since seen ad has been dilated in the past.    2 plus months of solid food dysphagia and worse with breada and plain spaghetti and no issues with liquids.    Only using PRN OTC PPI    Only fair historian and recall both esophageal polyps and colon polyps that were positive for Crohns?!?    No records of any of her procedures nopr her Path    Recall being on Colazol for some time but felt better off of it    Bowels are normal at 1-2 a day      REVIEWED PERTINENT RESULTS/ LABS  No results found for: CASEREPORT, FINALDX  Lab Results   Component Value Date    HGB 12.9 2022    MCV 87 2022     2022    ALT 14 2022    AST 18 2022    INR 1.10 2019    TRIG 106 2022      Adult Transthoracic Echo Complete W/ Cont if Necessary Per Protocol    Result Date: 3/2/2022  Narrative: · The left ventricular cavity is moderately dilated. · Left ventricular ejection fraction appears to be 56 - 60%. Left ventricular systolic function is normal. · There is moderate concentric left ventricular hypertrophy. There is severe apical hypertrophy of the apical segments. 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 · Normal right ventricular cavity size and systolic function noted. · The left atrial cavity is severely dilated. · Mild tricuspid valve regurgitation is present. · Calculated right ventricular systolic pressure from tricuspid regurgitation is 25 mmHg. · There is mild pulmonic valve regurgitation present. · There is no evidence of pericardial effusion        REVIEW OF SYSTEMS  Review of Systems   Constitutional: Negative for activity change, chills, fever and unexpected  weight change.   HENT: Positive for trouble swallowing. Negative for congestion.    Eyes: Negative for visual disturbance.   Respiratory: Positive for choking. Negative for shortness of breath.    Cardiovascular: Negative for chest pain and palpitations.   Gastrointestinal: Positive for constipation, nausea and vomiting. Negative for abdominal pain and blood in stool.   Endocrine: Negative for cold intolerance and heat intolerance.   Genitourinary: Negative for hematuria.   Musculoskeletal: Negative for gait problem.   Skin: Negative for color change.   Allergic/Immunologic: Negative for immunocompromised state.   Neurological: Negative for weakness and light-headedness.   Hematological: Negative for adenopathy.   Psychiatric/Behavioral: Negative for sleep disturbance. The patient is not nervous/anxious.          ACTIVE PROBLEMS  Patient Active Problem List    Diagnosis    • DELORES on CPAP [G47.33, Z99.89]    • Snoring [R06.83]    • Class 1 obesity due to excess calories without serious comorbidity with body mass index (BMI) of 31.0 to 31.9 in adult [E66.09, Z68.31]    • Chronic systolic congestive heart failure (HCC) [I50.22]    • NICM (nonischemic cardiomyopathy) (HCC) [I42.8]    • Other hyperlipidemia [E78.49]    • Ulcerative colitis (HCC) [K51.90]    • HTN (hypertension) [I10]    • GERD (gastroesophageal reflux disease) [K21.9]    • DJD (degenerative joint disease) [M19.90]    • Anemia [D64.9]    • Ductal 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 [D05.11]    • History of myocardial infarction [I25.2]    • Coronary artery disease involving native coronary artery of native heart without angina pectoris [I25.10]          PAST MEDICAL HISTORY  Past Medical History:   Diagnosis Date   • CAD (coronary artery disease)    • Cancer (HCC)     skin, right breast   • CHF (congestive heart failure) (HCC)    • Coronary artery disease involving native coronary artery of native heart without angina pectoris 11/1/2018   • Heart  murmur    • History of myocardial infarction 2018   • Hypertension    • Myocardial infarction (HCC)     PER PT    • NICM (nonischemic cardiomyopathy) (HCC) 2020   • DELORES on CPAP     AHI 15/h   • Sinus tachycardia    • SOB (shortness of breath)          SURGICAL HISTORY  Past Surgical History:   Procedure Laterality Date   • BUNIONECTOMY Bilateral    • CARDIAC CATHETERIZATION     • CARDIAC CATHETERIZATION N/A 2020    Procedure: Right and Left Heart Cath;  Surgeon: Bin Chacko MD;  Location:  [facility];  Service: Cardiovascular   • CARDIAC CATHETERIZATION N/A 2020    Procedure: Coronary angiography;  Surgeon: Bin Chacko MD;  Location:  [facility];  Service: Cardiovascular   • CARDIAC CATHETERIZATION N/A 2020    Procedure: Left ventriculography;  Surgeon: Bin Chacko MD;  Location:  [facility];  Service: Cardiovascular   • MASTECTOMY COMPLETE / SIMPLE  2019   • SHOULDER SURGERY Right    • SKIN CANCER EXCISION      ABOVE THE LIP.          FAMILY HISTORY  Family History   Problem Relation Age of Onset   • Hypertension Sister    • Cancer Sister    • Heart disease Brother 65        VALVE REPLACEMENT    • Hypertension Brother    • Hypertension Sister    • Hypertension Sister    • Hypertension Brother    • Diabetes Brother    • Hypertension Brother    • Cancer Brother    • Hypertension Brother    • Hypertension Brother    • Hypertension Brother          SOCIAL HISTORY  Social History     Occupational History   • Occupation: RETIRED    Tobacco Use   • Smoking status: Former Smoker     Quit date: 1973     Years since quittin.3   • Smokeless tobacco: Never Used   • Tobacco comment: CAFFEINE USE: 1 CUP DAILY   Vaping Use   • Vaping Use: Never used   Substance and Sexual Activity   • Alcohol use: No   • Drug use: No   • Sexual activity: Defer         CURRENT MEDICATIONS    Current Outpatient  "Medications:   •  aspirin 81 MG tablet, Take 81 mg by mouth., Disp: , Rfl:   •  B Complex Vitamins (VITAMIN B COMPLEX PO), Take 1 tablet by mouth Daily., Disp: , Rfl:   •  carvedilol (COREG) 12.5 MG tablet, Take 1 tablet by mouth 2 (Two) Times a Day., Disp: 180 tablet, Rfl: 3  •  Cholecalciferol (VITAMIN D) 2000 units capsule, Take 2,000 Units by mouth Daily., Disp: , Rfl:   •  CRANBERRY EXTRACT PO, Take 1 capsule by mouth Daily., Disp: , Rfl:   •  dilTIAZem (Cardizem) 30 MG tablet, Take 1 tablet by mouth 2 (Two) Times a Day., Disp: 180 tablet, Rfl: 3  •  furosemide (LASIX) 20 MG tablet, Take 1 tablet by mouth Daily. Take 1 tab in the morning and 1/2 tab at 2 pm, Disp: 135 tablet, Rfl: 3  •  Loratadine-D 24HR  MG per 24 hr tablet, TAKE ONE TABLET BY MOUTH DAILY, Disp: 30 tablet, Rfl: 3  •  MEGARED OMEGA-3 KRILL OIL PO, Take 1 capsule by mouth Daily., Disp: , Rfl:   •  PROBIOTIC PRODUCT PO, Take 2 capsules by mouth Daily., Disp: , Rfl:   •  sacubitril-valsartan (Entresto) 24-26 MG tablet, Take 1 tablet by mouth 2 (Two) Times a Day., Disp: 180 tablet, Rfl: 3  •  omeprazole (priLOSEC) 40 MG capsule, Take 1 capsule by mouth Daily., Disp: 90 capsule, Rfl: 3  No current facility-administered medications for this visit.    ALLERGIES  Nsaids and Adhesive tape    VITALS  Vitals:    03/03/22 0934   BP: 128/70   BP Location: Left arm   Patient Position: Sitting   Cuff Size: Large Adult   Weight: 81.3 kg (179 lb 3.2 oz)   Height: 162.6 cm (64\")       PHYSICAL EXAM  Debilities/Disabilities Identified: None  Emotional Behavior: Appropriate  Wt Readings from Last 3 Encounters:   03/03/22 81.3 kg (179 lb 3.2 oz)   03/02/22 78.9 kg (174 lb)   03/01/22 78.9 kg (174 lb)     Ht Readings from Last 1 Encounters:   03/03/22 162.6 cm (64\")     Body mass index is 30.76 kg/m².  Physical Exam  Constitutional:       Appearance: She is well-developed. She is not diaphoretic.   Eyes:      General: No scleral icterus.     " Conjunctiva/sclera: Conjunctivae normal.      Pupils: Pupils are equal, round, and reactive to light.   Neck:      Thyroid: No thyromegaly.   Cardiovascular:      Rate and Rhythm: Normal rate and regular rhythm.      Heart sounds: Normal heart sounds. No murmur heard.  No gallop.    Pulmonary:      Effort: Pulmonary effort is normal.      Breath sounds: Normal breath sounds. No wheezing or rales.   Abdominal:      General: Bowel sounds are normal. There is no distension or abdominal bruit.      Palpations: Abdomen is soft. There is no shifting dullness, fluid wave or mass.      Tenderness: There is no abdominal tenderness. There is no guarding. Negative signs include Del Angel's sign.      Hernia: There is no hernia in the ventral area.   Musculoskeletal:         General: Normal range of motion.      Cervical back: Normal range of motion and neck supple.   Lymphadenopathy:      Cervical: No cervical adenopathy.   Skin:     General: Skin is warm and dry.      Findings: No erythema or rash.   Neurological:      Mental Status: She is alert and oriented to person, place, and time.         CLINICAL DATA REVIEWED   reviewed previous lab results and integrated with today's visit, reviewed notes from other physicians and/or last GI encounter, reviewed previous endoscopy results and available photos, reviewed surgical pathology results from previous biopsies    ASSESSMENT  Diagnoses and all orders for this visit:    Esophageal dysphagia  -     Case Request; Standing  -     COVID PRE-OP / PRE-PROCEDURE SCREENING ORDER (NO ISOLATION) - Swab, Nasopharynx; Future  -     Case Request    Gastroesophageal reflux disease without esophagitis    Ulcerative colitis without complications, unspecified location (HCC)  -     Case Request; Standing  -     COVID PRE-OP / PRE-PROCEDURE SCREENING ORDER (NO ISOLATION) - Swab, Nasopharynx; Future  -     Case Request    Other orders  -     omeprazole (priLOSEC) 40 MG capsule; Take 1 capsule by mouth  Daily.  -     Follow Anesthesia Guidelines / Protocol; Future          PLAN  No follow-ups on file.    I have discussed the above plan with the patient.  They verbalize understanding and are in agreement with the plan.  They have been advised to contact the office for any questions, concerns, or changes related to their health.

## 2022-03-21 ENCOUNTER — LAB (OUTPATIENT)
Dept: LAB | Facility: HOSPITAL | Age: 77
End: 2022-03-21

## 2022-03-21 DIAGNOSIS — K51.90 ULCERATIVE COLITIS WITHOUT COMPLICATIONS, UNSPECIFIED LOCATION: ICD-10-CM

## 2022-03-21 DIAGNOSIS — R13.19 ESOPHAGEAL DYSPHAGIA: ICD-10-CM

## 2022-03-21 LAB — SARS-COV-2 RNA PNL SPEC NAA+PROBE: NOT DETECTED

## 2022-03-21 PROCEDURE — 87635 SARS-COV-2 COVID-19 AMP PRB: CPT | Performed by: INTERNAL MEDICINE

## 2022-03-21 PROCEDURE — C9803 HOPD COVID-19 SPEC COLLECT: HCPCS

## 2022-03-22 ENCOUNTER — ANESTHESIA EVENT (OUTPATIENT)
Dept: PERIOP | Facility: HOSPITAL | Age: 77
End: 2022-03-22

## 2022-03-23 ENCOUNTER — HOSPITAL ENCOUNTER (OUTPATIENT)
Facility: HOSPITAL | Age: 77
Setting detail: HOSPITAL OUTPATIENT SURGERY
Discharge: HOME | End: 2022-03-23
Attending: INTERNAL MEDICINE | Admitting: INTERNAL MEDICINE

## 2022-03-23 ENCOUNTER — ANESTHESIA (OUTPATIENT)
Dept: PERIOP | Facility: HOSPITAL | Age: 77
End: 2022-03-23

## 2022-03-23 VITALS
DIASTOLIC BLOOD PRESSURE: 76 MMHG | BODY MASS INDEX: 30.01 KG/M2 | WEIGHT: 175.8 LBS | HEIGHT: 64 IN | TEMPERATURE: 97.6 F | RESPIRATION RATE: 18 BRPM | OXYGEN SATURATION: 97 % | HEART RATE: 72 BPM | SYSTOLIC BLOOD PRESSURE: 168 MMHG

## 2022-03-23 DIAGNOSIS — K51.90 ULCERATIVE COLITIS WITHOUT COMPLICATIONS, UNSPECIFIED LOCATION: ICD-10-CM

## 2022-03-23 DIAGNOSIS — R13.19 ESOPHAGEAL DYSPHAGIA: ICD-10-CM

## 2022-03-23 LAB — QT INTERVAL: 435 MS

## 2022-03-23 PROCEDURE — 93010 ELECTROCARDIOGRAM REPORT: CPT | Performed by: INTERNAL MEDICINE

## 2022-03-23 PROCEDURE — 88305 TISSUE EXAM BY PATHOLOGIST: CPT | Performed by: INTERNAL MEDICINE

## 2022-03-23 PROCEDURE — 45380 COLONOSCOPY AND BIOPSY: CPT | Performed by: INTERNAL MEDICINE

## 2022-03-23 PROCEDURE — 43239 EGD BIOPSY SINGLE/MULTIPLE: CPT | Performed by: INTERNAL MEDICINE

## 2022-03-23 PROCEDURE — 88312 SPECIAL STAINS GROUP 1: CPT | Performed by: INTERNAL MEDICINE

## 2022-03-23 PROCEDURE — 93005 ELECTROCARDIOGRAM TRACING: CPT | Performed by: REGISTERED NURSE

## 2022-03-23 RX ORDER — SODIUM CHLORIDE 0.9 % (FLUSH) 0.9 %
10 SYRINGE (ML) INJECTION EVERY 12 HOURS SCHEDULED
Status: DISCONTINUED | OUTPATIENT
Start: 2022-03-23 | End: 2022-03-23 | Stop reason: HOSPADM

## 2022-03-23 RX ORDER — FLUCONAZOLE 100 MG/1
100 TABLET ORAL DAILY
Qty: 21 TABLET | Refills: 3 | Status: SHIPPED | OUTPATIENT
Start: 2022-03-23 | End: 2022-04-28

## 2022-03-23 RX ORDER — GLYCOPYRROLATE 0.2 MG/ML
INJECTION INTRAMUSCULAR; INTRAVENOUS AS NEEDED
Status: DISCONTINUED | OUTPATIENT
Start: 2022-03-23 | End: 2022-03-23 | Stop reason: SURG

## 2022-03-23 RX ORDER — LIDOCAINE HYDROCHLORIDE 10 MG/ML
0.5 INJECTION, SOLUTION EPIDURAL; INFILTRATION; INTRACAUDAL; PERINEURAL ONCE AS NEEDED
Status: COMPLETED | OUTPATIENT
Start: 2022-03-23 | End: 2022-03-23

## 2022-03-23 RX ORDER — SODIUM CHLORIDE 9 MG/ML
40 INJECTION, SOLUTION INTRAVENOUS AS NEEDED
Status: DISCONTINUED | OUTPATIENT
Start: 2022-03-23 | End: 2022-03-23 | Stop reason: HOSPADM

## 2022-03-23 RX ORDER — SODIUM CHLORIDE, SODIUM LACTATE, POTASSIUM CHLORIDE, CALCIUM CHLORIDE 600; 310; 30; 20 MG/100ML; MG/100ML; MG/100ML; MG/100ML
9 INJECTION, SOLUTION INTRAVENOUS CONTINUOUS
Status: DISCONTINUED | OUTPATIENT
Start: 2022-03-23 | End: 2022-03-23 | Stop reason: HOSPADM

## 2022-03-23 RX ORDER — SODIUM CHLORIDE 0.9 % (FLUSH) 0.9 %
10 SYRINGE (ML) INJECTION AS NEEDED
Status: DISCONTINUED | OUTPATIENT
Start: 2022-03-23 | End: 2022-03-23 | Stop reason: HOSPADM

## 2022-03-23 RX ADMIN — SODIUM CHLORIDE, POTASSIUM CHLORIDE, SODIUM LACTATE AND CALCIUM CHLORIDE 9 ML/HR: 600; 310; 30; 20 INJECTION, SOLUTION INTRAVENOUS at 11:35

## 2022-03-23 RX ADMIN — LIDOCAINE HYDROCHLORIDE 0.5 ML: 10 INJECTION, SOLUTION EPIDURAL; INFILTRATION; INTRACAUDAL; PERINEURAL at 11:35

## 2022-03-23 RX ADMIN — GLYCOPYRROLATE 0.1 MG: 0.2 INJECTION INTRAMUSCULAR; INTRAVENOUS at 12:26

## 2022-03-23 NOTE — ANESTHESIA PREPROCEDURE EVALUATION
Anesthesia Evaluation     Patient summary reviewed and Nursing notes reviewed   NPO Solid Status: > 8 hours  NPO Liquid Status: > 8 hours           Airway   Mallampati: II  TM distance: <3 FB  Neck ROM: full  Possible difficult intubation  Dental - normal exam     Pulmonary - normal exam   (+) sleep apnea,   (-) shortness of breath  Cardiovascular - normal exam  Exercise tolerance: poor (<4 METS)    ECG reviewed  Patient on routine beta blocker and Beta blocker given within 24 hours of surgery    (+) hypertension 2 medications or greater, CAD, CHF , hyperlipidemia,   (-) past MI    ROS comment: Similar to previous ECG  Rhythm: sinus rhythm  Ectopy: infrequent PVCs  Rate: normal  Conduction: non-specific intraventricular conduction delay  ST Segments: ST segments normal  T Waves: T waves normal  QRS axis: left  Other findings: left ventricular hypertrophy    Clinical impression: abnormal EKG      3/2022 echo    · The left ventricular cavity is moderately dilated.  · Left ventricular ejection fraction appears to be 56 - 60%. Left ventricular systolic function is normal.  · There is moderate concentric left ventricular hypertrophy. There is severe apical hypertrophy of the apical segments. 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  Use of blood products discussed with patient  Consented to blood products.       CODE STATUS:

## 2022-03-23 NOTE — INTERVAL H&P NOTE
"Vital Signs  /70 (BP Location: Left arm, Patient Position: Lying)   Pulse 97   Temp 97.6 °F (36.4 °C) (Oral)   Resp 16   Ht 162.6 cm (64\")   Wt 79.7 kg (175 lb 12.8 oz)   SpO2 97%   BMI 30.18 kg/m²     H&P reviewed. The patient was examined and there are no changes to the H&P.        "

## 2022-03-23 NOTE — BRIEF OP NOTE
ESOPHAGOGASTRODUODENOSCOPY, COLONOSCOPY  Progress Note    Effie Donnelly  3/23/2022    Pre-op Diagnosis:   Esophageal dysphagia [R13.19]  Ulcerative colitis without complications, unspecified location (HCC) [K51.90]       Post-Op Diagnosis Codes:     * Esophageal dysphagia [R13.19]     * Ulcerative colitis without complications, unspecified location (HCC) [K51.90]     * Gastritis [K29.70]     * Hiatal hernia [K44.9]     * Reflux esophagitis [K21.00]     * Candida esophagitis (HCC) [B37.81]     * Colon polyp [K63.5]    Procedure/CPT® Codes:        Procedure(s):  ESOPHAGOGASTRODUODENOSCOPY WITH BIOPSIES  COLONOSCOPY WITH BIOPSIES; POLYPECTOMY    Surgeon(s):  Lorne Esquivel MD    Anesthesia: Monitored Anesthesia Care    Staff:   Circulator: Catie Key RN  Scrub Person: Lizbeth Reaves; Maria Elena Sarmiento         Estimated Blood Loss: none    Urine Voided: * No values recorded between 3/23/2022 12:20 PM and 3/23/2022  1:10 PM *    Specimens:                Specimens     ID Source Type Tests Collected By Collected At Frozen?    A Stomach Tissue · TISSUE PATHOLOGY EXAM   Lorne Esquivel MD 3/23/22 1238     Description: bx    B Esophagus, Distal Tissue · TISSUE PATHOLOGY EXAM   Lorne Esquivel MD 3/23/22 1238     Description: bx    C Large Intestine Polyp · TISSUE PATHOLOGY EXAM   Lorne Esquivel MD 3/23/22 1252     Description: ILEOCECAL VALVE POLYP    D Large Intestine, Right / Ascending Colon Tissue · TISSUE PATHOLOGY EXAM   Lorne Esquivel MD 3/23/22 1254     Description: BX    E Large Intestine, Transverse Colon Tissue · TISSUE PATHOLOGY EXAM   Lorne Esquivel MD 3/23/22 1256     Description: BX    F Large Intestine, Sigmoid Colon Tissue · TISSUE PATHOLOGY EXAM   Lorne Esquivel MD 3/23/22 1259     Description: BX    G Large Intestine, Rectum Tissue · TISSUE PATHOLOGY EXAM   Lorne Esquivel MD 3/23/22 1302      Description: BX                Drains: * No LDAs found *    Findings: Normal Duodenum  Mild gastritis-Biopsy  Hiatal Hernia  Reflux Esophagitis-Biopsy  Candida Esophagitis    Colon to Cecum Good Prep  Polyp-Biopsy  Well Healed Colitis-Biopsy    Complications: None          Lorne Esquivel MD     Date: 3/23/2022  Time: 13:10 EDT

## 2022-03-23 NOTE — ANESTHESIA POSTPROCEDURE EVALUATION
Patient: Effie Donnelly    Procedure Summary     Date: 03/23/22 Room / Location: [facility] ENDOSCOPY 1 /  [facility]    Anesthesia Start: 1220 Anesthesia Stop: 1307    Procedures:       ESOPHAGOGASTRODUODENOSCOPY WITH BIOPSIES (N/A Esophagus)      COLONOSCOPY WITH BIOPSIES; POLYPECTOMY (N/A ) Diagnosis:       Esophageal dysphagia      Ulcerative colitis without complications, unspecified location (HCC)      Gastritis      Hiatal hernia      Reflux esophagitis      Candida esophagitis (HCC)      Colon polyp      (Esophageal dysphagia [R13.19])      (Ulcerative colitis without complications, unspecified location (HCC) [K51.90])    Surgeons: Lorne Esquivel MD Provider: Mirian Alexander MD    Anesthesia Type: MAC ASA Status: 3          Anesthesia Type: MAC    Vitals  Vitals Value Taken Time   /79 03/23/22 1320   Temp     Pulse 70 03/23/22 1320   Resp 18 03/23/22 1320   SpO2 96 % 03/23/22 1320           Post Anesthesia Care and Evaluation    Patient location during evaluation: PHASE II  Patient participation: complete - patient participated  Level of consciousness: awake  Pain management: adequate  Airway patency: patent  Anesthetic complications: No anesthetic complications  PONV Status: none  Cardiovascular status: acceptable  Respiratory status: acceptable  Hydration status: acceptable

## 2022-03-29 ENCOUNTER — OFFICE VISIT (OUTPATIENT)
Dept: FAMILY MEDICINE CLINIC | Facility: CLINIC | Age: 77
End: 2022-03-29

## 2022-03-29 VITALS
DIASTOLIC BLOOD PRESSURE: 78 MMHG | WEIGHT: 179 LBS | BODY MASS INDEX: 30.56 KG/M2 | HEIGHT: 64 IN | TEMPERATURE: 96.9 F | HEART RATE: 40 BPM | OXYGEN SATURATION: 99 % | SYSTOLIC BLOOD PRESSURE: 120 MMHG

## 2022-03-29 DIAGNOSIS — H10.31 ACUTE CONJUNCTIVITIS OF RIGHT EYE, UNSPECIFIED ACUTE CONJUNCTIVITIS TYPE: Primary | ICD-10-CM

## 2022-03-29 PROCEDURE — 99213 OFFICE O/P EST LOW 20 MIN: CPT | Performed by: NURSE PRACTITIONER

## 2022-03-29 RX ORDER — POLYMYXIN B SULFATE AND TRIMETHOPRIM 1; 10000 MG/ML; [USP'U]/ML
1 SOLUTION OPHTHALMIC EVERY 4 HOURS
Qty: 10 ML | Refills: 0 | Status: SHIPPED | OUTPATIENT
Start: 2022-03-29 | End: 2022-04-05

## 2022-03-29 NOTE — PROGRESS NOTES
Subjective   Effie Donnelly is a 76 y.o. female.     History of Present Illness   Patient presents today for acute visit and for sick visit.    Patient was seen in this office by this provider 3-1-22 for viral URI symptoms.  Patient went to urgent care 3-2-22 with similar complaints.  She tested negative for Covid and influenza.  No antibiotics given.  She presents today for her right eye redness.  Patient reports that she saw GI and had scope and he found thrush which was likely causing her sore throat symptoms.  She is under treatment for thrush at this time.    Patient presents with right eye redness, itching and watery drainage x1 week.  Patient denies headache, vision changes.  She is concerned she is having cataract surgery next month and is worried that they will not do the surgery.  Patient is recovering from URI.    The following portions of the patient's history were reviewed and updated as appropriate: allergies, current medications, past family history, past medical history, past social history, past surgical history and problem list.    Review of Systems   Constitutional: Negative for fever.   HENT: Positive for congestion. Negative for sore throat.    Eyes: Positive for discharge, redness and itching. Negative for blurred vision, double vision, photophobia and pain.   Respiratory: Negative for cough, shortness of breath and wheezing.    Cardiovascular: Negative for chest pain, palpitations and leg swelling.   Gastrointestinal: Negative for nausea and vomiting.   Neurological: Negative for weakness, light-headedness and headache.       Objective   Physical Exam  Constitutional:       Appearance: Normal appearance.   HENT:      Head: Normocephalic.      Right Ear: Tympanic membrane normal.      Left Ear: Tympanic membrane normal.      Nose: Nose normal.      Mouth/Throat:      Mouth: Mucous membranes are dry.      Pharynx: No oropharyngeal exudate or posterior oropharyngeal erythema.   Eyes:      General:  Lids are normal.         Right eye: Discharge present.      Conjunctiva/sclera:      Right eye: Right conjunctiva is injected.      Pupils: Pupils are equal, round, and reactive to light.   Cardiovascular:      Rate and Rhythm: Regular rhythm. Bradycardia present.      Pulses: Normal pulses.      Heart sounds: Normal heart sounds.   Pulmonary:      Effort: Pulmonary effort is normal.      Breath sounds: Normal breath sounds.   Musculoskeletal:      Cervical back: Normal range of motion.   Skin:     General: Skin is warm.   Neurological:      General: No focal deficit present.      Mental Status: She is alert.   Psychiatric:         Mood and Affect: Mood normal.         Vitals:    03/29/22 0928   BP: 120/78   Pulse: (!) 40   Temp: 96.9 °F (36.1 °C)   SpO2: 99%     Body mass index is 30.73 kg/m².    Procedures    Assessment/Plan   Problems Addressed this Visit    None     Visit Diagnoses     Acute conjunctivitis of right eye, unspecified acute conjunctivitis type    -  Primary    Relevant Medications    trimethoprim-polymyxin b (Polytrim) 45136-1.1 UNIT/ML-% ophthalmic solution      Diagnoses       Codes Comments    Acute conjunctivitis of right eye, unspecified acute conjunctivitis type    -  Primary ICD-10-CM: H10.31  ICD-9-CM: 372.00         Conjunctivitis-Rx Polytrim 1 drop to right eye every 4 hours while awake x1 week.  Reviewed eye care and handwashing.  Conjunctivitis is highly contagious.  May use Systane or lubricant drops in between Polytrim.  Follow-up with op so next month.    Return in 3 months for follow-up with Dr. Hawley  Send my chart questions or concerns  Education on conjunctivitis attached AVS         Return in about 3 months (around 6/29/2022) for Recheck.

## 2022-03-31 LAB
LAB AP CASE REPORT: NORMAL
LAB AP DIAGNOSIS COMMENT: NORMAL
PATH REPORT.FINAL DX SPEC: NORMAL
PATH REPORT.GROSS SPEC: NORMAL

## 2022-04-18 ENCOUNTER — OFFICE VISIT (OUTPATIENT)
Dept: FAMILY MEDICINE CLINIC | Facility: CLINIC | Age: 77
End: 2022-04-18

## 2022-04-18 VITALS
DIASTOLIC BLOOD PRESSURE: 72 MMHG | HEART RATE: 56 BPM | HEIGHT: 64 IN | SYSTOLIC BLOOD PRESSURE: 140 MMHG | WEIGHT: 174 LBS | OXYGEN SATURATION: 100 % | RESPIRATION RATE: 20 BRPM | TEMPERATURE: 96.4 F | BODY MASS INDEX: 29.71 KG/M2

## 2022-04-18 DIAGNOSIS — K51.90 ULCERATIVE COLITIS WITHOUT COMPLICATIONS, UNSPECIFIED LOCATION: ICD-10-CM

## 2022-04-18 DIAGNOSIS — K21.9 GASTROESOPHAGEAL REFLUX DISEASE WITHOUT ESOPHAGITIS: ICD-10-CM

## 2022-04-18 DIAGNOSIS — M19.90 OSTEOARTHRITIS, UNSPECIFIED OSTEOARTHRITIS TYPE, UNSPECIFIED SITE: ICD-10-CM

## 2022-04-18 DIAGNOSIS — I10 PRIMARY HYPERTENSION: ICD-10-CM

## 2022-04-18 DIAGNOSIS — I25.10 CORONARY ARTERY DISEASE INVOLVING NATIVE CORONARY ARTERY OF NATIVE HEART WITHOUT ANGINA PECTORIS: Primary | Chronic | ICD-10-CM

## 2022-04-18 PROCEDURE — 99214 OFFICE O/P EST MOD 30 MIN: CPT | Performed by: INTERNAL MEDICINE

## 2022-04-18 NOTE — PROGRESS NOTES
Subjective   Effie Donnelly is a 76 y.o. female.     Chief Complaint   Patient presents with   • Hypertension   • Congestive Heart Failure   Hyperlipidemia, GERD, ulcerative colitis    History of Present Illness   Patient here for follow-up for hypertension, hyperlipidemia, GERD, ulcerative colitis, statin intolerance.  Reports she had EGD colonoscopy found ulcerative colitis to be stable.  No chest pain or shortness of breath.  Blood pressure has been okay at home.  The following portions of the patient's history were reviewed and updated as appropriate: allergies, current medications, past family history, past medical history, past social history, past surgical history and problem list.    Review of Systems   Constitutional: Negative for activity change, appetite change, fatigue and fever.   Eyes: Negative for blurred vision and double vision.   Respiratory: Negative.  Negative for shortness of breath.    Cardiovascular: Negative for chest pain, palpitations and leg swelling.   Gastrointestinal: Negative.    Genitourinary: Negative for hematuria.   Neurological: Negative for dizziness, syncope, light-headedness and headache.   Psychiatric/Behavioral: Negative for agitation, behavioral problems and decreased concentration.       Allergies   Allergen Reactions   • Nsaids GI Bleeding     CROHN'S DISEASE   • Adhesive Tape Other (See Comments)     BLISTERS UNDER TAGADERM       Current Outpatient Medications on File Prior to Visit   Medication Sig Dispense Refill   • aspirin 81 MG tablet Take 81 mg by mouth.     • B Complex Vitamins (VITAMIN B COMPLEX PO) Take 1 tablet by mouth Daily.     • carvedilol (COREG) 12.5 MG tablet Take 1 tablet by mouth 2 (Two) Times a Day. 180 tablet 3   • Cholecalciferol (VITAMIN D) 2000 units capsule Take 2,000 Units by mouth Daily.     • CRANBERRY EXTRACT PO Take 1 capsule by mouth Daily.     • dilTIAZem (Cardizem) 30 MG tablet Take 1 tablet by mouth 2 (Two) Times a Day. 180 tablet 3   •  furosemide (LASIX) 20 MG tablet Take 1 tablet by mouth Daily. Take 1 tab in the morning and 1/2 tab at 2 pm 135 tablet 3   • Loratadine-D 24HR  MG per 24 hr tablet TAKE ONE TABLET BY MOUTH DAILY 30 tablet 3   • MEGARED OMEGA-3 KRILL OIL PO Take 1 capsule by mouth Daily.     • omeprazole (priLOSEC) 40 MG capsule Take 1 capsule by mouth Daily. 90 capsule 3   • PROBIOTIC PRODUCT PO Take 2 capsules by mouth Daily.     • sacubitril-valsartan (Entresto) 24-26 MG tablet Take 1 tablet by mouth 2 (Two) Times a Day. 180 tablet 3   • fluconazole (Diflucan) 100 MG tablet Take 1 tablet by mouth Daily. 21 tablet 3     No current facility-administered medications on file prior to visit.       Family History   Problem Relation Age of Onset   • Hypertension Sister    • Cancer Sister    • Heart disease Brother 65        VALVE REPLACEMENT    • Hypertension Brother    • Hypertension Sister    • Hypertension Sister    • Hypertension Brother    • Diabetes Brother    • Hypertension Brother    • Cancer Brother    • Hypertension Brother    • Hypertension Brother    • Hypertension Brother        Past Medical History:   Diagnosis Date   • CAD (coronary artery disease)    • Cancer (HCC)     skin, right breast   • CHF (congestive heart failure) ([facility])    • Coronary artery disease involving native coronary artery of native heart without angina pectoris 11/1/2018   • Heart murmur    • History of myocardial infarction 11/1/2018   • Hypertension    • Myocardial infarction ([facility])     PER PT    • NICM (nonischemic cardiomyopathy) ([facility]) 7/8/2020   • DELORES on CPAP     AHI 15/h   • Sinus tachycardia    • SOB (shortness of breath)        Past Surgical History:   Procedure Laterality Date   • BUNIONECTOMY Bilateral    • CARDIAC CATHETERIZATION     • CARDIAC CATHETERIZATION N/A 1/14/2020    Procedure: Right and Left Heart Cath;  Surgeon: Bin Chacko MD;  Location: [facility] CATH INVASIVE LOCATION;  Service: Cardiovascular   • CARDIAC CATHETERIZATION  N/A 2020    Procedure: Coronary angiography;  Surgeon: Bin Chacko MD;  Location:  [facility] CATH INVASIVE LOCATION;  Service: Cardiovascular   • CARDIAC CATHETERIZATION N/A 2020    Procedure: Left ventriculography;  Surgeon: Bin Chacko MD;  Location:  [facility] CATH INVASIVE LOCATION;  Service: Cardiovascular   • COLONOSCOPY N/A 3/23/2022    Procedure: COLONOSCOPY WITH BIOPSIES; POLYPECTOMY;  Surgeon: Lorne Esquivel MD;  Location:  [facility] OR;  Service: Gastroenterology;  Laterality: N/A;  POLYP  ULCERATIVE COLITIS   • ENDOSCOPY N/A 3/23/2022    Procedure: ESOPHAGOGASTRODUODENOSCOPY WITH BIOPSIES;  Surgeon: Lorne Esquivel MD;  Location: [facility] OR;  Service: Gastroenterology;  Laterality: N/A;  GASTRITIS  ESOPHAGEAL REFLUX  HIATAL HERNIA  ESOPHAGEAL CANDIDIASIS   • MASTECTOMY COMPLETE / SIMPLE  2019   • SHOULDER SURGERY Right    • SKIN CANCER EXCISION      ABOVE THE LIP.        Social History     Socioeconomic History   • Marital status:    • Number of children: 5   Tobacco Use   • Smoking status: Former Smoker     Quit date: 1973     Years since quittin.4   • Smokeless tobacco: Never Used   • Tobacco comment: CAFFEINE USE: 1 CUP DAILY   Vaping Use   • Vaping Use: Never used   Substance and Sexual Activity   • Alcohol use: No   • Drug use: No   • Sexual activity: Defer       Patient Active Problem List   Diagnosis   • History of myocardial infarction   • Coronary artery disease involving native coronary artery of native heart without angina pectoris   • Ulcerative colitis (HCC)   • HTN (hypertension)   • GERD (gastroesophageal reflux disease)   • DJD (degenerative joint disease)   • Ductal 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   • Anemia   • Other hyperlipidemia   • NICM (nonischemic cardiomyopathy) (HCC)   • DELORES on CPAP   • Snoring   • Class 1 obesity due to excess calories without serious comorbidity with body mass index (BMI) of 31.0 to 31.9 in adult  "  • Chronic systolic congestive heart failure (HCC)   • Esophageal dysphagia       /72 (BP Location: Left arm, Patient Position: Sitting, Cuff Size: Large Adult)   Pulse 56   Temp 96.4 °F (35.8 °C) (Temporal)   Resp 20   Ht 162.6 cm (64\")   Wt 78.9 kg (174 lb)   SpO2 100%   BMI 29.87 kg/m²   Body mass index is 29.87 kg/m².    Objective   Physical Exam  Constitutional:       Appearance: Normal appearance. She is well-developed.   Eyes:      Pupils: Pupils are equal, round, and reactive to light.   Neck:      Thyroid: No thyromegaly.      Vascular: No JVD.      Trachea: No tracheal deviation.   Cardiovascular:      Rate and Rhythm: Normal rate and regular rhythm.      Heart sounds: No murmur heard.  Pulmonary:      Effort: Pulmonary effort is normal. No respiratory distress.      Breath sounds: Normal breath sounds. No wheezing.   Abdominal:      General: Bowel sounds are normal. There is no distension.      Palpations: Abdomen is soft. There is no mass.      Tenderness: There is no abdominal tenderness. There is no right CVA tenderness, left CVA tenderness, guarding or rebound.      Hernia: No hernia is present.   Musculoskeletal:      Cervical back: Normal range of motion and neck supple.   Lymphadenopathy:      Cervical: No cervical adenopathy.   Neurological:      General: No focal deficit present.      Mental Status: She is alert and oriented to person, place, and time.   Psychiatric:         Mood and Affect: Mood normal.         Behavior: Behavior normal.           Assessment/Plan   Diagnoses and all orders for this visit:    1. Coronary artery disease involving native coronary artery of native heart without angina pectoris (Primary)  -     Cancel: CBC & Differential  -     Cancel: Comprehensive Metabolic Panel  -     Cancel: Lipid Panel With / Chol / HDL Ratio  -     Cancel: TSH  -     CBC & Differential; Future  -     Comprehensive Metabolic Panel; Future  -     Lipid Panel With / Chol / HDL " Ratio; Future  -     TSH; Future    2. Primary hypertension  -     Cancel: CBC & Differential  -     Cancel: Comprehensive Metabolic Panel  -     Cancel: Lipid Panel With / Chol / HDL Ratio  -     Cancel: TSH  -     CBC & Differential; Future  -     Comprehensive Metabolic Panel; Future  -     Lipid Panel With / Chol / HDL Ratio; Future  -     TSH; Future    3. Gastroesophageal reflux disease without esophagitis  -     Cancel: CBC & Differential  -     Cancel: Comprehensive Metabolic Panel  -     Cancel: Lipid Panel With / Chol / HDL Ratio  -     Cancel: TSH  -     CBC & Differential; Future  -     Comprehensive Metabolic Panel; Future  -     Lipid Panel With / Chol / HDL Ratio; Future  -     TSH; Future    4. Ulcerative colitis without complications, unspecified location (HCC)    5. Osteoarthritis, unspecified osteoarthritis type, unspecified site    Continue diet and exercise.  Patient will come back tomorrow and get labs done.  She is not fasting today.  Check blood pressure at home.  Keep follow-up with specialist.  Continue aspirin, Coreg to reduce to 12.5 twice a day, diltiazem, Prilosec, Entresto.  All her problems are chronic and stable.  Return in 3 months time.  EHR dragon/transcription disclaimer:  Part of this note are created by electronic transcription/translation of spoken language to printed text and thus may lead to erroneous, or at times, nonsensical words or phrases inadvertently transcribed.  Although I have reviewed for such errors, some may still exist.

## 2022-04-21 DIAGNOSIS — N17.0 ACUTE KIDNEY INJURY (AKI) WITH ACUTE TUBULAR NECROSIS (ATN): Primary | ICD-10-CM

## 2022-04-29 ENCOUNTER — HOSPITAL ENCOUNTER (OUTPATIENT)
Dept: ULTRASOUND IMAGING | Facility: HOSPITAL | Age: 77
End: 2022-04-29

## 2022-05-04 ENCOUNTER — APPOINTMENT (OUTPATIENT)
Dept: ULTRASOUND IMAGING | Facility: HOSPITAL | Age: 77
End: 2022-05-04

## 2022-05-13 ENCOUNTER — HOSPITAL ENCOUNTER (OUTPATIENT)
Dept: ULTRASOUND IMAGING | Facility: HOSPITAL | Age: 77
Discharge: HOME | End: 2022-05-13
Admitting: INTERNAL MEDICINE

## 2022-05-13 DIAGNOSIS — N17.0 ACUTE KIDNEY INJURY (AKI) WITH ACUTE TUBULAR NECROSIS (ATN): ICD-10-CM

## 2022-05-13 PROCEDURE — 76775 US EXAM ABDO BACK WALL LIM: CPT

## 2022-05-17 ENCOUNTER — OFFICE VISIT (OUTPATIENT)
Dept: FAMILY MEDICINE CLINIC | Facility: CLINIC | Age: 77
End: 2022-05-17

## 2022-05-17 VITALS
RESPIRATION RATE: 14 BRPM | WEIGHT: 175 LBS | OXYGEN SATURATION: 96 % | TEMPERATURE: 97.5 F | SYSTOLIC BLOOD PRESSURE: 112 MMHG | HEIGHT: 65 IN | DIASTOLIC BLOOD PRESSURE: 72 MMHG | HEART RATE: 63 BPM | BODY MASS INDEX: 29.16 KG/M2

## 2022-05-17 DIAGNOSIS — E78.49 OTHER HYPERLIPIDEMIA: Primary | ICD-10-CM

## 2022-05-17 DIAGNOSIS — N18.31 STAGE 3A CHRONIC KIDNEY DISEASE: ICD-10-CM

## 2022-05-17 DIAGNOSIS — I10 PRIMARY HYPERTENSION: ICD-10-CM

## 2022-05-17 PROBLEM — N18.2 CKD (CHRONIC KIDNEY DISEASE), SYMPTOM MANAGEMENT ONLY, STAGE 2 (MILD): Status: ACTIVE | Noted: 2022-05-17

## 2022-05-17 PROBLEM — N18.2 CKD (CHRONIC KIDNEY DISEASE), SYMPTOM MANAGEMENT ONLY, STAGE 2 (MILD): Status: RESOLVED | Noted: 2022-05-17 | Resolved: 2022-05-17

## 2022-05-17 PROCEDURE — 99213 OFFICE O/P EST LOW 20 MIN: CPT | Performed by: INTERNAL MEDICINE

## 2022-05-17 NOTE — PROGRESS NOTES
Subjective   Effie Donnelly is a 76 y.o. female.     Chief Complaint   Patient presents with   • Hypertension       History of Present Illness   Acute kidney injury, hypertension.  Denies any chest pain, nausea vomiting.  Shortness of breath occasionally.  Does not wheeze.  Stopped smoking 50 years ago.  Last chest x-ray showed mild emphysema.  She is follows with cardiology.  The following portions of the patient's history were reviewed and updated as appropriate: allergies, current medications, past family history, past medical history, past social history, past surgical history and problem list.    Review of Systems   Constitutional: Negative for activity change, appetite change, fatigue and fever.   Eyes: Negative for blurred vision and double vision.   Respiratory: Negative.  Negative for shortness of breath.    Cardiovascular: Negative for chest pain, palpitations and leg swelling.   Gastrointestinal: Negative.    Neurological: Negative for dizziness, syncope, light-headedness and headache.       Allergies   Allergen Reactions   • Nsaids GI Bleeding     CROHN'S DISEASE   • Adhesive Tape Other (See Comments)     BLISTERS UNDER TAGADERM       Current Outpatient Medications on File Prior to Visit   Medication Sig Dispense Refill   • aspirin 81 MG tablet Take 81 mg by mouth.     • B Complex Vitamins (VITAMIN B COMPLEX PO) Take 1 tablet by mouth Daily.     • carvedilol (COREG) 12.5 MG tablet Take 1 tablet by mouth 2 (Two) Times a Day. 180 tablet 3   • Cholecalciferol (VITAMIN D) 2000 units capsule Take 2,000 Units by mouth Daily.     • CRANBERRY EXTRACT PO Take 1 capsule by mouth Daily.     • dilTIAZem (Cardizem) 30 MG tablet Take 1 tablet by mouth 2 (Two) Times a Day. 180 tablet 3   • furosemide (LASIX) 20 MG tablet Take 1 tablet by mouth Daily. Take 1 tab in the morning and 1/2 tab at 2 pm 135 tablet 3   • Loratadine-D 24HR  MG per 24 hr tablet TAKE ONE TABLET BY MOUTH DAILY 30 tablet 3   • MEGARED OMEGA-3  KRILL OIL PO Take 1 capsule by mouth Daily.     • omeprazole (priLOSEC) 40 MG capsule Take 1 capsule by mouth Daily. 90 capsule 3   • PROBIOTIC PRODUCT PO Take 2 capsules by mouth Daily.     • sacubitril-valsartan (Entresto) 24-26 MG tablet Take 1 tablet by mouth 2 (Two) Times a Day. 180 tablet 3     No current facility-administered medications on file prior to visit.       Family History   Problem Relation Age of Onset   • Hypertension Sister    • Cancer Sister    • Heart disease Brother 65        VALVE REPLACEMENT    • Hypertension Brother    • Hypertension Sister    • Hypertension Sister    • Hypertension Brother    • Diabetes Brother    • Hypertension Brother    • Cancer Brother    • Hypertension Brother    • Hypertension Brother    • Hypertension Brother        Past Medical History:   Diagnosis Date   • CAD (coronary artery disease)    • Cancer ([facility])     skin, right breast   • CHF (congestive heart failure) ([facility])    • Coronary artery disease involving native coronary artery of native heart without angina pectoris 11/1/2018   • Heart murmur    • History of myocardial infarction 11/1/2018   • Hypertension    • Myocardial infarction ([facility])     PER PT    • NICM (nonischemic cardiomyopathy) ([facility]) 7/8/2020   • DELORES on CPAP     AHI 15/h   • Sinus tachycardia    • SOB (shortness of breath)        Past Surgical History:   Procedure Laterality Date   • BUNIONECTOMY Bilateral    • CARDIAC CATHETERIZATION     • CARDIAC CATHETERIZATION N/A 01/14/2020    Procedure: Right and Left Heart Cath;  Surgeon: Bin Chacko MD;  Location: [facility] CATH INVASIVE LOCATION;  Service: Cardiovascular   • CARDIAC CATHETERIZATION N/A 01/14/2020    Procedure: Coronary angiography;  Surgeon: Bin Chacko MD;  Location:  [facility] CATH INVASIVE LOCATION;  Service: Cardiovascular   • CARDIAC CATHETERIZATION N/A 01/14/2020    Procedure: Left ventriculography;  Surgeon: Bin Chacko MD;  Location:  [facility] CATH INVASIVE LOCATION;  Service:  Cardiovascular   • CATARACT EXTRACTION Left 2022   • COLONOSCOPY N/A 2022    Procedure: COLONOSCOPY WITH BIOPSIES; POLYPECTOMY;  Surgeon: Lorne Esquivel MD;  Location: [facility] OR;  Service: Gastroenterology;  Laterality: N/A;  POLYP  ULCERATIVE COLITIS   • ENDOSCOPY N/A 2022    Procedure: ESOPHAGOGASTRODUODENOSCOPY WITH BIOPSIES;  Surgeon: Lorne Esquivel MD;  Location: [facility] OR;  Service: Gastroenterology;  Laterality: N/A;  GASTRITIS  ESOPHAGEAL REFLUX  HIATAL HERNIA  ESOPHAGEAL CANDIDIASIS   • MASTECTOMY COMPLETE / SIMPLE  2019   • SHOULDER SURGERY Right    • SKIN CANCER EXCISION      ABOVE THE LIP.        Social History     Socioeconomic History   • Marital status:    • Number of children: 5   Tobacco Use   • Smoking status: Former Smoker     Quit date: 1973     Years since quittin.5   • Smokeless tobacco: Never Used   • Tobacco comment: CAFFEINE USE: 1 CUP DAILY   Vaping Use   • Vaping Use: Never used   Substance and Sexual Activity   • Alcohol use: No   • Drug use: No   • Sexual activity: Defer       Patient Active Problem List   Diagnosis   • History of myocardial infarction   • Coronary artery disease involving native coronary artery of native heart without angina pectoris   • Ulcerative colitis (HCC)   • HTN (hypertension)   • GERD (gastroesophageal reflux disease)   • DJD (degenerative joint disease)   • Ductal 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   • Anemia   • Other hyperlipidemia   • NICM (nonischemic cardiomyopathy) (HCC)   • DELORES on CPAP   • Snoring   • Class 1 obesity due to excess calories without serious comorbidity with body mass index (BMI) of 31.0 to 31.9 in adult   • Chronic systolic congestive heart failure (HCC)   • Esophageal dysphagia   • Stage 3a chronic kidney disease (HCC)       /72 (BP Location: Left arm, Patient Position: Sitting, Cuff Size: Large Adult)   Pulse 63   Temp 97.5 °F (36.4 °C)   Resp 14   Ht 165.1  "cm (65\")   Wt 79.4 kg (175 lb)   SpO2 96%   BMI 29.12 kg/m²   Body mass index is 29.12 kg/m².    Objective   Physical Exam  Vitals and nursing note reviewed.   Constitutional:       Appearance: She is well-developed.   Eyes:      Pupils: Pupils are equal, round, and reactive to light.   Neck:      Thyroid: No thyromegaly.      Vascular: No JVD.      Trachea: No tracheal deviation.   Cardiovascular:      Rate and Rhythm: Normal rate and regular rhythm.      Heart sounds: No murmur heard.  Pulmonary:      Effort: Pulmonary effort is normal. No respiratory distress.      Breath sounds: Normal breath sounds. No wheezing.   Abdominal:      General: Bowel sounds are normal.      Palpations: Abdomen is soft.   Musculoskeletal:         General: No swelling.      Cervical back: Normal range of motion and neck supple.      Right lower leg: No edema.      Left lower leg: No edema.   Lymphadenopathy:      Cervical: No cervical adenopathy.   Neurological:      Mental Status: She is alert and oriented to person, place, and time.           Assessment & Plan   Diagnoses and all orders for this visit:    1. Other hyperlipidemia (Primary)    2. Primary hypertension  -     Basic Metabolic Panel    3. Stage 3a chronic kidney disease (HCC)  -     Basic Metabolic Panel    Discussed with patient she has stage III kidney disease per GFR.  Avoid any nephrotoxic medications are dyes.  Check blood pressure at home.  Return as scheduled.  If it gets worse will refer to nephrologist.  Regarding her dyspnea most likely from the heart she follows with cardiology.  Patient does not have significant findings for COPD.  EHR dragon/transcription disclaimer:  Part of this note are created by electronic transcription/translation of spoken language to printed text and thus may lead to erroneous, or at times, nonsensical words or phrases inadvertently transcribed.  Although I have reviewed for such errors, some may still exist.           "

## 2022-05-18 LAB
BUN SERPL-MCNC: 27 MG/DL (ref 8–27)
BUN/CREAT SERPL: 19 (ref 12–28)
CALCIUM SERPL-MCNC: 9.2 MG/DL (ref 8.7–10.3)
CHLORIDE SERPL-SCNC: 103 MMOL/L (ref 96–106)
CO2 SERPL-SCNC: 24 MMOL/L (ref 20–29)
CREAT SERPL-MCNC: 1.45 MG/DL (ref 0.57–1)
EGFRCR SERPLBLD CKD-EPI 2021: 38 ML/MIN/1.73
GLUCOSE SERPL-MCNC: 95 MG/DL (ref 65–99)
POTASSIUM SERPL-SCNC: 4.7 MMOL/L (ref 3.5–5.2)
SODIUM SERPL-SCNC: 140 MMOL/L (ref 134–144)

## 2022-05-31 ENCOUNTER — OFFICE VISIT (OUTPATIENT)
Dept: CARDIOLOGY | Facility: CLINIC | Age: 77
End: 2022-05-31

## 2022-05-31 VITALS
SYSTOLIC BLOOD PRESSURE: 130 MMHG | HEART RATE: 60 BPM | WEIGHT: 175 LBS | DIASTOLIC BLOOD PRESSURE: 66 MMHG | BODY MASS INDEX: 29.16 KG/M2 | HEIGHT: 65 IN

## 2022-05-31 DIAGNOSIS — E78.49 OTHER HYPERLIPIDEMIA: ICD-10-CM

## 2022-05-31 DIAGNOSIS — I25.10 CORONARY ARTERY DISEASE INVOLVING NATIVE CORONARY ARTERY OF NATIVE HEART WITHOUT ANGINA PECTORIS: Primary | Chronic | ICD-10-CM

## 2022-05-31 DIAGNOSIS — I50.22 CHRONIC SYSTOLIC CONGESTIVE HEART FAILURE: ICD-10-CM

## 2022-05-31 DIAGNOSIS — N18.31 STAGE 3A CHRONIC KIDNEY DISEASE: ICD-10-CM

## 2022-05-31 DIAGNOSIS — G47.33 OSA ON CPAP: ICD-10-CM

## 2022-05-31 DIAGNOSIS — I25.2 HISTORY OF MYOCARDIAL INFARCTION: Chronic | ICD-10-CM

## 2022-05-31 DIAGNOSIS — I42.8 NICM (NONISCHEMIC CARDIOMYOPATHY): Chronic | ICD-10-CM

## 2022-05-31 DIAGNOSIS — Z99.89 OSA ON CPAP: ICD-10-CM

## 2022-05-31 DIAGNOSIS — I10 PRIMARY HYPERTENSION: ICD-10-CM

## 2022-05-31 PROCEDURE — 99214 OFFICE O/P EST MOD 30 MIN: CPT | Performed by: NURSE PRACTITIONER

## 2022-05-31 NOTE — PROGRESS NOTES
Date of Office Visit: 2022  Encounter Provider: PATI Eagle  Place of Service: [facility] CARDIOLOGY  Patient Name: Effie Donnelly  :1945  Primary Cardiologist:      CC:  3 month follow up    Dear Dr. Hawley    HPI: Effie Donnelly is a pleasant 76 y.o. female who presents 2022 for cardiac follow up. I have reviewed her past medical records including notes, labs and testing in preparation for today's visit.   She has a history of a nonischemic cardiomyopathy.  She also has a history of underlying mild CAD and a old myocardial infarction.  She is on guideline directed medical therapy.      Dr. Brand saw  her initially 2018. She was previously seen at [facility].  She has a history of CAD and myocardial infarction.  She had an echocardiogram in November that showed normal function.  She does have a history of an abnormal EKG with LVH as well as diffuse repolarization changes.  She just had an EKG performed at [facility] there was unchanged from prior EKGs.     Cath 2020:  Findings:  1. Coronary Artery Anatomy:  Dominance: Left  Left Main: Angiographically normal  Left Anterior Descending: The LAD is somewhat small in caliber size.  Contains a 30 to 40% proximal segment stenosis followed by luminal irregularities distally.  There are no appreciable diagonals from the LAD.  Circumflex Artery: Large in caliber size.  Luminal irregularities throughout supplies a mid and inferior marginal branch vessels.  Also supplies a left-sided PDA which contains luminal regularities.  Ramus: Large ramus almost takes over for LAD and is branching and bifurcating.  Luminal irregularities throughout  Right Coronary Artery: Nondominant     2. Hemodynamics:  Right Atrium: 8/5/4 mmHg  Right Ventricle: 31/2/5 mmHg  Pulmonary Artery: 34/11/23 mmHg  Pulmonary Wedge: 22/21/20 mmHg  Left Ventricle: 184/16/23 mmHg  Aorta: 178/64/107 mmHg  Cardiac Output/Index: 3.53  L/min 1.92 L/min/m2  PA Sat: 66 %  AO Sat: 96 %     3. Left Ventriculogram:  Ejection Fraction: 20 %  Wall Motion: Global  Mitral Regurgitation: None     Conclusions:  1. Left dominant system with diminutive LAD which may explain her stress test findings but otherwise nonischemic cardiomyopathy with only a 30 to 40% proximal LAD stenoses.  2. Currently elevated left-sided filling pressures with EDP of 23 mmHg  3. Currently reduced cardiac output and index of 3.5 and 1.9     I saw her 2/25/2022 for 3-month follow-up.  She denies any chest pain chest pressure.  She states her shortness of breath remains the same is been unchanged for the past year and a half.  She continues to complain of fatigue.  She has not had any dizziness, lightheadedness, syncope or presyncopal episodes.  She denies any lower extremity edema.  She denies any palpitations, she states she cannot feel her heart racing or skipping.  She states over the last 2 weeks she has been fainting in the mornings.  She states is associated with high blood sugar.  She states she self decreased her carvedilol to 12.5 mg twice a day and all of the symptoms went away.  I have asked her to follow-up with you about her blood sugars.  Her blood pressure today is within normal limits.  She states she has a cold and feels like she is congested in her chest.  She does have some mild crackles in her lung bases.  I have reviewed her recent labs.  Her most recent labs were from 2/8/2022.  CMP revealed a total glucose of 64, sodium 142, potassium 4.6 and a creatinine 1.22.  She had labs performed on January 11 that showed a CMP with a glucose of 86, sodium 140, potassium 4.3 and her creatinine up to 1.38.  TSH 3.850.  CBC unremarkable.  Lipid profile showed total cholesterol 224, HDL 67, , triglycerides 106.  Of note in December 2021, CMP showed a glucose of 93 and a creatinine of 1.17. I increased her lasix and ordered an echo.      3/2/2022 echo Interpretation  Summary  · The left ventricular cavity is moderately dilated.  · Left ventricular ejection fraction appears to be 56 - 60%. Left ventricular systolic function is normal.  · There is moderate concentric left ventricular hypertrophy. There is severe apical hypertrophy of the apical segments. 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  · Normal right ventricular cavity size and systolic function noted.  · The left atrial cavity is severely dilated.  · Mild tricuspid valve regurgitation is present.  · Calculated right ventricular systolic pressure from tricuspid regurgitation is 25 mmHg.  · There is mild pulmonic valve regurgitation present.  · There is no evidence of pericardial effusion    She did have labs 4/20/2022 that showed CMP with a creatinine 1.35, TSH 5.340.  Lipid panel revealed total cholesterol 220, HDL 56,  and triglycerides 118.  CBC unremarkable.    She had a repeat BMP on 5/17/2022 that showed a creatinine of 1.45.    She returns today for 3-month follow-up.  She states after 2 days of the increased in the Lasix, she had a lot of back pain so she stopped it.  She is just back to the 20 mg of Lasix daily.  Has noticed above, her creatinine did go up even on the lower dose of the Lasix.  She is taking her medications as directed.  Her blood pressure is well controlled.  She denies any palpitations, lower extremity edema, dizziness or lightheadedness.  She has not had any syncope or presyncopal episodes.  She denies any chest pain or chest pressure.  She does complain of fatigue and chronic shortness of breath.  That is unchanged.  She does not use her CPAP, she does have 1 at home but states she feels like she does not need it.  She denies any snoring, orthopnea, PND or daytime sleepiness.  I did discuss the correlation between untreated sleep apnea and cardiomyopathy.    Past Medical History:    Diagnosis Date   • CAD (coronary artery disease)    • Cancer ([facility])     skin, right breast   • CHF (congestive heart failure) ([facility])    • Coronary artery disease involving native coronary artery of native heart without angina pectoris 11/1/2018   • Heart murmur    • History of myocardial infarction 11/1/2018   • Hypertension    • Myocardial infarction ([facility])     PER PT    • NICM (nonischemic cardiomyopathy) ([facility]) 7/8/2020   • DELORES on CPAP     AHI 15/h   • Sinus tachycardia    • SOB (shortness of breath)        Past Surgical History:   Procedure Laterality Date   • BUNIONECTOMY Bilateral    • CARDIAC CATHETERIZATION     • CARDIAC CATHETERIZATION N/A 01/14/2020    Procedure: Right and Left Heart Cath;  Surgeon: Bin Chacko MD;  Location:  [facility];  Service: Cardiovascular   • CARDIAC CATHETERIZATION N/A 01/14/2020    Procedure: Coronary angiography;  Surgeon: Bin Chacko MD;  Location:  [facility];  Service: Cardiovascular   • CARDIAC CATHETERIZATION N/A 01/14/2020    Procedure: Left ventriculography;  Surgeon: Bin Chacko MD;  Location:  [facility];  Service: Cardiovascular   • CATARACT EXTRACTION Left 04/26/2022   • COLONOSCOPY N/A 03/23/2022    Procedure: COLONOSCOPY WITH BIOPSIES; POLYPECTOMY;  Surgeon: Lorne Esquivel MD;  Location: [facility] OR;  Service: Gastroenterology;  Laterality: N/A;  POLYP  ULCERATIVE COLITIS   • ENDOSCOPY N/A 03/23/2022    Procedure: ESOPHAGOGASTRODUODENOSCOPY WITH BIOPSIES;  Surgeon: Lorne Esquivel MD;  Location: [facility] OR;  Service: Gastroenterology;  Laterality: N/A;  GASTRITIS  ESOPHAGEAL REFLUX  HIATAL HERNIA  ESOPHAGEAL CANDIDIASIS   • MASTECTOMY COMPLETE / SIMPLE  06/2019   • SHOULDER SURGERY Right    • SKIN CANCER EXCISION      ABOVE THE LIP.        Social History     Socioeconomic History   • Marital status:    • Number of children: 5   Tobacco Use   • Smoking status: Former  Smoker     Quit date: 1973     Years since quittin.6   • Smokeless tobacco: Never Used   • Tobacco comment: CAFFEINE USE: 1 CUP DAILY   Vaping Use   • Vaping Use: Never used   Substance and Sexual Activity   • Alcohol use: No   • Drug use: No   • Sexual activity: Defer       Family History   Problem Relation Age of Onset   • Hypertension Sister    • Cancer Sister    • Heart disease Brother 65        VALVE REPLACEMENT    • Hypertension Brother    • Hypertension Sister    • Hypertension Sister    • Hypertension Brother    • Diabetes Brother    • Hypertension Brother    • Cancer Brother    • Hypertension Brother    • Hypertension Brother    • Hypertension Brother        The following portion of the patient's history were reviewed and updated as appropriate: past medical history, past surgical history, past social history, past family history, allergies, current medications, and problem list.    Review of Systems   Constitutional: Positive for malaise/fatigue. Negative for diaphoresis and fever.   HENT: Negative for congestion, hearing loss, hoarse voice, nosebleeds and sore throat.    Eyes: Negative for photophobia, vision loss in left eye, vision loss in right eye and visual disturbance.   Cardiovascular: Negative for chest pain, dyspnea on exertion, irregular heartbeat, leg swelling, near-syncope, orthopnea, palpitations, paroxysmal nocturnal dyspnea and syncope.   Respiratory: Positive for shortness of breath (unchanged). Negative for cough, hemoptysis, sleep disturbances due to breathing, snoring, sputum production and wheezing.    Endocrine: Negative for cold intolerance, heat intolerance, polydipsia, polyphagia and polyuria.   Hematologic/Lymphatic: Negative for bleeding problem. Does not bruise/bleed easily.   Skin: Negative for color change, dry skin, poor wound healing, rash and suspicious lesions.   Musculoskeletal: Negative for arthritis, back pain, falls, gout, joint pain, joint swelling, muscle  "cramps, muscle weakness and myalgias.   Gastrointestinal: Negative for bloating, abdominal pain, constipation, diarrhea, dysphagia, melena, nausea and vomiting.   Neurological: Negative for excessive daytime sleepiness, dizziness, headaches, light-headedness, loss of balance, numbness, paresthesias, seizures, vertigo and weakness.   Psychiatric/Behavioral: Negative for depression, memory loss and substance abuse. The patient is not nervous/anxious.        Allergies   Allergen Reactions   • Nsaids GI Bleeding     CROHN'S DISEASE   • Adhesive Tape Other (See Comments)     BLISTERS UNDER TAGADERM         Current Outpatient Medications:   •  aspirin 81 MG tablet, Take 81 mg by mouth., Disp: , Rfl:   •  B Complex Vitamins (VITAMIN B COMPLEX PO), Take 1 tablet by mouth Daily., Disp: , Rfl:   •  carvedilol (COREG) 12.5 MG tablet, Take 1 tablet by mouth 2 (Two) Times a Day., Disp: 180 tablet, Rfl: 3  •  Cholecalciferol (VITAMIN D) 2000 units capsule, Take 2,000 Units by mouth Daily., Disp: , Rfl:   •  CRANBERRY EXTRACT PO, Take 1 capsule by mouth Daily., Disp: , Rfl:   •  dilTIAZem (Cardizem) 30 MG tablet, Take 1 tablet by mouth 2 (Two) Times a Day., Disp: 180 tablet, Rfl: 3  •  furosemide (LASIX) 20 MG tablet, Take 1 tablet by mouth Daily. Take 1 tab in the morning and 1/2 tab at 2 pm, Disp: 135 tablet, Rfl: 3  •  MEGARED OMEGA-3 KRILL OIL PO, Take 1 capsule by mouth Daily., Disp: , Rfl:   •  omeprazole (priLOSEC) 40 MG capsule, Take 1 capsule by mouth Daily., Disp: 90 capsule, Rfl: 3  •  PROBIOTIC PRODUCT PO, Take 2 capsules by mouth Daily., Disp: , Rfl:   •  sacubitril-valsartan (Entresto) 24-26 MG tablet, Take 1 tablet by mouth 2 (Two) Times a Day., Disp: 180 tablet, Rfl: 3  •  Loratadine-D 24HR  MG per 24 hr tablet, TAKE ONE TABLET BY MOUTH DAILY, Disp: 30 tablet, Rfl: 3        Objective:     Vitals:    05/31/22 1045   BP: 130/66   Pulse: 60   Weight: 79.4 kg (175 lb)   Height: 165.1 cm (65\")     Body mass index " is 29.12 kg/m².      Vitals reviewed.   Constitutional:       General: Not in acute distress.     Appearance: Healthy appearance. Well-developed.   Eyes:      General:         Right eye: No discharge.         Left eye: No discharge.      Conjunctiva/sclera: Conjunctivae normal.   HENT:      Head: Normocephalic and atraumatic.      Right Ear: External ear normal.      Left Ear: External ear normal.      Nose: Nose normal.   Neck:      Thyroid: No thyromegaly.      Vascular: No JVD.      Trachea: No tracheal deviation.      Lymphadenopathy: No cervical adenopathy.   Pulmonary:      Effort: Pulmonary effort is normal. No respiratory distress.      Breath sounds: Normal breath sounds. No wheezing. No rales.   Chest:      Chest wall: Not tender to palpatation.   Cardiovascular:      Normal rate. Regular rhythm.      No gallop.   Pulses:     Intact distal pulses.   Edema:     Peripheral edema absent.   Abdominal:      General: There is no distension.      Palpations: Abdomen is soft.      Tenderness: There is no abdominal tenderness.   Musculoskeletal: Normal range of motion.         General: No tenderness or deformity.      Cervical back: Normal range of motion and neck supple. Skin:     General: Skin is warm and dry.      Findings: No erythema or rash.   Neurological:      Mental Status: Alert and oriented to person, place, and time.      Coordination: Coordination normal.   Psychiatric:         Attention and Perception: Attention normal.         Mood and Affect: Mood normal.         Speech: Speech normal.         Behavior: Behavior normal. Behavior is cooperative.         Thought Content: Thought content normal.         Judgment: Judgment normal.               ECG 12 Lead    Date/Time: 5/31/2022 10:55 AM  Performed by: Nan Brian APRN  Authorized by: Nan Brian APRN   Comparison: compared with previous ECG from 3/23/2022  Similar to previous ECG  Rhythm: sinus rhythm  Rate: normal  Conduction: incomplete right  bundle branch block  QRS axis: left  Other findings: left ventricular hypertrophy    Clinical impression: abnormal EKG              Assessment:       Diagnosis Plan   1. Coronary artery disease involving native coronary artery of native heart without angina pectoris     2. History of myocardial infarction     3. Chronic systolic congestive heart failure (HCC)     4. NICM (nonischemic cardiomyopathy) ([facility])     5. Primary hypertension     6. Other hyperlipidemia     7. DELORES on CPAP     8. Stage 3a chronic kidney disease ([facility])  Ambulatory Referral to Nephrology          Plan:        1. Coronary Artery Disease  Assessment  • Denies angina  Plan  • Lifestyle modifications discussed include adhering to a heart healthy diet, avoidance of tobacco products, maintenance of a healthy weight, medication compliance, regular exercise and regular monitoring of cholesterol and blood pressure  Subjective - Objective  • There is a history of past MI  • Current antiplatelet therapy includes aspirin 81 mg      2.   NICM with chronic systolic CHF-    She continues to have chronic shortness of breath that is unchanged.  She remains on guideline directed medical therapy     3.  CHF- appears euvolemic.  She cannot tolerate the increased dose of Lasix.  Her creatinine has actually gone up even on her maintenance dose of 20 mg daily.  I have made a referral to nephrology.     5.  Hypertension- controlled     6.  Hyperlipidemia-continue on lipid-lowering therapy.  Currently on Krill oil only.    Labs as above    7.  DELORES-denies need for CPAP machine.  She states she does not need it she does not snore, does not feel daytime sleepiness.  We did discuss the correlation between untreated sleep apnea and cardiomyopathy.    8.-Kidney disease-I have made a referral for nephrology.    No changes, nephrology referral, stressed the importance of treating her sleep apnea  RTO in 6 months with JA    As always, it has been a pleasure to participate in your  patient's care. Thank you.       Sincerely,       PATI Eagle      Current Outpatient Medications:   •  aspirin 81 MG tablet, Take 81 mg by mouth., Disp: , Rfl:   •  B Complex Vitamins (VITAMIN B COMPLEX PO), Take 1 tablet by mouth Daily., Disp: , Rfl:   •  carvedilol (COREG) 12.5 MG tablet, Take 1 tablet by mouth 2 (Two) Times a Day., Disp: 180 tablet, Rfl: 3  •  Cholecalciferol (VITAMIN D) 2000 units capsule, Take 2,000 Units by mouth Daily., Disp: , Rfl:   •  CRANBERRY EXTRACT PO, Take 1 capsule by mouth Daily., Disp: , Rfl:   •  dilTIAZem (Cardizem) 30 MG tablet, Take 1 tablet by mouth 2 (Two) Times a Day., Disp: 180 tablet, Rfl: 3  •  furosemide (LASIX) 20 MG tablet, Take 1 tablet by mouth Daily. Take 1 tab in the morning and 1/2 tab at 2 pm, Disp: 135 tablet, Rfl: 3  •  MEGARED OMEGA-3 KRILL OIL PO, Take 1 capsule by mouth Daily., Disp: , Rfl:   •  omeprazole (priLOSEC) 40 MG capsule, Take 1 capsule by mouth Daily., Disp: 90 capsule, Rfl: 3  •  PROBIOTIC PRODUCT PO, Take 2 capsules by mouth Daily., Disp: , Rfl:   •  sacubitril-valsartan (Entresto) 24-26 MG tablet, Take 1 tablet by mouth 2 (Two) Times a Day., Disp: 180 tablet, Rfl: 3  •  Loratadine-D 24HR  MG per 24 hr tablet, TAKE ONE TABLET BY MOUTH DAILY, Disp: 30 tablet, Rfl: 3      Dictated utilizing Dragon dictation

## 2022-08-08 ENCOUNTER — OFFICE VISIT (OUTPATIENT)
Dept: FAMILY MEDICINE CLINIC | Facility: CLINIC | Age: 77
End: 2022-08-08

## 2022-08-08 VITALS
HEART RATE: 92 BPM | WEIGHT: 181 LBS | SYSTOLIC BLOOD PRESSURE: 118 MMHG | TEMPERATURE: 97.1 F | BODY MASS INDEX: 30.16 KG/M2 | DIASTOLIC BLOOD PRESSURE: 62 MMHG | OXYGEN SATURATION: 99 % | HEIGHT: 65 IN | RESPIRATION RATE: 16 BRPM

## 2022-08-08 DIAGNOSIS — I10 PRIMARY HYPERTENSION: ICD-10-CM

## 2022-08-08 DIAGNOSIS — I50.22 CHRONIC SYSTOLIC CONGESTIVE HEART FAILURE: ICD-10-CM

## 2022-08-08 DIAGNOSIS — N18.31 STAGE 3A CHRONIC KIDNEY DISEASE: ICD-10-CM

## 2022-08-08 DIAGNOSIS — I25.10 CORONARY ARTERY DISEASE INVOLVING NATIVE CORONARY ARTERY OF NATIVE HEART WITHOUT ANGINA PECTORIS: Primary | Chronic | ICD-10-CM

## 2022-08-08 DIAGNOSIS — E78.49 OTHER HYPERLIPIDEMIA: ICD-10-CM

## 2022-08-08 DIAGNOSIS — I42.8 NICM (NONISCHEMIC CARDIOMYOPATHY): Chronic | ICD-10-CM

## 2022-08-08 PROCEDURE — 1159F MED LIST DOCD IN RCRD: CPT | Performed by: INTERNAL MEDICINE

## 2022-08-08 PROCEDURE — G0439 PPPS, SUBSEQ VISIT: HCPCS | Performed by: INTERNAL MEDICINE

## 2022-08-08 PROCEDURE — 1170F FXNL STATUS ASSESSED: CPT | Performed by: INTERNAL MEDICINE

## 2022-08-08 PROCEDURE — 99214 OFFICE O/P EST MOD 30 MIN: CPT | Performed by: INTERNAL MEDICINE

## 2022-08-08 PROCEDURE — 96160 PT-FOCUSED HLTH RISK ASSMT: CPT | Performed by: INTERNAL MEDICINE

## 2022-08-08 NOTE — PROGRESS NOTES
The ABCs of the Annual Wellness Visit  Subsequent Medicare Wellness Visit    Chief Complaint   Patient presents with   • Hyperlipidemia     Needs AWV      Subjective    History of Present Illness:  Effie Donnelly is a 76 y.o. female who presents for a Subsequent Medicare Wellness Visit.    The following portions of the patient's history were reviewed and   updated as appropriate: allergies, current medications, past family history, past medical history, past social history, past surgical history and problem list.    Compared to one year ago, the patient feels her physical   health is the same.    Compared to one year ago, the patient feels her mental   health is the same.    Recent Hospitalizations:  She was not admitted to the hospital during the last year.       Current Medical Providers:  Patient Care Team:  Terrence Hawley MD as PCP - General (Internal Medicine)    Outpatient Medications Prior to Visit   Medication Sig Dispense Refill   • aspirin 81 MG tablet Take 81 mg by mouth.     • B Complex Vitamins (VITAMIN B COMPLEX PO) Take 1 tablet by mouth Daily.     • carvedilol (COREG) 12.5 MG tablet Take 1 tablet by mouth 2 (Two) Times a Day. 180 tablet 3   • Cholecalciferol (VITAMIN D) 2000 units capsule Take 2,000 Units by mouth Daily.     • CRANBERRY EXTRACT PO Take 1 capsule by mouth Daily.     • dilTIAZem (Cardizem) 30 MG tablet Take 1 tablet by mouth 2 (Two) Times a Day. 180 tablet 3   • furosemide (LASIX) 20 MG tablet Take 1 tablet by mouth Daily. Take 1 tab in the morning and 1/2 tab at 2 pm 135 tablet 3   • Loratadine-D 24HR  MG per 24 hr tablet TAKE ONE TABLET BY MOUTH DAILY 30 tablet 3   • MEGARED OMEGA-3 KRILL OIL PO Take 1 capsule by mouth Daily.     • omeprazole (priLOSEC) 40 MG capsule Take 1 capsule by mouth Daily. 90 capsule 3   • PROBIOTIC PRODUCT PO Take 2 capsules by mouth Daily.     • sacubitril-valsartan (Entresto) 24-26 MG tablet Take 1 tablet by mouth 2 (Two) Times a Day. 180 tablet 3  "    No facility-administered medications prior to visit.       No opioid medication identified on active medication list. I have reviewed chart for other potential  high risk medication/s and harmful drug interactions in the elderly.          Aspirin is on active medication list. Aspirin use is indicated based on review of current medical condition/s. Pros and cons of this therapy have been discussed today. Benefits of this medication outweigh potential harm.  Patient has been encouraged to continue taking this medication.  .      Patient Active Problem List   Diagnosis   • History of myocardial infarction   • Coronary artery disease involving native coronary artery of native heart without angina pectoris   • Ulcerative colitis (HCC)   • HTN (hypertension)   • GERD (gastroesophageal reflux disease)   • DJD (degenerative joint disease)   • Ductal 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   • Anemia   • Other hyperlipidemia   • NICM (nonischemic cardiomyopathy) (HCC)   • DELORES on CPAP   • Snoring   • Class 1 obesity due to excess calories without serious comorbidity with body mass index (BMI) of 31.0 to 31.9 in adult   • Chronic systolic congestive heart failure (HCC)   • Esophageal dysphagia   • Stage 3a chronic kidney disease (HCC)     Advance Care Planning  Advance Directive is not on file.            Objective    Vitals:    08/08/22 1108   BP: 118/62   BP Location: Left arm   Patient Position: Sitting   Cuff Size: Large Adult   Pulse: 92   Resp: 16   Temp: 97.1 °F (36.2 °C)   SpO2: 99%   Weight: 82.1 kg (181 lb)   Height: 165.1 cm (65\")     Estimated body mass index is 30.12 kg/m² as calculated from the following:    Height as of this encounter: 165.1 cm (65\").    Weight as of this encounter: 82.1 kg (181 lb).    BMI is >= 30 and <35. (Class 1 Obesity). The following options were offered after discussion;: exercise counseling/recommendations      Does the patient have evidence of cognitive impairment? No    Physical " Exam            HEALTH RISK ASSESSMENT    Smoking Status:  Social History     Tobacco Use   Smoking Status Former Smoker   • Quit date: 1973   • Years since quittin.8   Smokeless Tobacco Never Used   Tobacco Comment    CAFFEINE USE: 1 CUP DAILY     Alcohol Consumption:  Social History     Substance and Sexual Activity   Alcohol Use No     Fall Risk Screen:    JYOTSNA Fall Risk Assessment has not been completed.    Depression Screening:  PHQ-2/PHQ-9 Depression Screening 2021   Retired PHQ-9 Total Score 0   Retired Total Score 0       Health Habits and Functional and Cognitive Screening:  Functional & Cognitive Status 2022   Do you have difficulty preparing food and eating? No   Do you have difficulty bathing yourself, getting dressed or grooming yourself? No   Do you have difficulty using the toilet? No   Do you have difficulty moving around from place to place? No   Do you have trouble with steps or getting out of a bed or a chair? No   Current Diet Well Balanced Diet   Dental Exam Up to date   Eye Exam Up to date   Exercise (times per week) 2 times per week   Current Exercises Include Walking;Yard Work   Do you need help using the phone?  No   Are you deaf or do you have serious difficulty hearing?  No   Do you need help with transportation? No   Do you need help shopping? No   Do you need help preparing meals?  No   Do you need help with housework?  No   Do you need help with laundry? No   Do you need help taking your medications? No   Do you need help managing money? No   Do you ever drive or ride in a car without wearing a seat belt? No   Have you felt unusual stress, anger or loneliness in the last month? No   Who do you live with? Child   If you need help, do you have trouble finding someone available to you? No   Have you been bothered in the last four weeks by sexual problems? No   Do you have difficulty concentrating, remembering or making decisions? No       Age-appropriate Screening  Schedule:  Refer to the list below for future screening recommendations based on patient's age, sex and/or medical conditions. Orders for these recommended tests are listed in the plan section. The patient has been provided with a written plan.    Health Maintenance   Topic Date Due   • DXA SCAN  Never done   • TDAP/TD VACCINES (1 - Tdap) Never done   • ZOSTER VACCINE (1 of 2) 10/07/2022 (Originally 10/6/1996)   • INFLUENZA VACCINE  10/01/2022   • MAMMOGRAM  10/21/2022   • LIPID PANEL  04/20/2023              Assessment & Plan   CMS Preventative Services Quick Reference  Risk Factors Identified During Encounter  Fall Risk-High or Moderate  The above risks/problems have been discussed with the patient.  Follow up actions/plans if indicated are seen below in the Assessment/Plan Section.  Pertinent information has been shared with the patient in the After Visit Summary.    Diagnoses and all orders for this visit:    1. Coronary artery disease involving native coronary artery of native heart without angina pectoris (Primary)  -     Comprehensive Metabolic Panel  -     CBC & Differential  -     Lipid Panel With / Chol / HDL Ratio  -     TSH    2. NICM (nonischemic cardiomyopathy) (HCC)  -     Comprehensive Metabolic Panel  -     CBC & Differential  -     Lipid Panel With / Chol / HDL Ratio  -     TSH    3. Primary hypertension  -     Comprehensive Metabolic Panel  -     CBC & Differential  -     Lipid Panel With / Chol / HDL Ratio  -     TSH    4. Other hyperlipidemia  -     Comprehensive Metabolic Panel  -     CBC & Differential  -     Lipid Panel With / Chol / HDL Ratio  -     TSH    5. Chronic systolic congestive heart failure (HCC)  -     Comprehensive Metabolic Panel  -     CBC & Differential  -     Lipid Panel With / Chol / HDL Ratio  -     TSH    6. Stage 3a chronic kidney disease (HCC)        Follow Up:   No follow-ups on file.     An After Visit Summary and PPPS were made available to the patient.                  Continue diet and exercise.  Continue medication.  Colonoscopy every 10 years.  Fall precaution.  Keep abdomen is clear and lighted.  Continue with vaccination, flu shot every year, tetanus every 5 to 10 years.  Pneumonia every 5 to 10 years.  Return in 3 months time.

## 2022-08-08 NOTE — PROGRESS NOTES
Subjective   Effie Donnelly is a 76 y.o. female.     Chief Complaint   Patient presents with   • Hyperlipidemia     Needs AWV   Hypertension, CAD, cardiomyopathy, chronic kidney disease    History of Present Illness   Patient here follow-up for hypertension, hyperlipidemia, CAD, cardiomyopathy, CHF, chronic kidney disease.  Taking all current medications.  Patient recently saw nephrology.  No chest pain shortness of breath.  Blood pressure under control at home.  The following portions of the patient's history were reviewed and updated as appropriate: allergies, current medications, past family history, past medical history, past social history, past surgical history and problem list.    Review of Systems   Constitutional: Negative for activity change, appetite change, diaphoresis and unexpected weight gain.   Eyes: Negative for blurred vision and double vision.   Respiratory: Negative.  Negative for shortness of breath.    Cardiovascular: Negative for chest pain, palpitations and leg swelling.   Gastrointestinal: Negative.    Musculoskeletal: Negative for arthralgias and back pain.   Skin: Negative for skin lesions.   Neurological: Negative for dizziness, syncope, numbness and headache.   Psychiatric/Behavioral: Negative for agitation, decreased concentration, self-injury and sleep disturbance. The patient is not nervous/anxious.        Allergies   Allergen Reactions   • Nsaids GI Bleeding     CROHN'S DISEASE   • Adhesive Tape Other (See Comments)     BLISTERS UNDER TAGADERM       Current Outpatient Medications on File Prior to Visit   Medication Sig Dispense Refill   • aspirin 81 MG tablet Take 81 mg by mouth.     • B Complex Vitamins (VITAMIN B COMPLEX PO) Take 1 tablet by mouth Daily.     • carvedilol (COREG) 12.5 MG tablet Take 1 tablet by mouth 2 (Two) Times a Day. 180 tablet 3   • Cholecalciferol (VITAMIN D) 2000 units capsule Take 2,000 Units by mouth Daily.     • CRANBERRY EXTRACT PO Take 1 capsule by mouth  Daily.     • dilTIAZem (Cardizem) 30 MG tablet Take 1 tablet by mouth 2 (Two) Times a Day. 180 tablet 3   • furosemide (LASIX) 20 MG tablet Take 1 tablet by mouth Daily. Take 1 tab in the morning and 1/2 tab at 2 pm 135 tablet 3   • Loratadine-D 24HR  MG per 24 hr tablet TAKE ONE TABLET BY MOUTH DAILY 30 tablet 3   • MEGARED OMEGA-3 KRILL OIL PO Take 1 capsule by mouth Daily.     • omeprazole (priLOSEC) 40 MG capsule Take 1 capsule by mouth Daily. 90 capsule 3   • PROBIOTIC PRODUCT PO Take 2 capsules by mouth Daily.     • sacubitril-valsartan (Entresto) 24-26 MG tablet Take 1 tablet by mouth 2 (Two) Times a Day. 180 tablet 3     No current facility-administered medications on file prior to visit.       Family History   Problem Relation Age of Onset   • Hypertension Sister    • Cancer Sister    • Heart disease Brother 65        VALVE REPLACEMENT    • Hypertension Brother    • Hypertension Sister    • Hypertension Sister    • Hypertension Brother    • Diabetes Brother    • Hypertension Brother    • Cancer Brother    • Hypertension Brother    • Hypertension Brother    • Hypertension Brother        Past Medical History:   Diagnosis Date   • CAD (coronary artery disease)    • Cancer (HCC)     skin, right breast   • CHF (congestive heart failure) ([facility])    • Coronary artery disease involving native coronary artery of native heart without angina pectoris 11/1/2018   • Heart murmur    • History of myocardial infarction 11/1/2018   • Hypertension    • Myocardial infarction (HCC)     PER PT    • NICM (nonischemic cardiomyopathy) ([facility]) 7/8/2020   • DELORES on CPAP     AHI 15/h   • Sinus tachycardia    • SOB (shortness of breath)        Past Surgical History:   Procedure Laterality Date   • BUNIONECTOMY Bilateral    • CARDIAC CATHETERIZATION     • CARDIAC CATHETERIZATION N/A 01/14/2020    Procedure: Right and Left Heart Cath;  Surgeon: Bin Chacko MD;  Location: [facility] CATH INVASIVE LOCATION;  Service: Cardiovascular    • CARDIAC CATHETERIZATION N/A 2020    Procedure: Coronary angiography;  Surgeon: Bin Chacko MD;  Location:  [facility] CATH INVASIVE LOCATION;  Service: Cardiovascular   • CARDIAC CATHETERIZATION N/A 2020    Procedure: Left ventriculography;  Surgeon: Bin Chacko MD;  Location:  [facility] CATH INVASIVE LOCATION;  Service: Cardiovascular   • CATARACT EXTRACTION Left 2022   • COLONOSCOPY N/A 2022    Procedure: COLONOSCOPY WITH BIOPSIES; POLYPECTOMY;  Surgeon: Lorne Esquivel MD;  Location:  [facility] OR;  Service: Gastroenterology;  Laterality: N/A;  POLYP  ULCERATIVE COLITIS   • ENDOSCOPY N/A 2022    Procedure: ESOPHAGOGASTRODUODENOSCOPY WITH BIOPSIES;  Surgeon: Lorne Esquivel MD;  Location:  [facility] OR;  Service: Gastroenterology;  Laterality: N/A;  GASTRITIS  ESOPHAGEAL REFLUX  HIATAL HERNIA  ESOPHAGEAL CANDIDIASIS   • MASTECTOMY COMPLETE / SIMPLE  2019   • SHOULDER SURGERY Right    • SKIN CANCER EXCISION      ABOVE THE LIP.        Social History     Socioeconomic History   • Marital status:    • Number of children: 5   Tobacco Use   • Smoking status: Former Smoker     Quit date: 1973     Years since quittin.8   • Smokeless tobacco: Never Used   • Tobacco comment: CAFFEINE USE: 1 CUP DAILY   Vaping Use   • Vaping Use: Never used   Substance and Sexual Activity   • Alcohol use: No   • Drug use: No   • Sexual activity: Defer       Patient Active Problem List   Diagnosis   • History of myocardial infarction   • Coronary artery disease involving native coronary artery of native heart without angina pectoris   • Ulcerative colitis (HCC)   • HTN (hypertension)   • GERD (gastroesophageal reflux disease)   • DJD (degenerative joint disease)   • Ductal 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   • Anemia   • Other hyperlipidemia   • NICM (nonischemic cardiomyopathy) (HCC)   • DELORES on CPAP   • Snoring   • Class 1 obesity due to excess calories without  "serious comorbidity with body mass index (BMI) of 31.0 to 31.9 in adult   • Chronic systolic congestive heart failure (HCC)   • Esophageal dysphagia   • Stage 3a chronic kidney disease (HCC)       /62 (BP Location: Left arm, Patient Position: Sitting, Cuff Size: Large Adult)   Pulse 92   Temp 97.1 °F (36.2 °C)   Resp 16   Ht 165.1 cm (65\")   Wt 82.1 kg (181 lb)   SpO2 99%   BMI 30.12 kg/m²   Body mass index is 30.12 kg/m².    Objective   Physical Exam  Vitals and nursing note reviewed.   Constitutional:       Appearance: She is well-developed.   Eyes:      Pupils: Pupils are equal, round, and reactive to light.   Neck:      Thyroid: No thyromegaly.      Vascular: No JVD.      Trachea: No tracheal deviation.   Cardiovascular:      Rate and Rhythm: Normal rate and regular rhythm.      Heart sounds: No murmur heard.  Pulmonary:      Effort: Pulmonary effort is normal. No respiratory distress.      Breath sounds: Normal breath sounds. No wheezing.   Abdominal:      General: Bowel sounds are normal. There is no distension.      Palpations: Abdomen is soft. There is no mass.      Tenderness: There is no abdominal tenderness.      Hernia: No hernia is present.   Musculoskeletal:      Cervical back: Normal range of motion and neck supple.   Lymphadenopathy:      Cervical: No cervical adenopathy.   Neurological:      General: No focal deficit present.      Mental Status: She is alert and oriented to person, place, and time. Mental status is at baseline.   Psychiatric:         Mood and Affect: Mood normal.         Behavior: Behavior normal.           Assessment & Plan   Diagnoses and all orders for this visit:    1. Coronary artery disease involving native coronary artery of native heart without angina pectoris (Primary)  -     Comprehensive Metabolic Panel  -     CBC & Differential  -     Lipid Panel With / Chol / HDL Ratio  -     TSH    2. NICM (nonischemic cardiomyopathy) (HCC)  -     Comprehensive Metabolic " Panel  -     CBC & Differential  -     Lipid Panel With / Chol / HDL Ratio  -     TSH    3. Primary hypertension  -     Comprehensive Metabolic Panel  -     CBC & Differential  -     Lipid Panel With / Chol / HDL Ratio  -     TSH    4. Other hyperlipidemia  -     Comprehensive Metabolic Panel  -     CBC & Differential  -     Lipid Panel With / Chol / HDL Ratio  -     TSH    5. Chronic systolic congestive heart failure (HCC)  -     Comprehensive Metabolic Panel  -     CBC & Differential  -     Lipid Panel With / Chol / HDL Ratio  -     TSH    6. Stage 3a chronic kidney disease (HCC)    Continue diet and exercise.  Continue checking blood pressure at home.  Continue follow-up with specialist.  Continue taking Coreg, diltiazem, Lasix, Entresto, Prilosec.  Check blood pressure at home.  Continue follow-up.  Return in 3 months time.  All her problems are chronic and stable.  Awaiting labs  EHR dragon/transcription disclaimer:  Part of this note are created by electronic transcription/translation of spoken language to printed text and thus may lead to erroneous, or at times, nonsensical words or phrases inadvertently transcribed.  Although I have reviewed for such errors, some may still exist.

## 2022-08-09 LAB
ALBUMIN SERPL-MCNC: 4 G/DL (ref 3.7–4.7)
ALBUMIN/GLOB SERPL: 1.6 {RATIO} (ref 1.2–2.2)
ALP SERPL-CCNC: 99 IU/L (ref 44–121)
ALT SERPL-CCNC: 31 IU/L (ref 0–32)
AST SERPL-CCNC: 30 IU/L (ref 0–40)
BASOPHILS # BLD AUTO: 0 X10E3/UL (ref 0–0.2)
BASOPHILS NFR BLD AUTO: 1 %
BILIRUB SERPL-MCNC: 0.3 MG/DL (ref 0–1.2)
BUN SERPL-MCNC: 22 MG/DL (ref 8–27)
BUN/CREAT SERPL: 18 (ref 12–28)
CALCIUM SERPL-MCNC: 9.3 MG/DL (ref 8.7–10.3)
CHLORIDE SERPL-SCNC: 101 MMOL/L (ref 96–106)
CHOLEST SERPL-MCNC: 199 MG/DL (ref 100–199)
CHOLEST/HDLC SERPL: 3.1 RATIO (ref 0–4.4)
CO2 SERPL-SCNC: 24 MMOL/L (ref 20–29)
CREAT SERPL-MCNC: 1.21 MG/DL (ref 0.57–1)
EGFRCR SERPLBLD CKD-EPI 2021: 47 ML/MIN/1.73
EOSINOPHIL # BLD AUTO: 0.2 X10E3/UL (ref 0–0.4)
EOSINOPHIL NFR BLD AUTO: 5 %
ERYTHROCYTE [DISTWIDTH] IN BLOOD BY AUTOMATED COUNT: 12.9 % (ref 11.7–15.4)
GLOBULIN SER CALC-MCNC: 2.5 G/DL (ref 1.5–4.5)
GLUCOSE SERPL-MCNC: 90 MG/DL (ref 65–99)
HCT VFR BLD AUTO: 40.3 % (ref 34–46.6)
HDLC SERPL-MCNC: 64 MG/DL
HGB BLD-MCNC: 13.3 G/DL (ref 11.1–15.9)
IMM GRANULOCYTES # BLD AUTO: 0 X10E3/UL (ref 0–0.1)
IMM GRANULOCYTES NFR BLD AUTO: 0 %
LDLC SERPL CALC-MCNC: 114 MG/DL (ref 0–99)
LYMPHOCYTES # BLD AUTO: 1.9 X10E3/UL (ref 0.7–3.1)
LYMPHOCYTES NFR BLD AUTO: 37 %
MCH RBC QN AUTO: 28.4 PG (ref 26.6–33)
MCHC RBC AUTO-ENTMCNC: 33 G/DL (ref 31.5–35.7)
MCV RBC AUTO: 86 FL (ref 79–97)
MONOCYTES # BLD AUTO: 0.5 X10E3/UL (ref 0.1–0.9)
MONOCYTES NFR BLD AUTO: 9 %
NEUTROPHILS # BLD AUTO: 2.6 X10E3/UL (ref 1.4–7)
NEUTROPHILS NFR BLD AUTO: 48 %
PLATELET # BLD AUTO: 269 X10E3/UL (ref 150–450)
POTASSIUM SERPL-SCNC: 5.1 MMOL/L (ref 3.5–5.2)
PROT SERPL-MCNC: 6.5 G/DL (ref 6–8.5)
RBC # BLD AUTO: 4.69 X10E6/UL (ref 3.77–5.28)
SODIUM SERPL-SCNC: 138 MMOL/L (ref 134–144)
TRIGL SERPL-MCNC: 117 MG/DL (ref 0–149)
TSH SERPL DL<=0.005 MIU/L-ACNC: 4.66 UIU/ML (ref 0.45–4.5)
VLDLC SERPL CALC-MCNC: 21 MG/DL (ref 5–40)
WBC # BLD AUTO: 5.3 X10E3/UL (ref 3.4–10.8)

## 2022-08-10 ENCOUNTER — TELEPHONE (OUTPATIENT)
Dept: FAMILY MEDICINE CLINIC | Facility: CLINIC | Age: 77
End: 2022-08-10

## 2022-08-10 NOTE — TELEPHONE ENCOUNTER
Caller: Effie Donnelly    Relationship: Self    Best call back number: [phone]    What test was performed: LABS    When was the test performed: 08.08.22    Where was the test performed: IN OFFICE    Additional notes: PATIENT IS REQUESTING A CALL BACK WITH THE RESULTS OF THE LABS

## 2022-08-10 NOTE — TELEPHONE ENCOUNTER
Attempted to return call to patient but voicemail was full. Had previously left voicemail for patient regarding lab results

## 2022-08-18 ENCOUNTER — TRANSCRIBE ORDERS (OUTPATIENT)
Dept: ADMINISTRATIVE | Facility: HOSPITAL | Age: 77
End: 2022-08-18

## 2022-08-18 ENCOUNTER — LAB (OUTPATIENT)
Dept: LAB | Facility: HOSPITAL | Age: 77
End: 2022-08-18

## 2022-08-18 DIAGNOSIS — I12.9 HYPERTENSIVE NEPHROPATHY: ICD-10-CM

## 2022-08-18 DIAGNOSIS — I50.9 CHRONIC HEART FAILURE, UNSPECIFIED HEART FAILURE TYPE: ICD-10-CM

## 2022-08-18 DIAGNOSIS — N18.31 CHRONIC KIDNEY DISEASE (CKD) STAGE G3A/A1, MODERATELY DECREASED GLOMERULAR FILTRATION RATE (GFR) BETWEEN 45-59 ML/MIN/1.73 SQUARE METER AND ALBUMINURIA CREATININE RATIO LESS THAN 30 MG/G (CMS/H*: ICD-10-CM

## 2022-08-18 DIAGNOSIS — N18.31 CHRONIC KIDNEY DISEASE (CKD) STAGE G3A/A1, MODERATELY DECREASED GLOMERULAR FILTRATION RATE (GFR) BETWEEN 45-59 ML/MIN/1.73 SQUARE METER AND ALBUMINURIA CREATININE RATIO LESS THAN 30 MG/G (CMS/H*: Primary | ICD-10-CM

## 2022-08-18 LAB
ALBUMIN SERPL-MCNC: 4.1 G/DL (ref 3.5–5.2)
ALBUMIN UR-MCNC: 1.5 MG/DL
ANION GAP SERPL CALCULATED.3IONS-SCNC: 9.9 MMOL/L (ref 5–15)
BACTERIA UR QL AUTO: ABNORMAL /HPF
BILIRUB UR QL STRIP: NEGATIVE
BUN SERPL-MCNC: 21 MG/DL (ref 8–23)
BUN/CREAT SERPL: 17.1 (ref 7–25)
CALCIUM SPEC-SCNC: 9 MG/DL (ref 8.6–10.5)
CHLORIDE SERPL-SCNC: 101 MMOL/L (ref 98–107)
CLARITY UR: CLEAR
CO2 SERPL-SCNC: 25.1 MMOL/L (ref 22–29)
COLOR UR: YELLOW
CREAT SERPL-MCNC: 1.23 MG/DL (ref 0.57–1)
CREAT UR-MCNC: 69 MG/DL
CREAT UR-MCNC: 69 MG/DL
EGFRCR SERPLBLD CKD-EPI 2021: 45.9 ML/MIN/1.73
GLUCOSE SERPL-MCNC: 82 MG/DL (ref 65–99)
GLUCOSE UR STRIP-MCNC: NEGATIVE MG/DL
HGB UR QL STRIP.AUTO: NEGATIVE
HYALINE CASTS UR QL AUTO: ABNORMAL /LPF
KETONES UR QL STRIP: NEGATIVE
LEUKOCYTE ESTERASE UR QL STRIP.AUTO: ABNORMAL
MICROALBUMIN/CREAT UR: 21.7 MG/G
NITRITE UR QL STRIP: NEGATIVE
PH UR STRIP.AUTO: 6 [PH] (ref 5–8)
PHOSPHATE SERPL-MCNC: 4.2 MG/DL (ref 2.5–4.5)
POTASSIUM SERPL-SCNC: 4.2 MMOL/L (ref 3.5–5.2)
PROT ?TM UR-MCNC: 14.1 MG/DL
PROT UR QL STRIP: NEGATIVE
PROT/CREAT UR: 204.3 MG/G CREA (ref 0–200)
RBC # UR STRIP: ABNORMAL /HPF
REF LAB TEST METHOD: ABNORMAL
SODIUM SERPL-SCNC: 136 MMOL/L (ref 136–145)
SP GR UR STRIP: 1.01 (ref 1–1.03)
SQUAMOUS #/AREA URNS HPF: ABNORMAL /HPF
UROBILINOGEN UR QL STRIP: ABNORMAL
WBC # UR STRIP: ABNORMAL /HPF

## 2022-08-18 PROCEDURE — 80069 RENAL FUNCTION PANEL: CPT

## 2022-08-18 PROCEDURE — 81001 URINALYSIS AUTO W/SCOPE: CPT

## 2022-08-18 PROCEDURE — 82570 ASSAY OF URINE CREATININE: CPT

## 2022-08-18 PROCEDURE — 36415 COLL VENOUS BLD VENIPUNCTURE: CPT

## 2022-08-18 PROCEDURE — 84156 ASSAY OF PROTEIN URINE: CPT

## 2022-08-18 PROCEDURE — 82043 UR ALBUMIN QUANTITATIVE: CPT

## 2022-11-02 ENCOUNTER — OFFICE VISIT (OUTPATIENT)
Dept: FAMILY MEDICINE CLINIC | Facility: CLINIC | Age: 77
End: 2022-11-02

## 2022-11-02 ENCOUNTER — HOSPITAL ENCOUNTER (OUTPATIENT)
Dept: GENERAL RADIOLOGY | Facility: HOSPITAL | Age: 77
Discharge: HOME | End: 2022-11-02
Admitting: INTERNAL MEDICINE

## 2022-11-02 VITALS
HEART RATE: 58 BPM | HEIGHT: 65 IN | WEIGHT: 179 LBS | BODY MASS INDEX: 29.82 KG/M2 | OXYGEN SATURATION: 99 % | RESPIRATION RATE: 18 BRPM | TEMPERATURE: 97.9 F | DIASTOLIC BLOOD PRESSURE: 80 MMHG | SYSTOLIC BLOOD PRESSURE: 134 MMHG

## 2022-11-02 DIAGNOSIS — J18.9 PNEUMONIA OF LEFT LOWER LOBE DUE TO INFECTIOUS ORGANISM: ICD-10-CM

## 2022-11-02 DIAGNOSIS — I10 PRIMARY HYPERTENSION: ICD-10-CM

## 2022-11-02 DIAGNOSIS — I25.10 CORONARY ARTERY DISEASE INVOLVING NATIVE CORONARY ARTERY OF NATIVE HEART WITHOUT ANGINA PECTORIS: Primary | Chronic | ICD-10-CM

## 2022-11-02 DIAGNOSIS — J01.90 ACUTE NON-RECURRENT SINUSITIS, UNSPECIFIED LOCATION: ICD-10-CM

## 2022-11-02 DIAGNOSIS — N18.31 STAGE 3A CHRONIC KIDNEY DISEASE: ICD-10-CM

## 2022-11-02 DIAGNOSIS — E78.49 OTHER HYPERLIPIDEMIA: ICD-10-CM

## 2022-11-02 DIAGNOSIS — I50.22 CHRONIC SYSTOLIC CONGESTIVE HEART FAILURE: ICD-10-CM

## 2022-11-02 DIAGNOSIS — K21.9 GASTROESOPHAGEAL REFLUX DISEASE WITHOUT ESOPHAGITIS: ICD-10-CM

## 2022-11-02 PROCEDURE — 99214 OFFICE O/P EST MOD 30 MIN: CPT | Performed by: INTERNAL MEDICINE

## 2022-11-02 PROCEDURE — 90662 IIV NO PRSV INCREASED AG IM: CPT | Performed by: INTERNAL MEDICINE

## 2022-11-02 PROCEDURE — G0008 ADMIN INFLUENZA VIRUS VAC: HCPCS | Performed by: INTERNAL MEDICINE

## 2022-11-02 PROCEDURE — 71046 X-RAY EXAM CHEST 2 VIEWS: CPT

## 2022-11-02 RX ORDER — AZITHROMYCIN 250 MG/1
TABLET, FILM COATED ORAL
Qty: 6 TABLET | Refills: 0 | Status: ON HOLD | OUTPATIENT
Start: 2022-11-02 | End: 2023-02-13

## 2022-11-02 NOTE — PROGRESS NOTES
Prechart completed. Will discuss immunization at visit.   Subjective   Effie Donnelly is a 77 y.o. female.     Chief Complaint   Patient presents with   • Hypertension   Hyperlipidemia, CHF, CAD.    History of Present Illness   Follow-up for hypertension, hyperlipidemia, CHF, CAD, CKD 3.  Denies any chest pain or shortness of breath.  Reports cough and congestion, sinus symptoms for about 2 weeks.  Not better.  No fever or chills.  She had 2 COVID-19 test that are negative.  Exertional shortness of breath.  The following portions of the patient's history were reviewed and updated as appropriate: allergies, current medications, past family history, past medical history, past social history, past surgical history and problem list.    Review of Systems   Constitutional: Negative for activity change, appetite change, fatigue and fever.   Eyes: Negative for blurred vision and double vision.   Respiratory: Positive for cough and shortness of breath. Negative for apnea, choking, chest tightness, wheezing and stridor.    Cardiovascular: Negative for chest pain, palpitations and leg swelling.   Gastrointestinal: Negative.    Genitourinary: Negative for dysuria and hematuria.   Neurological: Negative for dizziness, syncope, light-headedness and headache.   Psychiatric/Behavioral: Negative for agitation, behavioral problems, decreased concentration and depressed mood.       Allergies   Allergen Reactions   • Nsaids GI Bleeding     CROHN'S DISEASE   • Adhesive Tape Other (See Comments)     BLISTERS UNDER TAGADERM       Current Outpatient Medications on File Prior to Visit   Medication Sig Dispense Refill   • aspirin 81 MG tablet Take 81 mg by mouth.     • B Complex Vitamins (VITAMIN B COMPLEX PO) Take 1 tablet by mouth Daily.     • carvedilol (COREG) 12.5 MG tablet Take 1 tablet by mouth 2 (Two) Times a Day. 180 tablet 3   • Cholecalciferol (VITAMIN D) 2000 units capsule Take 2,000 Units by mouth Daily.     • CRANBERRY EXTRACT PO Take 1 capsule by mouth Daily.     • dilTIAZem (Cardizem)  30 MG tablet Take 1 tablet by mouth 2 (Two) Times a Day. 180 tablet 3   • furosemide (LASIX) 20 MG tablet Take 1 tablet by mouth Daily. Take 1 tab in the morning and 1/2 tab at 2 pm 135 tablet 3   • Loratadine-D 24HR  MG per 24 hr tablet TAKE ONE TABLET BY MOUTH DAILY 30 tablet 3   • MEGARED OMEGA-3 KRILL OIL PO Take 1 capsule by mouth Daily.     • omeprazole (priLOSEC) 40 MG capsule Take 1 capsule by mouth Daily. 90 capsule 3   • PROBIOTIC PRODUCT PO Take 2 capsules by mouth Daily.     • sacubitril-valsartan (Entresto) 24-26 MG tablet Take 1 tablet by mouth 2 (Two) Times a Day. 180 tablet 3     No current facility-administered medications on file prior to visit.       Family History   Problem Relation Age of Onset   • Hypertension Sister    • Cancer Sister    • Heart disease Brother 65        VALVE REPLACEMENT    • Hypertension Brother    • Hypertension Sister    • Hypertension Sister    • Hypertension Brother    • Diabetes Brother    • Hypertension Brother    • Cancer Brother    • Hypertension Brother    • Hypertension Brother    • Hypertension Brother        Past Medical History:   Diagnosis Date   • CAD (coronary artery disease)    • Cancer (HCC)     skin, right breast   • CHF (congestive heart failure) ([facility])    • Coronary artery disease involving native coronary artery of native heart without angina pectoris 11/1/2018   • Heart murmur    • History of myocardial infarction 11/1/2018   • Hypertension    • Myocardial infarction (HCC)     PER PT    • NICM (nonischemic cardiomyopathy) ([facility]) 7/8/2020   • DELORES on CPAP     AHI 15/h   • Sinus tachycardia    • SOB (shortness of breath)        Past Surgical History:   Procedure Laterality Date   • BUNIONECTOMY Bilateral    • CARDIAC CATHETERIZATION     • CARDIAC CATHETERIZATION N/A 01/14/2020    Procedure: Right and Left Heart Cath;  Surgeon: Bin Chacko MD;  Location: [facility] INVASIVE LOCATION;  Service: Cardiovascular   • CARDIAC CATHETERIZATION N/A  2020    Procedure: Coronary angiography;  Surgeon: Bin Chacko MD;  Location:  [facility] INVASIVE LOCATION;  Service: Cardiovascular   • CARDIAC CATHETERIZATION N/A 2020    Procedure: Left ventriculography;  Surgeon: Bin Chacko MD;  Location:  [facility] INVASIVE LOCATION;  Service: Cardiovascular   • CATARACT EXTRACTION Left 2022   • COLONOSCOPY N/A 2022    Procedure: COLONOSCOPY WITH BIOPSIES; POLYPECTOMY;  Surgeon: Lorne Esquivel MD;  Location: [facility] OR;  Service: Gastroenterology;  Laterality: N/A;  POLYP  ULCERATIVE COLITIS   • ENDOSCOPY N/A 2022    Procedure: ESOPHAGOGASTRODUODENOSCOPY WITH BIOPSIES;  Surgeon: Lorne Esquivel MD;  Location: [facility] OR;  Service: Gastroenterology;  Laterality: N/A;  GASTRITIS  ESOPHAGEAL REFLUX  HIATAL HERNIA  ESOPHAGEAL CANDIDIASIS   • MASTECTOMY COMPLETE / SIMPLE  2019   • SHOULDER SURGERY Right    • SKIN CANCER EXCISION      ABOVE THE LIP.        Social History     Socioeconomic History   • Marital status:    • Number of children: 5   Tobacco Use   • Smoking status: Former     Types: Cigarettes     Quit date: 1973     Years since quittin.0   • Smokeless tobacco: Never   • Tobacco comments:     CAFFEINE USE: 1 CUP DAILY   Vaping Use   • Vaping Use: Never used   Substance and Sexual Activity   • Alcohol use: No   • Drug use: No   • Sexual activity: Defer       Patient Active Problem List   Diagnosis   • History of myocardial infarction   • Coronary artery disease involving native coronary artery of native heart without angina pectoris   • Ulcerative colitis (HCC)   • HTN (hypertension)   • GERD (gastroesophageal reflux disease)   • DJD (degenerative joint disease)   • Ductal 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   • Anemia   • Other hyperlipidemia   • NICM (nonischemic cardiomyopathy) (HCC)   • DELORES on CPAP   • Snoring   • Class 1 obesity due to excess calories without serious  "comorbidity with body mass index (BMI) of 31.0 to 31.9 in adult   • Chronic systolic congestive heart failure (HCC)   • Esophageal dysphagia   • Stage 3a chronic kidney disease (HCC)       /80 (BP Location: Left arm, Patient Position: Sitting, Cuff Size: Large Adult)   Pulse 58   Temp 97.9 °F (36.6 °C)   Resp 18   Ht 165.1 cm (65\")   Wt 81.2 kg (179 lb)   SpO2 99%   BMI 29.79 kg/m²   Body mass index is 29.79 kg/m².    Objective   Physical Exam  Vitals and nursing note reviewed.   Constitutional:       Appearance: She is well-developed.   Eyes:      Pupils: Pupils are equal, round, and reactive to light.   Neck:      Thyroid: No thyromegaly.      Vascular: No JVD.      Trachea: No tracheal deviation.   Cardiovascular:      Rate and Rhythm: Normal rate and regular rhythm.      Heart sounds: No murmur heard.  Pulmonary:      Effort: Pulmonary effort is normal. No respiratory distress.      Breath sounds: Normal breath sounds. No wheezing.   Abdominal:      General: Bowel sounds are normal. There is no distension.      Palpations: Abdomen is soft. There is no mass.      Tenderness: There is no abdominal tenderness. There is no right CVA tenderness, left CVA tenderness, guarding or rebound.      Hernia: No hernia is present.   Musculoskeletal:      Cervical back: Normal range of motion and neck supple.   Lymphadenopathy:      Cervical: No cervical adenopathy.   Neurological:      General: No focal deficit present.      Mental Status: She is alert and oriented to person, place, and time.   Psychiatric:         Mood and Affect: Mood normal.           Assessment & Plan   Diagnoses and all orders for this visit:    1. Coronary artery disease involving native coronary artery of native heart without angina pectoris (Primary)  -     CBC & Differential  -     Comprehensive Metabolic Panel  -     Lipid Panel With / Chol / HDL Ratio  -     TSH    2. Primary hypertension  -     CBC & Differential  -     Comprehensive " Metabolic Panel  -     Lipid Panel With / Chol / HDL Ratio  -     TSH    3. Other hyperlipidemia  -     CBC & Differential  -     Comprehensive Metabolic Panel  -     Lipid Panel With / Chol / HDL Ratio  -     TSH    4. Chronic systolic congestive heart failure (HCC)    5. Gastroesophageal reflux disease without esophagitis    6. Stage 3a chronic kidney disease (HCC)    7. Acute non-recurrent sinusitis, unspecified location    8. Pneumonia of left lower lobe due to infectious organism  -     XR Chest 2 View; Future    Other orders  -     azithromycin (ZITHROMAX) 250 MG tablet; Take 2 tablets the first day, then 1 tablet daily for 4 days.  Dispense: 6 tablet; Refill: 0  -     Fluzone High-Dose 65+yrs (2331-4416)    Check blood pressure at home.  Z-Aubrey prescribed.  Patient to take over-the-counter Mucinex.  If worse needs to go to the ER.  Get chest x-ray.  Suspecting pneumonia on the left side.  Patient oxygen is good, has no fever in no distress does not need hospitalization for now.  Continue taking Entresto, Cardizem, Coreg, Lasix, aspirin and, Prilosec.  Prilosec is helping her GERD symptoms.  Rest of her problems are chronic and stable..  Cannot tolerate statins.  Patient CPK stays high at baseline.  Return in 3 months time before if it worse.  Keeps her CPK high at baseline.  Has difficulty starting a statin.  Patient had believed side effects with muscle pains.  We will check a CPK if it is under control I can restart Crestor or Lipitor.  If cannot tolerate I will let cardiology decide if they want to start her on Repatha  EHR dragon/transcription disclaimer:  Part of this note are created by electronic transcription/translation of spoken language to printed text and thus may lead to erroneous, or at times, nonsensical words or phrases inadvertently transcribed.  Although I have reviewed for such errors, some may still exist.

## 2022-11-03 LAB
ALBUMIN SERPL-MCNC: 3.9 G/DL (ref 3.5–5.2)
ALBUMIN/GLOB SERPL: 1.5 G/DL
ALP SERPL-CCNC: 111 U/L (ref 39–117)
ALT SERPL-CCNC: 16 U/L (ref 1–33)
AST SERPL-CCNC: 18 U/L (ref 1–32)
BASOPHILS # BLD AUTO: 0.02 10*3/MM3 (ref 0–0.2)
BASOPHILS NFR BLD AUTO: 0.4 % (ref 0–1.5)
BILIRUB SERPL-MCNC: 0.4 MG/DL (ref 0–1.2)
BUN SERPL-MCNC: 18 MG/DL (ref 8–23)
BUN/CREAT SERPL: 16.7 (ref 7–25)
CALCIUM SERPL-MCNC: 9.2 MG/DL (ref 8.6–10.5)
CHLORIDE SERPL-SCNC: 104 MMOL/L (ref 98–107)
CHOLEST SERPL-MCNC: 205 MG/DL (ref 0–200)
CHOLEST/HDLC SERPL: 3.8 {RATIO}
CK SERPL-CCNC: 107 U/L (ref 20–180)
CO2 SERPL-SCNC: 28 MMOL/L (ref 22–29)
CREAT SERPL-MCNC: 1.08 MG/DL (ref 0.57–1)
EGFRCR SERPLBLD CKD-EPI 2021: 53.3 ML/MIN/1.73
EOSINOPHIL # BLD AUTO: 0.24 10*3/MM3 (ref 0–0.4)
EOSINOPHIL NFR BLD AUTO: 5.4 % (ref 0.3–6.2)
ERYTHROCYTE [DISTWIDTH] IN BLOOD BY AUTOMATED COUNT: 12.9 % (ref 12.3–15.4)
GLOBULIN SER CALC-MCNC: 2.6 GM/DL
GLUCOSE SERPL-MCNC: 83 MG/DL (ref 65–99)
HCT VFR BLD AUTO: 38.7 % (ref 34–46.6)
HDLC SERPL-MCNC: 54 MG/DL (ref 40–60)
HGB BLD-MCNC: 13.1 G/DL (ref 12–15.9)
IMM GRANULOCYTES # BLD AUTO: 0.01 10*3/MM3 (ref 0–0.05)
IMM GRANULOCYTES NFR BLD AUTO: 0.2 % (ref 0–0.5)
LDLC SERPL CALC-MCNC: 131 MG/DL (ref 0–100)
LYMPHOCYTES # BLD AUTO: 1.63 10*3/MM3 (ref 0.7–3.1)
LYMPHOCYTES NFR BLD AUTO: 36.6 % (ref 19.6–45.3)
MCH RBC QN AUTO: 28.5 PG (ref 26.6–33)
MCHC RBC AUTO-ENTMCNC: 33.9 G/DL (ref 31.5–35.7)
MCV RBC AUTO: 84.3 FL (ref 79–97)
MONOCYTES # BLD AUTO: 0.38 10*3/MM3 (ref 0.1–0.9)
MONOCYTES NFR BLD AUTO: 8.5 % (ref 5–12)
NEUTROPHILS # BLD AUTO: 2.17 10*3/MM3 (ref 1.7–7)
NEUTROPHILS NFR BLD AUTO: 48.9 % (ref 42.7–76)
NRBC BLD AUTO-RTO: 0 /100 WBC (ref 0–0.2)
PLATELET # BLD AUTO: 191 10*3/MM3 (ref 140–450)
POTASSIUM SERPL-SCNC: 4.3 MMOL/L (ref 3.5–5.2)
PROT SERPL-MCNC: 6.5 G/DL (ref 6–8.5)
RBC # BLD AUTO: 4.59 10*6/MM3 (ref 3.77–5.28)
SODIUM SERPL-SCNC: 140 MMOL/L (ref 136–145)
TRIGL SERPL-MCNC: 113 MG/DL (ref 0–150)
TSH SERPL DL<=0.005 MIU/L-ACNC: 4.12 UIU/ML (ref 0.27–4.2)
VLDLC SERPL CALC-MCNC: 20 MG/DL (ref 5–40)
WBC # BLD AUTO: 4.45 10*3/MM3 (ref 3.4–10.8)

## 2022-11-03 RX ORDER — ROSUVASTATIN CALCIUM 5 MG/1
5 TABLET, COATED ORAL DAILY
Qty: 90 TABLET | Refills: 2 | Status: SHIPPED | OUTPATIENT
Start: 2022-11-03 | End: 2023-02-22 | Stop reason: SDUPTHER

## 2022-12-01 ENCOUNTER — OFFICE VISIT (OUTPATIENT)
Dept: CARDIOLOGY | Facility: CLINIC | Age: 77
End: 2022-12-01

## 2022-12-01 VITALS
HEIGHT: 65 IN | BODY MASS INDEX: 29.99 KG/M2 | SYSTOLIC BLOOD PRESSURE: 139 MMHG | HEART RATE: 106 BPM | WEIGHT: 180 LBS | DIASTOLIC BLOOD PRESSURE: 77 MMHG

## 2022-12-01 DIAGNOSIS — I42.8 NICM (NONISCHEMIC CARDIOMYOPATHY): Chronic | ICD-10-CM

## 2022-12-01 DIAGNOSIS — R06.02 SOB (SHORTNESS OF BREATH): ICD-10-CM

## 2022-12-01 DIAGNOSIS — I25.10 CORONARY ARTERY DISEASE INVOLVING NATIVE CORONARY ARTERY OF NATIVE HEART WITHOUT ANGINA PECTORIS: Primary | Chronic | ICD-10-CM

## 2022-12-01 DIAGNOSIS — I25.2 HISTORY OF MYOCARDIAL INFARCTION: Chronic | ICD-10-CM

## 2022-12-01 PROCEDURE — 99442 PR PHYS/QHP TELEPHONE EVALUATION 11-20 MIN: CPT | Performed by: INTERNAL MEDICINE

## 2022-12-01 NOTE — PROGRESS NOTES
Subjective:     Encounter Date: 12/01/22        Patient ID: Effie Donnelly is a 77 y.o. female.    Chief Complaint: preop cards  History of Present Illness    Dear Dr. Terrazas,    She has a history of a nonischemic cardiomyopathy.  She also has a history of underlying mild CAD and a old myocardial infarction.  She is on guideline directed medical therapy, which was titrated in January.    This patient has consented to a telehealth visit via audio. The visit was scheduled as a audio visit to comply with patient safety concerns in accordance with CDC recommendations.  All vitals recorded within this visit are reported by the patient.  I spent  15 minutes in total including but not limited to the 11 minutes spent in direct conversation with this patient.  We had a telehealth visit today because she had contact her office this morning to let us know that a family member was diagnosed yesterday with COVID.    Currently she does not have any COVID symptoms.  She is fully vaccinated and boosted.  She had issues with pneumonia at the beginning of the month but tested negative for COVID at that time.  She still very short of breath, that has been ongoing.  No chest pain or chest discomfort.  She had a chest x-ray at the beginning of the month that showed no infiltrates, no evidence of congestive heart failure.  She was getting very short of breath with activity even before she had pneumonia.    Cath 1/2020:  Findings:  1. Coronary Artery Anatomy:  Dominance: Left  Left Main: Angiographically normal  Left Anterior Descending: The LAD is somewhat small in caliber size.  Contains a 30 to 40% proximal segment stenosis followed by luminal irregularities distally.  There are no appreciable diagonals from the LAD.  Circumflex Artery: Large in caliber size.  Luminal irregularities throughout supplies a mid and inferior marginal branch vessels.  Also supplies a left-sided PDA which contains luminal regularities.  Ramus: Large  ramus almost takes over for LAD and is branching and bifurcating.  Luminal irregularities throughout  Right Coronary Artery: Nondominant     2. Hemodynamics:  Right Atrium: 8/5/4 mmHg  Right Ventricle: 31/2/5 mmHg  Pulmonary Artery: 34/11/23 mmHg  Pulmonary Wedge: 22/21/20 mmHg  Left Ventricle: 184/16/23 mmHg  Aorta: 178/64/107 mmHg  Cardiac Output/Index: 3.53 L/min 1.92 L/min/m2  PA Sat: 66 %  AO Sat: 96 %     3. Left Ventriculogram:  Ejection Fraction: 20 %  Wall Motion: Global  Mitral Regurgitation: None     Conclusions:  1. Left dominant system with diminutive LAD which may explain her stress test findings but otherwise nonischemic cardiomyopathy with only a 30 to 40% proximal LAD stenoses.  2. Currently elevated left-sided filling pressures with EDP of 23 mmHg  3. Currently reduced cardiac output and index of 3.5 and 1.9       I saw her initially November 2018. She was previously seen at [facility].  She has a history of CAD and myocardial infarction.  She had an echocardiogram in November that showed normal function.  She does have a history of an abnormal EKG with LVH as well as diffuse repolarization changes.  She just had an EKG performed at [facility] there was unchanged from prior EKGs.      The following portions of the patient's history were reviewed and updated as appropriate: allergies, current medications, past family history, past medical history, past social history, past surgical history and problem list.    Past Medical History:   Diagnosis Date   • CAD (coronary artery disease)    • Cancer ([facility])     skin, right breast   • CHF (congestive heart failure) ([facility])    • Coronary artery disease involving native coronary artery of native heart without angina pectoris 11/1/2018   • Heart murmur    • History of myocardial infarction 11/1/2018   • Hypertension    • Myocardial infarction ([facility])     PER PT    • NICM (nonischemic cardiomyopathy) ([facility]) 7/8/2020   • DELORES on CPAP     AHI 15/h   • Sinus  "tachycardia    • SOB (shortness of breath)        Past Surgical History:   Procedure Laterality Date   • BUNIONECTOMY Bilateral    • CARDIAC CATHETERIZATION     • CARDIAC CATHETERIZATION N/A 2020    Procedure: Right and Left Heart Cath;  Surgeon: Bin Chacko MD;  Location:  [facility] INV[facility];  Service: Cardiovascular   • CARDIAC CATHETERIZATION N/A 2020    Procedure: Coronary angiography;  Surgeon: Bin Chacko MD;  Location:  [facility] INV[facility];  Service: Cardiovascular   • CARDIAC CATHETERIZATION N/A 2020    Procedure: Left ventriculography;  Surgeon: Bin Chacko MD;  Location:  [facility];  Service: Cardiovascular   • CATARACT EXTRACTION Left 2022   • COLONOSCOPY N/A 2022    Procedure: COLONOSCOPY WITH BIOPSIES; POLYPECTOMY;  Surgeon: Lorne Esquivel MD;  Location:  [facility] OR;  Service: Gastroenterology;  Laterality: N/A;  POLYP  ULCERATIVE COLITIS   • ENDOSCOPY N/A 2022    Procedure: ESOPHAGOGASTRODUODENOSCOPY WITH BIOPSIES;  Surgeon: Lorne Esquivel MD;  Location:  [facility] OR;  Service: Gastroenterology;  Laterality: N/A;  GASTRITIS  ESOPHAGEAL REFLUX  HIATAL HERNIA  ESOPHAGEAL CANDIDIASIS   • MASTECTOMY COMPLETE / SIMPLE  2019   • SHOULDER SURGERY Right    • SKIN CANCER EXCISION      ABOVE THE LIP.        Social History     Socioeconomic History   • Marital status:    • Number of children: 5   Tobacco Use   • Smoking status: Former     Types: Cigarettes     Quit date: 1973     Years since quittin.1   • Smokeless tobacco: Never   • Tobacco comments:     CAFFEINE USE: 1 CUP DAILY   Vaping Use   • Vaping Use: Never used   Substance and Sexual Activity   • Alcohol use: No   • Drug use: No   • Sexual activity: Defer         Procedures       Objective:     Vitals:    22 0918   BP: 139/77   Pulse: 106   Weight: 81.6 kg (180 lb)   Height: 165.1 cm (65\")   Alert and oriented x3, " thought processes normal, judgment normal, speaking in full sentences with no wheezing and no respiratory distress. Hearing is appropriate without difficulty.      Lab Review:             Lab Results   Component Value Date    GLUCOSE 83 11/02/2022    BUN 18 11/02/2022    CREATININE 1.08 (H) 11/02/2022    EGFRIFNONA 43 (L) 02/08/2022    EGFRIFAFRI 50 (L) 02/08/2022    BCR 16.7 11/02/2022    K 4.3 11/02/2022    CO2 28.0 11/02/2022    CALCIUM 9.2 11/02/2022    PROTENTOTREF 6.5 11/02/2022    ALBUMIN 3.90 11/02/2022    LABIL2 1.5 11/02/2022    AST 18 11/02/2022    ALT 16 11/02/2022             Assessment:          Diagnosis Plan   1. Coronary artery disease involving native coronary artery of native heart without angina pectoris        2. History of myocardial infarction        3. NICM (nonischemic cardiomyopathy) (HCC)               Plan:       1. Coronary Artery Disease  Assessment  • The patient has no angina    Plan  • Lifestyle modifications discussed include adhering to a heart healthy diet, avoidance of tobacco products, maintenance of a healthy weight, medication compliance, regular exercise and regular monitoring of cholesterol and blood pressure    Subjective - Objective  • There is a history of past MI  • Current antiplatelet therapy includes aspirin 81 mg    2.   NICM with chronic systolic CHF-continue guideline directed medical therapy,   3.  Dyspnea on exertion, continues to be an issue and some progression, we will repeat an echocardiogram and see her back in the office after that.    Thank you very much for allowing us to participate in the care of this pleasant patient.  Please don't hesitate to call if I can be of assistance in any way.      Current Outpatient Medications:   •  aspirin 81 MG tablet, Take 81 mg by mouth., Disp: , Rfl:   •  B Complex Vitamins (VITAMIN B COMPLEX PO), Take 1 tablet by mouth Daily., Disp: , Rfl:   •  carvedilol (COREG) 12.5 MG tablet, Take 1 tablet by mouth 2 (Two) Times a  Day., Disp: 180 tablet, Rfl: 3  •  Cholecalciferol (VITAMIN D) 2000 units capsule, Take 2,000 Units by mouth Daily., Disp: , Rfl:   •  CRANBERRY EXTRACT PO, Take 1 capsule by mouth Daily., Disp: , Rfl:   •  dilTIAZem (Cardizem) 30 MG tablet, Take 1 tablet by mouth 2 (Two) Times a Day., Disp: 180 tablet, Rfl: 3  •  furosemide (LASIX) 20 MG tablet, Take 1 tablet by mouth Daily. Take 1 tab in the morning and 1/2 tab at 2 pm (Patient taking differently: Take 20 mg by mouth Daily.), Disp: 135 tablet, Rfl: 3  •  Loratadine-D 24HR  MG per 24 hr tablet, TAKE ONE TABLET BY MOUTH DAILY, Disp: 30 tablet, Rfl: 3  •  MEGARED OMEGA-3 KRILL OIL PO, Take 1 capsule by mouth Daily., Disp: , Rfl:   •  omeprazole (priLOSEC) 40 MG capsule, Take 1 capsule by mouth Daily., Disp: 90 capsule, Rfl: 3  •  PROBIOTIC PRODUCT PO, Take 2 capsules by mouth Daily., Disp: , Rfl:   •  rosuvastatin (Crestor) 5 MG tablet, Take 1 tablet by mouth Daily., Disp: 90 tablet, Rfl: 2  •  sacubitril-valsartan (Entresto) 24-26 MG tablet, Take 1 tablet by mouth 2 (Two) Times a Day., Disp: 180 tablet, Rfl: 3  •  azithromycin (ZITHROMAX) 250 MG tablet, Take 2 tablets the first day, then 1 tablet daily for 4 days., Disp: 6 tablet, Rfl: 0       No follow-ups on file.

## 2022-12-06 ENCOUNTER — TELEPHONE (OUTPATIENT)
Dept: FAMILY MEDICINE CLINIC | Facility: CLINIC | Age: 77
End: 2022-12-06

## 2022-12-06 NOTE — TELEPHONE ENCOUNTER
Caller: Effie Donnelly    Relationship to patient: Self     Best call back number: [phone]    Date of exposure: POSSIBLY OVER THE WEEKEND     Date of positive COVID19 test: 12/05/22    COVID19 symptoms: CONGESTION / DIARRHEA     Date of initial quarantine:     Additional information or concerns: PATIENT CALLING WANTING TO KNOW IF THERE IS SOMETHING SHE NEEDS TO BE TAKING OR DOING TO HELP WITH SYMPTOMS     What is the patients preferred pharmacy: [facility] [id number] - [place] - [address] - [phone]  - [phone]  [phone]

## 2022-12-14 ENCOUNTER — HOSPITAL ENCOUNTER (OUTPATIENT)
Dept: CARDIOLOGY | Facility: HOSPITAL | Age: 77
Discharge: HOME | End: 2022-12-14
Admitting: INTERNAL MEDICINE

## 2022-12-14 VITALS
HEIGHT: 65 IN | WEIGHT: 180 LBS | BODY MASS INDEX: 29.99 KG/M2 | HEART RATE: 54 BPM | DIASTOLIC BLOOD PRESSURE: 61 MMHG | SYSTOLIC BLOOD PRESSURE: 175 MMHG

## 2022-12-14 DIAGNOSIS — I25.10 CORONARY ARTERY DISEASE INVOLVING NATIVE CORONARY ARTERY OF NATIVE HEART WITHOUT ANGINA PECTORIS: Chronic | ICD-10-CM

## 2022-12-14 DIAGNOSIS — I42.8 NICM (NONISCHEMIC CARDIOMYOPATHY): Chronic | ICD-10-CM

## 2022-12-14 DIAGNOSIS — R06.02 SOB (SHORTNESS OF BREATH): ICD-10-CM

## 2022-12-14 DIAGNOSIS — I25.2 HISTORY OF MYOCARDIAL INFARCTION: Chronic | ICD-10-CM

## 2022-12-14 LAB
AORTIC DIMENSIONLESS INDEX: 0.7 (DI)
ASCENDING AORTA: 3.5 CM
BH CV ECHO MEAS - ACS: 1.91 CM
BH CV ECHO MEAS - AO MAX PG: 8.4 MMHG
BH CV ECHO MEAS - AO MEAN PG: 4.4 MMHG
BH CV ECHO MEAS - AO V2 MAX: 144.8 CM/SEC
BH CV ECHO MEAS - AO V2 VTI: 34 CM
BH CV ECHO MEAS - AVA(I,D): 2.24 CM2
BH CV ECHO MEAS - EDV(CUBED): 112.5 ML
BH CV ECHO MEAS - EDV(MOD-SP2): 122 ML
BH CV ECHO MEAS - EDV(MOD-SP4): 112 ML
BH CV ECHO MEAS - EF(MOD-BP): 50 %
BH CV ECHO MEAS - EF(MOD-SP2): 54.1 %
BH CV ECHO MEAS - EF(MOD-SP4): 43.8 %
BH CV ECHO MEAS - ESV(CUBED): 33.4 ML
BH CV ECHO MEAS - ESV(MOD-SP2): 56 ML
BH CV ECHO MEAS - ESV(MOD-SP4): 63 ML
BH CV ECHO MEAS - FS: 33.3 %
BH CV ECHO MEAS - IVS/LVPW: 1.03 CM
BH CV ECHO MEAS - IVSD: 1.42 CM
BH CV ECHO MEAS - LAT PEAK E' VEL: 9.2 CM/SEC
BH CV ECHO MEAS - LV DIASTOLIC VOL/BSA (35-75): 59.2 CM2
BH CV ECHO MEAS - LV MASS(C)D: 275.7 GRAMS
BH CV ECHO MEAS - LV MAX PG: 3.4 MMHG
BH CV ECHO MEAS - LV MEAN PG: 1.76 MMHG
BH CV ECHO MEAS - LV SYSTOLIC VOL/BSA (12-30): 33.3 CM2
BH CV ECHO MEAS - LV V1 MAX: 92.8 CM/SEC
BH CV ECHO MEAS - LV V1 VTI: 22.6 CM
BH CV ECHO MEAS - LVIDD: 4.8 CM
BH CV ECHO MEAS - LVIDS: 3.2 CM
BH CV ECHO MEAS - LVOT AREA: 3.4 CM2
BH CV ECHO MEAS - LVOT DIAM: 2.07 CM
BH CV ECHO MEAS - LVPWD: 1.38 CM
BH CV ECHO MEAS - MED PEAK E' VEL: 7.5 CM/SEC
BH CV ECHO MEAS - MV A DUR: 0.14 SEC
BH CV ECHO MEAS - MV A MAX VEL: 87 CM/SEC
BH CV ECHO MEAS - MV DEC SLOPE: 210.1 CM/SEC2
BH CV ECHO MEAS - MV DEC TIME: 310 MSEC
BH CV ECHO MEAS - MV E MAX VEL: 56.1 CM/SEC
BH CV ECHO MEAS - MV E/A: 0.64
BH CV ECHO MEAS - MV MAX PG: 5.2 MMHG
BH CV ECHO MEAS - MV MEAN PG: 1.35 MMHG
BH CV ECHO MEAS - MV P1/2T: 114.5 MSEC
BH CV ECHO MEAS - MV V2 VTI: 35.2 CM
BH CV ECHO MEAS - MVA(P1/2T): 1.92 CM2
BH CV ECHO MEAS - MVA(VTI): 2.16 CM2
BH CV ECHO MEAS - PA ACC TIME: 0.09 SEC
BH CV ECHO MEAS - PA PR(ACCEL): 37.4 MMHG
BH CV ECHO MEAS - PA V2 MAX: 91.4 CM/SEC
BH CV ECHO MEAS - PULM A REVS DUR: 0.15 SEC
BH CV ECHO MEAS - PULM A REVS VEL: 40.9 CM/SEC
BH CV ECHO MEAS - PULM DIAS VEL: 36.9 CM/SEC
BH CV ECHO MEAS - PULM S/D: 0.99
BH CV ECHO MEAS - PULM SYS VEL: 36.5 CM/SEC
BH CV ECHO MEAS - QP/QS: 1.29
BH CV ECHO MEAS - RAP SYSTOLE: 3 MMHG
BH CV ECHO MEAS - RV MAX PG: 2.6 MMHG
BH CV ECHO MEAS - RV V1 MAX: 80.7 CM/SEC
BH CV ECHO MEAS - RV V1 VTI: 17.7 CM
BH CV ECHO MEAS - RVOT DIAM: 2.7 CM
BH CV ECHO MEAS - RVSP: 31.4 MMHG
BH CV ECHO MEAS - SI(MOD-SP2): 34.9 ML/M2
BH CV ECHO MEAS - SI(MOD-SP4): 25.9 ML/M2
BH CV ECHO MEAS - SV(LVOT): 76.1 ML
BH CV ECHO MEAS - SV(MOD-SP2): 66 ML
BH CV ECHO MEAS - SV(MOD-SP4): 49 ML
BH CV ECHO MEAS - SV(RVOT): 98.2 ML
BH CV ECHO MEAS - TAPSE (>1.6): 1.74 CM
BH CV ECHO MEAS - TR MAX PG: 28.4 MMHG
BH CV ECHO MEAS - TR MAX VEL: 266.3 CM/SEC
BH CV ECHO MEASUREMENTS AVERAGE E/E' RATIO: 6.72
BH CV XLRA - RV BASE: 3.4 CM
BH CV XLRA - RV LENGTH: 5.6 CM
BH CV XLRA - RV MID: 1.93 CM
BH CV XLRA - TDI S': 18.5 CM/SEC
LEFT ATRIUM VOLUME INDEX: 41.3 ML/M2
LV EF 2D ECHO EST: 50 %
MAXIMAL PREDICTED HEART RATE: 144 BPM
SINUS: 3.6 CM
STRESS TARGET HR: 122 BPM

## 2022-12-14 PROCEDURE — 93306 TTE W/DOPPLER COMPLETE: CPT | Performed by: INTERNAL MEDICINE

## 2022-12-14 PROCEDURE — 93306 TTE W/DOPPLER COMPLETE: CPT

## 2022-12-14 NOTE — PROGRESS NOTES
I am covering Dr. Merida's in basket today because he is out of the office.      Patient recently seen for dyspnea.  Echocardiogram looks great.  EF normal, mild LVH (good blood pressure control), mild mitral valve regurgitation, mild tricuspid regurgitation, and left atrium mildly dilated.  I really do not see anything on here that would be causing her to be short of breath.  I will defer anything further to Dr. Brand.  Please get preliminary results to patient.

## 2023-02-06 ENCOUNTER — OFFICE VISIT (OUTPATIENT)
Dept: FAMILY MEDICINE CLINIC | Facility: CLINIC | Age: 78
End: 2023-02-06
Payer: MEDICARE

## 2023-02-06 VITALS
WEIGHT: 183 LBS | HEIGHT: 65 IN | HEART RATE: 52 BPM | OXYGEN SATURATION: 95 % | TEMPERATURE: 97 F | SYSTOLIC BLOOD PRESSURE: 118 MMHG | BODY MASS INDEX: 30.49 KG/M2 | DIASTOLIC BLOOD PRESSURE: 78 MMHG

## 2023-02-06 DIAGNOSIS — G47.33 OSA ON CPAP: ICD-10-CM

## 2023-02-06 DIAGNOSIS — E78.49 OTHER HYPERLIPIDEMIA: ICD-10-CM

## 2023-02-06 DIAGNOSIS — K21.9 GASTROESOPHAGEAL REFLUX DISEASE WITHOUT ESOPHAGITIS: ICD-10-CM

## 2023-02-06 DIAGNOSIS — Z99.89 OSA ON CPAP: ICD-10-CM

## 2023-02-06 DIAGNOSIS — I42.8 NICM (NONISCHEMIC CARDIOMYOPATHY): Chronic | ICD-10-CM

## 2023-02-06 DIAGNOSIS — R06.02 SHORTNESS OF BREATH: ICD-10-CM

## 2023-02-06 DIAGNOSIS — I10 PRIMARY HYPERTENSION: ICD-10-CM

## 2023-02-06 DIAGNOSIS — I25.10 CORONARY ARTERY DISEASE INVOLVING NATIVE CORONARY ARTERY OF NATIVE HEART WITHOUT ANGINA PECTORIS: Primary | Chronic | ICD-10-CM

## 2023-02-06 DIAGNOSIS — I50.22 CHRONIC SYSTOLIC CONGESTIVE HEART FAILURE: ICD-10-CM

## 2023-02-06 DIAGNOSIS — N18.31 STAGE 3A CHRONIC KIDNEY DISEASE: ICD-10-CM

## 2023-02-06 PROCEDURE — 99214 OFFICE O/P EST MOD 30 MIN: CPT | Performed by: INTERNAL MEDICINE

## 2023-02-06 NOTE — PROGRESS NOTES
Subjective   Effie Donnelly is a 77 y.o. female.     Chief Complaint   Patient presents with   • Coronary Artery Disease     F/u   Hypertension, hyperlipidemia, dyspnea    History of Present Illness   Patient following for nonischemic cardiomyopathy, CHF, GERD, hyperlipidemia.  Patient was given Crestor did not take it.  Her LDL was 130, discussed with patient her goal is less than 70.  He had a recent echo showed normal left ventricular function.  Continues to have shortness of breath especially with exertion, no shortness of breath with sitting.  Remote history of tobacco smoke exposure.  No chest pain, cough or congestion.  Patient reports she uses her BiPAP regularly.  The following portions of the patient's history were reviewed and updated as appropriate: allergies, current medications, past family history, past medical history, past social history, past surgical history and problem list.    Review of Systems   Constitutional: Negative for activity change, appetite change, fatigue and fever.   Eyes: Negative for blurred vision and double vision.   Respiratory: Positive for shortness of breath. Negative for apnea, cough, choking, chest tightness, wheezing and stridor.    Cardiovascular: Negative for chest pain, palpitations and leg swelling.   Gastrointestinal: Negative.    Neurological: Negative for dizziness, syncope, light-headedness and headache.   Psychiatric/Behavioral: Negative for agitation, behavioral problems, decreased concentration and suicidal ideas.       Allergies   Allergen Reactions   • Nsaids GI Bleeding     CROHN'S DISEASE   • Adhesive Tape Other (See Comments)     BLISTERS UNDER TAGADERM       Current Outpatient Medications on File Prior to Visit   Medication Sig Dispense Refill   • aspirin 81 MG tablet Take 81 mg by mouth.     • B Complex Vitamins (VITAMIN B COMPLEX PO) Take 1 tablet by mouth Daily.     • carvedilol (COREG) 12.5 MG tablet Take 1 tablet by mouth 2 (Two) Times a Day. 180 tablet  3   • Cholecalciferol (VITAMIN D) 2000 units capsule Take 2,000 Units by mouth Daily.     • CRANBERRY EXTRACT PO Take 1 capsule by mouth Daily.     • dilTIAZem (Cardizem) 30 MG tablet Take 1 tablet by mouth 2 (Two) Times a Day. 180 tablet 3   • furosemide (LASIX) 20 MG tablet Take 1 tablet by mouth Daily. Take 1 tab in the morning and 1/2 tab at 2 pm (Patient taking differently: Take 20 mg by mouth Daily.) 135 tablet 3   • Loratadine-D 24HR  MG per 24 hr tablet TAKE ONE TABLET BY MOUTH DAILY 30 tablet 3   • MEGARED OMEGA-3 KRILL OIL PO Take 1 capsule by mouth Daily.     • omeprazole (priLOSEC) 40 MG capsule Take 1 capsule by mouth Daily. 90 capsule 3   • PROBIOTIC PRODUCT PO Take 2 capsules by mouth Daily.     • rosuvastatin (Crestor) 5 MG tablet Take 1 tablet by mouth Daily. 90 tablet 2   • sacubitril-valsartan (Entresto) 24-26 MG tablet Take 1 tablet by mouth 2 (Two) Times a Day. 180 tablet 3   • azithromycin (ZITHROMAX) 250 MG tablet Take 2 tablets the first day, then 1 tablet daily for 4 days. 6 tablet 0     No current facility-administered medications on file prior to visit.       Family History   Problem Relation Age of Onset   • Hypertension Sister    • Cancer Sister    • Heart disease Brother 65        VALVE REPLACEMENT    • Hypertension Brother    • Hypertension Sister    • Hypertension Sister    • Hypertension Brother    • Diabetes Brother    • Hypertension Brother    • Cancer Brother    • Hypertension Brother    • Hypertension Brother    • Hypertension Brother        Past Medical History:   Diagnosis Date   • CAD (coronary artery disease)    • Cancer (HCC)     skin, right breast   • CHF (congestive heart failure) (HCC)    • Coronary artery disease involving native coronary artery of native heart without angina pectoris 11/1/2018   • Heart murmur    • History of myocardial infarction 11/1/2018   • Hypertension    • Myocardial infarction (HCC)     PER PT    • NICM (nonischemic cardiomyopathy) ([facility])  2020   • DELORES on CPAP     AHI 15/h   • Sinus tachycardia    • SOB (shortness of breath)        Past Surgical History:   Procedure Laterality Date   • BUNIONECTOMY Bilateral    • CARDIAC CATHETERIZATION     • CARDIAC CATHETERIZATION N/A 2020    Procedure: Right and Left Heart Cath;  Surgeon: Bin Chacko MD;  Location:  [facility];  Service: Cardiovascular   • CARDIAC CATHETERIZATION N/A 2020    Procedure: Coronary angiography;  Surgeon: Bin Chacko MD;  Location:  [facility];  Service: Cardiovascular   • CARDIAC CATHETERIZATION N/A 2020    Procedure: Left ventriculography;  Surgeon: Bin Chacko MD;  Location:  [facility];  Service: Cardiovascular   • CATARACT EXTRACTION Left 2022   • COLONOSCOPY N/A 2022    Procedure: COLONOSCOPY WITH BIOPSIES; POLYPECTOMY;  Surgeon: Lorne Esquivel MD;  Location:  [facility];  Service: Gastroenterology;  Laterality: N/A;  POLYP  ULCERATIVE COLITIS   • ENDOSCOPY N/A 2022    Procedure: ESOPHAGOGASTRODUODENOSCOPY WITH BIOPSIES;  Surgeon: Lorne Esquivel MD;  Location:  [facility];  Service: Gastroenterology;  Laterality: N/A;  GASTRITIS  ESOPHAGEAL REFLUX  HIATAL HERNIA  ESOPHAGEAL CANDIDIASIS   • MASTECTOMY COMPLETE / SIMPLE  2019   • SHOULDER SURGERY Right    • SKIN CANCER EXCISION      ABOVE THE LIP.        Social History     Socioeconomic History   • Marital status:    • Number of children: 5   Tobacco Use   • Smoking status: Former     Types: Cigarettes     Quit date: 1973     Years since quittin.2   • Smokeless tobacco: Never   • Tobacco comments:     CAFFEINE USE: 1 CUP DAILY   Vaping Use   • Vaping Use: Never used   Substance and Sexual Activity   • Alcohol use: No   • Drug use: No   • Sexual activity: Defer       Patient Active Problem List   Diagnosis   • History of myocardial infarction   • Coronary artery disease involving  "native coronary artery of native heart without angina pectoris   • Ulcerative colitis (HCC)   • HTN (hypertension)   • GERD (gastroesophageal reflux disease)   • DJD (degenerative joint disease)   • Ductal 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   • Anemia   • Other hyperlipidemia   • NICM (nonischemic cardiomyopathy) ([facility])   • DELORES on CPAP   • Snoring   • Class 1 obesity due to excess calories without serious comorbidity with body mass index (BMI) of 31.0 to 31.9 in adult   • Chronic systolic congestive heart failure (HCC)   • Esophageal dysphagia   • Stage 3a chronic kidney disease (HCC)       /78   Pulse 52   Temp 97 °F (36.1 °C)   Ht 165.1 cm (65\")   Wt 83 kg (183 lb)   SpO2 95%   BMI 30.45 kg/m²   Body mass index is 30.45 kg/m².    Objective   Physical Exam  Vitals and nursing note reviewed.   Constitutional:       Appearance: She is well-developed.   Eyes:      Pupils: Pupils are equal, round, and reactive to light.   Neck:      Thyroid: No thyromegaly.      Vascular: No JVD.      Trachea: No tracheal deviation.   Cardiovascular:      Rate and Rhythm: Normal rate and regular rhythm.      Heart sounds: No murmur heard.  Pulmonary:      Effort: Pulmonary effort is normal. No respiratory distress.      Breath sounds: Normal breath sounds. No wheezing.   Abdominal:      General: Bowel sounds are normal. There is no distension.      Palpations: Abdomen is soft. There is no mass.      Tenderness: There is no abdominal tenderness. There is no right CVA tenderness, left CVA tenderness, guarding or rebound.      Hernia: No hernia is present.   Musculoskeletal:      Cervical back: Normal range of motion and neck supple.   Lymphadenopathy:      Cervical: No cervical adenopathy.   Neurological:      General: No focal deficit present.      Mental Status: She is alert and oriented to person, place, and time. Mental status is at baseline.   Psychiatric:         Mood and Affect: Mood normal.         Behavior: " Behavior normal.           Assessment & Plan   Diagnoses and all orders for this visit:    1. Coronary artery disease involving native coronary artery of native heart without angina pectoris (Primary)  -     CBC & Differential  -     Comprehensive Metabolic Panel  -     CK  -     Lipid Panel With / Chol / HDL Ratio  -     TSH    2. Primary hypertension  -     CBC & Differential  -     Comprehensive Metabolic Panel  -     CK  -     Lipid Panel With / Chol / HDL Ratio  -     TSH    3. Other hyperlipidemia  -     CBC & Differential  -     Comprehensive Metabolic Panel  -     CK  -     Lipid Panel With / Chol / HDL Ratio  -     TSH    4. Chronic systolic congestive heart failure (HCC)  -     CBC & Differential  -     Comprehensive Metabolic Panel  -     CK  -     Lipid Panel With / Chol / HDL Ratio  -     TSH    5. Gastroesophageal reflux disease without esophagitis  -     CBC & Differential  -     Comprehensive Metabolic Panel  -     CK  -     Lipid Panel With / Chol / HDL Ratio  -     TSH    6. Stage 3a chronic kidney disease (HCC)    7. Shortness of breath  -     Ambulatory Referral to Pulmonology    8. NICM (nonischemic cardiomyopathy) ([facility])    9. DELORES on CPAP    Continue diet and exercise.  Check blood pressure.  Strongly advised patient to start Crestor.  Continue taking Coreg, diltiazem, Lasix, Prilosec, Entresto, restart Crestor.  Continue Prilosec helping her GERD symptoms.  Regarding her dyspnea I reviewed the chest x-ray done in August 2022 was normal, she had a CT of the chest PE protocol in 2019 that was normal.  I will refer her back to Dr. Junior who is seeing her for DELORES.  Patient to come back in 3 months time.  Strongly advised to start Crestor at this time.  Follow-up with specialist  EHR dragon/transcription disclaimer:  Part of this note are created by electronic transcription/translation of spoken language to printed text and thus may lead to erroneous, or at times, nonsensical words or phrases  inadvertently transcribed.  Although I have reviewed for such errors, some may still exist.

## 2023-02-07 LAB
ALBUMIN SERPL-MCNC: 4.3 G/DL (ref 3.5–5.2)
ALBUMIN/GLOB SERPL: 1.7 G/DL
ALP SERPL-CCNC: 104 U/L (ref 39–117)
ALT SERPL-CCNC: 16 U/L (ref 1–33)
AST SERPL-CCNC: 15 U/L (ref 1–32)
BASOPHILS # BLD AUTO: 0.04 10*3/MM3 (ref 0–0.2)
BASOPHILS NFR BLD AUTO: 0.7 % (ref 0–1.5)
BILIRUB SERPL-MCNC: 0.5 MG/DL (ref 0–1.2)
BUN SERPL-MCNC: 25 MG/DL (ref 8–23)
BUN/CREAT SERPL: 18.2 (ref 7–25)
CALCIUM SERPL-MCNC: 9.4 MG/DL (ref 8.6–10.5)
CHLORIDE SERPL-SCNC: 104 MMOL/L (ref 98–107)
CHOLEST SERPL-MCNC: 198 MG/DL (ref 0–200)
CHOLEST/HDLC SERPL: 3.3 {RATIO}
CK SERPL-CCNC: 98 U/L (ref 20–180)
CO2 SERPL-SCNC: 26.7 MMOL/L (ref 22–29)
CREAT SERPL-MCNC: 1.37 MG/DL (ref 0.57–1)
EGFRCR SERPLBLD CKD-EPI 2021: 40.1 ML/MIN/1.73
EOSINOPHIL # BLD AUTO: 0.2 10*3/MM3 (ref 0–0.4)
EOSINOPHIL NFR BLD AUTO: 3.6 % (ref 0.3–6.2)
ERYTHROCYTE [DISTWIDTH] IN BLOOD BY AUTOMATED COUNT: 13.2 % (ref 12.3–15.4)
GLOBULIN SER CALC-MCNC: 2.5 GM/DL
GLUCOSE SERPL-MCNC: 90 MG/DL (ref 65–99)
HCT VFR BLD AUTO: 44.5 % (ref 34–46.6)
HDLC SERPL-MCNC: 60 MG/DL (ref 40–60)
HGB BLD-MCNC: 13.9 G/DL (ref 12–15.9)
IMM GRANULOCYTES # BLD AUTO: 0.02 10*3/MM3 (ref 0–0.05)
IMM GRANULOCYTES NFR BLD AUTO: 0.4 % (ref 0–0.5)
LDLC SERPL CALC-MCNC: 121 MG/DL (ref 0–100)
LYMPHOCYTES # BLD AUTO: 1.84 10*3/MM3 (ref 0.7–3.1)
LYMPHOCYTES NFR BLD AUTO: 33.1 % (ref 19.6–45.3)
MCH RBC QN AUTO: 27.1 PG (ref 26.6–33)
MCHC RBC AUTO-ENTMCNC: 31.2 G/DL (ref 31.5–35.7)
MCV RBC AUTO: 86.9 FL (ref 79–97)
MONOCYTES # BLD AUTO: 0.52 10*3/MM3 (ref 0.1–0.9)
MONOCYTES NFR BLD AUTO: 9.4 % (ref 5–12)
NEUTROPHILS # BLD AUTO: 2.94 10*3/MM3 (ref 1.7–7)
NEUTROPHILS NFR BLD AUTO: 52.8 % (ref 42.7–76)
NRBC BLD AUTO-RTO: 0 /100 WBC (ref 0–0.2)
PLATELET # BLD AUTO: 242 10*3/MM3 (ref 140–450)
POTASSIUM SERPL-SCNC: 4.2 MMOL/L (ref 3.5–5.2)
PROT SERPL-MCNC: 6.8 G/DL (ref 6–8.5)
RBC # BLD AUTO: 5.12 10*6/MM3 (ref 3.77–5.28)
SODIUM SERPL-SCNC: 140 MMOL/L (ref 136–145)
TRIGL SERPL-MCNC: 97 MG/DL (ref 0–150)
TSH SERPL DL<=0.005 MIU/L-ACNC: 4.43 UIU/ML (ref 0.27–4.2)
VLDLC SERPL CALC-MCNC: 17 MG/DL (ref 5–40)
WBC # BLD AUTO: 5.56 10*3/MM3 (ref 3.4–10.8)

## 2023-02-13 ENCOUNTER — APPOINTMENT (OUTPATIENT)
Dept: CARDIOLOGY | Facility: HOSPITAL | Age: 78
End: 2023-02-13
Payer: MEDICARE

## 2023-02-13 ENCOUNTER — HOSPITAL ENCOUNTER (OUTPATIENT)
Facility: HOSPITAL | Age: 78
Setting detail: OBSERVATION
Discharge: HOME | End: 2023-02-14
Attending: EMERGENCY MEDICINE | Admitting: INTERNAL MEDICINE
Payer: MEDICARE

## 2023-02-13 ENCOUNTER — APPOINTMENT (OUTPATIENT)
Dept: GENERAL RADIOLOGY | Facility: HOSPITAL | Age: 78
End: 2023-02-13
Payer: MEDICARE

## 2023-02-13 DIAGNOSIS — I48.91 NEW ONSET ATRIAL FIBRILLATION: Primary | ICD-10-CM

## 2023-02-13 LAB
ALBUMIN SERPL-MCNC: 4.1 G/DL (ref 3.5–5.2)
ALBUMIN/GLOB SERPL: 1.3 G/DL
ALP SERPL-CCNC: 116 U/L (ref 39–117)
ALT SERPL W P-5'-P-CCNC: 24 U/L (ref 1–33)
ANION GAP SERPL CALCULATED.3IONS-SCNC: 11.9 MMOL/L (ref 5–15)
APTT PPP: 33.7 SECONDS (ref 24.3–38.1)
AST SERPL-CCNC: 27 U/L (ref 1–32)
B PARAPERT DNA SPEC QL NAA+PROBE: NOT DETECTED
B PERT DNA SPEC QL NAA+PROBE: NOT DETECTED
BASOPHILS # BLD AUTO: 0.02 10*3/MM3 (ref 0–0.2)
BASOPHILS NFR BLD AUTO: 0.2 % (ref 0–1.5)
BILIRUB SERPL-MCNC: 0.8 MG/DL (ref 0–1.2)
BUN SERPL-MCNC: 19 MG/DL (ref 8–23)
BUN/CREAT SERPL: 16.1 (ref 7–25)
C PNEUM DNA NPH QL NAA+NON-PROBE: NOT DETECTED
CALCIUM SPEC-SCNC: 8.9 MG/DL (ref 8.6–10.5)
CHLORIDE SERPL-SCNC: 102 MMOL/L (ref 98–107)
CO2 SERPL-SCNC: 21.1 MMOL/L (ref 22–29)
CREAT SERPL-MCNC: 1.18 MG/DL (ref 0.57–1)
D DIMER PPP FEU-MCNC: 0.4 MCGFEU/ML (ref 0–0.76)
DEPRECATED RDW RBC AUTO: 45.5 FL (ref 37–54)
EGFRCR SERPLBLD CKD-EPI 2021: 48 ML/MIN/1.73
EOSINOPHIL # BLD AUTO: 0.12 10*3/MM3 (ref 0–0.4)
EOSINOPHIL NFR BLD AUTO: 1.4 % (ref 0.3–6.2)
ERYTHROCYTE [DISTWIDTH] IN BLOOD BY AUTOMATED COUNT: 13.6 % (ref 12.3–15.4)
FLUAV RNA RESP QL NAA+PROBE: NOT DETECTED
FLUAV SUBTYP SPEC NAA+PROBE: NOT DETECTED
FLUBV RNA ISLT QL NAA+PROBE: NOT DETECTED
FLUBV RNA RESP QL NAA+PROBE: NOT DETECTED
GEN 5 2HR TROPONIN T REFLEX: 21 NG/L
GLOBULIN UR ELPH-MCNC: 3.2 GM/DL
GLUCOSE SERPL-MCNC: 107 MG/DL (ref 65–99)
HADV DNA SPEC NAA+PROBE: NOT DETECTED
HBA1C MFR BLD: 5.5 % (ref 4.8–5.6)
HCOV 229E RNA SPEC QL NAA+PROBE: NOT DETECTED
HCOV HKU1 RNA SPEC QL NAA+PROBE: DETECTED
HCOV NL63 RNA SPEC QL NAA+PROBE: NOT DETECTED
HCOV OC43 RNA SPEC QL NAA+PROBE: NOT DETECTED
HCT VFR BLD AUTO: 43.5 % (ref 34–46.6)
HGB BLD-MCNC: 13.7 G/DL (ref 12–15.9)
HMPV RNA NPH QL NAA+NON-PROBE: NOT DETECTED
HPIV1 RNA ISLT QL NAA+PROBE: NOT DETECTED
HPIV2 RNA SPEC QL NAA+PROBE: NOT DETECTED
HPIV3 RNA NPH QL NAA+PROBE: NOT DETECTED
HPIV4 P GENE NPH QL NAA+PROBE: NOT DETECTED
IMM GRANULOCYTES # BLD AUTO: 0.01 10*3/MM3 (ref 0–0.05)
IMM GRANULOCYTES NFR BLD AUTO: 0.1 % (ref 0–0.5)
INR PPP: 1.09 (ref 0.9–1.1)
LYMPHOCYTES # BLD AUTO: 1.04 10*3/MM3 (ref 0.7–3.1)
LYMPHOCYTES NFR BLD AUTO: 11.9 % (ref 19.6–45.3)
M PNEUMO IGG SER IA-ACNC: NOT DETECTED
MAGNESIUM SERPL-MCNC: 1.9 MG/DL (ref 1.6–2.4)
MCH RBC QN AUTO: 28.1 PG (ref 26.6–33)
MCHC RBC AUTO-ENTMCNC: 31.5 G/DL (ref 31.5–35.7)
MCV RBC AUTO: 89.1 FL (ref 79–97)
MONOCYTES # BLD AUTO: 0.83 10*3/MM3 (ref 0.1–0.9)
MONOCYTES NFR BLD AUTO: 9.5 % (ref 5–12)
NEUTROPHILS NFR BLD AUTO: 6.74 10*3/MM3 (ref 1.7–7)
NEUTROPHILS NFR BLD AUTO: 76.9 % (ref 42.7–76)
NT-PROBNP SERPL-MCNC: 3930 PG/ML (ref 0–1800)
PLATELET # BLD AUTO: 200 10*3/MM3 (ref 140–450)
PMV BLD AUTO: 11.5 FL (ref 6–12)
POTASSIUM SERPL-SCNC: 4.5 MMOL/L (ref 3.5–5.2)
PROCALCITONIN SERPL-MCNC: 0.06 NG/ML (ref 0–0.25)
PROT SERPL-MCNC: 7.3 G/DL (ref 6–8.5)
PROTHROMBIN TIME: 14.3 SECONDS (ref 12.1–15)
QT INTERVAL: 340 MS
RBC # BLD AUTO: 4.88 10*6/MM3 (ref 3.77–5.28)
RHINOVIRUS RNA SPEC NAA+PROBE: NOT DETECTED
RSV RNA NPH QL NAA+NON-PROBE: NOT DETECTED
SARS-COV-2 RNA NPH QL NAA+NON-PROBE: NOT DETECTED
SARS-COV-2 RNA RESP QL NAA+PROBE: NOT DETECTED
SODIUM SERPL-SCNC: 135 MMOL/L (ref 136–145)
TROPONIN T DELTA: 0 NG/L
TROPONIN T SERPL HS-MCNC: 21 NG/L
WBC NRBC COR # BLD: 8.76 10*3/MM3 (ref 3.4–10.8)

## 2023-02-13 PROCEDURE — 93325 DOPPLER ECHO COLOR FLOW MAPG: CPT

## 2023-02-13 PROCEDURE — 84145 PROCALCITONIN (PCT): CPT | Performed by: EMERGENCY MEDICINE

## 2023-02-13 PROCEDURE — 85730 THROMBOPLASTIN TIME PARTIAL: CPT | Performed by: EMERGENCY MEDICINE

## 2023-02-13 PROCEDURE — 85025 COMPLETE CBC W/AUTO DIFF WBC: CPT | Performed by: EMERGENCY MEDICINE

## 2023-02-13 PROCEDURE — 36415 COLL VENOUS BLD VENIPUNCTURE: CPT

## 2023-02-13 PROCEDURE — 25010000002 DIGOXIN PER 500 MCG: Performed by: INTERNAL MEDICINE

## 2023-02-13 PROCEDURE — 83880 ASSAY OF NATRIURETIC PEPTIDE: CPT | Performed by: EMERGENCY MEDICINE

## 2023-02-13 PROCEDURE — 83036 HEMOGLOBIN GLYCOSYLATED A1C: CPT | Performed by: NURSE PRACTITIONER

## 2023-02-13 PROCEDURE — 84484 ASSAY OF TROPONIN QUANT: CPT | Performed by: EMERGENCY MEDICINE

## 2023-02-13 PROCEDURE — 93325 DOPPLER ECHO COLOR FLOW MAPG: CPT | Performed by: INTERNAL MEDICINE

## 2023-02-13 PROCEDURE — 99285 EMERGENCY DEPT VISIT HI MDM: CPT

## 2023-02-13 PROCEDURE — 25010000002 ENOXAPARIN PER 10 MG: Performed by: EMERGENCY MEDICINE

## 2023-02-13 PROCEDURE — 93010 ELECTROCARDIOGRAM REPORT: CPT | Performed by: INTERNAL MEDICINE

## 2023-02-13 PROCEDURE — 93005 ELECTROCARDIOGRAM TRACING: CPT | Performed by: EMERGENCY MEDICINE

## 2023-02-13 PROCEDURE — G0378 HOSPITAL OBSERVATION PER HR: HCPCS

## 2023-02-13 PROCEDURE — 93308 TTE F-UP OR LMTD: CPT

## 2023-02-13 PROCEDURE — 99214 OFFICE O/P EST MOD 30 MIN: CPT | Performed by: INTERNAL MEDICINE

## 2023-02-13 PROCEDURE — 96372 THER/PROPH/DIAG INJ SC/IM: CPT

## 2023-02-13 PROCEDURE — 99222 1ST HOSP IP/OBS MODERATE 55: CPT | Performed by: STUDENT IN AN ORGANIZED HEALTH CARE EDUCATION/TRAINING PROGRAM

## 2023-02-13 PROCEDURE — 0202U NFCT DS 22 TRGT SARS-COV-2: CPT | Performed by: STUDENT IN AN ORGANIZED HEALTH CARE EDUCATION/TRAINING PROGRAM

## 2023-02-13 PROCEDURE — 71046 X-RAY EXAM CHEST 2 VIEWS: CPT

## 2023-02-13 PROCEDURE — 94799 UNLISTED PULMONARY SVC/PX: CPT

## 2023-02-13 PROCEDURE — 93308 TTE F-UP OR LMTD: CPT | Performed by: INTERNAL MEDICINE

## 2023-02-13 PROCEDURE — 25010000002 PERFLUTREN (DEFINITY) 8.476 MG IN SODIUM CHLORIDE (PF) 0.9 % 10 ML INJECTION: Performed by: STUDENT IN AN ORGANIZED HEALTH CARE EDUCATION/TRAINING PROGRAM

## 2023-02-13 PROCEDURE — 85610 PROTHROMBIN TIME: CPT | Performed by: EMERGENCY MEDICINE

## 2023-02-13 PROCEDURE — 96365 THER/PROPH/DIAG IV INF INIT: CPT

## 2023-02-13 PROCEDURE — 80053 COMPREHEN METABOLIC PANEL: CPT | Performed by: EMERGENCY MEDICINE

## 2023-02-13 PROCEDURE — 96366 THER/PROPH/DIAG IV INF ADDON: CPT

## 2023-02-13 PROCEDURE — 94761 N-INVAS EAR/PLS OXIMETRY MLT: CPT

## 2023-02-13 PROCEDURE — 87040 BLOOD CULTURE FOR BACTERIA: CPT | Performed by: EMERGENCY MEDICINE

## 2023-02-13 PROCEDURE — 25010000002 FUROSEMIDE PER 20 MG: Performed by: INTERNAL MEDICINE

## 2023-02-13 PROCEDURE — 85379 FIBRIN DEGRADATION QUANT: CPT | Performed by: EMERGENCY MEDICINE

## 2023-02-13 PROCEDURE — 83735 ASSAY OF MAGNESIUM: CPT | Performed by: STUDENT IN AN ORGANIZED HEALTH CARE EDUCATION/TRAINING PROGRAM

## 2023-02-13 PROCEDURE — C9803 HOPD COVID-19 SPEC COLLECT: HCPCS

## 2023-02-13 PROCEDURE — 87636 SARSCOV2 & INF A&B AMP PRB: CPT | Performed by: EMERGENCY MEDICINE

## 2023-02-13 PROCEDURE — 96375 TX/PRO/DX INJ NEW DRUG ADDON: CPT

## 2023-02-13 PROCEDURE — 93321 DOPPLER ECHO F-UP/LMTD STD: CPT

## 2023-02-13 PROCEDURE — 25010000002 MAGNESIUM SULFATE 2 GM/50ML SOLUTION: Performed by: INTERNAL MEDICINE

## 2023-02-13 PROCEDURE — 93321 DOPPLER ECHO F-UP/LMTD STD: CPT | Performed by: INTERNAL MEDICINE

## 2023-02-13 PROCEDURE — 96374 THER/PROPH/DIAG INJ IV PUSH: CPT

## 2023-02-13 RX ORDER — ASPIRIN 81 MG/1
81 TABLET ORAL DAILY
Status: DISCONTINUED | OUTPATIENT
Start: 2023-02-13 | End: 2023-02-13

## 2023-02-13 RX ORDER — SODIUM CHLORIDE 0.9 % (FLUSH) 0.9 %
10 SYRINGE (ML) INJECTION AS NEEDED
Status: DISCONTINUED | OUTPATIENT
Start: 2023-02-13 | End: 2023-02-14 | Stop reason: HOSPADM

## 2023-02-13 RX ORDER — METOPROLOL TARTRATE 50 MG/1
50 TABLET, FILM COATED ORAL EVERY 6 HOURS
Status: DISCONTINUED | OUTPATIENT
Start: 2023-02-13 | End: 2023-02-14 | Stop reason: HOSPADM

## 2023-02-13 RX ORDER — FLUTICASONE PROPIONATE 50 MCG
2 SPRAY, SUSPENSION (ML) NASAL NIGHTLY
Status: DISCONTINUED | OUTPATIENT
Start: 2023-02-13 | End: 2023-02-14 | Stop reason: HOSPADM

## 2023-02-13 RX ORDER — DEXAMETHASONE SODIUM PHOSPHATE 4 MG/ML
4 INJECTION, SOLUTION INTRA-ARTICULAR; INTRALESIONAL; INTRAMUSCULAR; INTRAVENOUS; SOFT TISSUE EVERY 6 HOURS PRN
Status: DISCONTINUED | OUTPATIENT
Start: 2023-02-13 | End: 2023-02-13

## 2023-02-13 RX ORDER — MAGNESIUM SULFATE HEPTAHYDRATE 40 MG/ML
2 INJECTION, SOLUTION INTRAVENOUS ONCE
Status: COMPLETED | OUTPATIENT
Start: 2023-02-13 | End: 2023-02-13

## 2023-02-13 RX ORDER — DILTIAZEM HYDROCHLORIDE 5 MG/ML
10 INJECTION INTRAVENOUS ONCE
Status: COMPLETED | OUTPATIENT
Start: 2023-02-13 | End: 2023-02-13

## 2023-02-13 RX ORDER — FUROSEMIDE 10 MG/ML
20 INJECTION INTRAMUSCULAR; INTRAVENOUS ONCE
Status: COMPLETED | OUTPATIENT
Start: 2023-02-13 | End: 2023-02-13

## 2023-02-13 RX ORDER — MELATONIN
4000 DAILY
Status: DISCONTINUED | OUTPATIENT
Start: 2023-02-13 | End: 2023-02-14 | Stop reason: HOSPADM

## 2023-02-13 RX ORDER — ACETAMINOPHEN 325 MG/1
650 TABLET ORAL EVERY 4 HOURS PRN
Status: DISCONTINUED | OUTPATIENT
Start: 2023-02-13 | End: 2023-02-14 | Stop reason: HOSPADM

## 2023-02-13 RX ORDER — SODIUM CHLORIDE 0.9 % (FLUSH) 0.9 %
10 SYRINGE (ML) INJECTION EVERY 12 HOURS SCHEDULED
Status: DISCONTINUED | OUTPATIENT
Start: 2023-02-13 | End: 2023-02-14 | Stop reason: HOSPADM

## 2023-02-13 RX ORDER — PANTOPRAZOLE SODIUM 40 MG/1
40 TABLET, DELAYED RELEASE ORAL
Status: DISCONTINUED | OUTPATIENT
Start: 2023-02-13 | End: 2023-02-14 | Stop reason: HOSPADM

## 2023-02-13 RX ORDER — FUROSEMIDE 20 MG/1
20 TABLET ORAL DAILY
Status: DISCONTINUED | OUTPATIENT
Start: 2023-02-13 | End: 2023-02-14 | Stop reason: HOSPADM

## 2023-02-13 RX ORDER — SODIUM CHLORIDE FOR INHALATION 0.9 %
3 VIAL, NEBULIZER (ML) INHALATION AS NEEDED
Status: DISCONTINUED | OUTPATIENT
Start: 2023-02-13 | End: 2023-02-14 | Stop reason: HOSPADM

## 2023-02-13 RX ORDER — CARVEDILOL 12.5 MG/1
12.5 TABLET ORAL 2 TIMES DAILY
Status: DISCONTINUED | OUTPATIENT
Start: 2023-02-13 | End: 2023-02-13

## 2023-02-13 RX ORDER — NITROGLYCERIN 0.4 MG/1
0.4 TABLET SUBLINGUAL
Status: DISCONTINUED | OUTPATIENT
Start: 2023-02-13 | End: 2023-02-14 | Stop reason: HOSPADM

## 2023-02-13 RX ORDER — L.ACID,PARA/B.BIFIDUM/S.THERM 8B CELL
1 CAPSULE ORAL DAILY
Status: DISCONTINUED | OUTPATIENT
Start: 2023-02-13 | End: 2023-02-14 | Stop reason: HOSPADM

## 2023-02-13 RX ORDER — SODIUM CHLORIDE 9 MG/ML
40 INJECTION, SOLUTION INTRAVENOUS AS NEEDED
Status: DISCONTINUED | OUTPATIENT
Start: 2023-02-13 | End: 2023-02-14 | Stop reason: HOSPADM

## 2023-02-13 RX ORDER — ROSUVASTATIN CALCIUM 5 MG/1
5 TABLET, COATED ORAL NIGHTLY
Status: DISCONTINUED | OUTPATIENT
Start: 2023-02-13 | End: 2023-02-14 | Stop reason: HOSPADM

## 2023-02-13 RX ORDER — DIGOXIN 0.25 MG/ML
250 INJECTION INTRAMUSCULAR; INTRAVENOUS ONCE
Status: COMPLETED | OUTPATIENT
Start: 2023-02-13 | End: 2023-02-13

## 2023-02-13 RX ORDER — FUROSEMIDE 20 MG/1
20 TABLET ORAL TAKE AS DIRECTED
Status: DISCONTINUED | OUTPATIENT
Start: 2023-02-13 | End: 2023-02-13

## 2023-02-13 RX ORDER — GUAIFENESIN 600 MG/1
600 TABLET, EXTENDED RELEASE ORAL EVERY 12 HOURS SCHEDULED
Status: DISCONTINUED | OUTPATIENT
Start: 2023-02-13 | End: 2023-02-14 | Stop reason: HOSPADM

## 2023-02-13 RX ORDER — FUROSEMIDE 20 MG/1
10 TABLET ORAL DAILY
Status: DISCONTINUED | OUTPATIENT
Start: 2023-02-13 | End: 2023-02-13

## 2023-02-13 RX ORDER — ENOXAPARIN SODIUM 100 MG/ML
1 INJECTION SUBCUTANEOUS ONCE
Status: COMPLETED | OUTPATIENT
Start: 2023-02-13 | End: 2023-02-13

## 2023-02-13 RX ADMIN — METOPROLOL TARTRATE 50 MG: 50 TABLET, FILM COATED ORAL at 20:20

## 2023-02-13 RX ADMIN — FUROSEMIDE 20 MG: 20 TABLET ORAL at 08:26

## 2023-02-13 RX ADMIN — FUROSEMIDE 20 MG: 10 INJECTION, SOLUTION INTRAMUSCULAR; INTRAVENOUS at 09:49

## 2023-02-13 RX ADMIN — MAGNESIUM SULFATE HEPTAHYDRATE 2 G: 2 INJECTION, SOLUTION INTRAVENOUS at 14:20

## 2023-02-13 RX ADMIN — GUAIFENESIN 600 MG: 600 TABLET, EXTENDED RELEASE ORAL at 08:26

## 2023-02-13 RX ADMIN — SODIUM CHLORIDE, POTASSIUM CHLORIDE, SODIUM LACTATE AND CALCIUM CHLORIDE 500 ML: 600; 310; 30; 20 INJECTION, SOLUTION INTRAVENOUS at 03:57

## 2023-02-13 RX ADMIN — SODIUM CHLORIDE 2 ML: 9 INJECTION INTRAMUSCULAR; INTRAVENOUS; SUBCUTANEOUS at 17:22

## 2023-02-13 RX ADMIN — CHOLECALCIFEROL TAB 25 MCG (1000 UNIT) 4000 UNITS: 25 TAB at 08:26

## 2023-02-13 RX ADMIN — APIXABAN 5 MG: 2.5 TABLET, FILM COATED ORAL at 20:21

## 2023-02-13 RX ADMIN — Medication 1 CAPSULE: at 08:26

## 2023-02-13 RX ADMIN — DIGOXIN 250 MCG: 0.25 INJECTION INTRAMUSCULAR; INTRAVENOUS at 14:19

## 2023-02-13 RX ADMIN — DILTIAZEM HYDROCHLORIDE 10 MG: 5 INJECTION, SOLUTION INTRAVENOUS at 04:16

## 2023-02-13 RX ADMIN — METOPROLOL TARTRATE 50 MG: 50 TABLET, FILM COATED ORAL at 15:12

## 2023-02-13 RX ADMIN — FLUTICASONE PROPIONATE 2 SPRAY: 50 SPRAY, METERED NASAL at 20:57

## 2023-02-13 RX ADMIN — ACETAMINOPHEN 325 MG: 325 TABLET ORAL at 21:02

## 2023-02-13 RX ADMIN — SACUBITRIL AND VALSARTAN 1 TABLET: 24; 26 TABLET, FILM COATED ORAL at 08:26

## 2023-02-13 RX ADMIN — APIXABAN 5 MG: 2.5 TABLET, FILM COATED ORAL at 09:50

## 2023-02-13 RX ADMIN — METOPROLOL TARTRATE 5 MG: 1 INJECTION, SOLUTION INTRAVENOUS at 08:56

## 2023-02-13 RX ADMIN — ASPIRIN 81 MG: 81 TABLET, COATED ORAL at 08:26

## 2023-02-13 RX ADMIN — GUAIFENESIN 600 MG: 600 TABLET, EXTENDED RELEASE ORAL at 20:20

## 2023-02-13 RX ADMIN — METOPROLOL TARTRATE 50 MG: 50 TABLET, FILM COATED ORAL at 08:56

## 2023-02-13 RX ADMIN — Medication 10 ML: at 08:27

## 2023-02-13 RX ADMIN — Medication 10 ML: at 20:21

## 2023-02-13 RX ADMIN — SACUBITRIL AND VALSARTAN 1 TABLET: 24; 26 TABLET, FILM COATED ORAL at 20:21

## 2023-02-13 RX ADMIN — ENOXAPARIN SODIUM 80 MG: 80 INJECTION SUBCUTANEOUS at 04:16

## 2023-02-13 RX ADMIN — PANTOPRAZOLE SODIUM 40 MG: 40 TABLET, DELAYED RELEASE ORAL at 08:26

## 2023-02-13 RX ADMIN — ROSUVASTATIN CALCIUM 5 MG: 5 TABLET, FILM COATED ORAL at 20:21

## 2023-02-13 NOTE — PROGRESS NOTES
Adult Nutrition  Assessment/PES    Patient Name:  Effie Donnelly  YOB: 1945  MRN: [id number]  Admit Date:  2/13/2023    Assessment Date:  2/13/2023    Comments:  Agree with diet. Will cont to follow and provide edu as indicated.      Reason for Assessment     Row Name 02/13/23 1436          Reason for Assessment    Reason For Assessment diagnosis/disease state  HF     Diagnosis cardiac disease  New Afib, ICM hx HF                Nutrition/Diet History     Row Name 02/13/23 1436          Nutrition/Diet History    Typical Intake (Food/Fluid/EN/PN) Pt recieving care.                Labs/Tests/Procedures/Meds     Row Name 02/13/23 1437          Labs/Procedures/Meds    Lab Results Reviewed reviewed     Lab Results Comments creat 1.1        Diagnostic Tests/Procedures    Diagnostic Test/Procedure Reviewed reviewed        Medications    Pertinent Medications Reviewed reviewed     Pertinent Medications Comments vitamin D, lasix, risaquad                  Estimated/Assessed Needs - Anthropometrics     Row Name 02/13/23 1437          Anthropometrics    Weight --  183.2#        Estimated/Assessed Needs    Additional Documentation Estimated Calorie Needs (Group);Fluid Requirements (Group);Protein Requirements (Group)        Estimated Calorie Needs    Estimated Calorie Requirement (kcal/day) 1588 kcal ( mifflin 1.2 )     Estimated Calorie Need Method Dunn-St Jeor        Protein Requirements    Est Protein Requirement Amount (gms/kg) 0.8 gm protein  66 gm pro        Fluid Requirements    Estimated Fluid Requirement Method RDA Method  1588 ml                Nutrition Prescription Ordered     Row Name 02/13/23 1438          Nutrition Prescription PO    Common Modifiers Cardiac                Evaluation of Received Nutrient/Fluid Intake     Row Name 02/13/23 1438          Fluid Intake Evaluation    Oral Fluid (mL) --  insufficient data        PO Evaluation    Number of Days PO Intake Evaluated Insufficient Data                    Problem/Interventions:   Problem 1     Row Name 02/13/23 1438          Nutrition Diagnoses Problem 1    Problem 1 Knowledge Deficit     Etiology (related to) Medical Diagnosis     Cardiac CHF  hx noted     Signs/Symptoms (evidenced by) Potential Information Deficit                      Intervention Goal     Row Name 02/13/23 1439          Intervention Goal    General Meet nutritional needs for age/condition;Provide information regarding MNT for treatment/condition     PO Establish PO;PO intake (%)     PO Intake % 50 %  or greater                Nutrition Intervention     Row Name 02/13/23 1439          Nutrition Intervention    RD/Tech Action Follow Tx progress                  Education/Evaluation     Row Name 02/13/23 1439          Education    Education Education not appropriate at this time        Monitor/Evaluation    Monitor Per protocol;I&O;PO intake;Pertinent labs;Weight;Symptoms                 Electronically signed by:  Alice Shrestha RD  02/13/23 14:39 EST   (2) Patient Placed in Bed

## 2023-02-13 NOTE — PLAN OF CARE
Goal Outcome Evaluation:  Plan of Care Reviewed With: patient        Progress: improving  Outcome Evaluation: A&Ox4. Room air. Shortness of air improving per patient. Heart rate more controlled this afternoon. Telemetry shows afib with occasional PVCs. Respiratory panel positive for coronavirus HKU1. Droplet Isolation. Patient states this afternoon that this is the best she has felt in years. IV L hand, IV medications. No complaints of pain this shift. Will continue to monitor.

## 2023-02-13 NOTE — CASE MANAGEMENT/SOCIAL WORK
Discharge Planning Assessment  GÉNESIS Dunne     Patient Name: Effie Donnelly  MRN: [id number]  Today's Date: 2/13/2023    Admit Date: 2/13/2023    Plan: Home with adult children   Discharge Needs Assessment     Row Name 02/13/23 1046       Living Environment    People in Home child(kiah), adult    Name(s) of People in Home Gilmer, son and Jie daughter    Current Living Arrangements home  house    Duration at Residence 32 Y    Potentially Unsafe Housing Conditions --  none    Primary Care Provided by self    Provides Primary Care For no one    Caregiving Concerns no care giving concerns voiced by patient at this time.    Family Caregiver if Needed child(kiah), adult    Family Caregiver Names Gilmer, son and Jie daughter    Quality of Family Relationships unable to assess    Able to Return to Prior Arrangements yes    Living Arrangement Comments Pt states she lives in a two story house with three steps to gain entry and an elevator to get to second floor and  her son and daughter live there as well       Resource/Environmental Concerns    Resource/Environmental Concerns none    Transportation Concerns none       Food Insecurity    Within the past 12 months, you worried that your food would run out before you got the money to buy more. Never true    Within the past 12 months, the food you bought just didn't last and you didn't have money to get more. Never true       Transition Planning    Patient/Family Anticipates Transition to home with family    Patient/Family Anticipated Services at Transition none    Transportation Anticipated family or friend will provide  pt states her daughter Jie will be able to provide ride home at discharge.       Discharge Needs Assessment    Readmission Within the Last 30 Days no previous admission in last 30 days    Current Outpatient/Agency/Support Group --  none    Equipment Currently Used at Home cpap  pt states she has not used her CPAP for 2 yrs and is unsure which DME  provided her services    Concerns to be Addressed denies needs/concerns at this time;adjustment to diagnosis/illness;discharge planning    Concerns Comments pt voiced no discharge needs at this time    Anticipated Changes Related to Illness none    Equipment Needed After Discharge none    Outpatient/Agency/Support Group Needs --  pt states she will not need these services at discharge    Discharge Facility/Level of Care Needs --  pt states she will not need these services at discharge    Provided Post Acute Provider List? Refused    Refused Provider List Comment pt declined    Patient's Choice of Community Agency(s) none    Current Discharge Risk --  none    Discharge Coordination/Progress Patient states she plans on returning home at discharge with her adult children to help as needed, no discharge needs voiced by patient at this time.               Discharge Plan     Row Name 02/13/23 0905       Plan    Plan Home with adult children    Patient/Family in Agreement with Plan yes    Plan Comments Into room and introduced self and role of CM. Discussed discharge disposition with patient at bedside. Patient is currently resting in bed and voiced no complaints. Patient confirms that the info on her face sheet is correct and that she see's Dr. Hawley as PCP. She states that she uses [facility] in [place] and currently has no problem picking up or paying for her med's. She also states that she does not have a living will and declines information regarding one. Patient states she lives with her two adult children in a two story house with an elevator inside and three steps to gain entry and states she normally has no issues entering the home or maneuvering inside. She states that she is independent with her ADL's, works and drives and her daughter Jie will be able to provide ride home at discharge. She states she currently does not use any DME at home but has a CPAP at home but has not used in for the last 2  years. She states she does not anticipate needing any equipment at discharge. Patient also states that she has not used home health in the past and states she is not interested in using this services at discharge. CM offered community resources such as HH, STR, LTC and [facility] program but patient declines the need for them at this time. Patient states she plans on returning home at discharge with her children to help as needed. Patient voiced no discharge needs or had any questions or concerns regarding discharge plans at this time. Name and number placed on white board in room. CM will follow.              Continued Care and Services - Admitted Since 2/13/2023    Coordination has not been started for this encounter.          Demographic Summary     Row Name 02/13/23 1045       General Information    Admission Type observation    Arrived From home    Required Notices Provided Observation Status Notice    Referral Source admission list    Reason for Consult discharge planning    Preferred Language English       Contact Information    Permission Granted to Share Info With                Functional Status    No documentation.                Psychosocial    No documentation.                Abuse/Neglect    No documentation.                Legal    No documentation.                Substance Abuse    No documentation.                Patient Forms    No documentation.                   Mayte Wheeler RN

## 2023-02-13 NOTE — CONSULTS
Date of Hospital Visit: 2023  Date of consult: 2023  Encounter Provider: Ashish Browne MD  Place of Service: [facility] CARDIOLOGY  Patient Name: Effie Donnelly  :1945  Referral Provider: No ref. provider found    Chief complaint-shortness of breath    Reason for consult: Newly diagnosed atrial fibrillation, suspected CHF    History of Present Illness  Ms. Donnelly is a 77 years old lady who usually follows up with Dr. Brand came to the ER with progressively worsening shortness of breath and generalized body weakness of several weeks duration.  She was having similar symptoms during office visit in 2022 but symptoms have worsened over the last several days.  She denies having palpitations or chest discomfort, presyncope or syncope.  She reports symptoms resembling orthopnea, PND.  She is compliant with current medications but reports feeling more tired since she was started on diltiazem and Coreg.    She was noted to be in atrial fibrillation with rapid ventricular response in the ER and she got diltiazem 10 mg IV and resumed on home regimen.      Past Medical History:   Diagnosis Date   • CAD (coronary artery disease)    • Cancer (HCC)     skin, right breast   • CHF (congestive heart failure) ([facility])    • Coronary artery disease involving native coronary artery of native heart without angina pectoris 2018   • Heart murmur    • History of myocardial infarction 2018   • Hypertension    • Myocardial infarction ([facility])     PER PT    • NICM (nonischemic cardiomyopathy) ([facility]) 2020   • DELORES on CPAP     AHI 15/h   • Sinus tachycardia    • SOB (shortness of breath)        Past Surgical History:   Procedure Laterality Date   • BUNIONECTOMY Bilateral    • CARDIAC CATHETERIZATION     • CARDIAC CATHETERIZATION N/A 2020    Procedure: Right and Left Heart Cath;  Surgeon: Bin Chacko MD;  Location: [facility] CATH INVASIVE LOCATION;  Service:  Cardiovascular   • CARDIAC CATHETERIZATION N/A 01/14/2020    Procedure: Coronary angiography;  Surgeon: Bin Chacko MD;  Location:  [facility] CATH INVASIVE LOCATION;  Service: Cardiovascular   • CARDIAC CATHETERIZATION N/A 01/14/2020    Procedure: Left ventriculography;  Surgeon: Bin Chacko MD;  Location:  [facility] CATH INVASIVE LOCATION;  Service: Cardiovascular   • CATARACT EXTRACTION Left 04/26/2022   • COLONOSCOPY N/A 03/23/2022    Procedure: COLONOSCOPY WITH BIOPSIES; POLYPECTOMY;  Surgeon: Lorne Esquivel MD;  Location:  [facility] OR;  Service: Gastroenterology;  Laterality: N/A;  POLYP  ULCERATIVE COLITIS   • ENDOSCOPY N/A 03/23/2022    Procedure: ESOPHAGOGASTRODUODENOSCOPY WITH BIOPSIES;  Surgeon: Lorne Esquivel MD;  Location: [facility] OR;  Service: Gastroenterology;  Laterality: N/A;  GASTRITIS  ESOPHAGEAL REFLUX  HIATAL HERNIA  ESOPHAGEAL CANDIDIASIS   • MASTECTOMY COMPLETE / SIMPLE  06/2019   • SHOULDER SURGERY Right    • SKIN CANCER EXCISION      ABOVE THE LIP.        Medications Prior to Admission   Medication Sig Dispense Refill Last Dose   • aspirin 81 MG tablet Take 81 mg by mouth Daily. TAKE 2 DAILY ON Tuesday AND THURSDAYS 2/12/2023   • B Complex Vitamins (VITAMIN B COMPLEX PO) Take 1 tablet by mouth Daily.   2/12/2023   • carvedilol (COREG) 12.5 MG tablet Take 1 tablet by mouth 2 (Two) Times a Day. 180 tablet 3 2/12/2023   • cholecalciferol (VITAMIN D3) 25 MCG (1000 UT) tablet Take 4,000 Units by mouth Daily.      • CRANBERRY EXTRACT PO Take 1 capsule by mouth Daily.      • dilTIAZem (Cardizem) 30 MG tablet Take 1 tablet by mouth 2 (Two) Times a Day. 180 tablet 3    • furosemide (LASIX) 20 MG tablet Take 1 tablet by mouth Daily. Take 1 tab in the morning and 1/2 tab at 2 pm (Patient taking differently: Take 20 mg by mouth Daily. 1/2 TAB DAILY) 135 tablet 3    • Loratadine-D 24HR  MG per 24 hr tablet TAKE ONE TABLET BY MOUTH DAILY 30 tablet 3    • MEGARED OMEGA-3  KRILL OIL PO Take 1 capsule by mouth Daily.      • omeprazole (priLOSEC) 40 MG capsule Take 1 capsule by mouth Daily. 90 capsule 3    • PROBIOTIC PRODUCT PO Take 2 capsules by mouth Daily.      • rosuvastatin (Crestor) 5 MG tablet Take 1 tablet by mouth Daily. (Patient not taking: Reported on 2023) 90 tablet 2 Not Taking   • sacubitril-valsartan (Entresto) 24-26 MG tablet Take 1 tablet by mouth 2 (Two) Times a Day. 180 tablet 3        Current Meds  Scheduled Meds:aspirin, 81 mg, Oral, Daily  carvedilol, 12.5 mg, Oral, BID  cholecalciferol, 4,000 Units, Oral, Daily  dilTIAZem, 30 mg, Oral, BID  furosemide, 10 mg, Oral, Daily  furosemide, 20 mg, Oral, Daily  guaiFENesin, 600 mg, Oral, Q12H  lactobacillus acidophilus, 1 capsule, Oral, Daily  pantoprazole, 40 mg, Oral, Q AM  rosuvastatin, 5 mg, Oral, Nightly  sacubitril-valsartan, 1 tablet, Oral, BID  sodium chloride, 10 mL, Intravenous, Q12H      Continuous Infusions:   PRN Meds:.•  acetaminophen  •  dexamethasone  •  nitroglycerin  •  sodium chloride  •  sodium chloride  •  sodium chloride    Allergies as of 2023 - Reviewed 2023   Allergen Reaction Noted   • Nsaids GI Bleeding 2019   • Adhesive tape Other (See Comments) 2019       Social History     Socioeconomic History   • Marital status:    • Number of children: 5   Tobacco Use   • Smoking status: Former     Types: Cigarettes     Quit date: 1973     Years since quittin.3   • Smokeless tobacco: Never   • Tobacco comments:     CAFFEINE USE: 1 CUP DAILY   Vaping Use   • Vaping Use: Never used   Substance and Sexual Activity   • Alcohol use: No   • Drug use: No   • Sexual activity: Defer       Family History   Problem Relation Age of Onset   • Hypertension Sister    • Cancer Sister    • Heart disease Brother 65        VALVE REPLACEMENT    • Hypertension Brother    • Hypertension Sister    • Hypertension Sister    • Hypertension Brother    • Diabetes Brother    •  "Hypertension Brother    • Cancer Brother    • Hypertension Brother    • Hypertension Brother    • Hypertension Brother        REVIEW OF SYSTEMS:   All systems reviewed and pertinent positives include in HPI otherwise negative review of systems.       Objective:   Temp:  [98.3 °F (36.8 °C)-99.5 °F (37.5 °C)] 99.5 °F (37.5 °C)  Heart Rate:  [100-127] 122  Resp:  [18-20] 20  BP: (124-137)/() 137/82  Body mass index is 30.49 kg/m².  Flowsheet Rows    Flowsheet Row First Filed Value   Admission Height 165.1 cm (65\") Documented at 02/13/2023 0153   Admission Weight 83 kg (183 lb) Documented at 02/13/2023 0153        Vitals:    02/13/23 0530   BP: 137/82   Pulse: (!) 122   Resp: 20   Temp: 99.5 °F (37.5 °C)   SpO2: 94%       General Appearance:    Alert, cooperative, in no acute distress   Head:    Normocephalic, without obvious abnormality, atraumatic   Eyes:            Lids and lashes normal, conjunctivae and sclerae normal, no   icterus, no pallor, corneas clear, PERRLA   Ears:    Ears appear intact with no abnormalities noted   Throat:   No oral lesions, no thrush, oral mucosa moist   Neck:   No adenopathy, supple, trachea midline, no thyromegaly, no   carotid bruit, no JVD   Back:     No kyphosis present, no scoliosis present, no skin lesions, erythema or scars, no tenderness to percussion or palpation, range of motion normal   Lungs:     Clear to auscultation,respirations regular, even and unlabored    Heart:   Irregularly irregular , normal S1 and S2, no murmur, no gallop, no rub, no click   Chest Wall:    No abnormalities observed   Abdomen:     Normal bowel sounds, no masses, no organomegaly, soft nontender, nondistended, no guarding, no rebound  tenderness   Extremities:   Moves all extremities well, no edema, no cyanosis, no redness   Pulses:   Pulses palpable and equal bilaterally. Normal radial, carotid, femoral, dorsalis pedis and posterior tibial pulses bilaterally. Normal abdominal aorta "   Skin:  Neurology:   Psychiatric:   No bleeding, bruising or rash   Normal speech and cranial nerve exam, no focal deficit   Alert and oriented x 3, normal mood and affect                 Review of Data:      Results from last 7 days   Lab Units 02/13/23 0229   SODIUM mmol/L 135*   POTASSIUM mmol/L 4.5   CHLORIDE mmol/L 102   CO2 mmol/L 21.1*   BUN mg/dL 19   CREATININE mg/dL 1.18*   CALCIUM mg/dL 8.9   BILIRUBIN mg/dL 0.8   ALK PHOS U/L 116   ALT (SGPT) U/L 24   AST (SGOT) U/L 27   GLUCOSE mg/dL 107*     Results from last 7 days   Lab Units 02/13/23 0429 02/13/23 0229 02/06/23  0958   CK TOTAL U/L  --   --  98   HSTROP T ng/L 21* 21*  --      @LABRCNTbnp@  Results from last 7 days   Lab Units 02/13/23 0229 02/06/23  0958   WBC 10*3/mm3 8.76 5.56   HEMOGLOBIN g/dL 13.7 13.9   HEMATOCRIT % 43.5 44.5   PLATELETS 10*3/mm3 200 242     Results from last 7 days   Lab Units 02/13/23  0315   INR  1.09   APTT seconds 33.7     Results from last 7 days   Lab Units 02/13/23 0429   MAGNESIUM mg/dL 1.9     @LABRCNTIP(chol,trig,hdl,ldl)    I personally viewed and interpreted the patient's EKG/Telemetry data  )  Patient Active Problem List   Diagnosis   • History of myocardial infarction   • Coronary artery disease involving native coronary artery of native heart without angina pectoris   • Ulcerative colitis (HCC)   • HTN (hypertension)   • GERD (gastroesophageal reflux disease)   • DJD (degenerative joint disease)   • Ductal 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   • Anemia   • Other hyperlipidemia   • NICM (nonischemic cardiomyopathy) ([facility])   • DELORES on CPAP   • Snoring   • Class 1 obesity due to excess calories without serious comorbidity with body mass index (BMI) of 31.0 to 31.9 in adult   • Chronic systolic congestive heart failure (HCC)   • Esophageal dysphagia   • Stage 3a chronic kidney disease (HCC)   • New onset atrial fibrillation (HCC)         Assessment and Plan:    1.  Newly diagnosed symptomatic atrial  fibrillation with rapid ventricular response mainly causing shortness of breath.  Hear rate rate still uncontrolled >120 beats per minute with intermittent PVC  Hold off Coreg and diltiazem for now: Treat with short acting IV and oral metoprolol  She also has symptoms of CHF- I will give a dose of IV diuretic and started on oral Lasix. Start Eliquis.   Limited echo to evaluate left ventricular ejection fraction which impacted medication choice for A-fib and rate versus rhythm strategy    2.  Prior history of nonischemic cardiomyopathy on Coreg , Entresto and oral Lasix   Echo in December with low normal left ventricular ejection fraction  3.  History of nonobstructive coronary artery  4.  Essential hypertension  5. CKD   6.  Hypercholesterolemia on rosuvastatin        Ashish Browne MD  02/13/23  07:24 EST.  Time spent in reviewing chart, discussion and examination:

## 2023-02-13 NOTE — PROGRESS NOTES
Reviewed notes/labs of this am:    New a-fib RVR:  NICM:  HTN:  CAD/HLD:   H/O MI:  eliquis started  Limited echo pending  Continues crestor 5 mg daily, entresto 24-25 mg twice daily, metoprolol tartrate 60 mg every 6 hours, lasix 20 mg daily  Strict I&O, daily weight  Monitor on telemetry    GERD: continues protonix    Obesity: Body mass index is 30.49 kg/m².    H/O ductal carcinoma right breast s/p mastectomy    DELORES on CPAP    RVP pending

## 2023-02-13 NOTE — H&P
[facility] HOSPITALIST     Terrence Hawley MD is PCP    CHIEF COMPLAINT: New-onset atrial fibrillation    HISTORY OF PRESENT ILLNESS: Effie Donnelly is a 77-year-old female with a history of nonischemic cardiomyopathy, SVT, CAD, DELORES adherent to CPAP therapy, and multiple other comorbidities, see below.  She presented to the ED for shortness of breath and cough of 1 week duration and was found to have what seems to be new onset atrial fibrillation and was in RVR on EKG.    Upon interview the patient states that she feels terrible overall. She has felt congested for the past week. She has also had a nonproductive cough but feels as if she has mucus to expectorate and is unable to. She denies any fevers or chills. She is nauseous but is not currently vomiting. She has had some mild chest discomfort. She denies any syncopal episodes, diarrhea, hematochezia, melena.     In the ED the patient was found to have a very slightly elevated troponin.  Repeat at this time is pending.  Her EKG demonstrated atrial fibrillation with RVR.  Her BNP was elevated and a questionable infiltrate was noted on chest x-ray.          Past Medical History:   Diagnosis Date   • CAD (coronary artery disease)    • Cancer (HCC)     skin, right breast   • CHF (congestive heart failure) ([facility])    • Coronary artery disease involving native coronary artery of native heart without angina pectoris 11/1/2018   • Heart murmur    • History of myocardial infarction 11/1/2018   • Hypertension    • Myocardial infarction ([facility])     PER PT    • NICM (nonischemic cardiomyopathy) ([facility]) 7/8/2020   • DELORES on CPAP     AHI 15/h   • Sinus tachycardia    • SOB (shortness of breath)      Past Surgical History:   Procedure Laterality Date   • BUNIONECTOMY Bilateral    • CARDIAC CATHETERIZATION     • CARDIAC CATHETERIZATION N/A 01/14/2020    Procedure: Right and Left Heart Cath;  Surgeon: Bin Chacko MD;  Location: [facility] INVASIVE LOCATION;   Service: Cardiovascular   • CARDIAC CATHETERIZATION N/A 2020    Procedure: Coronary angiography;  Surgeon: Bin Chacko MD;  Location:  [facility] CATH INVASIVE LOCATION;  Service: Cardiovascular   • CARDIAC CATHETERIZATION N/A 2020    Procedure: Left ventriculography;  Surgeon: Bin Chacko MD;  Location:  [facility] CATH INVASIVE LOCATION;  Service: Cardiovascular   • CATARACT EXTRACTION Left 2022   • COLONOSCOPY N/A 2022    Procedure: COLONOSCOPY WITH BIOPSIES; POLYPECTOMY;  Surgeon: Lorne Esquivel MD;  Location:  [facility] OR;  Service: Gastroenterology;  Laterality: N/A;  POLYP  ULCERATIVE COLITIS   • ENDOSCOPY N/A 2022    Procedure: ESOPHAGOGASTRODUODENOSCOPY WITH BIOPSIES;  Surgeon: Lorne Esquivel MD;  Location:  [facility] OR;  Service: Gastroenterology;  Laterality: N/A;  GASTRITIS  ESOPHAGEAL REFLUX  HIATAL HERNIA  ESOPHAGEAL CANDIDIASIS   • MASTECTOMY COMPLETE / SIMPLE  2019   • SHOULDER SURGERY Right    • SKIN CANCER EXCISION      ABOVE THE LIP.      Family History   Problem Relation Age of Onset   • Hypertension Sister    • Cancer Sister    • Heart disease Brother 65        VALVE REPLACEMENT    • Hypertension Brother    • Hypertension Sister    • Hypertension Sister    • Hypertension Brother    • Diabetes Brother    • Hypertension Brother    • Cancer Brother    • Hypertension Brother    • Hypertension Brother    • Hypertension Brother      Social History     Tobacco Use   • Smoking status: Former     Types: Cigarettes     Quit date: 1973     Years since quittin.3   • Smokeless tobacco: Never   • Tobacco comments:     CAFFEINE USE: 1 CUP DAILY   Vaping Use   • Vaping Use: Never used   Substance Use Topics   • Alcohol use: No   • Drug use: No     (Not in a hospital admission)    Allergies:  Nsaids and Adhesive tape    REVIEW OF SYSTEMS:  Please see the above history of present illness for pertinent positives and negatives.  The remainder of the  "patient's systems have been reviewed and are negative.     Vital Signs  Temp:  [98.3 °F (36.8 °C)-99.2 °F (37.3 °C)] 99.2 °F (37.3 °C)  Heart Rate:  [100-127] 101  Resp:  [18] 18  BP: (124-131)/() 131/95  Oxygen Therapy  SpO2: 94 %  Device (Oxygen Therapy): room air}  Body mass index is 30.45 kg/m².  Flowsheet Rows    Flowsheet Row First Filed Value   Admission Height 165.1 cm (65\") Documented at 02/13/2023 0153   Admission Weight 83 kg (183 lb) Documented at 02/13/2023 0153             Physical Exam:  Physical Exam   Constitutional: Patient is a pleasant, obese female in no acute distress on room air.  She is alert and oriented to person, place, time, and situation.   Head: Normocephalic and atraumatic.   EENT: Hearing grossly intact  Eyes:  Sclerae are anicteric and noninjected.  Mouth and Throat: Patient has moist mucous membranes. Oropharynx is clear of any erythema or exudate.     Neck: Trachea midline  Cardiovascular: Moderately tachycardic, irregularly irregular rhythm.  No murmurs, rubs, or gallops appreciated  Pulmonary/Chest: No supplemental O2 or respiratory distress.  No accessory muscle use.  Dry cough noted infrequently during exam.  Abdominal: Soft. Bowel sounds are normoactive .no distension and no mass appreciated.  No grimacing on palpation  Musculoskeletal: Normal muscle tone  Extremities: No edema. Pulses are palpable in all 4 extremities.  Neurological: Patient is alert and oriented to person, place, and time.  Answers questions appropriately.  No gross motor deficits noted  Skin: Skin is warm. No rash noted. Nails show no clubbing.  No cyanosis or erythema.    Emotional Behavior:    Judgment and Insight: Seem intact on interview   Mental Status: See above   Memory: Seems intact on interview   Mood and Affect:         Depression-no signs of acute depression               Anxiety-no signs of acute anxiety    Debilities:   Physical Weakness-none noted   Handicaps-none " "noted   Disabilities-none noted   Agitation-none during interview     Results Review:    I reviewed the patient's new clinical results.  Lab Results (most recent)     Procedure Component Value Units Date/Time    High Sensitivity Troponin T 2Hr [[id number]] Collected: 02/13/23 0429    Specimen: Blood Updated: 02/13/23 0438    Protime-INR [[id number]] Collected: 02/13/23 0315    Specimen: Blood Updated: 02/13/23 0436    aPTT [[id number]] Collected: 02/13/23 0315    Specimen: Blood Updated: 02/13/23 0436    BNP [[id number]]  (Abnormal) Collected: 02/13/23 0229    Specimen: Blood Updated: 02/13/23 0356     proBNP 3,930.0 pg/mL     Narrative:      Among patients with dyspnea, NT-proBNP is highly sensitive for the detection of acute congestive heart failure. In addition NT-proBNP of <300 pg/ml effectively rules out acute congestive heart failure with 99% negative predictive value.    Results may be falsely decreased if patient taking Biotin.      D-dimer, Quantitative [[id number]]  (Normal) Collected: 02/13/23 0315    Specimen: Blood Updated: 02/13/23 0354     D-Dimer, Quantitative 0.40 MCGFEU/mL     Narrative:      According to the assay 's published package insert, a normal (<0.50 MCGFEU/mL) D-dimer result in conjunction with a non-high clinical probability assessment, excludes deep vein thrombosis (DVT) and pulmonary embolism (PE) with high sensitivity.    D-dimer values increase with age and this can make VTE exclusion of an older population difficult. To address this, the [employer], based on best available evidence and recent guidelines, recommends that clinicians use age-adjusted D-dimer thresholds in patients greater than 50 years of age with: a) a low probability of PE who do not meet all Pulmonary Embolism Rule Out Criteria, or b) in those with intermediate probability of PE.   The formula for an age-adjusted D-dimer cut-off is \"age/100\".  For example, a 60 year old patient would have " an age-adjusted cut-off of 0.60 MCGFEU/mL and an 80 year old 0.80 MCGFEU/mL.    Comprehensive Metabolic Panel [[id number]]  (Abnormal) Collected: 02/13/23 0229    Specimen: Blood Updated: 02/13/23 0332     Glucose 107 mg/dL      BUN 19 mg/dL      Creatinine 1.18 mg/dL      Sodium 135 mmol/L      Potassium 4.5 mmol/L      Comment: Slight hemolysis detected by analyzer. Results may be affected.        Chloride 102 mmol/L      CO2 21.1 mmol/L      Calcium 8.9 mg/dL      Total Protein 7.3 g/dL      Albumin 4.1 g/dL      ALT (SGPT) 24 U/L      AST (SGOT) 27 U/L      Alkaline Phosphatase 116 U/L      Total Bilirubin 0.8 mg/dL      Globulin 3.2 gm/dL      A/G Ratio 1.3 g/dL      BUN/Creatinine Ratio 16.1     Anion Gap 11.9 mmol/L      eGFR 48.0 mL/min/1.73     Narrative:      GFR Normal >60  Chronic Kidney Disease <60  Kidney Failure <15    The GFR formula is only valid for adults with stable renal function between ages 18 and 70.    High Sensitivity Troponin T [[id number]]  (Abnormal) Collected: 02/13/23 0229    Specimen: Blood Updated: 02/13/23 0306     HS Troponin T 21 ng/L     Narrative:      High Sensitive Troponin T Reference Range:  <10.0 ng/L- Negative Female for AMI  <15.0 ng/L- Negative Male for AMI  >=10 - Abnormal Female indicating possible myocardial injury.  >=15 - Abnormal Male indicating possible myocardial injury.   Clinicians would have to utilize clinical acumen, EKG, Troponin, and serial changes to determine if it is an Acute Myocardial Infarction or myocardial injury due to an underlying chronic condition.         Procalcitonin [[id number]]  (Normal) Collected: 02/13/23 0229    Specimen: Blood Updated: 02/13/23 0305     Procalcitonin 0.06 ng/mL     Narrative:      As a Marker for Sepsis (Non-Neonates):    1. <0.5 ng/mL represents a low risk of severe sepsis and/or septic shock.  2. >2 ng/mL represents a high risk of severe sepsis and/or septic shock.    As a Marker for Lower Respiratory Tract Infections  "that require antibiotic therapy:    PCT on Admission    Antibiotic Therapy       6-12 Hrs later    >0.5                Strongly Recommended  >0.25 - <0.5        Recommended   0.1 - 0.25          Discouraged              Remeasure/reassess PCT  <0.1                Strongly Discouraged     Remeasure/reassess PCT    As 28 day mortality risk marker: \"Change in Procalcitonin Result\" (>80% or <=80%) if Day 0 (or Day 1) and Day 4 values are available. Refer to http://www.[facility]-pct-calculator.com    Change in PCT <=80%  A decrease of PCT levels below or equal to 80% defines a positive change in PCT test result representing a higher risk for 28-day all-cause mortality of patients diagnosed with severe sepsis for septic shock.    Change in PCT >80%  A decrease of PCT levels of more than 80% defines a negative change in PCT result representing a lower risk for 28-day all-cause mortality of patients diagnosed with severe sepsis or septic shock.       CBC & Differential [[id number]]  (Abnormal) Collected: 02/13/23 0229    Specimen: Blood Updated: 02/13/23 0237    Narrative:      The following orders were created for panel order CBC & Differential.  Procedure                               Abnormality         Status                     ---------                               -----------         ------                     CBC Auto Differential[[id number]]        Abnormal            Final result                 Please view results for these tests on the individual orders.    CBC Auto Differential [[id number]]  (Abnormal) Collected: 02/13/23 0229    Specimen: Blood Updated: 02/13/23 0237     WBC 8.76 10*3/mm3      RBC 4.88 10*6/mm3      Hemoglobin 13.7 g/dL      Hematocrit 43.5 %      MCV 89.1 fL      MCH 28.1 pg      MCHC 31.5 g/dL      RDW 13.6 %      RDW-SD 45.5 fl      MPV 11.5 fL      Platelets 200 10*3/mm3      Neutrophil % 76.9 %      Lymphocyte % 11.9 %      Monocyte % 9.5 %      Eosinophil % 1.4 %      Basophil % 0.2 %      " Immature Grans % 0.1 %      Neutrophils, Absolute 6.74 10*3/mm3      Lymphocytes, Absolute 1.04 10*3/mm3      Monocytes, Absolute 0.83 10*3/mm3      Eosinophils, Absolute 0.12 10*3/mm3      Basophils, Absolute 0.02 10*3/mm3      Immature Grans, Absolute 0.01 10*3/mm3     COVID-19 and FLU A/B PCR - Swab, Nasopharynx [[id number]]  (Normal) Collected: 02/13/23 0200    Specimen: Swab from Nasopharynx Updated: 02/13/23 0225     COVID19 Not Detected     Influenza A PCR Not Detected     Influenza B PCR Not Detected    Narrative:      Fact sheet for providers: https://www.fda.gov/media/219905/download    Fact sheet for patients: https://www.fda.gov/media/721586/download    Test performed by PCR.          Imaging Results (Most Recent)     Procedure Component Value Units Date/Time    XR Chest 2 View [[id number]] Collected: 02/13/23 0249     Updated: 02/13/23 0252    Narrative:      CR Chest 2 Vws    INDICATION:    Cough and short of air symptoms 1 week. Negative COVID- chest. History of congestive heart failure.    COMPARISON:    11/2/2022    FINDINGS:   PA and lateral views of the chest.  The heart is enlarged. Atherosclerotic change of the aorta. Vascularity within normal limits. No pneumothorax. Chronic blunting of the left CP angle and old healed granulomatous disease. There are some new opacities in  the infrahilar lung zone on the right suggestive of dense atelectasis. No new effusion. Postop changes right shoulder and projecting over the right lower lobe. There is thoracic spondylosis.        Impression:        1. Cardiomegaly without distinct volume overload.  2. Chronic lung changes with probable atelectasis or less likely early/developing infiltrate in the right lung base.    Signer Name: Андрей Tolbert MD   Signed: 2/13/2023 2:49 AM   Workstation Name: [email or web address]    [facility]         Both film and radiology read noted.  Agree with radiology read that patient has cardiomegaly with some  infiltrates, most suspicious being in the left lung base    ECG/EMG Results (most recent)     Procedure Component Value Units Date/Time    ECG 12 Lead Chest Pain [[id number]] Collected: 02/13/23 0255     Updated: 02/13/23 0256     QT Interval 340 ms     Narrative:      HEART RATE= 133  bpm  RR Interval= 450  ms  NH Interval=   ms  P Horizontal Axis=   deg  P Front Axis=   deg  QRSD Interval= 108  ms  QT Interval= 340  ms  QRS Axis= -39  deg  T Wave Axis= 131  deg  - ABNORMAL ECG -  Atrial fibrillation  LVH with secondary repolarization abnormality  Prolonged QT interval  Electronically Signed By:   Date and Time of Study: 2023-02-13 02:55:02        Reviewed both tracing and impression.  Compared to prior EKG.  Prior EKG and denies EKG both have signs of ischemia with mild ST depression, multiple PVCs, and T wave inversion.  Tonight's EKG is atrial fibrillation, however.    Assessment & Plan     New onset atrial fibrillation with RVR/slightly elevated troponin/nonobstructive CAD/nonischemic cardiomyopathy  - Admitting to obs with telemetry  - Patient has a history of SVT but no known history of atrial fibrillation  - Known to cardiology, will consult them  - Patient is already on Cardizem and Coreg at home.  Will give home medications of Coreg and Cardizem  -Seems as if last cardiac catheterization was in 2020, patient has never had any stents or CABG, has been noted to have nonobstructive CAD  - Continue aspirin, beta-blocker, and statin per home regimen  -Continue Entresto per home regimen  -We will add on A1c and lipid panel due to elevated troponin  -Will order an RVP due to cough       I discussed the patient's findings and my recommendations with patient and nurse at bedside.     Dione Askew DO  02/13/23  04:43 EST    Time: 60 minutes was spent taking report from the ED physician, reviewing the patient's chart, performing a full history and physical, and documenting the encounter

## 2023-02-13 NOTE — PLAN OF CARE
Goal Outcome Evaluation:  Plan of Care Reviewed With: patient        Progress: no change  Outcome Evaluation: pt arrived to unit - no c/o pain - room air - heartbeat irregular - right mast>no BP or lab draws - pt requesting something to drink, awaiting orders

## 2023-02-13 NOTE — ED PROVIDER NOTES
Subjective   History of Present Illness  77-year-old female presents with shortness of breath and cough.  Patient states for the last week her shortness of breath has been getting worse.  She reports she has some baseline dyspnea on exertion but over the past week it has gotten worse and that she is also developed a cough.  Cough is nonproductive.  No fevers or chills.  She also reports some occasional chest tightness but states this is longstanding and her baseline.  She also reports sinus congestion and sore throat.  States she feels like she needs to cough something up but cannot.  She has taken Mucinex and Dimetapp today without relief.  No recent leg pains or swelling.  No medication changes.  Daughter who is at bedside and with patient has also had cough and congestion for the past week.        Review of Systems   All other systems reviewed and are negative.      Past Medical History:   Diagnosis Date   • CAD (coronary artery disease)    • Cancer ([facility])     skin, right breast   • CHF (congestive heart failure) ([facility])    • Coronary artery disease involving native coronary artery of native heart without angina pectoris 11/1/2018   • Heart murmur    • History of myocardial infarction 11/1/2018   • Hypertension    • Myocardial infarction ([facility])     PER PT    • NICM (nonischemic cardiomyopathy) ([facility]) 7/8/2020   • DELORES on CPAP     AHI 15/h   • Sinus tachycardia    • SOB (shortness of breath)        Allergies   Allergen Reactions   • Nsaids GI Bleeding     CROHN'S DISEASE   • Adhesive Tape Other (See Comments)     BLISTERS UNDER TAGADERM       Past Surgical History:   Procedure Laterality Date   • BUNIONECTOMY Bilateral    • CARDIAC CATHETERIZATION     • CARDIAC CATHETERIZATION N/A 01/14/2020    Procedure: Right and Left Heart Cath;  Surgeon: Bin Chacko MD;  Location: [facility] CATH INVASIVE LOCATION;  Service: Cardiovascular   • CARDIAC CATHETERIZATION N/A 01/14/2020    Procedure: Coronary angiography;  Surgeon:  Bin Chacko MD;  Location:  [facility] CATH INVASIVE LOCATION;  Service: Cardiovascular   • CARDIAC CATHETERIZATION N/A 2020    Procedure: Left ventriculography;  Surgeon: Bin Chacko MD;  Location:  [facility] CATH INVASIVE LOCATION;  Service: Cardiovascular   • CATARACT EXTRACTION Left 2022   • COLONOSCOPY N/A 2022    Procedure: COLONOSCOPY WITH BIOPSIES; POLYPECTOMY;  Surgeon: Lorne Esquivel MD;  Location:  [facility] OR;  Service: Gastroenterology;  Laterality: N/A;  POLYP  ULCERATIVE COLITIS   • ENDOSCOPY N/A 2022    Procedure: ESOPHAGOGASTRODUODENOSCOPY WITH BIOPSIES;  Surgeon: Lorne Esquivel MD;  Location:  [facility] OR;  Service: Gastroenterology;  Laterality: N/A;  GASTRITIS  ESOPHAGEAL REFLUX  HIATAL HERNIA  ESOPHAGEAL CANDIDIASIS   • MASTECTOMY COMPLETE / SIMPLE  2019   • SHOULDER SURGERY Right    • SKIN CANCER EXCISION      ABOVE THE LIP.        Family History   Problem Relation Age of Onset   • Hypertension Sister    • Cancer Sister    • Heart disease Brother 65        VALVE REPLACEMENT    • Hypertension Brother    • Hypertension Sister    • Hypertension Sister    • Hypertension Brother    • Diabetes Brother    • Hypertension Brother    • Cancer Brother    • Hypertension Brother    • Hypertension Brother    • Hypertension Brother        Social History     Socioeconomic History   • Marital status:    • Number of children: 5   Tobacco Use   • Smoking status: Former     Types: Cigarettes     Quit date: 1973     Years since quittin.3   • Smokeless tobacco: Never   • Tobacco comments:     CAFFEINE USE: 1 CUP DAILY   Vaping Use   • Vaping Use: Never used   Substance and Sexual Activity   • Alcohol use: No   • Drug use: No   • Sexual activity: Defer           Objective   Physical Exam  Constitutional:       General: She is not in acute distress.     Appearance: She is not toxic-appearing.   HENT:      Head: Normocephalic and atraumatic.       Nose: Congestion present. No rhinorrhea.      Mouth/Throat:      Mouth: Mucous membranes are moist.      Pharynx: Oropharynx is clear.   Eyes:      Extraocular Movements: Extraocular movements intact.      Pupils: Pupils are equal, round, and reactive to light.   Cardiovascular:      Pulses: Normal pulses.      Heart sounds: Normal heart sounds.      Comments: Heart rate around 100, regular  Pulmonary:      Effort: Pulmonary effort is normal. No respiratory distress.      Breath sounds: Normal breath sounds.   Abdominal:      General: There is no distension.      Palpations: Abdomen is soft.      Tenderness: There is no abdominal tenderness.   Musculoskeletal:         General: No swelling, tenderness, deformity or signs of injury. Normal range of motion.      Cervical back: Normal range of motion and neck supple. No tenderness.      Right lower leg: No edema.      Left lower leg: No edema.   Skin:     General: Skin is warm and dry.   Neurological:      General: No focal deficit present.      Mental Status: She is alert and oriented to person, place, and time. Mental status is at baseline.   Psychiatric:         Mood and Affect: Mood normal.         Behavior: Behavior normal.         Thought Content: Thought content normal.         Judgment: Judgment normal.         Procedures           ED Course  ED Course as of 02/13/23 0452   Mon Feb 13, 2023   0446 Patient overall nontoxic-appearing although she does have an occasional cough, looks like she does not feel great.  As best I can piece together here patient likely has some component of viral URI/pneumonia and appears to be in new onset atrial fibrillation.  Hard to determine how much of her symptoms currently are from the URI versus atrial fibrillation with rates in the 120s to 130s.  Troponin is indeterminate range but patient really has not complained of any chest pain, has recent echocardiogram and heart catheterization from not too terribly long ago which were  both reassuring.  Questionable infiltrate on x-ray but patient's white blood cell count and procalcitonin are both normal.  COVID and influenza are both negative.  Do not feel the patient needs antibiotics at this point and would like to first try to control her rate to see if this improves her symptoms.  Patient was given IV fluid bolus and IV diltiazem which have improved her heart rate to around 110.  We will give treatment dose of Lovenox for anticoagulation and plan to admit for further management. [TD]   0451 EKG obtained at 0255 shows atrial fibrillation with a rate of 133, LVH, PVCs.  A-fib appears to be new onset [TD]      ED Course User Index  [TD] Brandon Wang MD                                           [facility]    Final diagnoses:   New onset atrial fibrillation (HCC)       ED Disposition  ED Disposition     ED Disposition   Decision to Admit    Condition   --    Comment   Level of Care: Telemetry [5]   Diagnosis: New onset atrial fibrillation (HCC) [477594]   Admitting Physician: FARRAH GASTON [[id number]]   Attending Physician: FARRAH GASTON [[id number]]               No follow-up provider specified.       Medication List      Changed    furosemide 20 MG tablet  Commonly known as: LASIX  Take 1 tablet by mouth Daily. Take 1 tab in the morning and 1/2 tab at 2 pm  What changed: additional instructions             Brandon Wang MD  02/13/23 0451       Brandon Wang MD  02/13/23 0459

## 2023-02-14 ENCOUNTER — READMISSION MANAGEMENT (OUTPATIENT)
Dept: CALL CENTER | Facility: HOSPITAL | Age: 78
End: 2023-02-14
Payer: MEDICARE

## 2023-02-14 VITALS
OXYGEN SATURATION: 95 % | WEIGHT: 178.79 LBS | TEMPERATURE: 98.5 F | BODY MASS INDEX: 29.79 KG/M2 | SYSTOLIC BLOOD PRESSURE: 132 MMHG | RESPIRATION RATE: 18 BRPM | HEIGHT: 65 IN | DIASTOLIC BLOOD PRESSURE: 71 MMHG | HEART RATE: 60 BPM

## 2023-02-14 LAB
ANION GAP SERPL CALCULATED.3IONS-SCNC: 9.1 MMOL/L (ref 5–15)
BH CV ECHO MEAS - EDV(CUBED): 163.1 ML
BH CV ECHO MEAS - EDV(MOD-SP2): 120 ML
BH CV ECHO MEAS - EDV(MOD-SP4): 137 ML
BH CV ECHO MEAS - EF(MOD-BP): 46 %
BH CV ECHO MEAS - EF(MOD-SP2): 41.7 %
BH CV ECHO MEAS - EF(MOD-SP4): 46 %
BH CV ECHO MEAS - ESV(CUBED): 76.9 ML
BH CV ECHO MEAS - ESV(MOD-SP2): 70 ML
BH CV ECHO MEAS - ESV(MOD-SP4): 74 ML
BH CV ECHO MEAS - FS: 22.2 %
BH CV ECHO MEAS - IVS/LVPW: 1.17 CM
BH CV ECHO MEAS - IVSD: 1.12 CM
BH CV ECHO MEAS - LAT PEAK E' VEL: 11.3 CM/SEC
BH CV ECHO MEAS - LV DIASTOLIC VOL/BSA (35-75): 72 CM2
BH CV ECHO MEAS - LV MASS(C)D: 221.6 GRAMS
BH CV ECHO MEAS - LV SYSTOLIC VOL/BSA (12-30): 38.9 CM2
BH CV ECHO MEAS - LVIDD: 5.5 CM
BH CV ECHO MEAS - LVIDS: 4.3 CM
BH CV ECHO MEAS - LVPWD: 0.96 CM
BH CV ECHO MEAS - MED PEAK E' VEL: 7.7 CM/SEC
BH CV ECHO MEAS - RAP SYSTOLE: 3 MMHG
BH CV ECHO MEAS - RVSP: 35 MMHG
BH CV ECHO MEAS - SI(MOD-SP2): 26.3 ML/M2
BH CV ECHO MEAS - SI(MOD-SP4): 33.1 ML/M2
BH CV ECHO MEAS - SV(MOD-SP2): 50 ML
BH CV ECHO MEAS - SV(MOD-SP4): 63 ML
BH CV ECHO MEAS - TR MAX PG: 32.2 MMHG
BH CV ECHO MEAS - TR MAX VEL: 283.9 CM/SEC
BUN SERPL-MCNC: 16 MG/DL (ref 8–23)
BUN/CREAT SERPL: 16 (ref 7–25)
CALCIUM SPEC-SCNC: 8.4 MG/DL (ref 8.6–10.5)
CHLORIDE SERPL-SCNC: 99 MMOL/L (ref 98–107)
CO2 SERPL-SCNC: 23.9 MMOL/L (ref 22–29)
CREAT SERPL-MCNC: 1 MG/DL (ref 0.57–1)
EGFRCR SERPLBLD CKD-EPI 2021: 58.5 ML/MIN/1.73
GLUCOSE SERPL-MCNC: 86 MG/DL (ref 65–99)
LEFT ATRIUM VOLUME INDEX: 54.2 ML/M2
MAXIMAL PREDICTED HEART RATE: 144 BPM
POTASSIUM SERPL-SCNC: 3.5 MMOL/L (ref 3.5–5.2)
SODIUM SERPL-SCNC: 132 MMOL/L (ref 136–145)
STRESS TARGET HR: 122 BPM

## 2023-02-14 PROCEDURE — G0378 HOSPITAL OBSERVATION PER HR: HCPCS

## 2023-02-14 PROCEDURE — 80048 BASIC METABOLIC PNL TOTAL CA: CPT | Performed by: NURSE PRACTITIONER

## 2023-02-14 PROCEDURE — 99214 OFFICE O/P EST MOD 30 MIN: CPT | Performed by: NURSE PRACTITIONER

## 2023-02-14 PROCEDURE — 99238 HOSP IP/OBS DSCHRG MGMT 30/<: CPT | Performed by: INTERNAL MEDICINE

## 2023-02-14 RX ORDER — METOPROLOL TARTRATE 50 MG/1
100 TABLET, FILM COATED ORAL 2 TIMES DAILY
Qty: 60 TABLET | Refills: 0 | Status: SHIPPED | OUTPATIENT
Start: 2023-02-14 | End: 2023-02-14 | Stop reason: SDUPTHER

## 2023-02-14 RX ORDER — METOPROLOL TARTRATE 75 MG/1
75 TABLET, FILM COATED ORAL 2 TIMES DAILY
Qty: 60 TABLET | Refills: 0 | Status: SHIPPED | OUTPATIENT
Start: 2023-02-14 | End: 2023-02-22

## 2023-02-14 RX ORDER — BENZONATATE 100 MG/1
200 CAPSULE ORAL 3 TIMES DAILY PRN
Status: DISCONTINUED | OUTPATIENT
Start: 2023-02-14 | End: 2023-02-14 | Stop reason: HOSPADM

## 2023-02-14 RX ORDER — GUAIFENESIN 600 MG/1
600 TABLET, EXTENDED RELEASE ORAL EVERY 12 HOURS SCHEDULED
Qty: 10 TABLET | Refills: 0 | Status: SHIPPED | OUTPATIENT
Start: 2023-02-14 | End: 2023-03-08 | Stop reason: ALTCHOICE

## 2023-02-14 RX ORDER — BENZONATATE 200 MG/1
200 CAPSULE ORAL 3 TIMES DAILY PRN
Qty: 40 CAPSULE | Refills: 0 | Status: SHIPPED | OUTPATIENT
Start: 2023-02-14 | End: 2023-03-08 | Stop reason: ALTCHOICE

## 2023-02-14 RX ORDER — METOPROLOL TARTRATE 50 MG/1
50 TABLET, FILM COATED ORAL ONCE
Status: DISCONTINUED | OUTPATIENT
Start: 2023-02-14 | End: 2023-02-14

## 2023-02-14 RX ADMIN — METOPROLOL TARTRATE 50 MG: 50 TABLET, FILM COATED ORAL at 08:45

## 2023-02-14 RX ADMIN — APIXABAN 5 MG: 2.5 TABLET, FILM COATED ORAL at 08:45

## 2023-02-14 RX ADMIN — ACETAMINOPHEN 650 MG: 325 TABLET ORAL at 08:46

## 2023-02-14 RX ADMIN — Medication 1 CAPSULE: at 08:45

## 2023-02-14 RX ADMIN — CHOLECALCIFEROL TAB 25 MCG (1000 UNIT) 4000 UNITS: 25 TAB at 08:45

## 2023-02-14 RX ADMIN — Medication 10 ML: at 08:46

## 2023-02-14 RX ADMIN — BENZONATATE 200 MG: 100 CAPSULE ORAL at 08:45

## 2023-02-14 RX ADMIN — FUROSEMIDE 20 MG: 20 TABLET ORAL at 08:46

## 2023-02-14 RX ADMIN — METOPROLOL TARTRATE 50 MG: 50 TABLET, FILM COATED ORAL at 03:29

## 2023-02-14 RX ADMIN — GUAIFENESIN 600 MG: 600 TABLET, EXTENDED RELEASE ORAL at 08:45

## 2023-02-14 RX ADMIN — PANTOPRAZOLE SODIUM 40 MG: 40 TABLET, DELAYED RELEASE ORAL at 05:32

## 2023-02-14 RX ADMIN — SACUBITRIL AND VALSARTAN 1 TABLET: 24; 26 TABLET, FILM COATED ORAL at 08:45

## 2023-02-14 NOTE — DISCHARGE SUMMARY
Effie Donnelly  1945  [id number]    Hospitalists Discharge Summary    Date of Admission: 2/13/2023  Date of Discharge:  2/14/2023    History of Present Illness from [facility] on admit: Effie Donnelly is a 77-year-old female with a history of nonischemic cardiomyopathy, SVT, CAD, DELORES adherent to CPAP therapy, and multiple other comorbidities, see below.  She presented to the ED for shortness of breath and cough of 1 week duration and was found to have what seems to be new onset atrial fibrillation and was in RVR on EKG.     Upon interview the patient states that she has felt congested for the past week. She has also had a nonproductive cough but feels as if she has mucus to expectorate and is unable to. She denies any fevers or chills. She also denies any nausea or vomiting. She has had some mild chest discomfort. She denies any syncopal episodes.      In the ED the patient was found to have a very slightly elevated troponin.  Repeat at this time is pending.  Her EKG demonstrated atrial fibrillation with RVR.  Her BNP was elevated and a questionable infiltrate was noted on chest x-ray.    Primary Discharge diagnoses: New onset A-fib RVR-now rate controlled and anticoagulated with metoprolol tartrate and Eliquis.     Secondary Discharge Diagnoses: Acute coronavirus HQ U1 infection-asymptomatic and stable on room air.     Hospital Course Summary: Patient was admitted for new onset atrial fibrillation with RVR.  Patient was transitioned to oral Lopressor and placed on Eliquis due to [id number] score of 6, heart rate is now well controlled.  Initially was on 50 mg every 6 hours of Lopressor, discussed with cardiology on day of discharge to change to 100 mg twice daily.  Patient will have close follow-up with cardiology for ongoing care.  Patient remains on Entresto per her home regimen.  Aspirin discontinued as she is now on Eliquis, to attempt to decrease bleeding risk.  Patient also with acute coronavirus infection as per  above, she is asymptomatic and remains on room air, saturating well.  I have prescribed supportive care measures including Tessalon Perles at discharge. on 2/14/2023 patient's condition had improved. They were deemed stable for discharge. They were advised to take all medications as prescribed, follow up with PCP within 1 week. If there are any issues patient should contact PCP and/or return to the ED for follow up. Patient was agreeable to the plan and subsequently discharged at this time.     PCP  Patient Care Team:  Terrence Hawley MD as PCP - General (Internal Medicine)    Consults:   Consults     Date and Time Order Name Status Description    2/13/2023  6:32 AM Inpatient Cardiology Consult            Operations and Procedures Performed:       XR Chest 2 View    Result Date: 2/13/2023  Narrative: CR Chest 2 Vws INDICATION:  Cough and short of air symptoms 1 week. Negative COVID- chest. History of congestive heart failure. COMPARISON:  11/2/2022 FINDINGS: PA and lateral views of the chest.  The heart is enlarged. Atherosclerotic change of the aorta. Vascularity within normal limits. No pneumothorax. Chronic blunting of the left CP angle and old healed granulomatous disease. There are some new opacities in the infrahilar lung zone on the right suggestive of dense atelectasis. No new effusion. Postop changes right shoulder and projecting over the right lower lobe. There is thoracic spondylosis.      Impression: 1. Cardiomegaly without distinct volume overload. 2. Chronic lung changes with probable atelectasis or less likely early/developing infiltrate in the right lung base. Signer Name: Андрей Tolbert MD  Signed: 2/13/2023 2:49 AM  Workstation Name: [id number]  [facility]    Adult Transthoracic Echo Limited W/ Cont if Necessary Per Protocol    Result Date: 2/14/2023  Narrative: •  Left ventricular ejection fraction appears to be 51 - 55%. •  Sigmoid-shaped ventricular septum is  present.Prominent left ventricular false tendon. •  Left ventricular diastolic function is consistent with (grade II w/high LAP) pseudonormalization. •  Estimated right ventricular systolic pressure from tricuspid regurgitation is mildly elevated (35-45 mmHg).       Allergies:  is allergic to nsaids and adhesive tape.    Robbin  Reviewed     Discharge Medications:     Discharge Medications      New Medications      Instructions Start Date   apixaban 5 MG tablet tablet  Commonly known as: ELIQUIS   5 mg, Oral, Every 12 Hours Scheduled      benzonatate 200 MG capsule  Commonly known as: TESSALON   200 mg, Oral, 3 Times Daily PRN      guaiFENesin 600 MG 12 hr tablet  Commonly known as: MUCINEX   600 mg, Oral, Every 12 Hours Scheduled      metoprolol tartrate 50 MG tablet  Commonly known as: LOPRESSOR   100 mg, Oral, 2 Times Daily         Changes to Medications      Instructions Start Date   furosemide 20 MG tablet  Commonly known as: LASIX  What changed: additional instructions   20 mg, Oral, Daily, Take 1 tab in the morning and 1/2 tab at 2 pm         Continue These Medications      Instructions Start Date   cholecalciferol 25 MCG (1000 UT) tablet  Commonly known as: VITAMIN D3   4,000 Units, Oral, Daily      CRANBERRY EXTRACT PO   1 capsule, Oral, Daily      dilTIAZem 30 MG tablet  Commonly known as: Cardizem   30 mg, Oral, 2 Times Daily      Entresto 24-26 MG tablet  Generic drug: sacubitril-valsartan   1 tablet, Oral, 2 Times Daily      Loratadine-D 24HR  MG per 24 hr tablet  Generic drug: loratadine-pseudoephedrine   TAKE ONE TABLET BY MOUTH DAILY      MEGARED OMEGA-3 KRILL OIL PO   1 capsule, Oral, Daily      omeprazole 40 MG capsule  Commonly known as: priLOSEC   40 mg, Oral, Daily      PROBIOTIC PRODUCT PO   2 capsules, Oral, Daily      rosuvastatin 5 MG tablet  Commonly known as: Crestor   5 mg, Oral, Daily      VITAMIN B COMPLEX PO   1 tablet, Oral, Daily         Stop These Medications    aspirin 81 MG  tablet     carvedilol 12.5 MG tablet  Commonly known as: COREG            Last Lab Results:   Lab Results (most recent)     Procedure Component Value Units Date/Time    Blood Culture - Blood, Arm, Left [[id number]]  (Normal) Collected: 02/13/23 0429    Specimen: Blood from Arm, Left Updated: 02/14/23 0500     Blood Culture No growth at 24 hours    Basic Metabolic Panel [[id number]]  (Abnormal) Collected: 02/14/23 0340    Specimen: Blood Updated: 02/14/23 0458     Glucose 86 mg/dL      BUN 16 mg/dL      Creatinine 1.00 mg/dL      Sodium 132 mmol/L      Potassium 3.5 mmol/L      Chloride 99 mmol/L      CO2 23.9 mmol/L      Calcium 8.4 mg/dL      BUN/Creatinine Ratio 16.0     Anion Gap 9.1 mmol/L      eGFR 58.5 mL/min/1.73     Narrative:      GFR Normal >60  Chronic Kidney Disease <60  Kidney Failure <15    The GFR formula is only valid for adults with stable renal function between ages 18 and 70.    Hemoglobin A1c [[id number]]  (Normal) Collected: 02/13/23 0229    Specimen: Blood Updated: 02/13/23 1710     Hemoglobin A1C 5.50 %     Narrative:      Hemoglobin A1C Ranges:    Increased Risk for Diabetes  5.7% to 6.4%  Diabetes                     >= 6.5%  Diabetic Goal                < 7.0%    Respiratory Panel PCR w/COVID-19(SARS-CoV-2) RYAN/LEVAR/DARCIE/PAD/COR/MAD/VAHID In-House, NP Swab in UTM/VTM, 3-4 HR TAT - Swab, Nasopharynx [[id number]]  (Abnormal) Collected: 02/13/23 0829    Specimen: Swab from Nasopharynx Updated: 02/13/23 1149     ADENOVIRUS, PCR Not Detected     Coronavirus 229E Not Detected     Coronavirus HKU1 Detected     Coronavirus NL63 Not Detected     Coronavirus OC43 Not Detected     COVID19 Not Detected     Human Metapneumovirus Not Detected     Human Rhinovirus/Enterovirus Not Detected     Influenza A PCR Not Detected     Influenza B PCR Not Detected     Parainfluenza Virus 1 Not Detected     Parainfluenza Virus 2 Not Detected     Parainfluenza Virus 3 Not Detected     Parainfluenza Virus 4 Not Detected      RSV, PCR Not Detected     Bordetella pertussis pcr Not Detected     Bordetella parapertussis PCR Not Detected     Chlamydophila pneumoniae PCR Not Detected     Mycoplasma pneumo by PCR Not Detected    Narrative:      In the setting of a positive respiratory panel with a viral infection PLUS a negative procalcitonin without other underlying concern for bacterial infection, consider observing off antibiotics or discontinuation of antibiotics and continue supportive care. If the respiratory panel is positive for atypical bacterial infection (Bordetella pertussis, Chlamydophila pneumoniae, or Mycoplasma pneumoniae), consider antibiotic de-escalation to target atypical bacterial infection.    Magnesium [[id number]]  (Normal) Collected: 02/13/23 0429    Specimen: Blood Updated: 02/13/23 0708     Magnesium 1.9 mg/dL     High Sensitivity Troponin T 2Hr [[id number]]  (Abnormal) Collected: 02/13/23 0429    Specimen: Blood Updated: 02/13/23 0510     HS Troponin T 21 ng/L      Troponin T Delta 0 ng/L     Narrative:      High Sensitive Troponin T Reference Range:  <10.0 ng/L- Negative Female for AMI  <15.0 ng/L- Negative Male for AMI  >=10 - Abnormal Female indicating possible myocardial injury.  >=15 - Abnormal Male indicating possible myocardial injury.   Clinicians would have to utilize clinical acumen, EKG, Troponin, and serial changes to determine if it is an Acute Myocardial Infarction or myocardial injury due to an underlying chronic condition.         aPTT [[id number]]  (Normal) Collected: 02/13/23 0315    Specimen: Blood Updated: 02/13/23 0445     PTT 33.7 seconds     Narrative:      PTT = The equivalent PTT values for the therapeutic range of heparin levels at 0.1 to 0.7 U/ml are 53 to 110 seconds.      Protime-INR [[id number]]  (Normal) Collected: 02/13/23 0315    Specimen: Blood Updated: 02/13/23 0445     Protime 14.3 Seconds      INR 1.09    Narrative:      Therapeutic Ranges for INR: 2.0-3.0 (PT 20-30)                   "            2.5-3.5 (PT 25-34)    BNP [[id number]]  (Abnormal) Collected: 02/13/23 0229    Specimen: Blood Updated: 02/13/23 0356     proBNP 3,930.0 pg/mL     Narrative:      Among patients with dyspnea, NT-proBNP is highly sensitive for the detection of acute congestive heart failure. In addition NT-proBNP of <300 pg/ml effectively rules out acute congestive heart failure with 99% negative predictive value.    Results may be falsely decreased if patient taking Biotin.      D-dimer, Quantitative [[id number]]  (Normal) Collected: 02/13/23 0315    Specimen: Blood Updated: 02/13/23 0354     D-Dimer, Quantitative 0.40 MCGFEU/mL     Narrative:      According to the assay 's published package insert, a normal (<0.50 MCGFEU/mL) D-dimer result in conjunction with a non-high clinical probability assessment, excludes deep vein thrombosis (DVT) and pulmonary embolism (PE) with high sensitivity.    D-dimer values increase with age and this can make VTE exclusion of an older population difficult. To address this, the [employer], based on best available evidence and recent guidelines, recommends that clinicians use age-adjusted D-dimer thresholds in patients greater than 50 years of age with: a) a low probability of PE who do not meet all Pulmonary Embolism Rule Out Criteria, or b) in those with intermediate probability of PE.   The formula for an age-adjusted D-dimer cut-off is \"age/100\".  For example, a 60 year old patient would have an age-adjusted cut-off of 0.60 MCGFEU/mL and an 80 year old 0.80 MCGFEU/mL.    Comprehensive Metabolic Panel [[id number]]  (Abnormal) Collected: 02/13/23 0229    Specimen: Blood Updated: 02/13/23 0332     Glucose 107 mg/dL      BUN 19 mg/dL      Creatinine 1.18 mg/dL      Sodium 135 mmol/L      Potassium 4.5 mmol/L      Comment: Slight hemolysis detected by analyzer. Results may be affected.        Chloride 102 mmol/L      CO2 21.1 mmol/L      Calcium 8.9 mg/dL      " "Total Protein 7.3 g/dL      Albumin 4.1 g/dL      ALT (SGPT) 24 U/L      AST (SGOT) 27 U/L      Alkaline Phosphatase 116 U/L      Total Bilirubin 0.8 mg/dL      Globulin 3.2 gm/dL      A/G Ratio 1.3 g/dL      BUN/Creatinine Ratio 16.1     Anion Gap 11.9 mmol/L      eGFR 48.0 mL/min/1.73     Narrative:      GFR Normal >60  Chronic Kidney Disease <60  Kidney Failure <15    The GFR formula is only valid for adults with stable renal function between ages 18 and 70.    High Sensitivity Troponin T [[id number]]  (Abnormal) Collected: 02/13/23 0229    Specimen: Blood Updated: 02/13/23 0306     HS Troponin T 21 ng/L     Narrative:      High Sensitive Troponin T Reference Range:  <10.0 ng/L- Negative Female for AMI  <15.0 ng/L- Negative Male for AMI  >=10 - Abnormal Female indicating possible myocardial injury.  >=15 - Abnormal Male indicating possible myocardial injury.   Clinicians would have to utilize clinical acumen, EKG, Troponin, and serial changes to determine if it is an Acute Myocardial Infarction or myocardial injury due to an underlying chronic condition.         Procalcitonin [[id number]]  (Normal) Collected: 02/13/23 0229    Specimen: Blood Updated: 02/13/23 0305     Procalcitonin 0.06 ng/mL     Narrative:      As a Marker for Sepsis (Non-Neonates):    1. <0.5 ng/mL represents a low risk of severe sepsis and/or septic shock.  2. >2 ng/mL represents a high risk of severe sepsis and/or septic shock.    As a Marker for Lower Respiratory Tract Infections that require antibiotic therapy:    PCT on Admission    Antibiotic Therapy       6-12 Hrs later    >0.5                Strongly Recommended  >0.25 - <0.5        Recommended   0.1 - 0.25          Discouraged              Remeasure/reassess PCT  <0.1                Strongly Discouraged     Remeasure/reassess PCT    As 28 day mortality risk marker: \"Change in Procalcitonin Result\" (>80% or <=80%) if Day 0 (or Day 1) and Day 4 values are available. Refer to " http://www.[facility]-pct-calculator.com    Change in PCT <=80%  A decrease of PCT levels below or equal to 80% defines a positive change in PCT test result representing a higher risk for 28-day all-cause mortality of patients diagnosed with severe sepsis for septic shock.    Change in PCT >80%  A decrease of PCT levels of more than 80% defines a negative change in PCT result representing a lower risk for 28-day all-cause mortality of patients diagnosed with severe sepsis or septic shock.       CBC & Differential [[id number]]  (Abnormal) Collected: 02/13/23 0229    Specimen: Blood Updated: 02/13/23 0237    Narrative:      The following orders were created for panel order CBC & Differential.  Procedure                               Abnormality         Status                     ---------                               -----------         ------                     CBC Auto Differential[[id number]]        Abnormal            Final result                 Please view results for these tests on the individual orders.    CBC Auto Differential [[id number]]  (Abnormal) Collected: 02/13/23 0229    Specimen: Blood Updated: 02/13/23 0237     WBC 8.76 10*3/mm3      RBC 4.88 10*6/mm3      Hemoglobin 13.7 g/dL      Hematocrit 43.5 %      MCV 89.1 fL      MCH 28.1 pg      MCHC 31.5 g/dL      RDW 13.6 %      RDW-SD 45.5 fl      MPV 11.5 fL      Platelets 200 10*3/mm3      Neutrophil % 76.9 %      Lymphocyte % 11.9 %      Monocyte % 9.5 %      Eosinophil % 1.4 %      Basophil % 0.2 %      Immature Grans % 0.1 %      Neutrophils, Absolute 6.74 10*3/mm3      Lymphocytes, Absolute 1.04 10*3/mm3      Monocytes, Absolute 0.83 10*3/mm3      Eosinophils, Absolute 0.12 10*3/mm3      Basophils, Absolute 0.02 10*3/mm3      Immature Grans, Absolute 0.01 10*3/mm3     COVID-19 and FLU A/B PCR - Swab, Nasopharynx [[id number]]  (Normal) Collected: 02/13/23 0200    Specimen: Swab from Nasopharynx Updated: 02/13/23 0225     COVID19 Not Detected     Influenza  A PCR Not Detected     Influenza B PCR Not Detected    Narrative:      Fact sheet for providers: https://www.fda.gov/media/835745/download    Fact sheet for patients: https://www.fda.gov/media/909766/download    Test performed by PCR.        Imaging Results (Most Recent)     Procedure Component Value Units Date/Time    XR Chest 2 View [[id number]] Collected: 02/13/23 0249     Updated: 02/13/23 0252    Narrative:      CR Chest 2 Vws    INDICATION:    Cough and short of air symptoms 1 week. Negative COVID- chest. History of congestive heart failure.    COMPARISON:    11/2/2022    FINDINGS:   PA and lateral views of the chest.  The heart is enlarged. Atherosclerotic change of the aorta. Vascularity within normal limits. No pneumothorax. Chronic blunting of the left CP angle and old healed granulomatous disease. There are some new opacities in  the infrahilar lung zone on the right suggestive of dense atelectasis. No new effusion. Postop changes right shoulder and projecting over the right lower lobe. There is thoracic spondylosis.        Impression:        1. Cardiomegaly without distinct volume overload.  2. Chronic lung changes with probable atelectasis or less likely early/developing infiltrate in the right lung base.    Signer Name: Андрей Tolbert MD   Signed: 2/13/2023 2:49 AM   Workstation Name: [email or web address]    [facility]          PROCEDURES      Condition on Discharge:  Stable, Improved.     Physical Exam at Discharge  Vital Signs  Temp:  [97.1 °F (36.2 °C)-99.8 °F (37.7 °C)] 97.1 °F (36.2 °C)  Heart Rate:  [] 72  Resp:  [17-20] 18  BP: (125-138)/(61-81) 136/65    Physical Exam  Physical Exam:  General: Patient is awake and alert.  Elderly female. No acute distress noted.   HENT: Head is atraumatic, normocephalic. Hearing is grossly intact. Nose is without obvious congestion and appears patent. Neck is supple and trachea is midline.   Eyes: Vision is grossly intact. Pupils appear equal  and round.   Cardiovascular: Heart has irregularly irregular rhythm with no murmurs, rubs or gallops noted.   Respiratory: Lungs are clear to ausculation without wheezes, rhonchi or rales.   Abdominal/GI: Soft, nontender, bowel sounds present. No rebound or guarding present.   Extremities: No peripheral edema noted.   Musculoskeletal: Spontaneous movement of bilateral upper and lower extremities against gravity noted. No signs of injury or deformity noted.   Skin: Warm and dry.   Psych: Mood and affect are appropriate. Cooperative with exam.   Neuro: No facial asymmetry noted. No focal deficits noted, hearing and vision are grossly intact.      Discharge Disposition  To home in stable condition    Visiting Nurse:    No    Home PT/OT:  No    Home Safety Evaluation:  No    DME  No new equipment    Discharge Diet:      Dietary Orders (From admission, onward)     Start     Ordered    02/13/23 0632  Diet: Cardiac Diets; Healthy Heart (2-3 Na+); Texture: Regular Texture (IDDSI 7); Fluid Consistency: Thin (IDDSI 0)  Diet Effective Now        References:    Diet Order [place]   Question Answer Comment   Diets: Cardiac Diets    Cardiac Diet: Healthy Heart (2-3 Na+)    Texture: Regular Texture (IDDSI 7)    Fluid Consistency: Thin (IDDSI 0)        02/13/23 0632                Activity at Discharge:  As tolerated    Pre-discharge education  None      Follow-up Appointments  No future appointments.  Additional Instructions for the Follow-ups that You Need to Schedule     Discharge Follow-up with PCP   As directed       Currently Documented PCP:    Terrence Hawley MD    PCP Phone Number:    [phone]     Follow Up Details: within 1 week         Discharge Follow-up with Specialty: Cardiology; 1 Week   As directed      Specialty: Cardiology    Follow Up: 1 Week    Follow Up Details: Kevin Brian NP for new Afib               Test Results Pending at Discharge  Pending Labs     Order Current Status    Blood Culture - Blood,  Arm, Left Preliminary result          Discharge over 30 min (if over 30 minutes give explanation as to why it took greater than 30 minutes)  Secondary to:   Coordination of care/follow up  Medication reconciliation  D/W patient      At [facility], we believe that sharing information builds trust and better relationships. You are receiving this note because you recently visited [facility]. It is possible you will see health information before a provider has talked with you about it. This kind of information can be easy to misunderstand. To help you fully understand what it means for your health, we urge you to discuss this note with your provider.    Tray Serrato DO  02/14/23  09:13 EST

## 2023-02-14 NOTE — PROGRESS NOTES
"    Patient Name: Effie Donnelly  :1945  77 y.o.      Patient Care Team:  Terrence Hawley MD as PCP - General (Internal Medicine)    Chief Complaint: SOA/KERMIT/afib    Interval History: Up in chair, feeling better.     Objective   Vital Signs  Temp:  [97.1 °F (36.2 °C)-99.8 °F (37.7 °C)] 97.1 °F (36.2 °C)  Heart Rate:  [] 72  Resp:  [17-20] 18  BP: (125-138)/(61-81) 136/65    Intake/Output Summary (Last 24 hours) at 2023 0816  Last data filed at 2023 0533  Gross per 24 hour   Intake 150 ml   Output 3525 ml   Net -3375 ml     Flowsheet Rows    Flowsheet Row First Filed Value   Admission Height 165.1 cm (65\") Documented at 2023   Admission Weight 83 kg (183 lb) Documented at 2023          Physical Exam:   General Appearance:    Alert, cooperative, in no acute distress   Lungs:     Clear to auscultation.  Normal respiratory effort and rate.      Heart:    Irregular rhythm and normal rate, normal S1 and S2, no murmurs, gallops or rubs.     Chest Wall:    No abnormalities observed   Abdomen:     Soft, nontender, positive bowel sounds.     Extremities:   no cyanosis, clubbing or edema.  No marked joint deformities.  Adequate musculoskeletal strength.       Results Review:    Results from last 7 days   Lab Units 23  0340   SODIUM mmol/L 132*   POTASSIUM mmol/L 3.5   CHLORIDE mmol/L 99   CO2 mmol/L 23.9   BUN mg/dL 16   CREATININE mg/dL 1.00   GLUCOSE mg/dL 86   CALCIUM mg/dL 8.4*     Results from last 7 days   Lab Units 23   HSTROP T ng/L 21* 21*     Results from last 7 days   Lab Units 23   WBC 10*3/mm3 8.76   HEMOGLOBIN g/dL 13.7   HEMATOCRIT % 43.5   PLATELETS 10*3/mm3 200     Results from last 7 days   Lab Units 23  0315   INR  1.09   APTT seconds 33.7     Results from last 7 days   Lab Units 02/13/23  0429   MAGNESIUM mg/dL 1.9                   Medication Review:   apixaban, 5 mg, Oral, Q12H  cholecalciferol, 4,000 Units, " Oral, Daily  fluticasone, 2 spray, Each Nare, Nightly  furosemide, 20 mg, Oral, Daily  guaiFENesin, 600 mg, Oral, Q12H  lactobacillus acidophilus, 1 capsule, Oral, Daily  metoprolol tartrate, 50 mg, Oral, Q6H  pantoprazole, 40 mg, Oral, Q AM  rosuvastatin, 5 mg, Oral, Nightly  sacubitril-valsartan, 1 tablet, Oral, BID  sodium chloride, 10 mL, Intravenous, Q12H              Assessment & Plan          1.  Newly diagnosed symptomatic atrial fibrillation with rapid ventricular response mainly causing shortness of breath. -She is rate controlled on metoprolol tartrate 50 every 6.  For ease of dosing I am going to change her to metoprolol tartrate 100 mg twice daily.  Monitor response.  She has been started on Eliquis.  Her [id number] score is 6.  Limited echo shows EF 51-50% with   •  Left ventricular diastolic function is consistent with (grade II w/high LAP) pseudonormalization.    2.  CHF - /good response with IV diuresis.      3.  Prior history of nonischemic cardiomyopathy on Coreg , Entresto and oral Lasix .  Echho in December with low normal left ventricular ejection fraction    4.  History of nonobstructive coronary artery- denies angina    5.  Essential hypertension - well controlled    6. CKD - creatinine 1.0 - stable    7.  Hypercholesterolemia - continue lipid lowering therapy, she is on rosuvastatin    8.  Corona virus - support measures, she is on room air without complaints of SOA,  Still has a cough.      OK to discharge from cardiac standpoint.  Will see in office in 1-2 weeks.    PATI Eagle  [facility]  02/14/23  08:16 EST

## 2023-02-14 NOTE — PLAN OF CARE
Goal Outcome Evaluation:  Plan of Care Reviewed With: patient        Progress: improving  Outcome Evaluation: room air - flonase given to pt d/t complaints of difficulty breathing in through nose - HR controlled - Afib on tele - ambulating in room and to bathroom -

## 2023-02-14 NOTE — PROGRESS NOTES
Adult Nutrition  Assessment/PES    Patient Name:  Effie Donnelly  YOB: 1945  MRN: [id number]  Admit Date:  2/13/2023    Assessment Date:  2/14/2023    Comments:  Pt avoids salt & weighs looking for trends re: fluid.   Declines any further edu needs. Will remain available.      Reason for Assessment     Row Name 02/14/23 1020          Reason for Assessment    Reason For Assessment follow-up protocol     Diagnosis cardiac disease                Nutrition/Diet History     Row Name 02/14/23 1021          Nutrition/Diet History    Typical Intake (Food/Fluid/EN/PN) Spoke w pt at bedside. Not much appetite this am b/c coughing since 5 am. Reports avoids salt at home & does monitor weight most days. declines further edu needs.                    Estimated/Assessed Needs - Anthropometrics     Row Name 02/14/23 0535          Anthropometrics    Weight 81.1 kg (178 lb 12.7 oz)                Nutrition Prescription Ordered     Row Name 02/14/23 1021          Nutrition Prescription PO    Common Modifiers Cardiac                     Problem/Interventions:   Problem 1     Row Name 02/14/23 1021          Nutrition Diagnoses Problem 1    Problem 1 Knowledge Deficit     Etiology (related to) Medical Diagnosis     Cardiac CHF     Signs/Symptoms (evidenced by) Potential Information Deficit                      Intervention Goal     Row Name 02/14/23 1022          Intervention Goal    General Provide information regarding MNT for treatment/condition                Nutrition Intervention     Row Name 02/14/23 1022          Nutrition Intervention    RD/Tech Action Follow Tx progress                  Education/Evaluation     Row Name 02/14/23 1022          Education    Education Education offered and refused;Education topics;Other (comment)  Edu weighing daily monitor for trends, avoid high Na foods & avoid direct Salt.     Education Topics Na+        Monitor/Evaluation    Monitor Per protocol     Education Follow-up Other  (comment)  pt already doing these things at home, denies further edu needs                 Electronically signed by:  Alice Shrestha RD  02/14/23 10:23 EST

## 2023-02-15 ENCOUNTER — TRANSITIONAL CARE MANAGEMENT TELEPHONE ENCOUNTER (OUTPATIENT)
Dept: CALL CENTER | Facility: HOSPITAL | Age: 78
End: 2023-02-15
Payer: MEDICARE

## 2023-02-15 ENCOUNTER — APPOINTMENT (OUTPATIENT)
Dept: GENERAL RADIOLOGY | Facility: HOSPITAL | Age: 78
End: 2023-02-15
Payer: MEDICARE

## 2023-02-15 ENCOUNTER — HOSPITAL ENCOUNTER (EMERGENCY)
Facility: HOSPITAL | Age: 78
Discharge: HOME | End: 2023-02-15
Attending: EMERGENCY MEDICINE | Admitting: EMERGENCY MEDICINE
Payer: MEDICARE

## 2023-02-15 VITALS
RESPIRATION RATE: 16 BRPM | HEIGHT: 66 IN | WEIGHT: 175 LBS | BODY MASS INDEX: 28.12 KG/M2 | DIASTOLIC BLOOD PRESSURE: 94 MMHG | OXYGEN SATURATION: 93 % | HEART RATE: 107 BPM | TEMPERATURE: 98.1 F | SYSTOLIC BLOOD PRESSURE: 148 MMHG

## 2023-02-15 DIAGNOSIS — R05.1 ACUTE COUGH: ICD-10-CM

## 2023-02-15 DIAGNOSIS — J18.9 PNEUMONIA OF RIGHT LOWER LOBE DUE TO INFECTIOUS ORGANISM: Primary | ICD-10-CM

## 2023-02-15 PROCEDURE — 99283 EMERGENCY DEPT VISIT LOW MDM: CPT

## 2023-02-15 PROCEDURE — 71045 X-RAY EXAM CHEST 1 VIEW: CPT

## 2023-02-15 PROCEDURE — 63710000001 PREDNISONE PER 1 MG: Performed by: EMERGENCY MEDICINE

## 2023-02-15 RX ORDER — ALBUTEROL SULFATE 90 UG/1
2 AEROSOL, METERED RESPIRATORY (INHALATION) EVERY 4 HOURS PRN
Qty: 8.5 G | Refills: 0 | Status: SHIPPED | OUTPATIENT
Start: 2023-02-15

## 2023-02-15 RX ORDER — PREDNISONE 20 MG/1
TABLET ORAL
Qty: 12 TABLET | Refills: 0 | Status: SHIPPED | OUTPATIENT
Start: 2023-02-15 | End: 2023-02-22 | Stop reason: ALTCHOICE

## 2023-02-15 RX ORDER — PREDNISONE 20 MG/1
60 TABLET ORAL ONCE
Status: COMPLETED | OUTPATIENT
Start: 2023-02-15 | End: 2023-02-15

## 2023-02-15 RX ORDER — DOXYCYCLINE 100 MG/1
100 CAPSULE ORAL EVERY 12 HOURS
Qty: 14 CAPSULE | Refills: 0 | Status: SHIPPED | OUTPATIENT
Start: 2023-02-15 | End: 2023-03-08 | Stop reason: ALTCHOICE

## 2023-02-15 RX ADMIN — PREDNISONE 60 MG: 20 TABLET ORAL at 11:28

## 2023-02-15 NOTE — ED PROVIDER NOTES
Subjective   History of Present Illness  Patient presents complaining of increased chest congestion, a productive cough and feeling fatigued.  Patient was discharged 2 days ago and had been in the hospital for A-fib and also diagnosed with a upper respiratory viral infection.  Patient denies any fever, nausea, vomiting, diarrhea, chest pain, back pain, or near syncope.  Patient's been taking some medicine but does not seem to be helping.  Patient feels like she is getting worse and worried about bronchitis or pneumonia.        Review of Systems   All other systems reviewed and are negative.      Past Medical History:   Diagnosis Date   • CAD (coronary artery disease)    • Cancer ([facility])     skin, right breast   • CHF (congestive heart failure) ([facility])    • Coronary artery disease involving native coronary artery of native heart without angina pectoris 11/1/2018   • Heart murmur    • History of myocardial infarction 11/1/2018   • Hypertension    • Myocardial infarction ([facility])     PER PT    • NICM (nonischemic cardiomyopathy) ([facility]) 7/8/2020   • DELORES on CPAP     AHI 15/h   • Sinus tachycardia    • SOB (shortness of breath)        Allergies   Allergen Reactions   • Nsaids GI Bleeding     CROHN'S DISEASE   • Adhesive Tape Other (See Comments)     BLISTERS UNDER TAGADERM       Past Surgical History:   Procedure Laterality Date   • BUNIONECTOMY Bilateral    • CARDIAC CATHETERIZATION     • CARDIAC CATHETERIZATION N/A 01/14/2020    Procedure: Right and Left Heart Cath;  Surgeon: Bin Chacko MD;  Location: [facility] CATH INVASIVE LOCATION;  Service: Cardiovascular   • CARDIAC CATHETERIZATION N/A 01/14/2020    Procedure: Coronary angiography;  Surgeon: Bin Chacko MD;  Location: [facility] CATH INVASIVE LOCATION;  Service: Cardiovascular   • CARDIAC CATHETERIZATION N/A 01/14/2020    Procedure: Left ventriculography;  Surgeon: Bin Chacko MD;  Location: [facility] CATH INVASIVE LOCATION;  Service: Cardiovascular   •  Writer showed pt medications in package & explained what they were for, pt took meds this evening without difficulty~   CATARACT EXTRACTION Left 2022   • COLONOSCOPY N/A 2022    Procedure: COLONOSCOPY WITH BIOPSIES; POLYPECTOMY;  Surgeon: Lorne Esquivel MD;  Location: [facility] OR;  Service: Gastroenterology;  Laterality: N/A;  POLYP  ULCERATIVE COLITIS   • ENDOSCOPY N/A 2022    Procedure: ESOPHAGOGASTRODUODENOSCOPY WITH BIOPSIES;  Surgeon: Lorne Esquivel MD;  Location: [facility] OR;  Service: Gastroenterology;  Laterality: N/A;  GASTRITIS  ESOPHAGEAL REFLUX  HIATAL HERNIA  ESOPHAGEAL CANDIDIASIS   • MASTECTOMY COMPLETE / SIMPLE  2019   • SHOULDER SURGERY Right    • SKIN CANCER EXCISION      ABOVE THE LIP.        Family History   Problem Relation Age of Onset   • Hypertension Sister    • Cancer Sister    • Heart disease Brother 65        VALVE REPLACEMENT    • Hypertension Brother    • Hypertension Sister    • Hypertension Sister    • Hypertension Brother    • Diabetes Brother    • Hypertension Brother    • Cancer Brother    • Hypertension Brother    • Hypertension Brother    • Hypertension Brother        Social History     Socioeconomic History   • Marital status:    • Number of children: 5   Tobacco Use   • Smoking status: Former     Types: Cigarettes     Quit date: 1973     Years since quittin.3   • Smokeless tobacco: Never   • Tobacco comments:     CAFFEINE USE: 1 CUP DAILY   Vaping Use   • Vaping Use: Never used   Substance and Sexual Activity   • Alcohol use: No   • Drug use: No   • Sexual activity: Defer           Objective   Physical Exam  Vitals and nursing note reviewed.   HENT:      Head: Normocephalic and atraumatic.      Right Ear: External ear normal.      Left Ear: External ear normal.   Eyes:      Conjunctiva/sclera: Conjunctivae normal.   Cardiovascular:      Rate and Rhythm: Normal rate. Rhythm irregular.      Pulses:           Radial pulses are 2+ on the right side.        Dorsalis pedis pulses are 2+ on the right side and 2+ on the left side.         Posterior tibial pulses are 2+ on the right side and 2+ on the left side.      Heart sounds: Normal heart sounds.   Pulmonary:      Effort: Pulmonary effort is normal.      Breath sounds: Examination of the right-upper field reveals wheezing. Examination of the left-upper field reveals wheezing. Examination of the right-middle field reveals decreased breath sounds. Examination of the left-middle field reveals decreased breath sounds. Examination of the right-lower field reveals decreased breath sounds. Examination of the left-lower field reveals decreased breath sounds. Decreased breath sounds and wheezing present. No rhonchi or rales.   Abdominal:      Palpations: Abdomen is soft.   Musculoskeletal:      Cervical back: Neck supple.      Right lower leg: No edema.      Left lower leg: No edema.   Lymphadenopathy:      Cervical: No cervical adenopathy.   Skin:     General: Skin is warm and dry.      Capillary Refill: Capillary refill takes 2 to 3 seconds.   Neurological:      Mental Status: She is alert and oriented to person, place, and time.   Psychiatric:         Mood and Affect: Mood normal.         Behavior: Behavior normal.         Procedures           ED Course                                             Medical Decision Making  ddx viral syndrome, bronchitis, pneumonia    XR Chest 1 View    Result Date: 2/15/2023  New right basilar infiltrate or atelectasis.  This report was finalized on 2/15/2023 12:04 PM by Dr. Jj Jaimes MD.      1220 Pt seen again prior to d/c.  Imaging reviewed and are remarkable for new early pna.  Symptoms improved and pt feels better; Pt doing well on room air; no hypoxia or resp. Distress. Non-toxic. Comfortable. Tolerating po.  Relaxed breathing.  All questions personally answered at the bedside and all d/c instructions personally reviewed with pt.  Discussed the importance of close outpt. f/u and pt. understands this and agrees to do so.  Pt agrees to return to ED immediately  for any new, persistent, or worsening symptoms.    EMR Dragon/Transcription disclaimer:  Much of this encounter note is an electronic transcription/translation of spoken language to printed text, aka voice recognition.  The electronic translation of spoken language may permit erroneous or at times nonsensical words or phrases to be inadvertently transcribed; although I have reviewed the note for such errors, some may still exist so please interpret based on surrounding text content.      Acute cough: acute illness or injury  Pneumonia of right lower lobe due to infectious organism: acute illness or injury  Amount and/or Complexity of Data Reviewed  Radiology: ordered.      Risk  Prescription drug management.          Final diagnoses:   Pneumonia of right lower lobe due to infectious organism   Acute cough       ED Disposition  ED Disposition     ED Disposition   Discharge    Condition   Stable    Comment   --             Terrence Hawley MD  [address] [place] [address] [phone]    In 3 days  If symptoms worsen         Medication List      New Prescriptions    albuterol sulfate  (90 Base) MCG/ACT inhaler  Commonly known as: PROVENTIL HFA;VENTOLIN HFA;PROAIR HFA  Inhale 2 puffs Every 4 (Four) Hours As Needed for Wheezing or Shortness of Air. Use with chamber     doxycycline 100 MG capsule  Commonly known as: MONODOX  Take 1 capsule by mouth Every 12 (Twelve) Hours.     predniSONE 20 MG tablet  Commonly known as: DELTASONE  3 tabs p.o. qday for days 1-2, then 2 tabs p.o. for days 3-4, then 1 tab p.o. for days 5-6.        Changed    furosemide 20 MG tablet  Commonly known as: LASIX  Take 1 tablet by mouth Daily. Take 1 tab in the morning and 1/2 tab at 2 pm  What changed: additional instructions           Where to Get Your Medications      These medications were sent to [facility] [id number] - [place] - [address] - [phone]  - [phone] FX [id number] [address]     Phone: [phone]   · albuterol sulfate  (90 Base) MCG/ACT inhaler  · doxycycline 100 MG capsule  · predniSONE 20 MG tablet          Fuad Romero MD  02/16/23 8004

## 2023-02-15 NOTE — OUTREACH NOTE
Call Center TCM Note    Flowsheet Row Responses   [facility] [place] patient discharged from? [place]   Does the patient have one of the following disease processes/diagnoses(primary or secondary)? Other   TCM attempt successful? Yes   Call start time 1538   Call end time 1545   Discharge diagnosis Atrial fib-ER on 2/15/23 DX with pneumonia   Meds reviewed with patient/caregiver? Yes   Is the patient having any side effects they believe may be caused by any medication additions or changes? No   Does the patient have all medications ordered at discharge? Yes   Is the patient taking all medications as directed (includes completed medication regime)? No   What is preventing the patient from taking all medications as directed? Other  [Not picked up yet]   Nursing Interventions Nurse provided patient education   Comments Hospital d/c f/u appt is on 2/20/23 at 9:45 AM    Does the patient have an appointment with their PCP within 7 days of discharge? Yes   Psychosocial issues? No   Did the patient receive a copy of their discharge instructions? Yes   Nursing interventions Reviewed instructions with patient   What is the patient's perception of their health status since discharge? Improving  [DX with pneumonia on 2/15/23]   Is the patient/caregiver able to teach back signs and symptoms related to disease process for when to call PCP? Yes   Is the patient/caregiver able to teach back signs and symptoms related to disease process for when to call 911? Yes   Is the patient/caregiver able to teach back the hierarchy of who to call/visit for symptoms/problems? PCP, Specialist, Home health nurse, Urgent Care, ED, 911 Yes   TCM call completed? Yes   Call end time 1545   Would this patient benefit from a Referral to Amb Social Work? No   Is the patient interested in additional calls from an ambulatory ?  NOTE:  applies to high risk patients requiring additional follow-up. No          Jodi Lawson, XAVIER    2/15/2023,  15:46 EST

## 2023-02-15 NOTE — CASE MANAGEMENT/SOCIAL WORK
Case Management Discharge Note      Final Note: Discharged home.    Provided Post Acute Provider List?: Refused  Refused Provider List Comment: pt declined    Selected Continued Care - Discharged on 2/14/2023 Admission date: 2/13/2023 - Discharge disposition: Home or Self Care    Destination    No services have been selected for the patient.              Durable Medical Equipment    No services have been selected for the patient.              Dialysis/Infusion    No services have been selected for the patient.              Home Medical Care    No services have been selected for the patient.              Therapy    No services have been selected for the patient.              Community Resources    No services have been selected for the patient.              Community & DME    No services have been selected for the patient.                       Final Discharge Disposition Code: 01 - home or self-care

## 2023-02-15 NOTE — OUTREACH NOTE
Call Center TCM Note    Flowsheet Row Responses   [facility] facility patient discharged from? [place]   Does the patient have one of the following disease processes/diagnoses(primary or secondary)? Other   TCM attempt successful? No   Unsuccessful attempts Attempt 1  [Pt is back in ED at time of first attempt 02/15/2023 1109am]          Effie Blood MA    2/15/2023, 11:09 EST

## 2023-02-15 NOTE — OUTREACH NOTE
Prep Survey    Flowsheet Row Responses   [facility] facility patient discharged from? [place]   Is LACE score < 7 ? Yes   Eligibility [facility]   Date of Admission 02/13/23   Date of Discharge 02/14/23   Discharge Disposition Home or Self Care   Discharge diagnosis Atrial fib   Does the patient have one of the following disease processes/diagnoses(primary or secondary)? Other   Does the patient have Home health ordered? No   Is there a DME ordered? No   Prep survey completed? Yes          ZAIRE MADDEN - Registered Nurse

## 2023-02-18 LAB — BACTERIA SPEC AEROBE CULT: NORMAL

## 2023-02-20 ENCOUNTER — HOSPITAL ENCOUNTER (OUTPATIENT)
Dept: CT IMAGING | Facility: HOSPITAL | Age: 78
Discharge: HOME | End: 2023-02-20
Admitting: INTERNAL MEDICINE
Payer: MEDICARE

## 2023-02-20 ENCOUNTER — OFFICE VISIT (OUTPATIENT)
Dept: FAMILY MEDICINE CLINIC | Facility: CLINIC | Age: 78
End: 2023-02-20
Payer: MEDICARE

## 2023-02-20 VITALS
WEIGHT: 184 LBS | OXYGEN SATURATION: 95 % | TEMPERATURE: 97.8 F | SYSTOLIC BLOOD PRESSURE: 118 MMHG | HEART RATE: 45 BPM | HEIGHT: 66 IN | BODY MASS INDEX: 29.57 KG/M2 | RESPIRATION RATE: 16 BRPM | DIASTOLIC BLOOD PRESSURE: 78 MMHG

## 2023-02-20 DIAGNOSIS — I25.10 CORONARY ARTERY DISEASE INVOLVING NATIVE CORONARY ARTERY OF NATIVE HEART WITHOUT ANGINA PECTORIS: Primary | Chronic | ICD-10-CM

## 2023-02-20 DIAGNOSIS — M54.9 MID BACK PAIN ON RIGHT SIDE: ICD-10-CM

## 2023-02-20 DIAGNOSIS — R10.9 RIGHT FLANK PAIN: ICD-10-CM

## 2023-02-20 DIAGNOSIS — I48.91 NEW ONSET ATRIAL FIBRILLATION: ICD-10-CM

## 2023-02-20 DIAGNOSIS — J18.9 PNEUMONIA OF RIGHT LOWER LOBE DUE TO INFECTIOUS ORGANISM: ICD-10-CM

## 2023-02-20 DIAGNOSIS — I50.22 CHRONIC SYSTOLIC CONGESTIVE HEART FAILURE: ICD-10-CM

## 2023-02-20 PROCEDURE — 71250 CT THORAX DX C-: CPT

## 2023-02-20 PROCEDURE — 99214 OFFICE O/P EST MOD 30 MIN: CPT | Performed by: INTERNAL MEDICINE

## 2023-02-20 RX ORDER — HYDROCODONE BITARTRATE AND ACETAMINOPHEN 5; 325 MG/1; MG/1
1 TABLET ORAL 2 TIMES DAILY PRN
Qty: 10 TABLET | Refills: 0 | Status: SHIPPED | OUTPATIENT
Start: 2023-02-20

## 2023-02-20 NOTE — PROGRESS NOTES
Subjective   Effie Donnelly is a 77 y.o. female.     Chief Complaint   Patient presents with   • Hospital Follow Up Visit       History of Present Illness   Patient was in the hospital for acute congestive heart failure, initially thought to be viral pneumonia.  Patient was discharged on Tuesday went back to the hospital on Wednesday.  Patient was having right mid back pain.  Chest x-ray repeat showed right lower infiltrate no pleural effusion.  Patient continues to complain of right mid back pain pain, just no dysuria, no urgency, no fever or chills.  Coughing but better.  Shortness of breath is better.  In the hospital patient was found with A-fib with rapid ventricular sponsor, Eliquis was added.  She is off Coreg.  She is on metoprolol.  The following portions of the patient's history were reviewed and updated as appropriate: allergies, current medications, past family history, past medical history, past social history, past surgical history and problem list.    Review of Systems   Constitutional: Negative for activity change, appetite change, fatigue and fever.   Eyes: Negative for blurred vision and double vision.   Respiratory: Positive for cough. Negative for apnea, choking, chest tightness, shortness of breath, wheezing and stridor.         Right mid back pain   Cardiovascular: Negative for chest pain, palpitations and leg swelling.   Gastrointestinal: Negative.    Genitourinary: Negative for flank pain, frequency and hematuria.   Musculoskeletal: Positive for back pain. Negative for arthralgias.   Neurological: Negative for dizziness, syncope, light-headedness and headache.   Psychiatric/Behavioral: Negative for agitation and suicidal ideas.   All other systems reviewed and are negative.      Allergies   Allergen Reactions   • Nsaids GI Bleeding     CROHN'S DISEASE   • Adhesive Tape Other (See Comments)     BLISTERS UNDER TAGADERM       Current Outpatient Medications on File Prior to Visit   Medication Sig  Dispense Refill   • albuterol sulfate  (90 Base) MCG/ACT inhaler Inhale 2 puffs Every 4 (Four) Hours As Needed for Wheezing or Shortness of Air. Use with chamber 8.5 g 0   • apixaban (ELIQUIS) 5 MG tablet tablet Take 1 tablet by mouth Every 12 (Twelve) Hours. Indications: Atrial Fibrillation 60 tablet 0   • B Complex Vitamins (VITAMIN B COMPLEX PO) Take 1 tablet by mouth Daily.     • cholecalciferol (VITAMIN D3) 25 MCG (1000 UT) tablet Take 4,000 Units by mouth Daily.     • doxycycline (MONODOX) 100 MG capsule Take 1 capsule by mouth Every 12 (Twelve) Hours. 14 capsule 0   • furosemide (LASIX) 20 MG tablet Take 1 tablet by mouth Daily. Take 1 tab in the morning and 1/2 tab at 2 pm (Patient taking differently: Take 20 mg by mouth Daily. 1/2 TAB DAILY) 135 tablet 3   • Loratadine-D 24HR  MG per 24 hr tablet TAKE ONE TABLET BY MOUTH DAILY 30 tablet 3   • MEGARED OMEGA-3 KRILL OIL PO Take 1 capsule by mouth Daily.     • metoprolol tartrate 75 MG tablet Take 75 mg by mouth 2 (Two) Times a Day. 60 tablet 0   • omeprazole (priLOSEC) 40 MG capsule Take 1 capsule by mouth Daily. 90 capsule 3   • predniSONE (DELTASONE) 20 MG tablet 3 tabs p.o. qday for days 1-2, then 2 tabs p.o. for days 3-4, then 1 tab p.o. for days 5-6. 12 tablet 0   • PROBIOTIC PRODUCT PO Take 2 capsules by mouth Daily.     • rosuvastatin (Crestor) 5 MG tablet Take 1 tablet by mouth Daily. 90 tablet 2   • sacubitril-valsartan (Entresto) 24-26 MG tablet Take 1 tablet by mouth 2 (Two) Times a Day. 180 tablet 3   • benzonatate (TESSALON) 200 MG capsule Take 1 capsule by mouth 3 (Three) Times a Day As Needed for Cough. 40 capsule 0   • CRANBERRY EXTRACT PO Take 1 capsule by mouth Daily.     • guaiFENesin (MUCINEX) 600 MG 12 hr tablet Take 1 tablet by mouth Every 12 (Twelve) Hours. 10 tablet 0     No current facility-administered medications on file prior to visit.       Family History   Problem Relation Age of Onset   • Hypertension Sister    •  Cancer Sister    • Heart disease Brother 65        VALVE REPLACEMENT    • Hypertension Brother    • Hypertension Sister    • Hypertension Sister    • Hypertension Brother    • Diabetes Brother    • Hypertension Brother    • Cancer Brother    • Hypertension Brother    • Hypertension Brother    • Hypertension Brother        Past Medical History:   Diagnosis Date   • CAD (coronary artery disease)    • Cancer (HCC)     skin, right breast   • CHF (congestive heart failure) (Formerly [facility])    • Coronary artery disease involving native coronary artery of native heart without angina pectoris 11/1/2018   • Heart murmur    • History of myocardial infarction 11/1/2018   • Hypertension    • Myocardial infarction (Former[facility])     PER PT    • NICM (nonischemic cardiomyopathy) ([facility]ly [facility]) 7/8/2020   • DELORES on CPAP     AHI 15/h   • Sinus tachycardia    • SOB (shortness of breath)        Past Surgical History:   Procedure Laterality Date   • BUNIONECTOMY Bilateral    • CARDIAC CATHETERIZATION     • CARDIAC CATHETERIZATION N/A 01/14/2020    Procedure: Right and Left Heart Cath;  Surgeon: Bin Chacko MD;  Location:  [facility];  Service: Cardiovascular   • CARDIAC CATHETERIZATION N/A 01/14/2020    Procedure: Coronary angiography;  Surgeon: Bin Chacko MD;  Location:  [facility];  Service: Cardiovascular   • CARDIAC CATHETERIZATION N/A 01/14/2020    Procedure: Left ventriculography;  Surgeon: Bin Chacko MD;  Location:  [facility];  Service: Cardiovascular   • CATARACT EXTRACTION Left 04/26/2022   • COLONOSCOPY N/A 03/23/2022    Procedure: COLONOSCOPY WITH BIOPSIES; POLYPECTOMY;  Surgeon: Lorne Esquivel MD;  Location: [facility] OR;  Service: Gastroenterology;  Laterality: N/A;  POLYP  ULCERATIVE COLITIS   • ENDOSCOPY N/A 03/23/2022    Procedure: ESOPHAGOGASTRODUODENOSCOPY WITH BIOPSIES;  Surgeon: Lorne Esquivel MD;  Location: [facility] OR;  Service:  "Gastroenterology;  Laterality: N/A;  GASTRITIS  ESOPHAGEAL REFLUX  HIATAL HERNIA  ESOPHAGEAL CANDIDIASIS   • MASTECTOMY COMPLETE / SIMPLE  2019   • SHOULDER SURGERY Right    • SKIN CANCER EXCISION      ABOVE THE LIP.        Social History     Socioeconomic History   • Marital status:    • Number of children: 5   Tobacco Use   • Smoking status: Former     Types: Cigarettes     Quit date: 1973     Years since quittin.3   • Smokeless tobacco: Never   • Tobacco comments:     CAFFEINE USE: 1 CUP DAILY   Vaping Use   • Vaping Use: Never used   Substance and Sexual Activity   • Alcohol use: No   • Drug use: No   • Sexual activity: Defer       Patient Active Problem List   Diagnosis   • History of myocardial infarction   • Coronary artery disease involving native coronary artery of native heart without angina pectoris   • Ulcerative colitis (HCC)   • HTN (hypertension)   • GERD (gastroesophageal reflux disease)   • DJD (degenerative joint disease)   • Ductal 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   • Anemia   • Other hyperlipidemia   • NICM (nonischemic cardiomyopathy) (HCC)   • DELORES on CPAP   • Snoring   • Class 1 obesity due to excess calories without serious comorbidity with body mass index (BMI) of 31.0 to 31.9 in adult   • Chronic systolic congestive heart failure (HCC)   • Esophageal dysphagia   • Stage 3a chronic kidney disease (HCC)   • New onset atrial fibrillation (HCC)       /78 (BP Location: Left arm, Patient Position: Sitting, Cuff Size: Large Adult)   Pulse (!) 45   Temp 97.8 °F (36.6 °C)   Resp 16   Ht 167.6 cm (66\")   Wt 83.5 kg (184 lb)   SpO2 95%   BMI 29.70 kg/m²   Body mass index is 29.7 kg/m².    Objective   Physical Exam  Vitals and nursing note reviewed.   Constitutional:       Appearance: She is well-developed.   Eyes:      Pupils: Pupils are equal, round, and reactive to light.   Neck:      Thyroid: No thyromegaly.      Vascular: No JVD.      Trachea: No tracheal " deviation.   Cardiovascular:      Rate and Rhythm: Normal rate and regular rhythm.      Heart sounds: No murmur heard.  Pulmonary:      Effort: Pulmonary effort is normal. No respiratory distress.      Breath sounds: Normal breath sounds. No wheezing.   Abdominal:      General: Bowel sounds are normal. There is no distension.      Palpations: Abdomen is soft. There is no mass.      Tenderness: There is no abdominal tenderness. There is no right CVA tenderness, left CVA tenderness, guarding or rebound.      Hernia: No hernia is present.   Musculoskeletal:      Cervical back: Normal range of motion and neck supple.   Lymphadenopathy:      Cervical: No cervical adenopathy.   Neurological:      General: No focal deficit present.      Mental Status: She is alert and oriented to person, place, and time.   Psychiatric:         Mood and Affect: Mood normal.         Behavior: Behavior normal.           Assessment & Plan   Diagnoses and all orders for this visit:    1. Coronary artery disease involving native coronary artery of native heart without angina pectoris (Primary)  -     CBC & Differential  -     Comprehensive Metabolic Panel  -     POC Urinalysis Dipstick, Automated  -     Urine Culture - Urine, Urine, Random Void    2. Chronic systolic congestive heart failure (HCC)  -     CBC & Differential  -     Comprehensive Metabolic Panel  -     POC Urinalysis Dipstick, Automated  -     Urine Culture - Urine, Urine, Random Void    3. Right flank pain  -     CBC & Differential  -     Comprehensive Metabolic Panel  -     POC Urinalysis Dipstick, Automated  -     Urine Culture - Urine, Urine, Random Void  -     Cancel: CT Chest Without Contrast; Future  -     CT Chest Without Contrast; Future  -     HYDROcodone-acetaminophen (NORCO) 5-325 MG per tablet; Take 1 tablet by mouth 2 (Two) Times a Day As Needed for Severe Pain.  Dispense: 10 tablet; Refill: 0    4. Pneumonia of right lower lobe due to infectious organism  -     CBC &  Differential  -     Comprehensive Metabolic Panel  -     POC Urinalysis Dipstick, Automated  -     Urine Culture - Urine, Urine, Random Void  -     Cancel: CT Chest Without Contrast; Future  -     CT Chest Without Contrast; Future    5. Mid back pain on right side  -     CBC & Differential  -     Comprehensive Metabolic Panel  -     POC Urinalysis Dipstick, Automated  -     Urine Culture - Urine, Urine, Random Void  -     Cancel: CT Chest Without Contrast; Future  -     CT Chest Without Contrast; Future  -     HYDROcodone-acetaminophen (NORCO) 5-325 MG per tablet; Take 1 tablet by mouth 2 (Two) Times a Day As Needed for Severe Pain.  Dispense: 10 tablet; Refill: 0    6. New onset atrial fibrillation (HCC)    Continue Eliquis.  Continue Entresto.  Continue metoprolol, Crestor, Omnicef.  Discussed with patient does not have any urine symptoms.  Most likely is musculoskeletal from coughing.  However patient has pneumonia warm make sure there is no para pneumonia fluid collection.  We will get stat CT of the chest without contrast as patient has chronic kidney disease.  Patient is on doxycycline at this time and prednisone will finish off.  I will see her back in couple days time.  Vicodin 5/325 was given few of them discussed with patient drowsiness and be careful about using it.  EHR dragon/transcription disclaimer:  Part of this note are created by electronic transcription/translation of spoken language to printed text and thus may lead to erroneous, or at times, nonsensical words or phrases inadvertently transcribed.  Although I have reviewed for such errors, some may still exist.

## 2023-02-21 LAB
ALBUMIN SERPL-MCNC: 4 G/DL (ref 3.5–5.2)
ALBUMIN/GLOB SERPL: 1.5 G/DL
ALP SERPL-CCNC: 111 U/L (ref 39–117)
ALT SERPL-CCNC: 55 U/L (ref 1–33)
AST SERPL-CCNC: 30 U/L (ref 1–32)
BASOPHILS # BLD AUTO: 0.04 10*3/MM3 (ref 0–0.2)
BASOPHILS NFR BLD AUTO: 0.4 % (ref 0–1.5)
BILIRUB SERPL-MCNC: 0.2 MG/DL (ref 0–1.2)
BUN SERPL-MCNC: 22 MG/DL (ref 8–23)
BUN/CREAT SERPL: 19.6 (ref 7–25)
CALCIUM SERPL-MCNC: 9 MG/DL (ref 8.6–10.5)
CHLORIDE SERPL-SCNC: 98 MMOL/L (ref 98–107)
CO2 SERPL-SCNC: 26.5 MMOL/L (ref 22–29)
CREAT SERPL-MCNC: 1.12 MG/DL (ref 0.57–1)
EGFRCR SERPLBLD CKD-EPI 2021: 51.1 ML/MIN/1.73
EOSINOPHIL # BLD AUTO: 0.02 10*3/MM3 (ref 0–0.4)
EOSINOPHIL NFR BLD AUTO: 0.2 % (ref 0.3–6.2)
ERYTHROCYTE [DISTWIDTH] IN BLOOD BY AUTOMATED COUNT: 13.5 % (ref 12.3–15.4)
GLOBULIN SER CALC-MCNC: 2.7 GM/DL
GLUCOSE SERPL-MCNC: 104 MG/DL (ref 65–99)
HCT VFR BLD AUTO: 39.7 % (ref 34–46.6)
HGB BLD-MCNC: 13.2 G/DL (ref 12–15.9)
IMM GRANULOCYTES # BLD AUTO: 0.19 10*3/MM3 (ref 0–0.05)
IMM GRANULOCYTES NFR BLD AUTO: 1.7 % (ref 0–0.5)
LYMPHOCYTES # BLD AUTO: 1.4 10*3/MM3 (ref 0.7–3.1)
LYMPHOCYTES NFR BLD AUTO: 12.6 % (ref 19.6–45.3)
MCH RBC QN AUTO: 28.9 PG (ref 26.6–33)
MCHC RBC AUTO-ENTMCNC: 33.2 G/DL (ref 31.5–35.7)
MCV RBC AUTO: 86.9 FL (ref 79–97)
MONOCYTES # BLD AUTO: 0.85 10*3/MM3 (ref 0.1–0.9)
MONOCYTES NFR BLD AUTO: 7.7 % (ref 5–12)
NEUTROPHILS # BLD AUTO: 8.61 10*3/MM3 (ref 1.7–7)
NEUTROPHILS NFR BLD AUTO: 77.4 % (ref 42.7–76)
NRBC BLD AUTO-RTO: 0 /100 WBC (ref 0–0.2)
PLATELET # BLD AUTO: 373 10*3/MM3 (ref 140–450)
POTASSIUM SERPL-SCNC: 4.5 MMOL/L (ref 3.5–5.2)
PROT SERPL-MCNC: 6.7 G/DL (ref 6–8.5)
RBC # BLD AUTO: 4.57 10*6/MM3 (ref 3.77–5.28)
SODIUM SERPL-SCNC: 136 MMOL/L (ref 136–145)
WBC # BLD AUTO: 11.11 10*3/MM3 (ref 3.4–10.8)

## 2023-02-22 ENCOUNTER — OFFICE VISIT (OUTPATIENT)
Dept: FAMILY MEDICINE CLINIC | Facility: CLINIC | Age: 78
End: 2023-02-22
Payer: MEDICARE

## 2023-02-22 ENCOUNTER — OFFICE VISIT (OUTPATIENT)
Dept: CARDIOLOGY | Facility: CLINIC | Age: 78
End: 2023-02-22
Payer: MEDICARE

## 2023-02-22 VITALS
DIASTOLIC BLOOD PRESSURE: 64 MMHG | HEIGHT: 66 IN | TEMPERATURE: 97.8 F | BODY MASS INDEX: 29.89 KG/M2 | WEIGHT: 186 LBS | HEART RATE: 53 BPM | OXYGEN SATURATION: 94 % | RESPIRATION RATE: 16 BRPM | SYSTOLIC BLOOD PRESSURE: 102 MMHG

## 2023-02-22 VITALS
OXYGEN SATURATION: 96 % | BODY MASS INDEX: 29.89 KG/M2 | RESPIRATION RATE: 16 BRPM | DIASTOLIC BLOOD PRESSURE: 80 MMHG | SYSTOLIC BLOOD PRESSURE: 122 MMHG | HEIGHT: 66 IN | HEART RATE: 105 BPM | WEIGHT: 186 LBS

## 2023-02-22 DIAGNOSIS — E78.49 OTHER HYPERLIPIDEMIA: ICD-10-CM

## 2023-02-22 DIAGNOSIS — M54.9 MID BACK PAIN ON RIGHT SIDE: ICD-10-CM

## 2023-02-22 DIAGNOSIS — G47.33 OSA ON CPAP: ICD-10-CM

## 2023-02-22 DIAGNOSIS — I48.91 NEW ONSET ATRIAL FIBRILLATION: ICD-10-CM

## 2023-02-22 DIAGNOSIS — I10 PRIMARY HYPERTENSION: ICD-10-CM

## 2023-02-22 DIAGNOSIS — I42.8 NICM (NONISCHEMIC CARDIOMYOPATHY): Chronic | ICD-10-CM

## 2023-02-22 DIAGNOSIS — Z99.89 OSA ON CPAP: ICD-10-CM

## 2023-02-22 DIAGNOSIS — I50.22 CHRONIC SYSTOLIC CONGESTIVE HEART FAILURE: ICD-10-CM

## 2023-02-22 DIAGNOSIS — I25.2 HISTORY OF MYOCARDIAL INFARCTION: Chronic | ICD-10-CM

## 2023-02-22 DIAGNOSIS — J18.9 PNEUMONIA OF RIGHT LOWER LOBE DUE TO INFECTIOUS ORGANISM: Primary | ICD-10-CM

## 2023-02-22 DIAGNOSIS — I25.10 CORONARY ARTERY DISEASE INVOLVING NATIVE CORONARY ARTERY OF NATIVE HEART WITHOUT ANGINA PECTORIS: Primary | Chronic | ICD-10-CM

## 2023-02-22 LAB
BACTERIA UR CULT: NO GROWTH
BACTERIA UR CULT: NORMAL

## 2023-02-22 PROCEDURE — 99213 OFFICE O/P EST LOW 20 MIN: CPT | Performed by: INTERNAL MEDICINE

## 2023-02-22 PROCEDURE — 99214 OFFICE O/P EST MOD 30 MIN: CPT | Performed by: NURSE PRACTITIONER

## 2023-02-22 PROCEDURE — 93000 ELECTROCARDIOGRAM COMPLETE: CPT | Performed by: NURSE PRACTITIONER

## 2023-02-22 RX ORDER — ROSUVASTATIN CALCIUM 5 MG/1
5 TABLET, COATED ORAL DAILY
Qty: 90 TABLET | Refills: 2 | Status: SHIPPED | OUTPATIENT
Start: 2023-02-22

## 2023-02-22 RX ORDER — METOPROLOL TARTRATE 100 MG/1
100 TABLET ORAL 2 TIMES DAILY
Qty: 180 TABLET | Refills: 3 | Status: SHIPPED | OUTPATIENT
Start: 2023-02-22 | End: 2023-03-08

## 2023-02-22 NOTE — PROGRESS NOTES
Date of Office Visit: 2023  Encounter Provider: PATI Eagle  Place of Service: [facility] CARDIOLOGY  Patient Name: Effie Donnelly  :1945  Primary Cardiologist: Dr. Brand    CC:  Hospital follow up/ new onset afib    Dear Dr. Hawley    HPI: Effie Donnelly is a pleasant 77 y.o. female who presents 2023 for cardiac follow up.  I reviewed her past medical records including notes, labs and testing in preparation for today's visit.    Dr. Brand saw  her initially 2018. She was previously seen at [facility].  She has a history of CAD and myocardial infarction.  She had an echocardiogram in November that showed normal function.  She does have a history of an abnormal EKG with LVH as well as diffuse repolarization changes.  She just had an EKG performed at [facility] there was unchanged from prior EKGs.     Cath 2020:  Findings:  1. Coronary Artery Anatomy:  Dominance: Left  Left Main: Angiographically normal  Left Anterior Descending: The LAD is somewhat small in caliber size.  Contains a 30 to 40% proximal segment stenosis followed by luminal irregularities distally.  There are no appreciable diagonals from the LAD.  Circumflex Artery: Large in caliber size.  Luminal irregularities throughout supplies a mid and inferior marginal branch vessels.  Also supplies a left-sided PDA which contains luminal regularities.  Ramus: Large ramus almost takes over for LAD and is branching and bifurcating.  Luminal irregularities throughout  Right Coronary Artery: Nondominant     2. Hemodynamics:  Right Atrium: 8/5/4 mmHg  Right Ventricle: 31/2/5 mmHg  Pulmonary Artery: 34/11/23 mmHg  Pulmonary Wedge: 22/21/20 mmHg  Left Ventricle: 184/16/23 mmHg  Aorta: 178/64/107 mmHg  Cardiac Output/Index: 3.53 L/min 1.92 L/min/m2  PA Sat: 66 %  AO Sat: 96 %     3. Left Ventriculogram:  Ejection Fraction: 20 %  Wall Motion: Global  Mitral  Regurgitation: None     Conclusions:  1. Left dominant system with diminutive LAD which may explain her stress test findings but otherwise nonischemic cardiomyopathy with only a 30 to 40% proximal LAD stenoses.  2. Currently elevated left-sided filling pressures with EDP of 23 mmHg  3. Currently reduced cardiac output and index of 3.5 and 1.9     I saw her 2/25/2022 for 3-month follow-up.  She denies any chest pain chest pressure.  She states her shortness of breath remains the same is been unchanged for the past year and a half.  She continues to complain of fatigue.  She has not had any dizziness, lightheadedness, syncope or presyncopal episodes.  She denies any lower extremity edema.  She denies any palpitations, she states she cannot feel her heart racing or skipping.  She states over the last 2 weeks she has been fainting in the mornings.  She states is associated with high blood sugar.  She states she self decreased her carvedilol to 12.5 mg twice a day and all of the symptoms went away.  I have asked her to follow-up with you about her blood sugars.  Her blood pressure today is within normal limits.  She states she has a cold and feels like she is congested in her chest.  She does have some mild crackles in her lung bases.  I have reviewed her recent labs.  Her most recent labs were from 2/8/2022.  CMP revealed a total glucose of 64, sodium 142, potassium 4.6 and a creatinine 1.22.  She had labs performed on January 11 that showed a CMP with a glucose of 86, sodium 140, potassium 4.3 and her creatinine up to 1.38.  TSH 3.850.  CBC unremarkable.  Lipid profile showed total cholesterol 224, HDL 67, , triglycerides 106.  Of note in December 2021, CMP showed a glucose of 93 and a creatinine of 1.17. I increased her lasix and ordered an echo.      3/2/2022 echo Interpretation Summary  • The left ventricular cavity is moderately dilated.  • Left ventricular ejection fraction appears to be 56 - 60%. Left  ventricular systolic function is normal.  • There is moderate concentric left ventricular hypertrophy. There is severe apical hypertrophy of the apical segments. 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  • Normal right ventricular cavity size and systolic function noted.  • The left atrial cavity is severely dilated.  • Mild tricuspid valve regurgitation is present.  • Calculated right ventricular systolic pressure from tricuspid regurgitation is 25 mmHg.  • There is mild pulmonic valve regurgitation present.  • There is no evidence of pericardial effusion     She did have labs 4/20/2022 that showed CMP with a creatinine 1.35, TSH 5.340.  Lipid panel revealed total cholesterol 220, HDL 56,  and triglycerides 118.  CBC unremarkable.     She had a repeat BMP on 5/17/2022 that showed a creatinine of 1.45.     I saw her in May 2022 for 3-month follow-up.  She states after 2 days of the increased in the Lasix, she had a lot of back pain so she stopped it.  She is just back to the 20 mg of Lasix daily.  Has noticed above, her creatinine did go up even on the lower dose of the Lasix.  She is taking her medications as directed.  Her blood pressure is well controlled.  She denies any palpitations, lower extremity edema, dizziness or lightheadedness.  She has not had any syncope or presyncopal episodes.  She denies any chest pain or chest pressure.  She does complain of fatigue and chronic shortness of breath.  That is unchanged.  She does not use her CPAP, she does have 1 at home but states she feels like she does not need it.  She denies any snoring, orthopnea, PND or daytime sleepiness.  I did discuss the correlation between untreated sleep apnea and cardiomyopathy.     She had appointment with Dr. Brand in December which was turned into a telehealth secondary to direct exposure from a family member living in her  home.  She stated that at the time, she did not have any COVID symptoms.  She is fully vaccinated and boosted.  She had issues with pneumonia at the beginning of the month but tested negative for COVID at that time.  She still very short of breath, that has been ongoing.  No chest pain or chest discomfort.  She had a chest x-ray at the beginning of the month that showed no infiltrates, no evidence of congestive heart failure.  She was getting very short of breath with activity even before she had pneumonia.  He did order an echo.     12/22/2022  Interpretation Summary  •  Left ventricular systolic function is normal.  •  Left ventricular wall thickness is consistent with mild concentric hypertrophy.  •  Left ventricular diastolic function was normal.  •  There is mild, bileaflet mitral valve thickening present.  •  Estimated right ventricular systolic pressure from tricuspid regurgitation is normal (<35 mmHg).     She presented to the ED on 12/13/2023 with shortness of breath and not feeling well.  She was found to be in atrial fibrillation with RVR, this was a new diagnosis.  She did receive IV diltiazem in the emergency room.  She was transitioned to oral Lopressor and was placed on Eliquis as her [id number] score was 6.  Her heart rate responded well and she was titrated to metoprolol 100 mg twice daily.  She remained on her sacubitril-valsartan and her regular home regimen.  Aspirin was discontinued as she is now on Eliquis given attempt to decrease her bleeding risk.  She was also found to be positive for acute coronavirus infection and she was treated symptomatically.  She was satting well on room air and did not require oxygen.    2/14/2023 Interpretation Summary  •  Left ventricular ejection fraction appears to be 51 - 55%.  •  Sigmoid-shaped ventricular septum is present.Prominent left ventricular false tendon.  •  Left ventricular diastolic function is consistent with (grade II w/high LAP)  pseudonormalization.  •  Estimated right ventricular systolic pressure from tricuspid regurgitation is mildly elevated (35-45 mmHg).     She had to return back to the ED on with continued shortness of air and productive cough.  She was diagnosed with right lower lobe pneumonia and started on antibiotics and a short course of steroids.    She presents today in follow-up.  She states she is feeling a lot better.  She still has some shortness of breath and fatigue but that is improving.  She felt a lot better after getting the antibiotic and steroids.  She cannot really feel her heart racing or skipping.  Again she does feel some shortness of air and tiredness.  She denies any lower extremity edema, she has not had any dizziness, lightheadedness, syncope or presyncopal episodes.  She denies any chest pain or chest pressure.  She states she has DELORES but cannot tolerate the mask.  Epic states that she is on Lopressor 75 mg twice daily, patient does not know what dose of medicine she is on, however, discharge stated she was supposed to take Lopressor 100 twice a day.  She is still mildly tachycardic with a heart rate of 105 and remains in atrial fibrillation.  I have sent in a prescription for Lopressor 100 mg twice daily.      Past Medical History:   Diagnosis Date   • CAD (coronary artery disease)    • Cancer ([facility])     skin, right breast   • CHF (congestive heart failure) ([facility])    • Coronary artery disease involving native coronary artery of native heart without angina pectoris 11/1/2018   • Heart murmur    • History of myocardial infarction 11/1/2018   • Hypertension    • Myocardial infarction ([facility])     PER PT    • NICM (nonischemic cardiomyopathy) ([facility]) 7/8/2020   • DELORES on CPAP     AHI 15/h   • Sinus tachycardia    • SOB (shortness of breath)        Past Surgical History:   Procedure Laterality Date   • BUNIONECTOMY Bilateral    • CARDIAC CATHETERIZATION     • CARDIAC CATHETERIZATION N/A 01/14/2020    Procedure: Right  and Left Heart Cath;  Surgeon: Bin Chacko MD;  Location:  [facility];  Service: Cardiovascular   • CARDIAC CATHETERIZATION N/A 2020    Procedure: Coronary angiography;  Surgeon: Bin Chacko MD;  Location:  [facility];  Service: Cardiovascular   • CARDIAC CATHETERIZATION N/A 2020    Procedure: Left ventriculography;  Surgeon: Bin Chacko MD;  Location:  [facility];  Service: Cardiovascular   • CATARACT EXTRACTION Left 2022   • COLONOSCOPY N/A 2022    Procedure: COLONOSCOPY WITH BIOPSIES; POLYPECTOMY;  Surgeon: Lorne Esquivel MD;  Location:  [facility];  Service: Gastroenterology;  Laterality: N/A;  POLYP  ULCERATIVE COLITIS   • ENDOSCOPY N/A 2022    Procedure: ESOPHAGOGASTRODUODENOSCOPY WITH BIOPSIES;  Surgeon: Lorne Esquivel MD;  Location:  [facility];  Service: Gastroenterology;  Laterality: N/A;  GASTRITIS  ESOPHAGEAL REFLUX  HIATAL HERNIA  ESOPHAGEAL CANDIDIASIS   • MASTECTOMY COMPLETE / SIMPLE  2019   • SHOULDER SURGERY Right    • SKIN CANCER EXCISION      ABOVE THE LIP.        Social History     Socioeconomic History   • Marital status:    • Number of children: 5   Tobacco Use   • Smoking status: Former     Types: Cigarettes     Quit date: 1973     Years since quittin.3   • Smokeless tobacco: Never   • Tobacco comments:     CAFFEINE USE: 1 CUP DAILY   Vaping Use   • Vaping Use: Never used   Substance and Sexual Activity   • Alcohol use: No   • Drug use: No   • Sexual activity: Defer       Family History   Problem Relation Age of Onset   • Hypertension Sister    • Cancer Sister    • Heart disease Brother 65        VALVE REPLACEMENT    • Hypertension Brother    • Hypertension Sister    • Hypertension Sister    • Hypertension Brother    • Diabetes Brother    • Hypertension Brother    • Cancer Brother    • Hypertension Brother    • Hypertension Brother    • Hypertension  Brother        The following portion of the patient's history were reviewed and updated as appropriate: past medical history, past surgical history, past social history, past family history, allergies, current medications, and problem list.    Review of Systems   Constitutional: Positive for malaise/fatigue (improving). Negative for diaphoresis and fever.   HENT: Negative for congestion, hearing loss, hoarse voice, nosebleeds and sore throat.    Eyes: Negative for photophobia, vision loss in left eye, vision loss in right eye and visual disturbance.   Cardiovascular: Negative for chest pain, dyspnea on exertion, irregular heartbeat, leg swelling, near-syncope, orthopnea, palpitations, paroxysmal nocturnal dyspnea and syncope.   Respiratory: Positive for shortness of breath (improving). Negative for cough, hemoptysis, sleep disturbances due to breathing, snoring, sputum production and wheezing.    Endocrine: Negative for cold intolerance, heat intolerance, polydipsia, polyphagia and polyuria.   Hematologic/Lymphatic: Negative for bleeding problem. Does not bruise/bleed easily.   Skin: Negative for color change, dry skin, poor wound healing, rash and suspicious lesions.   Musculoskeletal: Negative for arthritis, back pain, falls, gout, joint pain, joint swelling, muscle cramps, muscle weakness and myalgias.   Gastrointestinal: Negative for bloating, abdominal pain, constipation, diarrhea, dysphagia, melena, nausea and vomiting.   Neurological: Negative for excessive daytime sleepiness, dizziness, headaches, light-headedness, loss of balance, numbness, paresthesias, seizures, vertigo and weakness.   Psychiatric/Behavioral: Negative for depression, memory loss and substance abuse. The patient is not nervous/anxious.        Allergies   Allergen Reactions   • Nsaids GI Bleeding     CROHN'S DISEASE   • Adhesive Tape Other (See Comments)     BLISTERS UNDER TAGADERM         Current Outpatient Medications:   •  albuterol  sulfate  (90 Base) MCG/ACT inhaler, Inhale 2 puffs Every 4 (Four) Hours As Needed for Wheezing or Shortness of Air. Use with chamber, Disp: 8.5 g, Rfl: 0  •  apixaban (ELIQUIS) 5 MG tablet tablet, Take 1 tablet by mouth Every 12 (Twelve) Hours. Indications: Atrial Fibrillation, Disp: 60 tablet, Rfl: 0  •  B Complex Vitamins (VITAMIN B COMPLEX PO), Take 1 tablet by mouth Daily., Disp: , Rfl:   •  benzonatate (TESSALON) 200 MG capsule, Take 1 capsule by mouth 3 (Three) Times a Day As Needed for Cough., Disp: 40 capsule, Rfl: 0  •  cholecalciferol (VITAMIN D3) 25 MCG (1000 UT) tablet, Take 4,000 Units by mouth Daily., Disp: , Rfl:   •  CRANBERRY EXTRACT PO, Take 1 capsule by mouth Daily., Disp: , Rfl:   •  doxycycline (MONODOX) 100 MG capsule, Take 1 capsule by mouth Every 12 (Twelve) Hours., Disp: 14 capsule, Rfl: 0  •  furosemide (LASIX) 20 MG tablet, Take 1 tablet by mouth Daily. Take 1 tab in the morning and 1/2 tab at 2 pm (Patient taking differently: Take 20 mg by mouth Daily. 1.5 TAB DAILY), Disp: 135 tablet, Rfl: 3  •  guaiFENesin (MUCINEX) 600 MG 12 hr tablet, Take 1 tablet by mouth Every 12 (Twelve) Hours., Disp: 10 tablet, Rfl: 0  •  HYDROcodone-acetaminophen (NORCO) 5-325 MG per tablet, Take 1 tablet by mouth 2 (Two) Times a Day As Needed for Severe Pain., Disp: 10 tablet, Rfl: 0  •  Loratadine-D 24HR  MG per 24 hr tablet, TAKE ONE TABLET BY MOUTH DAILY, Disp: 30 tablet, Rfl: 3  •  MEGARED OMEGA-3 KRILL OIL PO, Take 1 capsule by mouth Daily., Disp: , Rfl:   •  metoprolol tartrate 75 MG tablet, Take 75 mg by mouth 2 (Two) Times a Day., Disp: 60 tablet, Rfl: 0  •  omeprazole (priLOSEC) 40 MG capsule, Take 1 capsule by mouth Daily., Disp: 90 capsule, Rfl: 3  •  PROBIOTIC PRODUCT PO, Take 2 capsules by mouth Daily., Disp: , Rfl:   •  rosuvastatin (Crestor) 5 MG tablet, Take 1 tablet by mouth Daily., Disp: 90 tablet, Rfl: 2  •  sacubitril-valsartan (Entresto) 24-26 MG tablet, Take 1 tablet by mouth  "2 (Two) Times a Day., Disp: 180 tablet, Rfl: 3        Objective:     Vitals:    02/22/23 1402   BP: 122/80   Pulse: 105   Resp: 16   SpO2: 96%   Weight: 84.4 kg (186 lb)   Height: 167.6 cm (66\")     Body mass index is 30.02 kg/m².      Vitals reviewed.   Constitutional:       General: Not in acute distress.     Appearance: Well-developed and not in distress.   Eyes:      General:         Right eye: No discharge.         Left eye: No discharge.      Conjunctiva/sclera: Conjunctivae normal.   HENT:      Head: Normocephalic and atraumatic.      Right Ear: External ear normal.      Left Ear: External ear normal.      Nose: Nose normal.   Neck:      Thyroid: No thyromegaly.      Vascular: No JVD.      Trachea: No tracheal deviation.      Lymphadenopathy: No cervical adenopathy.   Pulmonary:      Effort: Pulmonary effort is normal. No respiratory distress.      Breath sounds: Normal breath sounds. No wheezing. No rales.   Chest:      Chest wall: Not tender to palpatation.   Cardiovascular:      Tachycardia present. Irregularly irregular rhythm.      No gallop.   Pulses:     Intact distal pulses.   Edema:     Peripheral edema absent.   Abdominal:      General: There is no distension.      Palpations: Abdomen is soft.      Tenderness: There is no abdominal tenderness.   Musculoskeletal: Normal range of motion.         General: No tenderness or deformity.      Cervical back: Normal range of motion and neck supple. Skin:     General: Skin is warm and dry.      Findings: No erythema or rash.   Neurological:      Mental Status: Alert and oriented to person, place, and time.      Coordination: Coordination normal.   Psychiatric:         Attention and Perception: Attention normal.         Mood and Affect: Mood normal.         Behavior: Behavior normal.         Thought Content: Thought content normal.         Cognition and Memory: Cognition normal.         Judgment: Judgment normal.               ECG 12 Lead    Date/Time: " 2/22/2023 3:17 PM  Performed by: Nan Brian APRN  Authorized by: Nan Brian APRN   Comparison: compared with previous ECG from 2/13/2023  Similar to previous ECG  Rhythm: atrial fibrillation  Ectopy: infrequent PVCs  Rate: normal  Conduction: incomplete right bundle branch block and non-specific intraventricular conduction delay  ST Segments: ST segments normal  T Waves: T waves normal  QRS axis: left  Other findings: left ventricular hypertrophy    Clinical impression: abnormal EKG  Comments: D/W MEGHANA              Assessment:       Diagnosis Plan   1. Coronary artery disease involving native coronary artery of native heart without angina pectoris        2. History of myocardial infarction        3. Chronic systolic congestive heart failure (HCC)        4. NICM (nonischemic cardiomyopathy) (HCC)        5. New onset atrial fibrillation (HCC)        6. Primary hypertension        7. Other hyperlipidemia        8. DELORES on CPAP               Plan:           1.  Newly diagnosed symptomatic atrial fibrillation with rapid ventricular response-  She has been started on Eliquis.  Her [id number] score is 6.  Limited echo shows EF 51-50% with Left ventricular diastolic function is consistent with (grade II w/high LAP) pseudonormalization. She is still a little tachy still.  Epic records show she is on Metoprolol 75 mg BID but discharge note stated she was discharged on metoprolol 100 mg BID.  She does not know what dose she is on.  I have sent in a new prescription for 100 mg to take twice daily.  She will come back in in 2 weeks.     2.  CHF -appears euvolemic today, she states her shortness of breath is improving daily, especially after being on the antibiotic.     3.  Prior history of nonischemic cardiomyopathy on Coreg , Entresto and oral Lasix .  Echho in December with low normal left ventricular ejection fraction.  Repeat echo from 2/14/2023 showed recovery of function with an EF 51 to 55%.     4.  History of  nonobstructive coronary artery-denies angina     5.  Essential hypertension -well-controlled     6. CKD -Labs from 2/20/2023 so a creatinine of 1.12.     7.  Hypercholesterolemia -continue lipid-lowering therapy, she is currently supposed to be on rosuvastatin 5 mg daily but states she ran out.  I have refilled that for her.     8.  Corona virus -this did thin go into right lower lobe pneumonia for which she received antibiotics and short course of steroids.  She is feeling a lot better and states her breathing has improved a great deal after the steroids and the antibiotic.    Increase Lopressor to 100 mg twice daily.  RTO in 2 weeks with JF    As always, it has been a pleasure to participate in your patient's care. Thank you.       Sincerely,       PATI Eagle      Current Outpatient Medications:   •  albuterol sulfate  (90 Base) MCG/ACT inhaler, Inhale 2 puffs Every 4 (Four) Hours As Needed for Wheezing or Shortness of Air. Use with chamber, Disp: 8.5 g, Rfl: 0  •  apixaban (ELIQUIS) 5 MG tablet tablet, Take 1 tablet by mouth Every 12 (Twelve) Hours. Indications: Atrial Fibrillation, Disp: 60 tablet, Rfl: 0  •  B Complex Vitamins (VITAMIN B COMPLEX PO), Take 1 tablet by mouth Daily., Disp: , Rfl:   •  benzonatate (TESSALON) 200 MG capsule, Take 1 capsule by mouth 3 (Three) Times a Day As Needed for Cough., Disp: 40 capsule, Rfl: 0  •  cholecalciferol (VITAMIN D3) 25 MCG (1000 UT) tablet, Take 4,000 Units by mouth Daily., Disp: , Rfl:   •  CRANBERRY EXTRACT PO, Take 1 capsule by mouth Daily., Disp: , Rfl:   •  doxycycline (MONODOX) 100 MG capsule, Take 1 capsule by mouth Every 12 (Twelve) Hours., Disp: 14 capsule, Rfl: 0  •  furosemide (LASIX) 20 MG tablet, Take 1 tablet by mouth Daily. Take 1 tab in the morning and 1/2 tab at 2 pm (Patient taking differently: Take 20 mg by mouth Daily. 1.5 TAB DAILY), Disp: 135 tablet, Rfl: 3  •  guaiFENesin (MUCINEX) 600 MG 12 hr tablet, Take 1 tablet by mouth Every  12 (Twelve) Hours., Disp: 10 tablet, Rfl: 0  •  HYDROcodone-acetaminophen (NORCO) 5-325 MG per tablet, Take 1 tablet by mouth 2 (Two) Times a Day As Needed for Severe Pain., Disp: 10 tablet, Rfl: 0  •  Loratadine-D 24HR  MG per 24 hr tablet, TAKE ONE TABLET BY MOUTH DAILY, Disp: 30 tablet, Rfl: 3  •  MEGARED OMEGA-3 KRILL OIL PO, Take 1 capsule by mouth Daily., Disp: , Rfl:   •  omeprazole (priLOSEC) 40 MG capsule, Take 1 capsule by mouth Daily., Disp: 90 capsule, Rfl: 3  •  PROBIOTIC PRODUCT PO, Take 2 capsules by mouth Daily., Disp: , Rfl:   •  rosuvastatin (Crestor) 5 MG tablet, Take 1 tablet by mouth Daily., Disp: 90 tablet, Rfl: 2  •  sacubitril-valsartan (Entresto) 24-26 MG tablet, Take 1 tablet by mouth 2 (Two) Times a Day., Disp: 180 tablet, Rfl: 3  •  metoprolol tartrate (LOPRESSOR) 100 MG tablet, Take 1 tablet by mouth 2 (Two) Times a Day., Disp: 180 tablet, Rfl: 3      Dictated utilizing Dragon dictation

## 2023-02-22 NOTE — PROGRESS NOTES
Subjective   Effie Dnonelly is a 77 y.o. female.     Chief Complaint   Patient presents with   • Follow-up   Follow-up right mid back pain pneumonia    History of Present Illness   Continues to complain of right mid back pain.  Pain medication helping.  Increased pain with cough.  No shortness of breath fever chills.  Finished with antibiotics.  CT of the chest without contrast showed atelectasis but has clinical pneumonia.  Very small amount of pleural fluid.  No calf pain.  The following portions of the patient's history were reviewed and updated as appropriate: allergies, current medications, past family history, past medical history, past social history, past surgical history and problem list.    Review of Systems   Constitutional: Negative for activity change, appetite change, fatigue and fever.   Eyes: Negative for blurred vision and double vision.   Respiratory: Negative.  Negative for shortness of breath.          right lower mid back with cough   Cardiovascular: Negative for chest pain, palpitations and leg swelling.   Neurological: Negative for dizziness, syncope, light-headedness and headache.       Allergies   Allergen Reactions   • Nsaids GI Bleeding     CROHN'S DISEASE   • Adhesive Tape Other (See Comments)     BLISTERS UNDER TAGADERM       Current Outpatient Medications on File Prior to Visit   Medication Sig Dispense Refill   • albuterol sulfate  (90 Base) MCG/ACT inhaler Inhale 2 puffs Every 4 (Four) Hours As Needed for Wheezing or Shortness of Air. Use with chamber 8.5 g 0   • apixaban (ELIQUIS) 5 MG tablet tablet Take 1 tablet by mouth Every 12 (Twelve) Hours. Indications: Atrial Fibrillation 60 tablet 0   • B Complex Vitamins (VITAMIN B COMPLEX PO) Take 1 tablet by mouth Daily.     • benzonatate (TESSALON) 200 MG capsule Take 1 capsule by mouth 3 (Three) Times a Day As Needed for Cough. 40 capsule 0   • cholecalciferol (VITAMIN D3) 25 MCG (1000 UT) tablet Take 4,000 Units by mouth Daily.      • CRANBERRY EXTRACT PO Take 1 capsule by mouth Daily.     • doxycycline (MONODOX) 100 MG capsule Take 1 capsule by mouth Every 12 (Twelve) Hours. 14 capsule 0   • furosemide (LASIX) 20 MG tablet Take 1 tablet by mouth Daily. Take 1 tab in the morning and 1/2 tab at 2 pm (Patient taking differently: Take 20 mg by mouth Daily. 1/2 TAB DAILY) 135 tablet 3   • guaiFENesin (MUCINEX) 600 MG 12 hr tablet Take 1 tablet by mouth Every 12 (Twelve) Hours. 10 tablet 0   • HYDROcodone-acetaminophen (NORCO) 5-325 MG per tablet Take 1 tablet by mouth 2 (Two) Times a Day As Needed for Severe Pain. 10 tablet 0   • Loratadine-D 24HR  MG per 24 hr tablet TAKE ONE TABLET BY MOUTH DAILY 30 tablet 3   • MEGARED OMEGA-3 KRILL OIL PO Take 1 capsule by mouth Daily.     • metoprolol tartrate 75 MG tablet Take 75 mg by mouth 2 (Two) Times a Day. 60 tablet 0   • omeprazole (priLOSEC) 40 MG capsule Take 1 capsule by mouth Daily. 90 capsule 3   • predniSONE (DELTASONE) 20 MG tablet 3 tabs p.o. qday for days 1-2, then 2 tabs p.o. for days 3-4, then 1 tab p.o. for days 5-6. 12 tablet 0   • PROBIOTIC PRODUCT PO Take 2 capsules by mouth Daily.     • rosuvastatin (Crestor) 5 MG tablet Take 1 tablet by mouth Daily. 90 tablet 2   • sacubitril-valsartan (Entresto) 24-26 MG tablet Take 1 tablet by mouth 2 (Two) Times a Day. 180 tablet 3     No current facility-administered medications on file prior to visit.       Family History   Problem Relation Age of Onset   • Hypertension Sister    • Cancer Sister    • Heart disease Brother 65        VALVE REPLACEMENT    • Hypertension Brother    • Hypertension Sister    • Hypertension Sister    • Hypertension Brother    • Diabetes Brother    • Hypertension Brother    • Cancer Brother    • Hypertension Brother    • Hypertension Brother    • Hypertension Brother        Past Medical History:   Diagnosis Date   • CAD (coronary artery disease)    • Cancer (HCC)     skin, right breast   • CHF (congestive heart  failure) ([facility])    • Coronary artery disease involving native coronary artery of native heart without angina pectoris 2018   • Heart murmur    • History of myocardial infarction 2018   • Hypertension    • Myocardial infarction ([facility])     PER PT    • NICM (nonischemic cardiomyopathy) ([facility]) 2020   • DELORES on CPAP     AHI 15/h   • Sinus tachycardia    • SOB (shortness of breath)        Past Surgical History:   Procedure Laterality Date   • BUNIONECTOMY Bilateral    • CARDIAC CATHETERIZATION     • CARDIAC CATHETERIZATION N/A 2020    Procedure: Right and Left Heart Cath;  Surgeon: Bin Chacko MD;  Location:  [facility];  Service: Cardiovascular   • CARDIAC CATHETERIZATION N/A 2020    Procedure: Coronary angiography;  Surgeon: Bin Chacko MD;  Location:  [facility];  Service: Cardiovascular   • CARDIAC CATHETERIZATION N/A 2020    Procedure: Left ventriculography;  Surgeon: Bin Chacko MD;  Location:  [facility];  Service: Cardiovascular   • CATARACT EXTRACTION Left 2022   • COLONOSCOPY N/A 2022    Procedure: COLONOSCOPY WITH BIOPSIES; POLYPECTOMY;  Surgeon: Lorne Esquivel MD;  Location:  [facility];  Service: Gastroenterology;  Laterality: N/A;  POLYP  ULCERATIVE COLITIS   • ENDOSCOPY N/A 2022    Procedure: ESOPHAGOGASTRODUODENOSCOPY WITH BIOPSIES;  Surgeon: Lorne Esquivel MD;  Location:  [facility];  Service: Gastroenterology;  Laterality: N/A;  GASTRITIS  ESOPHAGEAL REFLUX  HIATAL HERNIA  ESOPHAGEAL CANDIDIASIS   • MASTECTOMY COMPLETE / SIMPLE  2019   • SHOULDER SURGERY Right    • SKIN CANCER EXCISION      ABOVE THE LIP.        Social History     Socioeconomic History   • Marital status:    • Number of children: 5   Tobacco Use   • Smoking status: Former     Types: Cigarettes     Quit date: 1973     Years since quittin.3   • Smokeless tobacco: Never   •  "Tobacco comments:     CAFFEINE USE: 1 CUP DAILY   Vaping Use   • Vaping Use: Never used   Substance and Sexual Activity   • Alcohol use: No   • Drug use: No   • Sexual activity: Defer       Patient Active Problem List   Diagnosis   • History of myocardial infarction   • Coronary artery disease involving native coronary artery of native heart without angina pectoris   • Ulcerative colitis (HCC)   • HTN (hypertension)   • GERD (gastroesophageal reflux disease)   • DJD (degenerative joint disease)   • Ductal 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   • Anemia   • Other hyperlipidemia   • NICM (nonischemic cardiomyopathy) (HCC)   • DELORES on CPAP   • Snoring   • Class 1 obesity due to excess calories without serious comorbidity with body mass index (BMI) of 31.0 to 31.9 in adult   • Chronic systolic congestive heart failure (HCC)   • Esophageal dysphagia   • Stage 3a chronic kidney disease (HCC)   • New onset atrial fibrillation (HCC)       /64 (BP Location: Left arm, Patient Position: Sitting, Cuff Size: Large Adult)   Pulse 53   Temp 97.8 °F (36.6 °C)   Resp 16   Ht 167.6 cm (66\")   Wt 84.4 kg (186 lb)   SpO2 94%   BMI 30.02 kg/m²   Body mass index is 30.02 kg/m².    Objective   Physical Exam  Vitals and nursing note reviewed.   Constitutional:       Appearance: She is well-developed.   Eyes:      Pupils: Pupils are equal, round, and reactive to light.   Neck:      Thyroid: No thyromegaly.      Vascular: No JVD.      Trachea: No tracheal deviation.   Cardiovascular:      Rate and Rhythm: Normal rate and regular rhythm.      Heart sounds: No murmur heard.  Pulmonary:      Effort: Pulmonary effort is normal. No respiratory distress.      Breath sounds: Normal breath sounds. No wheezing.      Comments: No shingles rash noted.  No abnormality on palpation  Abdominal:      General: Bowel sounds are normal.      Palpations: Abdomen is soft.   Musculoskeletal:      Cervical back: Normal range of motion and neck " supple.   Lymphadenopathy:      Cervical: No cervical adenopathy.   Neurological:      Mental Status: She is alert and oriented to person, place, and time.           Assessment & Plan   Diagnoses and all orders for this visit:    1. Pneumonia of right lower lobe due to infectious organism (Primary)    2. Mid back pain on right side    Discussed with patient she is already on Eliquis for atrial for.  Continued at this time.  Discussed again pain medication addiction week careful using it.  Patient will come back in 2 weeks time.  If pain continues will repeat chest x-ray I will get a CT of the chest PE protocol.  High GFR was little bit low so concerned about getting anything with contrast.  Continue rest of the medications.  EHR dragon/transcription disclaimer:  Part of this note are created by electronic transcription/translation of spoken language to printed text and thus may lead to erroneous, or at times, nonsensical words or phrases inadvertently transcribed.  Although I have reviewed for such errors, some may still exist.

## 2023-03-02 ENCOUNTER — TRANSCRIBE ORDERS (OUTPATIENT)
Dept: ADMINISTRATIVE | Facility: HOSPITAL | Age: 78
End: 2023-03-02
Payer: MEDICARE

## 2023-03-02 DIAGNOSIS — J18.9 PNEUMONIA DUE TO INFECTIOUS ORGANISM, UNSPECIFIED LATERALITY, UNSPECIFIED PART OF LUNG: ICD-10-CM

## 2023-03-02 DIAGNOSIS — I27.20 PULMONARY HTN: ICD-10-CM

## 2023-03-07 ENCOUNTER — OFFICE VISIT (OUTPATIENT)
Dept: FAMILY MEDICINE CLINIC | Facility: CLINIC | Age: 78
End: 2023-03-07
Payer: MEDICARE

## 2023-03-07 VITALS
BODY MASS INDEX: 29.7 KG/M2 | DIASTOLIC BLOOD PRESSURE: 78 MMHG | WEIGHT: 184 LBS | OXYGEN SATURATION: 86 % | HEART RATE: 80 BPM | SYSTOLIC BLOOD PRESSURE: 122 MMHG | RESPIRATION RATE: 18 BRPM

## 2023-03-07 DIAGNOSIS — J18.9 PNEUMONIA OF RIGHT LOWER LOBE DUE TO INFECTIOUS ORGANISM: Primary | ICD-10-CM

## 2023-03-07 DIAGNOSIS — R53.82 CHRONIC FATIGUE: ICD-10-CM

## 2023-03-07 DIAGNOSIS — I50.22 CHRONIC SYSTOLIC CONGESTIVE HEART FAILURE: ICD-10-CM

## 2023-03-07 DIAGNOSIS — M54.9 MID BACK PAIN ON RIGHT SIDE: ICD-10-CM

## 2023-03-07 DIAGNOSIS — I25.10 CORONARY ARTERY DISEASE INVOLVING NATIVE CORONARY ARTERY OF NATIVE HEART WITHOUT ANGINA PECTORIS: ICD-10-CM

## 2023-03-07 PROCEDURE — 99213 OFFICE O/P EST LOW 20 MIN: CPT | Performed by: INTERNAL MEDICINE

## 2023-03-07 NOTE — PROGRESS NOTES
Subjective   Effie Donnelly is a 77 y.o. female.     Chief Complaint   Patient presents with   • Pneumonia     2 week follow up.        History of Present Illness   Patient here for follow-up.  Reports cough, congestion, pain in right mid back is resolved.  Patient also has seen pulmonologist.  Patient reports passing out since her metoprolol was increased to 100 twice a day.  She is off Coreg and Cardizem.  She is still taking Crestor.  Still complains of weakness.  Patient had complains of weakness for long time.  However, patient reports since she got started on metoprolol she has been feeling fatigued.  She cut her dose down to 50 twice a day today.  Has not taken any yet.  Has appointment with cardiology tomorrow.   Relates syncopal episode to metoprolol  Pulse ox repeat was 94% on room air  The following portions of the patient's history were reviewed and updated as appropriate: allergies, current medications, past family history, past medical history, past social history, past surgical history and problem list.    Review of Systems   Constitutional: Negative for activity change, appetite change, fatigue and fever.   Eyes: Negative for blurred vision and double vision.   Respiratory: Negative.  Negative for shortness of breath.    Cardiovascular: Negative for chest pain, palpitations and leg swelling.   Gastrointestinal: Negative.    Genitourinary: Negative for hematuria.   Neurological: Positive for syncope and light-headedness. Negative for dizziness and headache.       Allergies   Allergen Reactions   • Nsaids GI Bleeding     CROHN'S DISEASE   • Adhesive Tape Other (See Comments)     BLISTERS UNDER TAGADERM       Current Outpatient Medications on File Prior to Visit   Medication Sig Dispense Refill   • albuterol sulfate  (90 Base) MCG/ACT inhaler Inhale 2 puffs Every 4 (Four) Hours As Needed for Wheezing or Shortness of Air. Use with chamber 8.5 g 0   • apixaban (ELIQUIS) 5 MG tablet tablet Take 1  tablet by mouth Every 12 (Twelve) Hours. Indications: Atrial Fibrillation 60 tablet 0   • B Complex Vitamins (VITAMIN B COMPLEX PO) Take 1 tablet by mouth Daily.     • cholecalciferol (VITAMIN D3) 25 MCG (1000 UT) tablet Take 4 tablets by mouth Daily.     • CRANBERRY EXTRACT PO Take 1 capsule by mouth Daily.     • furosemide (LASIX) 20 MG tablet Take 1 tablet by mouth Daily. Take 1 tab in the morning and 1/2 tab at 2 pm (Patient taking differently: Take 1 tablet by mouth Daily. 1.5 TAB DAILY) 135 tablet 3   • Loratadine-D 24HR  MG per 24 hr tablet TAKE ONE TABLET BY MOUTH DAILY 30 tablet 3   • MEGARED OMEGA-3 KRILL OIL PO Take 1 capsule by mouth Daily.     • metoprolol tartrate (LOPRESSOR) 100 MG tablet Take 1 tablet by mouth 2 (Two) Times a Day. 180 tablet 3   • omeprazole (priLOSEC) 40 MG capsule Take 1 capsule by mouth Daily. 90 capsule 3   • PROBIOTIC PRODUCT PO Take 2 capsules by mouth Daily.     • rosuvastatin (Crestor) 5 MG tablet Take 1 tablet by mouth Daily. 90 tablet 2   • sacubitril-valsartan (Entresto) 24-26 MG tablet Take 1 tablet by mouth 2 (Two) Times a Day. 180 tablet 3   • benzonatate (TESSALON) 200 MG capsule Take 1 capsule by mouth 3 (Three) Times a Day As Needed for Cough. 40 capsule 0   • doxycycline (MONODOX) 100 MG capsule Take 1 capsule by mouth Every 12 (Twelve) Hours. 14 capsule 0   • guaiFENesin (MUCINEX) 600 MG 12 hr tablet Take 1 tablet by mouth Every 12 (Twelve) Hours. 10 tablet 0   • HYDROcodone-acetaminophen (NORCO) 5-325 MG per tablet Take 1 tablet by mouth 2 (Two) Times a Day As Needed for Severe Pain. 10 tablet 0     No current facility-administered medications on file prior to visit.       Family History   Problem Relation Age of Onset   • Hypertension Sister    • Cancer Sister    • Heart disease Brother 65        VALVE REPLACEMENT    • Hypertension Brother    • Hypertension Sister    • Hypertension Sister    • Hypertension Brother    • Diabetes Brother    • Hypertension  Brother    • Cancer Brother    • Hypertension Brother    • Hypertension Brother    • Hypertension Brother        Past Medical History:   Diagnosis Date   • CAD (coronary artery disease)    • Cancer (HCC)     skin, right breast   • CHF (congestive heart failure) ([facility])    • Coronary artery disease involving native coronary artery of native heart without angina pectoris 11/1/2018   • Heart murmur    • History of myocardial infarction 11/1/2018   • Hypertension    • Myocardial infarction (HCC)     PER PT    • NICM (nonischemic cardiomyopathy) ([facility]) 7/8/2020   • DELORES on CPAP     AHI 15/h   • Sinus tachycardia    • SOB (shortness of breath)        Past Surgical History:   Procedure Laterality Date   • BUNIONECTOMY Bilateral    • CARDIAC CATHETERIZATION     • CARDIAC CATHETERIZATION N/A 01/14/2020    Procedure: Right and Left Heart Cath;  Surgeon: Bin Chacko MD;  Location:  [facility];  Service: Cardiovascular   • CARDIAC CATHETERIZATION N/A 01/14/2020    Procedure: Coronary angiography;  Surgeon: Bin Chacko MD;  Location:  [facility];  Service: Cardiovascular   • CARDIAC CATHETERIZATION N/A 01/14/2020    Procedure: Left ventriculography;  Surgeon: Bin Chacko MD;  Location:  [facility];  Service: Cardiovascular   • CATARACT EXTRACTION Left 04/26/2022   • COLONOSCOPY N/A 03/23/2022    Procedure: COLONOSCOPY WITH BIOPSIES; POLYPECTOMY;  Surgeon: Lorne Esquivel MD;  Location: [facility];  Service: Gastroenterology;  Laterality: N/A;  POLYP  ULCERATIVE COLITIS   • ENDOSCOPY N/A 03/23/2022    Procedure: ESOPHAGOGASTRODUODENOSCOPY WITH BIOPSIES;  Surgeon: Lorne Esquivel MD;  Location: [facility];  Service: Gastroenterology;  Laterality: N/A;  GASTRITIS  ESOPHAGEAL REFLUX  HIATAL HERNIA  ESOPHAGEAL CANDIDIASIS   • MASTECTOMY COMPLETE / SIMPLE  06/2019   • SHOULDER SURGERY Right    • SKIN CANCER EXCISION      ABOVE THE LIP.         Social History     Socioeconomic History   • Marital status:    • Number of children: 5   Tobacco Use   • Smoking status: Former     Types: Cigarettes     Quit date: 1973     Years since quittin.3   • Smokeless tobacco: Never   • Tobacco comments:     CAFFEINE USE: 1 CUP DAILY   Vaping Use   • Vaping Use: Never used   Substance and Sexual Activity   • Alcohol use: No   • Drug use: No   • Sexual activity: Defer       Patient Active Problem List   Diagnosis   • History of myocardial infarction   • Coronary artery disease involving native coronary artery of native heart without angina pectoris   • Ulcerative colitis (HCC)   • HTN (hypertension)   • GERD (gastroesophageal reflux disease)   • DJD (degenerative joint disease)   • Ductal 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   • Anemia   • Other hyperlipidemia   • NICM (nonischemic cardiomyopathy) (HCC)   • DELORES on CPAP   • Snoring   • Class 1 obesity due to excess calories without serious comorbidity with body mass index (BMI) of 31.0 to 31.9 in adult   • Chronic systolic congestive heart failure (HCC)   • Esophageal dysphagia   • Stage 3a chronic kidney disease (HCC)   • New onset atrial fibrillation (HCC)       /78   Pulse 80   Resp 18   Wt 83.5 kg (184 lb)   SpO2 (!) 86%   BMI 29.70 kg/m²   Body mass index is 29.7 kg/m².    Objective   Physical Exam  Vitals and nursing note reviewed.   Constitutional:       Appearance: She is well-developed.   Eyes:      Pupils: Pupils are equal, round, and reactive to light.   Neck:      Thyroid: No thyromegaly.      Vascular: No JVD.      Trachea: No tracheal deviation.   Cardiovascular:      Rate and Rhythm: Rhythm irregular.      Heart sounds: No murmur heard.  Pulmonary:      Effort: Pulmonary effort is normal. No respiratory distress.      Breath sounds: Normal breath sounds. No wheezing.   Abdominal:      General: Bowel sounds are normal. There is no distension.      Palpations: Abdomen is  soft. There is no mass.      Tenderness: There is no abdominal tenderness.      Hernia: No hernia is present.   Musculoskeletal:      Cervical back: Normal range of motion and neck supple.   Lymphadenopathy:      Cervical: No cervical adenopathy.   Neurological:      Mental Status: She is alert and oriented to person, place, and time.           Assessment & Plan   Diagnoses and all orders for this visit:    1. Pneumonia of right lower lobe due to infectious organism (Primary)  -     CBC & Differential  -     Comprehensive Metabolic Panel  -     CK  -     Folate  -     TSH  -     Vitamin B12    2. Mid back pain on right side  -     CBC & Differential  -     Comprehensive Metabolic Panel  -     CK  -     Folate  -     TSH  -     Vitamin B12    3. Chronic systolic congestive heart failure (HCC)  -     CBC & Differential  -     Comprehensive Metabolic Panel  -     CK  -     Folate  -     TSH  -     Vitamin B12    4. Coronary artery disease involving native coronary artery of native heart without angina pectoris  -     CBC & Differential  -     Comprehensive Metabolic Panel  -     CK  -     Folate  -     TSH  -     Vitamin B12    5. Chronic fatigue    Continue diet and exercise.  Encourage patient to take her current medications.  Check blood pressure and pulse at home.  Repeat pulse ox was normal at 94% on room air.  Keep follow-up with cardiology tomorrow.  Symptoms of pneumonia and right mid back pain all resolved now.  No further antibiotics needed.  No pain medication needed.  Patient has an appointment in 2 months time we will check her fasting labs then.  EHR dragon/transcription disclaimer:  Part of this note are created by electronic transcription/translation of spoken language to printed text and thus may lead to erroneous, or at times, nonsensical words or phrases inadvertently transcribed.  Although I have reviewed for such errors, some may still exist.  .

## 2023-03-08 ENCOUNTER — OFFICE VISIT (OUTPATIENT)
Dept: CARDIOLOGY | Facility: CLINIC | Age: 78
End: 2023-03-08
Payer: MEDICARE

## 2023-03-08 VITALS
HEART RATE: 98 BPM | RESPIRATION RATE: 16 BRPM | SYSTOLIC BLOOD PRESSURE: 130 MMHG | DIASTOLIC BLOOD PRESSURE: 90 MMHG | HEIGHT: 66 IN | BODY MASS INDEX: 28.61 KG/M2 | WEIGHT: 178 LBS

## 2023-03-08 DIAGNOSIS — I50.22 CHRONIC SYSTOLIC CONGESTIVE HEART FAILURE: ICD-10-CM

## 2023-03-08 DIAGNOSIS — E78.49 OTHER HYPERLIPIDEMIA: ICD-10-CM

## 2023-03-08 DIAGNOSIS — I10 PRIMARY HYPERTENSION: ICD-10-CM

## 2023-03-08 DIAGNOSIS — I25.2 HISTORY OF MYOCARDIAL INFARCTION: Chronic | ICD-10-CM

## 2023-03-08 DIAGNOSIS — I42.8 NICM (NONISCHEMIC CARDIOMYOPATHY): Chronic | ICD-10-CM

## 2023-03-08 DIAGNOSIS — I25.10 CORONARY ARTERY DISEASE INVOLVING NATIVE CORONARY ARTERY OF NATIVE HEART WITHOUT ANGINA PECTORIS: Primary | Chronic | ICD-10-CM

## 2023-03-08 DIAGNOSIS — I48.91 NEW ONSET ATRIAL FIBRILLATION: ICD-10-CM

## 2023-03-08 LAB
ALBUMIN SERPL-MCNC: 4 G/DL (ref 3.5–5.2)
ALBUMIN/GLOB SERPL: 1.7 G/DL
ALP SERPL-CCNC: 111 U/L (ref 39–117)
ALT SERPL-CCNC: 31 U/L (ref 1–33)
AST SERPL-CCNC: 15 U/L (ref 1–32)
BASOPHILS # BLD AUTO: 0.04 10*3/MM3 (ref 0–0.2)
BASOPHILS NFR BLD AUTO: 0.7 % (ref 0–1.5)
BILIRUB SERPL-MCNC: 0.5 MG/DL (ref 0–1.2)
BUN SERPL-MCNC: 27 MG/DL (ref 8–23)
BUN/CREAT SERPL: 19.6 (ref 7–25)
CALCIUM SERPL-MCNC: 9.4 MG/DL (ref 8.6–10.5)
CHLORIDE SERPL-SCNC: 104 MMOL/L (ref 98–107)
CK SERPL-CCNC: 78 U/L (ref 20–180)
CO2 SERPL-SCNC: 25.5 MMOL/L (ref 22–29)
CREAT SERPL-MCNC: 1.38 MG/DL (ref 0.57–1)
EGFRCR SERPLBLD CKD-EPI 2021: 39.8 ML/MIN/1.73
EOSINOPHIL # BLD AUTO: 0.18 10*3/MM3 (ref 0–0.4)
EOSINOPHIL NFR BLD AUTO: 3.3 % (ref 0.3–6.2)
ERYTHROCYTE [DISTWIDTH] IN BLOOD BY AUTOMATED COUNT: 13.6 % (ref 12.3–15.4)
FOLATE SERPL-MCNC: 10.2 NG/ML (ref 4.78–24.2)
GLOBULIN SER CALC-MCNC: 2.4 GM/DL
GLUCOSE SERPL-MCNC: 80 MG/DL (ref 65–99)
HCT VFR BLD AUTO: 39.1 % (ref 34–46.6)
HGB BLD-MCNC: 12.7 G/DL (ref 12–15.9)
IMM GRANULOCYTES # BLD AUTO: 0.02 10*3/MM3 (ref 0–0.05)
IMM GRANULOCYTES NFR BLD AUTO: 0.4 % (ref 0–0.5)
LYMPHOCYTES # BLD AUTO: 1.62 10*3/MM3 (ref 0.7–3.1)
LYMPHOCYTES NFR BLD AUTO: 30 % (ref 19.6–45.3)
MCH RBC QN AUTO: 28.2 PG (ref 26.6–33)
MCHC RBC AUTO-ENTMCNC: 32.5 G/DL (ref 31.5–35.7)
MCV RBC AUTO: 86.7 FL (ref 79–97)
MONOCYTES # BLD AUTO: 0.6 10*3/MM3 (ref 0.1–0.9)
MONOCYTES NFR BLD AUTO: 11.1 % (ref 5–12)
NEUTROPHILS # BLD AUTO: 2.94 10*3/MM3 (ref 1.7–7)
NEUTROPHILS NFR BLD AUTO: 54.5 % (ref 42.7–76)
NRBC BLD AUTO-RTO: 0 /100 WBC (ref 0–0.2)
PLATELET # BLD AUTO: 249 10*3/MM3 (ref 140–450)
POTASSIUM SERPL-SCNC: 4.4 MMOL/L (ref 3.5–5.2)
PROT SERPL-MCNC: 6.4 G/DL (ref 6–8.5)
RBC # BLD AUTO: 4.51 10*6/MM3 (ref 3.77–5.28)
SODIUM SERPL-SCNC: 141 MMOL/L (ref 136–145)
TSH SERPL DL<=0.005 MIU/L-ACNC: 3.46 UIU/ML (ref 0.27–4.2)
VIT B12 SERPL-MCNC: 1355 PG/ML (ref 211–946)
WBC # BLD AUTO: 5.4 10*3/MM3 (ref 3.4–10.8)

## 2023-03-08 PROCEDURE — 1160F RVW MEDS BY RX/DR IN RCRD: CPT | Performed by: NURSE PRACTITIONER

## 2023-03-08 PROCEDURE — 3080F DIAST BP >= 90 MM HG: CPT | Performed by: NURSE PRACTITIONER

## 2023-03-08 PROCEDURE — 1159F MED LIST DOCD IN RCRD: CPT | Performed by: NURSE PRACTITIONER

## 2023-03-08 PROCEDURE — 3075F SYST BP GE 130 - 139MM HG: CPT | Performed by: NURSE PRACTITIONER

## 2023-03-08 PROCEDURE — 99214 OFFICE O/P EST MOD 30 MIN: CPT | Performed by: NURSE PRACTITIONER

## 2023-03-08 PROCEDURE — 93000 ELECTROCARDIOGRAM COMPLETE: CPT | Performed by: NURSE PRACTITIONER

## 2023-03-08 RX ORDER — CARVEDILOL 12.5 MG/1
12.5 TABLET ORAL 2 TIMES DAILY
Qty: 180 TABLET | Refills: 3 | Status: SHIPPED | OUTPATIENT
Start: 2023-03-08 | End: 2023-03-15 | Stop reason: ALTCHOICE

## 2023-03-08 NOTE — PROGRESS NOTES
Date of Office Visit: 2023  Encounter Provider: PATI Eagle  Place of Service: [facility] CARDIOLOGY  Patient Name: Effie Donnelly  :1945  Primary Cardiologist: Dr. Brand    CC:  2 week follow up    Dear Dr. Hawley    HPI: Effie oDnnelly is a pleasant 77 y.o. female who presents 2023 for cardiac follow up I have reviewed her past medical records including notes, labs and testing in preparation for today's visit.    Dr. Brand saw  her initially 2018. She was previously seen at [facility].  She has a history of CAD and myocardial infarction.  She had an echocardiogram in November that showed normal function.  She does have a history of an abnormal EKG with LVH as well as diffuse repolarization changes.  She just had an EKG performed at [facility] there was unchanged from prior EKGs.     Cath 2020:  Findings:  1. Coronary Artery Anatomy:  Dominance: Left  Left Main: Angiographically normal  Left Anterior Descending: The LAD is somewhat small in caliber size.  Contains a 30 to 40% proximal segment stenosis followed by luminal irregularities distally.  There are no appreciable diagonals from the LAD.  Circumflex Artery: Large in caliber size.  Luminal irregularities throughout supplies a mid and inferior marginal branch vessels.  Also supplies a left-sided PDA which contains luminal regularities.  Ramus: Large ramus almost takes over for LAD and is branching and bifurcating.  Luminal irregularities throughout  Right Coronary Artery: Nondominant     2. Hemodynamics:  Right Atrium: 8/5/4 mmHg  Right Ventricle: 31/2/5 mmHg  Pulmonary Artery: 34/11/23 mmHg  Pulmonary Wedge: 22/21/20 mmHg  Left Ventricle: 184/16/23 mmHg  Aorta: 178/64/107 mmHg  Cardiac Output/Index: 3.53 L/min 1.92 L/min/m2  PA Sat: 66 %  AO Sat: 96 %     3. Left Ventriculogram:  Ejection Fraction: 20 %  Wall Motion: Global  Mitral Regurgitation: None     Conclusions:  1. Left  dominant system with diminutive LAD which may explain her stress test findings but otherwise nonischemic cardiomyopathy with only a 30 to 40% proximal LAD stenoses.  2. Currently elevated left-sided filling pressures with EDP of 23 mmHg  3. Currently reduced cardiac output and index of 3.5 and 1.9     I saw her 2/25/2022 for 3-month follow-up.  She denies any chest pain chest pressure.  She states her shortness of breath remains the same is been unchanged for the past year and a half.  She continues to complain of fatigue.  She has not had any dizziness, lightheadedness, syncope or presyncopal episodes.  She denies any lower extremity edema.  She denies any palpitations, she states she cannot feel her heart racing or skipping.  She states over the last 2 weeks she has been fainting in the mornings.  She states is associated with high blood sugar.  She states she self decreased her carvedilol to 12.5 mg twice a day and all of the symptoms went away.  I have asked her to follow-up with you about her blood sugars.  Her blood pressure today is within normal limits.  She states she has a cold and feels like she is congested in her chest.  She does have some mild crackles in her lung bases.  I have reviewed her recent labs.  Her most recent labs were from 2/8/2022.  CMP revealed a total glucose of 64, sodium 142, potassium 4.6 and a creatinine 1.22.  She had labs performed on January 11 that showed a CMP with a glucose of 86, sodium 140, potassium 4.3 and her creatinine up to 1.38.  TSH 3.850.  CBC unremarkable.  Lipid profile showed total cholesterol 224, HDL 67, , triglycerides 106.  Of note in December 2021, CMP showed a glucose of 93 and a creatinine of 1.17. I increased her lasix and ordered an echo.      3/2/2022 echo Interpretation Summary  • The left ventricular cavity is moderately dilated.  • Left ventricular ejection fraction appears to be 56 - 60%. Left ventricular systolic function is normal.  • There  is moderate concentric left ventricular hypertrophy. There is severe apical hypertrophy of the apical segments. 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  • Normal right ventricular cavity size and systolic function noted.  • The left atrial cavity is severely dilated.  • Mild tricuspid valve regurgitation is present.  • Calculated right ventricular systolic pressure from tricuspid regurgitation is 25 mmHg.  • There is mild pulmonic valve regurgitation present.  • There is no evidence of pericardial effusion     She did have labs 4/20/2022 that showed CMP with a creatinine 1.35, TSH 5.340.  Lipid panel revealed total cholesterol 220, HDL 56,  and triglycerides 118.  CBC unremarkable.     She had a repeat BMP on 5/17/2022 that showed a creatinine of 1.45.     I saw her in May 2022 for 3-month follow-up.  She states after 2 days of the increased in the Lasix, she had a lot of back pain so she stopped it.  She is just back to the 20 mg of Lasix daily.  Has noticed above, her creatinine did go up even on the lower dose of the Lasix.  She is taking her medications as directed.  Her blood pressure is well controlled.  She denies any palpitations, lower extremity edema, dizziness or lightheadedness.  She has not had any syncope or presyncopal episodes.  She denies any chest pain or chest pressure.  She does complain of fatigue and chronic shortness of breath.  That is unchanged.  She does not use her CPAP, she does have 1 at home but states she feels like she does not need it.  She denies any snoring, orthopnea, PND or daytime sleepiness.  I did discuss the correlation between untreated sleep apnea and cardiomyopathy.     She had appointment with Dr. Brand in December which was turned into a telehealth secondary to direct exposure from a family member living in her home.  She stated that at the time, she did not have  any COVID symptoms.  She is fully vaccinated and boosted.  She had issues with pneumonia at the beginning of the month but tested negative for COVID at that time.  She still very short of breath, that has been ongoing.  No chest pain or chest discomfort.  She had a chest x-ray at the beginning of the month that showed no infiltrates, no evidence of congestive heart failure.  She was getting very short of breath with activity even before she had pneumonia.  He did order an echo.     12/22/2022  Interpretation Summary  •  Left ventricular systolic function is normal.  •  Left ventricular wall thickness is consistent with mild concentric hypertrophy.  •  Left ventricular diastolic function was normal.  •  There is mild, bileaflet mitral valve thickening present.  •  Estimated right ventricular systolic pressure from tricuspid regurgitation is normal (<35 mmHg).     She presented to the ED on 12/13/2023 with shortness of breath and not feeling well.  She was found to be in atrial fibrillation with RVR, this was a new diagnosis.  She did receive IV diltiazem in the emergency room.  She was transitioned to oral Lopressor and was placed on Eliquis as her [id number] score was 6.  Her heart rate responded well and she was titrated to metoprolol 100 mg twice daily.  She remained on her sacubitril-valsartan and her regular home regimen.  Aspirin was discontinued as she is now on Eliquis given attempt to decrease her bleeding risk.  She was also found to be positive for acute coronavirus infection and she was treated symptomatically.  She was satting well on room air and did not require oxygen.     2/14/2023 Interpretation Summary  •  Left ventricular ejection fraction appears to be 51 - 55%.  •  Sigmoid-shaped ventricular septum is present.Prominent left ventricular false tendon.  •  Left ventricular diastolic function is consistent with (grade II w/high LAP) pseudonormalization.  •  Estimated right ventricular systolic  pressure from tricuspid regurgitation is mildly elevated (35-45 mmHg).     She had to return back to the ED on with continued shortness of air and productive cough.  She was diagnosed with right lower lobe pneumonia and started on antibiotics and a short course of steroids.     I saw her 2/22/2023 in follow-up.  She states she is feeling a lot better.  She still has some shortness of breath and fatigue but that is improving.  She felt a lot better after getting the antibiotic and steroids.  She cannot really feel her heart racing or skipping.  Again she does feel some shortness of air and tiredness.  She denies any lower extremity edema, she has not had any dizziness, lightheadedness, syncope or presyncopal episodes.  She denies any chest pain or chest pressure.  She states she has DELORES but cannot tolerate the mask.  Epic states that she is on Lopressor 75 mg twice daily, patient does not know what dose of medicine she is on, however, discharge stated she was supposed to take Lopressor 100 twice a day.  She is still mildly tachycardic with a heart rate of 105 and remains in atrial fibrillation.  I have sent in a prescription for Lopressor 100 mg twice daily.     She returns today for 2-week follow-up.  She denies any palpitations or lower extremity edema.  She feels short of breath, dizzy and fatigued.  She states that is worse in the last 2 weeks.  She denies any chest pain or chest pressure.  She is compliant with her CPAP.  She feels terrible.  She is not tolerating the metoprolol.  She felt so bad last night she did not take it at all because she states it wakes her up at night.  She appears weak and fatigued today.  She would like to go back on her combination of carvedilol and diltiazem.  Past Medical History:   Diagnosis Date   • CAD (coronary artery disease)    • Cancer (HCC)     skin, right breast   • CHF (congestive heart failure) ([facility])    • Coronary artery disease involving native coronary artery of native  heart without angina pectoris 2018   • Heart murmur    • History of myocardial infarction 2018   • Hypertension    • Myocardial infarction (HCC)     PER PT    • NICM (nonischemic cardiomyopathy) (HCC) 2020   • DELORES on CPAP     AHI 15/h   • Sinus tachycardia    • SOB (shortness of breath)        Past Surgical History:   Procedure Laterality Date   • BUNIONECTOMY Bilateral    • CARDIAC CATHETERIZATION     • CARDIAC CATHETERIZATION N/A 2020    Procedure: Right and Left Heart Cath;  Surgeon: Bin Chacko MD;  Location:  [facility];  Service: Cardiovascular   • CARDIAC CATHETERIZATION N/A 2020    Procedure: Coronary angiography;  Surgeon: Bin Chacko MD;  Location:  [facility];  Service: Cardiovascular   • CARDIAC CATHETERIZATION N/A 2020    Procedure: Left ventriculography;  Surgeon: Bin Chacko MD;  Location:  [facility];  Service: Cardiovascular   • CATARACT EXTRACTION Left 2022   • COLONOSCOPY N/A 2022    Procedure: COLONOSCOPY WITH BIOPSIES; POLYPECTOMY;  Surgeon: Lorne Esquivel MD;  Location: [facility] OR;  Service: Gastroenterology;  Laterality: N/A;  POLYP  ULCERATIVE COLITIS   • ENDOSCOPY N/A 2022    Procedure: ESOPHAGOGASTRODUODENOSCOPY WITH BIOPSIES;  Surgeon: Lorne Esquivel MD;  Location:  [facility] OR;  Service: Gastroenterology;  Laterality: N/A;  GASTRITIS  ESOPHAGEAL REFLUX  HIATAL HERNIA  ESOPHAGEAL CANDIDIASIS   • MASTECTOMY COMPLETE / SIMPLE  2019   • SHOULDER SURGERY Right    • SKIN CANCER EXCISION      ABOVE THE LIP.        Social History     Socioeconomic History   • Marital status:    • Number of children: 5   Tobacco Use   • Smoking status: Former     Types: Cigarettes     Quit date: 1973     Years since quittin.3   • Smokeless tobacco: Never   • Tobacco comments:     CAFFEINE USE: 1 CUP DAILY   Vaping Use   • Vaping Use: Never used    Substance and Sexual Activity   • Alcohol use: No   • Drug use: No   • Sexual activity: Defer       Family History   Problem Relation Age of Onset   • Hypertension Sister    • Cancer Sister    • Heart disease Brother 65        VALVE REPLACEMENT    • Hypertension Brother    • Hypertension Sister    • Hypertension Sister    • Hypertension Brother    • Diabetes Brother    • Hypertension Brother    • Cancer Brother    • Hypertension Brother    • Hypertension Brother    • Hypertension Brother        The following portion of the patient's history were reviewed and updated as appropriate: past medical history, past surgical history, past social history, past family history, allergies, current medications, and problem list.    Review of Systems   Constitutional: Positive for malaise/fatigue. Negative for diaphoresis and fever.   HENT: Negative for congestion, hearing loss, hoarse voice, nosebleeds and sore throat.    Eyes: Negative for photophobia, vision loss in left eye, vision loss in right eye and visual disturbance.   Cardiovascular: Positive for palpitations. Negative for chest pain, dyspnea on exertion, irregular heartbeat, leg swelling, near-syncope, orthopnea, paroxysmal nocturnal dyspnea and syncope.   Respiratory: Positive for shortness of breath. Negative for cough, hemoptysis, sleep disturbances due to breathing, snoring, sputum production and wheezing.    Endocrine: Negative for cold intolerance, heat intolerance, polydipsia, polyphagia and polyuria.   Hematologic/Lymphatic: Negative for bleeding problem. Does not bruise/bleed easily.   Skin: Negative for color change, dry skin, poor wound healing, rash and suspicious lesions.   Musculoskeletal: Negative for arthritis, back pain, falls, gout, joint pain, joint swelling, muscle cramps, muscle weakness and myalgias.   Gastrointestinal: Negative for bloating, abdominal pain, constipation, diarrhea, dysphagia, melena, nausea and vomiting.   Neurological:  Positive for dizziness and weakness. Negative for excessive daytime sleepiness, headaches, light-headedness, loss of balance, numbness, paresthesias, seizures and vertigo.   Psychiatric/Behavioral: Negative for depression, memory loss and substance abuse. The patient is not nervous/anxious.        Allergies   Allergen Reactions   • Nsaids GI Bleeding     CROHN'S DISEASE   • Adhesive Tape Other (See Comments)     BLISTERS UNDER TAGADERM         Current Outpatient Medications:   •  albuterol sulfate  (90 Base) MCG/ACT inhaler, Inhale 2 puffs Every 4 (Four) Hours As Needed for Wheezing or Shortness of Air. Use with chamber, Disp: 8.5 g, Rfl: 0  •  apixaban (ELIQUIS) 5 MG tablet tablet, Take 1 tablet by mouth Every 12 (Twelve) Hours. Indications: Atrial Fibrillation, Disp: 60 tablet, Rfl: 0  •  B Complex Vitamins (VITAMIN B COMPLEX PO), Take 1 tablet by mouth Daily., Disp: , Rfl:   •  cholecalciferol (VITAMIN D3) 25 MCG (1000 UT) tablet, Take 4 tablets by mouth Daily., Disp: , Rfl:   •  CRANBERRY EXTRACT PO, Take 1 capsule by mouth Daily., Disp: , Rfl:   •  furosemide (LASIX) 20 MG tablet, Take 1 tablet by mouth Daily. Take 1 tab in the morning and 1/2 tab at 2 pm (Patient taking differently: Take 1 tablet by mouth Daily. 1.5 TAB DAILY), Disp: 135 tablet, Rfl: 3  •  HYDROcodone-acetaminophen (NORCO) 5-325 MG per tablet, Take 1 tablet by mouth 2 (Two) Times a Day As Needed for Severe Pain., Disp: 10 tablet, Rfl: 0  •  Loratadine-D 24HR  MG per 24 hr tablet, TAKE ONE TABLET BY MOUTH DAILY, Disp: 30 tablet, Rfl: 3  •  MEGARED OMEGA-3 KRILL OIL PO, Take 1 capsule by mouth Daily., Disp: , Rfl:   •  metoprolol tartrate (LOPRESSOR) 100 MG tablet, Take 1 tablet by mouth 2 (Two) Times a Day., Disp: 180 tablet, Rfl: 3  •  omeprazole (priLOSEC) 40 MG capsule, Take 1 capsule by mouth Daily., Disp: 90 capsule, Rfl: 3  •  PROBIOTIC PRODUCT PO, Take 2 capsules by mouth Daily., Disp: , Rfl:   •  rosuvastatin (Crestor) 5 MG  "tablet, Take 1 tablet by mouth Daily., Disp: 90 tablet, Rfl: 2  •  sacubitril-valsartan (Entresto) 24-26 MG tablet, Take 1 tablet by mouth 2 (Two) Times a Day., Disp: 180 tablet, Rfl: 3        Objective:     Vitals:    03/08/23 1328   BP: 130/90   Pulse: 98   Resp: 16   Weight: 80.7 kg (178 lb)   Height: 167.6 cm (66\")     Body mass index is 28.73 kg/m².      Vitals reviewed.   Constitutional:       General: Not in acute distress.     Appearance: Well-developed.      Comments: Feels poorly   Eyes:      General:         Right eye: No discharge.         Left eye: No discharge.      Conjunctiva/sclera: Conjunctivae normal.   HENT:      Head: Normocephalic and atraumatic.      Right Ear: External ear normal.      Left Ear: External ear normal.      Nose: Nose normal.   Neck:      Thyroid: No thyromegaly.      Vascular: No JVD.      Trachea: No tracheal deviation.      Lymphadenopathy: No cervical adenopathy.   Pulmonary:      Effort: Pulmonary effort is normal. No respiratory distress.      Breath sounds: Normal breath sounds. No wheezing. No rales.   Chest:      Chest wall: Not tender to palpatation.   Cardiovascular:      Tachycardia present. Irregularly irregular rhythm.      No gallop.   Pulses:     Intact distal pulses.   Edema:     Peripheral edema absent.   Abdominal:      General: There is no distension.      Palpations: Abdomen is soft.      Tenderness: There is no abdominal tenderness.   Musculoskeletal: Normal range of motion.         General: No tenderness or deformity.      Cervical back: Normal range of motion and neck supple. Skin:     General: Skin is warm and dry.      Findings: No erythema or rash.   Neurological:      Mental Status: Alert and oriented to person, place, and time.      Coordination: Coordination normal.   Psychiatric:         Attention and Perception: Attention normal.         Behavior: Behavior normal.         Thought Content: Thought content normal.         Cognition and Memory: " Cognition normal.         Judgment: Judgment normal.               ECG 12 Lead    Date/Time: 3/8/2023 1:36 PM  Performed by: Nan Brian APRN  Authorized by: Nan Brian APRN   Comparison: compared with previous ECG from 2/22/2023  Similar to previous ECG  Rhythm: atrial fibrillation  Ectopy: infrequent PVCs  Rate: normal  Conduction: conduction normal  Q waves: V2    ST Segments: ST segments normal  T inversion: I, aVL, V4, V5 and V6  QRS axis: left  Other findings: left ventricular hypertrophy    Clinical impression: abnormal EKG              Assessment:       Diagnosis Plan   1. Coronary artery disease involving native coronary artery of native heart without angina pectoris        2. History of myocardial infarction        3. NICM (nonischemic cardiomyopathy) (HCC)        4. Chronic systolic congestive heart failure (HCC)        5. New onset atrial fibrillation (HCC)        6. Primary hypertension        7. Other hyperlipidemia               Plan:       1.  Newly diagnosed symptomatic atrial fibrillation with rapid ventricular response-  She has been started on Eliquis.  Her LXL2AM2-[id number] score is 6.  Limited echo shows EF 51-50% with Left ventricular diastolic function is consistent with (grade II w/high LAP) pseudonormalization.  She is weak fatigued and short of breath.  She states she feels terrible and does not want to stay on the metoprolol.  She wants to go back to carvedilol and diltiazem.  I am happy to try that.  We will stop the metoprolol and restart carvedilol 12.5 mg twice daily along with diltiazem 30 mg twice daily.  She will come back in a week.    2.  CHF -appears euvolemic, she is tired and fatigued and feels it is the metoprolol.     3.  Prior history of nonischemic cardiomyopathy on Coreg , Entresto and oral Lasix .  Echho in December with low normal left ventricular ejection fraction.  Repeat echo from 2/14/2023 showed recovery of function with an EF 51 to 55%.     4.  History of  nonobstructive coronary artery-denies angina     5.  Essential hypertension -well-controlled     6. CKD -Labs from 2/20/2023 so a creatinine of 1.12.     7.  Hypercholesterolemia -continue lipid-lowering therapy, currently on rosuvastatin 5 mg daily.     8.  Corona virus -this did thin go into right lower lobe pneumonia for which she received antibiotics and short course of steroids.    Continues to improve but is still weak and tired.    She is not tolerating the metoprolol, we will stop that.  She wants to go back and try carvedilol and her diltiazem which we will do.  I will see her again in a week.    As always, it has been a pleasure to participate in your patient's care. Thank you.       Sincerely,       PATI Eagle      Current Outpatient Medications:   •  albuterol sulfate  (90 Base) MCG/ACT inhaler, Inhale 2 puffs Every 4 (Four) Hours As Needed for Wheezing or Shortness of Air. Use with chamber, Disp: 8.5 g, Rfl: 0  •  apixaban (ELIQUIS) 5 MG tablet tablet, Take 1 tablet by mouth Every 12 (Twelve) Hours. Indications: Atrial Fibrillation, Disp: 60 tablet, Rfl: 0  •  B Complex Vitamins (VITAMIN B COMPLEX PO), Take 1 tablet by mouth Daily., Disp: , Rfl:   •  cholecalciferol (VITAMIN D3) 25 MCG (1000 UT) tablet, Take 4 tablets by mouth Daily., Disp: , Rfl:   •  CRANBERRY EXTRACT PO, Take 1 capsule by mouth Daily., Disp: , Rfl:   •  furosemide (LASIX) 20 MG tablet, Take 1 tablet by mouth Daily. Take 1 tab in the morning and 1/2 tab at 2 pm (Patient taking differently: Take 1 tablet by mouth Daily. 1.5 TAB DAILY), Disp: 135 tablet, Rfl: 3  •  HYDROcodone-acetaminophen (NORCO) 5-325 MG per tablet, Take 1 tablet by mouth 2 (Two) Times a Day As Needed for Severe Pain., Disp: 10 tablet, Rfl: 0  •  Loratadine-D 24HR  MG per 24 hr tablet, TAKE ONE TABLET BY MOUTH DAILY, Disp: 30 tablet, Rfl: 3  •  MEGARED OMEGA-3 KRILL OIL PO, Take 1 capsule by mouth Daily., Disp: , Rfl:   •  omeprazole (priLOSEC) 40  MG capsule, Take 1 capsule by mouth Daily., Disp: 90 capsule, Rfl: 3  •  PROBIOTIC PRODUCT PO, Take 2 capsules by mouth Daily., Disp: , Rfl:   •  rosuvastatin (Crestor) 5 MG tablet, Take 1 tablet by mouth Daily., Disp: 90 tablet, Rfl: 2  •  sacubitril-valsartan (Entresto) 24-26 MG tablet, Take 1 tablet by mouth 2 (Two) Times a Day., Disp: 180 tablet, Rfl: 3  •  carvedilol (COREG) 12.5 MG tablet, Take 1 tablet by mouth 2 (Two) Times a Day., Disp: 180 tablet, Rfl: 3  •  dilTIAZem (Cardizem) 30 MG tablet, Take 1 tablet by mouth 2 (Two) Times a Day., Disp: 60 tablet, Rfl: 6      Dictated utilizing Dragon dictation

## 2023-03-14 NOTE — TELEPHONE ENCOUNTER
Rx Refill Note  Requested Prescriptions     Pending Prescriptions Disp Refills   • apixaban (ELIQUIS) 5 MG tablet tablet 60 tablet 5     Sig: Take 1 tablet by mouth Every 12 (Twelve) Hours. Indications: Atrial Fibrillation      Last office visit with prescribing clinician: 3/8/2023   Last telemedicine visit with prescribing clinician: 3/15/2023   Next office visit with prescribing clinician: 3/15/2023                         Would you like a call back once the refill request has been completed: [] Yes [] No    If the office needs to give you a call back, can they leave a voicemail: [] Yes [] No    Yvonne Wolf MA  03/14/23, 10:12 EDT

## 2023-03-15 ENCOUNTER — OFFICE VISIT (OUTPATIENT)
Dept: CARDIOLOGY | Facility: CLINIC | Age: 78
End: 2023-03-15
Payer: MEDICARE

## 2023-03-15 VITALS
OXYGEN SATURATION: 97 % | HEART RATE: 90 BPM | RESPIRATION RATE: 16 BRPM | DIASTOLIC BLOOD PRESSURE: 90 MMHG | WEIGHT: 178 LBS | SYSTOLIC BLOOD PRESSURE: 140 MMHG | HEIGHT: 66 IN | BODY MASS INDEX: 28.61 KG/M2

## 2023-03-15 DIAGNOSIS — Z99.89 OSA ON CPAP: ICD-10-CM

## 2023-03-15 DIAGNOSIS — I25.10 CORONARY ARTERY DISEASE INVOLVING NATIVE CORONARY ARTERY OF NATIVE HEART WITHOUT ANGINA PECTORIS: Primary | Chronic | ICD-10-CM

## 2023-03-15 DIAGNOSIS — I10 PRIMARY HYPERTENSION: ICD-10-CM

## 2023-03-15 DIAGNOSIS — G47.33 OSA ON CPAP: ICD-10-CM

## 2023-03-15 DIAGNOSIS — I48.91 NEW ONSET ATRIAL FIBRILLATION: ICD-10-CM

## 2023-03-15 DIAGNOSIS — E78.49 OTHER HYPERLIPIDEMIA: ICD-10-CM

## 2023-03-15 DIAGNOSIS — I42.8 NICM (NONISCHEMIC CARDIOMYOPATHY): Chronic | ICD-10-CM

## 2023-03-15 DIAGNOSIS — I25.2 HISTORY OF MYOCARDIAL INFARCTION: Chronic | ICD-10-CM

## 2023-03-15 DIAGNOSIS — I50.22 CHRONIC SYSTOLIC CONGESTIVE HEART FAILURE: ICD-10-CM

## 2023-03-15 PROCEDURE — 93000 ELECTROCARDIOGRAM COMPLETE: CPT | Performed by: NURSE PRACTITIONER

## 2023-03-15 PROCEDURE — 3080F DIAST BP >= 90 MM HG: CPT | Performed by: NURSE PRACTITIONER

## 2023-03-15 PROCEDURE — 99214 OFFICE O/P EST MOD 30 MIN: CPT | Performed by: NURSE PRACTITIONER

## 2023-03-15 PROCEDURE — 1159F MED LIST DOCD IN RCRD: CPT | Performed by: NURSE PRACTITIONER

## 2023-03-15 PROCEDURE — 1160F RVW MEDS BY RX/DR IN RCRD: CPT | Performed by: NURSE PRACTITIONER

## 2023-03-15 PROCEDURE — 3077F SYST BP >= 140 MM HG: CPT | Performed by: NURSE PRACTITIONER

## 2023-03-15 RX ORDER — METOPROLOL SUCCINATE 100 MG/1
100 TABLET, EXTENDED RELEASE ORAL DAILY
COMMUNITY

## 2023-03-15 NOTE — PROGRESS NOTES
Date of Office Visit: 03/15/2023  Encounter Provider: PATI Eagle  Place of Service: [facility]  Patient Name: Effie Donnelly  :1945  Primary Cardiologist: Dr. Brand    CC:  1 week follow up    Dear Dr. Hawley    HPI: Effie Donnelly is a pleasant 77 y.o. female who presents 03/15/2023 for cardiac follow up. I have reviewed her past medical records including notes, labs and testing in preparation for today's visit.    Dr. Brand saw  her initially 2018. She was previously seen at [facility].  She has a history of CAD and myocardial infarction.  She had an echocardiogram in November that showed normal function.  She does have a history of an abnormal EKG with LVH as well as diffuse repolarization changes.  She just had an EKG performed at [facility] there was unchanged from prior EKGs.     Cath 2020:  Findings:  1. Coronary Artery Anatomy:  Dominance: Left  Left Main: Angiographically normal  Left Anterior Descending: The LAD is somewhat small in caliber size.  Contains a 30 to 40% proximal segment stenosis followed by luminal irregularities distally.  There are no appreciable diagonals from the LAD.  Circumflex Artery: Large in caliber size.  Luminal irregularities throughout supplies a mid and inferior marginal branch vessels.  Also supplies a left-sided PDA which contains luminal regularities.  Ramus: Large ramus almost takes over for LAD and is branching and bifurcating.  Luminal irregularities throughout  Right Coronary Artery: Nondominant     2. Hemodynamics:  Right Atrium: 8/5/4 mmHg  Right Ventricle: 31/2/5 mmHg  Pulmonary Artery: 34/11/23 mmHg  Pulmonary Wedge: 22/21/20 mmHg  Left Ventricle: 184/16/23 mmHg  Aorta: 178/64/107 mmHg  Cardiac Output/Index: 3.53 L/min 1.92 L/min/m2  PA Sat: 66 %  AO Sat: 96 %     3. Left Ventriculogram:  Ejection Fraction: 20 %  Wall Motion: Global  Mitral Regurgitation: None     Conclusions:  1. Left  dominant system with diminutive LAD which may explain her stress test findings but otherwise nonischemic cardiomyopathy with only a 30 to 40% proximal LAD stenoses.  2. Currently elevated left-sided filling pressures with EDP of 23 mmHg  3. Currently reduced cardiac output and index of 3.5 and 1.9     I saw her 2/25/2022 for 3-month follow-up.  She denies any chest pain chest pressure.  She states her shortness of breath remains the same is been unchanged for the past year and a half.  She continues to complain of fatigue.  She has not had any dizziness, lightheadedness, syncope or presyncopal episodes.  She denies any lower extremity edema.  She denies any palpitations, she states she cannot feel her heart racing or skipping.  She states over the last 2 weeks she has been fainting in the mornings.  She states is associated with high blood sugar.  She states she self decreased her carvedilol to 12.5 mg twice a day and all of the symptoms went away.  I have asked her to follow-up with you about her blood sugars.  Her blood pressure today is within normal limits.  She states she has a cold and feels like she is congested in her chest.  She does have some mild crackles in her lung bases.  I have reviewed her recent labs.  Her most recent labs were from 2/8/2022.  CMP revealed a total glucose of 64, sodium 142, potassium 4.6 and a creatinine 1.22.  She had labs performed on January 11 that showed a CMP with a glucose of 86, sodium 140, potassium 4.3 and her creatinine up to 1.38.  TSH 3.850.  CBC unremarkable.  Lipid profile showed total cholesterol 224, HDL 67, , triglycerides 106.  Of note in December 2021, CMP showed a glucose of 93 and a creatinine of 1.17. I increased her lasix and ordered an echo.      3/2/2022 echo Interpretation Summary  • The left ventricular cavity is moderately dilated.  • Left ventricular ejection fraction appears to be 56 - 60%. Left ventricular systolic function is normal.  • There  is moderate concentric left ventricular hypertrophy. There is severe apical hypertrophy of the apical segments. 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  • Normal right ventricular cavity size and systolic function noted.  • The left atrial cavity is severely dilated.  • Mild tricuspid valve regurgitation is present.  • Calculated right ventricular systolic pressure from tricuspid regurgitation is 25 mmHg.  • There is mild pulmonic valve regurgitation present.  • There is no evidence of pericardial effusion     She did have labs 4/20/2022 that showed CMP with a creatinine 1.35, TSH 5.340.  Lipid panel revealed total cholesterol 220, HDL 56,  and triglycerides 118.  CBC unremarkable.     She had a repeat BMP on 5/17/2022 that showed a creatinine of 1.45.     I saw her in May 2022 for 3-month follow-up.  She states after 2 days of the increased in the Lasix, she had a lot of back pain so she stopped it.  She is just back to the 20 mg of Lasix daily.  Has noticed above, her creatinine did go up even on the lower dose of the Lasix.  She is taking her medications as directed.  Her blood pressure is well controlled.  She denies any palpitations, lower extremity edema, dizziness or lightheadedness.  She has not had any syncope or presyncopal episodes.  She denies any chest pain or chest pressure.  She does complain of fatigue and chronic shortness of breath.  That is unchanged.  She does not use her CPAP, she does have 1 at home but states she feels like she does not need it.  She denies any snoring, orthopnea, PND or daytime sleepiness.  I did discuss the correlation between untreated sleep apnea and cardiomyopathy.     She had appointment with Dr. Brand in December which was turned into a telehealth secondary to direct exposure from a family member living in her home.  She stated that at the time, she did not have  any COVID symptoms.  She is fully vaccinated and boosted.  She had issues with pneumonia at the beginning of the month but tested negative for COVID at that time.  She still very short of breath, that has been ongoing.  No chest pain or chest discomfort.  She had a chest x-ray at the beginning of the month that showed no infiltrates, no evidence of congestive heart failure.  She was getting very short of breath with activity even before she had pneumonia.  He did order an echo.     12/22/2022  Interpretation Summary  •  Left ventricular systolic function is normal.  •  Left ventricular wall thickness is consistent with mild concentric hypertrophy.  •  Left ventricular diastolic function was normal.  •  There is mild, bileaflet mitral valve thickening present.  •  Estimated right ventricular systolic pressure from tricuspid regurgitation is normal (<35 mmHg).     She presented to the ED on 12/13/2023 with shortness of breath and not feeling well.  She was found to be in atrial fibrillation with RVR, this was a new diagnosis.  She did receive IV diltiazem in the emergency room.  She was transitioned to oral Lopressor and was placed on Eliquis as her [id number] score was 6.  Her heart rate responded well and she was titrated to metoprolol 100 mg twice daily.  She remained on her sacubitril-valsartan and her regular home regimen.  Aspirin was discontinued as she is now on Eliquis given attempt to decrease her bleeding risk.  She was also found to be positive for acute coronavirus infection and she was treated symptomatically.  She was satting well on room air and did not require oxygen.     2/14/2023 Interpretation Summary  •  Left ventricular ejection fraction appears to be 51 - 55%.  •  Sigmoid-shaped ventricular septum is present.Prominent left ventricular false tendon.  •  Left ventricular diastolic function is consistent with (grade II w/high LAP) pseudonormalization.  •  Estimated right ventricular systolic  pressure from tricuspid regurgitation is mildly elevated (35-45 mmHg).     She had to return back to the ED on with continued shortness of air and productive cough.  She was diagnosed with right lower lobe pneumonia and started on antibiotics and a short course of steroids.     I saw her 2/22/2023 in follow-up.  She states she is feeling a lot better.  She still has some shortness of breath and fatigue but that is improving.  She felt a lot better after getting the antibiotic and steroids.  She cannot really feel her heart racing or skipping.  Again she does feel some shortness of air and tiredness.  She denies any lower extremity edema, she has not had any dizziness, lightheadedness, syncope or presyncopal episodes.  She denies any chest pain or chest pressure.  She states she has DELORES but cannot tolerate the mask.  Epic states that she is on Lopressor 75 mg twice daily, patient does not know what dose of medicine she is on, however, discharge stated she was supposed to take Lopressor 100 twice a day.  She is still mildly tachycardic with a heart rate of 105 and remains in atrial fibrillation.  I have sent in a prescription for Lopressor 100 mg twice daily.     I saw her 3/8/2023 for 2-week follow-up.  She denies any palpitations or lower extremity edema.  She feels short of breath, dizzy and fatigued.  She states that is worse in the last 2 weeks.  She denies any chest pain or chest pressure.  She is compliant with her CPAP.  She feels terrible.  She is not tolerating the metoprolol.  She felt so bad last night she did not take it at all because she states it wakes her up at night.  She appears weak and fatigued today.   She is not tolerating the metoprolol, we will stop that.  She wants to go back and try carvedilol and her diltiazem which we will do.      She returns today for 1 week follow-up.  She states she cannot tolerate the Coreg as it made her short of breath so she went back to the metoprolol.  She is  doing well on the diltiazem.  She denies any palpitations, she cannot feel her heart racing or skipping.  She denies any lower extremity edema, dizziness or lightheadedness.  She has not had any syncope or presyncopal episodes.  She denies any chest pain or chest pressure or fatigue.  She is compliant with her CPAP.  She does still have shortness of breath with exertion.  She remains in atrial fibrillation with a heart rate of 90.  She is having frequent PVCs.  She does state, however, that she feels so much better on the diltiazem.    Past Medical History:   Diagnosis Date   • CAD (coronary artery disease)    • Cancer ([facility])     skin, right breast   • CHF (congestive heart failure) ([facility])    • Coronary artery disease involving native coronary artery of native heart without angina pectoris 11/1/2018   • Heart murmur    • History of myocardial infarction 11/1/2018   • Hypertension    • Myocardial infarction ([facility])     PER PT    • NICM (nonischemic cardiomyopathy) ([facility]) 7/8/2020   • DELORES on CPAP     AHI 15/h   • Sinus tachycardia    • SOB (shortness of breath)        Past Surgical History:   Procedure Laterality Date   • BUNIONECTOMY Bilateral    • CARDIAC CATHETERIZATION     • CARDIAC CATHETERIZATION N/A 01/14/2020    Procedure: Right and Left Heart Cath;  Surgeon: Bin Chacko MD;  Location: [facility] CATH INVASIVE LOCATION;  Service: Cardiovascular   • CARDIAC CATHETERIZATION N/A 01/14/2020    Procedure: Coronary angiography;  Surgeon: Bin Chacko MD;  Location: [facility] CATH INVASIVE LOCATION;  Service: Cardiovascular   • CARDIAC CATHETERIZATION N/A 01/14/2020    Procedure: Left ventriculography;  Surgeon: Bin Chacko MD;  Location: [facility] CATH INVASIVE LOCATION;  Service: Cardiovascular   • CATARACT EXTRACTION Left 04/26/2022   • COLONOSCOPY N/A 03/23/2022    Procedure: COLONOSCOPY WITH BIOPSIES; POLYPECTOMY;  Surgeon: Lorne Esquivel MD;  Location: [facility];  Service: Gastroenterology;   Laterality: N/A;  POLYP  ULCERATIVE COLITIS   • ENDOSCOPY N/A 2022    Procedure: ESOPHAGOGASTRODUODENOSCOPY WITH BIOPSIES;  Surgeon: Lorne Esquivel MD;  Location: [facility];  Service: Gastroenterology;  Laterality: N/A;  GASTRITIS  ESOPHAGEAL REFLUX  HIATAL HERNIA  ESOPHAGEAL CANDIDIASIS   • MASTECTOMY COMPLETE / SIMPLE  2019   • SHOULDER SURGERY Right    • SKIN CANCER EXCISION      ABOVE THE LIP.        Social History     Socioeconomic History   • Marital status:    • Number of children: 5   Tobacco Use   • Smoking status: Former     Types: Cigarettes     Quit date: 1973     Years since quittin.4   • Smokeless tobacco: Never   • Tobacco comments:     CAFFEINE USE: 1 CUP DAILY   Vaping Use   • Vaping Use: Never used   Substance and Sexual Activity   • Alcohol use: No   • Drug use: No   • Sexual activity: Defer       Family History   Problem Relation Age of Onset   • Hypertension Sister    • Cancer Sister    • Heart disease Brother 65        VALVE REPLACEMENT    • Hypertension Brother    • Hypertension Sister    • Hypertension Sister    • Hypertension Brother    • Diabetes Brother    • Hypertension Brother    • Cancer Brother    • Hypertension Brother    • Hypertension Brother    • Hypertension Brother        The following portion of the patient's history were reviewed and updated as appropriate: past medical history, past surgical history, past social history, past family history, allergies, current medications, and problem list.    Review of Systems   Constitutional: Negative for diaphoresis, fever and malaise/fatigue.   HENT: Negative for congestion, hearing loss, hoarse voice, nosebleeds and sore throat.    Eyes: Negative for photophobia, vision loss in left eye, vision loss in right eye and visual disturbance.   Cardiovascular: Positive for irregular heartbeat. Negative for chest pain, dyspnea on exertion, leg swelling, near-syncope, orthopnea, palpitations, paroxysmal  nocturnal dyspnea and syncope.   Respiratory: Positive for shortness of breath (with exertion). Negative for cough, hemoptysis, sleep disturbances due to breathing, snoring, sputum production and wheezing.    Endocrine: Negative for cold intolerance, heat intolerance, polydipsia, polyphagia and polyuria.   Hematologic/Lymphatic: Negative for bleeding problem. Does not bruise/bleed easily.   Skin: Negative for color change, dry skin, poor wound healing, rash and suspicious lesions.   Musculoskeletal: Negative for arthritis, back pain, falls, gout, joint pain, joint swelling, muscle cramps, muscle weakness and myalgias.   Gastrointestinal: Negative for bloating, abdominal pain, constipation, diarrhea, dysphagia, melena, nausea and vomiting.   Neurological: Negative for excessive daytime sleepiness, dizziness, headaches, light-headedness, loss of balance, numbness, paresthesias, seizures, vertigo and weakness.   Psychiatric/Behavioral: Negative for depression, memory loss and substance abuse. The patient is not nervous/anxious.        Allergies   Allergen Reactions   • Nsaids GI Bleeding     CROHN'S DISEASE   • Adhesive Tape Other (See Comments)     BLISTERS UNDER TAGADERM         Current Outpatient Medications:   •  albuterol sulfate  (90 Base) MCG/ACT inhaler, Inhale 2 puffs Every 4 (Four) Hours As Needed for Wheezing or Shortness of Air. Use with chamber, Disp: 8.5 g, Rfl: 0  •  apixaban (ELIQUIS) 5 MG tablet tablet, Take 1 tablet by mouth Every 12 (Twelve) Hours. Indications: Atrial Fibrillation, Disp: 60 tablet, Rfl: 1  •  B Complex Vitamins (VITAMIN B COMPLEX PO), Take 1 tablet by mouth Daily., Disp: , Rfl:   •  cholecalciferol (VITAMIN D3) 25 MCG (1000 UT) tablet, Take 4 tablets by mouth Daily., Disp: , Rfl:   •  CRANBERRY EXTRACT PO, Take 1 capsule by mouth Daily., Disp: , Rfl:   •  dilTIAZem (Cardizem) 30 MG tablet, Take 1 tablet by mouth 3 (Three) Times a Day., Disp: 90 tablet, Rfl: 6  •  furosemide  "(LASIX) 20 MG tablet, Take 1 tablet by mouth Daily. Take 1 tab in the morning and 1/2 tab at 2 pm (Patient taking differently: Take 1 tablet by mouth Daily. 1.5 TAB DAILY), Disp: 135 tablet, Rfl: 3  •  HYDROcodone-acetaminophen (NORCO) 5-325 MG per tablet, Take 1 tablet by mouth 2 (Two) Times a Day As Needed for Severe Pain., Disp: 10 tablet, Rfl: 0  •  Loratadine-D 24HR  MG per 24 hr tablet, TAKE ONE TABLET BY MOUTH DAILY, Disp: 30 tablet, Rfl: 3  •  MEGARED OMEGA-3 KRILL OIL PO, Take 1 capsule by mouth Daily., Disp: , Rfl:   •  metoprolol succinate XL (TOPROL-XL) 100 MG 24 hr tablet, Take 1 tablet by mouth Daily. Takes a half tablet twice daily, Disp: , Rfl:   •  omeprazole (priLOSEC) 40 MG capsule, Take 1 capsule by mouth Daily., Disp: 90 capsule, Rfl: 3  •  PROBIOTIC PRODUCT PO, Take 2 capsules by mouth Daily., Disp: , Rfl:   •  rosuvastatin (Crestor) 5 MG tablet, Take 1 tablet by mouth Daily., Disp: 90 tablet, Rfl: 2  •  sacubitril-valsartan (Entresto) 24-26 MG tablet, Take 1 tablet by mouth 2 (Two) Times a Day., Disp: 180 tablet, Rfl: 3        Objective:     Vitals:    03/15/23 1329   BP: 140/90   Pulse: 90   Resp: 16   SpO2: 97%   Weight: 80.7 kg (178 lb)   Height: 167.6 cm (66\")     Body mass index is 28.73 kg/m².      Vitals reviewed.   Constitutional:       General: Not in acute distress.     Appearance: Well-developed and not in distress.   Eyes:      General:         Right eye: No discharge.         Left eye: No discharge.      Conjunctiva/sclera: Conjunctivae normal.   HENT:      Head: Normocephalic and atraumatic.      Right Ear: External ear normal.      Left Ear: External ear normal.      Nose: Nose normal.   Neck:      Thyroid: No thyromegaly.      Vascular: No JVD.      Trachea: No tracheal deviation.      Lymphadenopathy: No cervical adenopathy.   Pulmonary:      Effort: Pulmonary effort is normal. No respiratory distress.      Breath sounds: Normal breath sounds. No wheezing. No rales. "   Chest:      Chest wall: Not tender to palpatation.   Cardiovascular:      Normal rate. Irregularly irregular rhythm.      No gallop.   Pulses:     Intact distal pulses.   Edema:     Peripheral edema absent.   Abdominal:      General: There is no distension.      Palpations: Abdomen is soft.      Tenderness: There is no abdominal tenderness.   Musculoskeletal: Normal range of motion.         General: No tenderness or deformity.      Cervical back: Normal range of motion and neck supple. Skin:     General: Skin is warm and dry.      Findings: No erythema or rash.   Neurological:      Mental Status: Alert and oriented to person, place, and time.      Coordination: Coordination normal.   Psychiatric:         Attention and Perception: Attention normal.         Behavior: Behavior normal.         Thought Content: Thought content normal.         Judgment: Judgment normal.               ECG 12 Lead    Date/Time: 3/15/2023 1:33 PM  Performed by: Nan Brian APRN  Authorized by: Nan Brian APRN   Comparison: compared with previous ECG from 3/8/2023  Similar to previous ECG  Rhythm: atrial fibrillation  Ectopy: bigeminy  Rate: normal  Conduction: non-specific intraventricular conduction delay  ST Segments: ST segments normal  QRS axis: left  Other findings: left ventricular hypertrophy    Clinical impression: abnormal EKG              Assessment:       Diagnosis Plan   1. Coronary artery disease involving native coronary artery of native heart without angina pectoris        2. History of myocardial infarction        3. NICM (nonischemic cardiomyopathy) (HCC)        4. Chronic systolic congestive heart failure (HCC)        5. New onset atrial fibrillation (HCC)        6. Primary hypertension        7. Other hyperlipidemia        8. DELORES on CPAP               Plan:       1.  Newly diagnosed symptomatic atrial fibrillation with rapid ventricular response-  She has been started on Eliquis.  Her [id number] score is  6.  Limited echo shows EF 51-50% with Left ventricular diastolic function is consistent with (grade II w/high LAP) pseudonormalization.   She felt so poorly on the metoprolol and wanted to go back to the combination of carvedilol and diltiazem.  We did that and then she stated after 2 doses of the carvedilol, it immediately made her short of breath so she switched back to taking the metoprolol 50 mg twice a day but she feels like it is the diltiazem that is made the biggest difference of all.  I am going to increase that up to 30 mg 3 times daily for better coverage.    2.  CHF -appears euvolemic, feeling better.     3.  Prior history of nonischemic cardiomyopathy on Coreg , Entresto and oral Lasix .  Echho in December with low normal left ventricular ejection fraction.  Repeat echo from 2/14/2023 showed recovery of function with an EF 51 to 55%.     4.  History of nonobstructive coronary artery-denies angina     5.  Essential hypertension -little borderline today.  I am going to increase her diltiazem up to 30 mg every 8 hours which will help with heart rate and blood pressure.     6. CKD -Labs from 2/20/2023 so a creatinine of 1.12.     7.  Hypercholesterolemia -continue lipid-lowering therapy, currently on rosuvastatin 5 mg daily.     8.  Corona virus -this did thin go into right lower lobe pneumonia for which she received antibiotics and short course of steroids.     Feeling much much better.    Increase diltiazem up to 30 mg every 8 hours.  RTO in 1 month with JF        As always, it has been a pleasure to participate in your patient's care. Thank you.       Sincerely,       PATI Eagle      Current Outpatient Medications:   •  albuterol sulfate  (90 Base) MCG/ACT inhaler, Inhale 2 puffs Every 4 (Four) Hours As Needed for Wheezing or Shortness of Air. Use with chamber, Disp: 8.5 g, Rfl: 0  •  apixaban (ELIQUIS) 5 MG tablet tablet, Take 1 tablet by mouth Every 12 (Twelve) Hours. Indications: Atrial  Fibrillation, Disp: 60 tablet, Rfl: 1  •  B Complex Vitamins (VITAMIN B COMPLEX PO), Take 1 tablet by mouth Daily., Disp: , Rfl:   •  cholecalciferol (VITAMIN D3) 25 MCG (1000 UT) tablet, Take 4 tablets by mouth Daily., Disp: , Rfl:   •  CRANBERRY EXTRACT PO, Take 1 capsule by mouth Daily., Disp: , Rfl:   •  dilTIAZem (Cardizem) 30 MG tablet, Take 1 tablet by mouth 3 (Three) Times a Day., Disp: 90 tablet, Rfl: 6  •  furosemide (LASIX) 20 MG tablet, Take 1 tablet by mouth Daily. Take 1 tab in the morning and 1/2 tab at 2 pm (Patient taking differently: Take 1 tablet by mouth Daily. 1.5 TAB DAILY), Disp: 135 tablet, Rfl: 3  •  HYDROcodone-acetaminophen (NORCO) 5-325 MG per tablet, Take 1 tablet by mouth 2 (Two) Times a Day As Needed for Severe Pain., Disp: 10 tablet, Rfl: 0  •  Loratadine-D 24HR  MG per 24 hr tablet, TAKE ONE TABLET BY MOUTH DAILY, Disp: 30 tablet, Rfl: 3  •  MEGARED OMEGA-3 KRILL OIL PO, Take 1 capsule by mouth Daily., Disp: , Rfl:   •  metoprolol succinate XL (TOPROL-XL) 100 MG 24 hr tablet, Take 1 tablet by mouth Daily. Takes a half tablet twice daily, Disp: , Rfl:   •  omeprazole (priLOSEC) 40 MG capsule, Take 1 capsule by mouth Daily., Disp: 90 capsule, Rfl: 3  •  PROBIOTIC PRODUCT PO, Take 2 capsules by mouth Daily., Disp: , Rfl:   •  rosuvastatin (Crestor) 5 MG tablet, Take 1 tablet by mouth Daily., Disp: 90 tablet, Rfl: 2  •  sacubitril-valsartan (Entresto) 24-26 MG tablet, Take 1 tablet by mouth 2 (Two) Times a Day., Disp: 180 tablet, Rfl: 3      Dictated utilizing Dragon dictation

## 2023-04-12 ENCOUNTER — OFFICE VISIT (OUTPATIENT)
Dept: CARDIOLOGY | Facility: CLINIC | Age: 78
End: 2023-04-12
Payer: MEDICARE

## 2023-04-12 VITALS
HEART RATE: 102 BPM | BODY MASS INDEX: 28.93 KG/M2 | DIASTOLIC BLOOD PRESSURE: 80 MMHG | WEIGHT: 180 LBS | OXYGEN SATURATION: 96 % | HEIGHT: 66 IN | SYSTOLIC BLOOD PRESSURE: 132 MMHG | RESPIRATION RATE: 16 BRPM

## 2023-04-12 DIAGNOSIS — I25.2 HISTORY OF MYOCARDIAL INFARCTION: Chronic | ICD-10-CM

## 2023-04-12 DIAGNOSIS — E78.49 OTHER HYPERLIPIDEMIA: ICD-10-CM

## 2023-04-12 DIAGNOSIS — I48.91 NEW ONSET ATRIAL FIBRILLATION: ICD-10-CM

## 2023-04-12 DIAGNOSIS — I25.10 CORONARY ARTERY DISEASE INVOLVING NATIVE CORONARY ARTERY OF NATIVE HEART WITHOUT ANGINA PECTORIS: Primary | Chronic | ICD-10-CM

## 2023-04-12 DIAGNOSIS — I42.8 NICM (NONISCHEMIC CARDIOMYOPATHY): Chronic | ICD-10-CM

## 2023-04-12 DIAGNOSIS — I50.22 CHRONIC SYSTOLIC CONGESTIVE HEART FAILURE: ICD-10-CM

## 2023-04-12 DIAGNOSIS — I10 PRIMARY HYPERTENSION: ICD-10-CM

## 2023-04-12 NOTE — LETTER
2023       No Recipients    Patient: Effie Donnelly   YOB: 1945   Date of Visit: 2023       Dear Dr. Francois Recipients:    Thank you for referring Effie Donnelly to me for evaluation. Below are the relevant portions of my assessment and plan of care.    If you have questions, please do not hesitate to call me. I look forward to following Effie along with you.         Sincerely,        PATI [place]        CC:   No Recipients    Nan Brian APRN  [phone]  Signed    Date of Office Visit: 2023  Encounter Provider: [facility]  Place of Service: [facility]  Patient Name: Effie Donnelly  :1945  Primary Cardiologist: Dr. Brand    CC:  1 month follow up    Dear Dr. Hawley    HPI: Effie Donnelly is a pleasant 77 y.o. female who presents 2023 for cardiac follow up.  I have reviewed her past medical records including notes, labs and testing in preparation for today's visit.    Dr. Brand saw  her initially 2018. She was previously seen at [facility].  She has a history of CAD and myocardial infarction.  She had an echocardiogram in November that showed normal function.  She does have a history of an abnormal EKG with LVH as well as diffuse repolarization changes.  She just had an EKG performed at [facility] there was unchanged from prior EKGs.     Cath 2020: Findings:  1. Coronary Artery Anatomy:  Dominance: Left  Left Main: Angiographically normal  Left Anterior Descending: The LAD is somewhat small in caliber size.  Contains a 30 to 40% proximal segment stenosis followed by luminal irregularities distally.  There are no appreciable diagonals from the LAD.  Circumflex Artery: Large in caliber size.  Luminal irregularities throughout supplies a mid and inferior marginal branch vessels.  Also supplies a left-sided PDA which contains luminal regularities.  Ramus: Large ramus almost takes over for LAD and is  branching and bifurcating.  Luminal irregularities throughout  Right Coronary Artery: Nondominant  2. Hemodynamics:  Right Atrium: 8/5/4 mmHg  Right Ventricle: 31/2/5 mmHg  Pulmonary Artery: 34/11/23 mmHg  Pulmonary Wedge: 22/21/20 mmHg  Left Ventricle: 184/16/23 mmHg  Aorta: 178/64/107 mmHg  Cardiac Output/Index: 3.53 L/min 1.92 L/min/m2  PA Sat: 66 %  AO Sat: 96 %   3. Left Ventriculogram:  Ejection Fraction: 20 %  Wall Motion: Global  Mitral Regurgitation: None   Conclusions:  1. Left dominant system with diminutive LAD which may explain her stress test findings but otherwise nonischemic cardiomyopathy with only a 30 to 40% proximal LAD stenoses.  2. Currently elevated left-sided filling pressures with EDP of 23 mmHg  3. Currently reduced cardiac output and index of 3.5 and 1.9     I saw her 2/25/2022 for 3-month follow-up.  She denies any chest pain chest pressure.  She states her shortness of breath remains the same is been unchanged for the past year and a half.  She continues to complain of fatigue.  She has not had any dizziness, lightheadedness, syncope or presyncopal episodes.  She denies any lower extremity edema.  She denies any palpitations, she states she cannot feel her heart racing or skipping.  She states over the last 2 weeks she has been fainting in the mornings.  She states is associated with high blood sugar.  She states she self decreased her carvedilol to 12.5 mg twice a day and all of the symptoms went away.  I have asked her to follow-up with you about her blood sugars.  Her blood pressure today is within normal limits.  She states she has a cold and feels like she is congested in her chest.  She does have some mild crackles in her lung bases.  I have reviewed her recent labs.  Her most recent labs were from 2/8/2022.  CMP revealed a total glucose of 64, sodium 142, potassium 4.6 and a creatinine 1.22.  She had labs performed on January 11 that showed a CMP with a glucose of 86, sodium 140,  potassium 4.3 and her creatinine up to 1.38.  TSH 3.850.  CBC unremarkable.  Lipid profile showed total cholesterol 224, HDL 67, , triglycerides 106.  Of note in December 2021, CMP showed a glucose of 93 and a creatinine of 1.17. I increased her lasix and ordered an echo.      3/2/2022 echo Interpretation Summary  • The left ventricular cavity is moderately dilated.  • Left ventricular ejection fraction appears to be 56 - 60%. Left ventricular systolic function is normal.  • There is moderate concentric left ventricular hypertrophy. There is severe apical hypertrophy of the apical segments. 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  • Normal right ventricular cavity size and systolic function noted.  • The left atrial cavity is severely dilated.  • Mild tricuspid valve regurgitation is present.  • Calculated right ventricular systolic pressure from tricuspid regurgitation is 25 mmHg.  • There is mild pulmonic valve regurgitation present.  • There is no evidence of pericardial effusion     She did have labs 4/20/2022 that showed CMP with a creatinine 1.35, TSH 5.340.  Lipid panel revealed total cholesterol 220, HDL 56,  and triglycerides 118.  CBC unremarkable.     She had a repeat BMP on 5/17/2022 that showed a creatinine of 1.45.     I saw her in May 2022 for 3-month follow-up.  She states after 2 days of the increased in the Lasix, she had a lot of back pain so she stopped it.  She is just back to the 20 mg of Lasix daily.  Has noticed above, her creatinine did go up even on the lower dose of the Lasix.  She is taking her medications as directed.  Her blood pressure is well controlled.  She denies any palpitations, lower extremity edema, dizziness or lightheadedness.  She has not had any syncope or presyncopal episodes.  She denies any chest pain or chest pressure.  She does complain of fatigue and  chronic shortness of breath.  That is unchanged.  She does not use her CPAP, she does have 1 at home but states she feels like she does not need it.  She denies any snoring, orthopnea, PND or daytime sleepiness.  I did discuss the correlation between untreated sleep apnea and cardiomyopathy.     She had appointment with Dr. Brand in December which was turned into a telehealth secondary to direct exposure from a family member living in her home.  She stated that at the time, she did not have any COVID symptoms.  She is fully vaccinated and boosted.  She had issues with pneumonia at the beginning of the month but tested negative for COVID at that time.  She still very short of breath, that has been ongoing.  No chest pain or chest discomfort.  She had a chest x-ray at the beginning of the month that showed no infiltrates, no evidence of congestive heart failure.  She was getting very short of breath with activity even before she had pneumonia.  He did order an echo.     12/22/2022  Interpretation Summary  •  Left ventricular systolic function is normal.  •  Left ventricular wall thickness is consistent with mild concentric hypertrophy.  •  Left ventricular diastolic function was normal.  •  There is mild, bileaflet mitral valve thickening present.  •  Estimated right ventricular systolic pressure from tricuspid regurgitation is normal (<35 mmHg).     She presented to the ED on 12/13/2023 with shortness of breath and not feeling well.  She was found to be in atrial fibrillation with RVR, this was a new diagnosis.  She did receive IV diltiazem in the emergency room.  She was transitioned to oral Lopressor and was placed on Eliquis as her [id number] score was 6.  Her heart rate responded well and she was titrated to metoprolol 100 mg twice daily.  She remained on her sacubitril-valsartan and her regular home regimen.  Aspirin was discontinued as she is now on Eliquis given attempt to decrease her bleeding risk.  She  was also found to be positive for acute coronavirus infection and she was treated symptomatically.  She was satting well on room air and did not require oxygen.     2/14/2023 Interpretation Summary  •  Left ventricular ejection fraction appears to be 51 - 55%.  •  Sigmoid-shaped ventricular septum is present.Prominent left ventricular false tendon.  •  Left ventricular diastolic function is consistent with (grade II w/high LAP) pseudonormalization.  •  Estimated right ventricular systolic pressure from tricuspid regurgitation is mildly elevated (35-45 mmHg).     She had to return back to the ED on with continued shortness of air and productive cough.  She was diagnosed with right lower lobe pneumonia and started on antibiotics and a short course of steroids.     I saw her 2/22/2023 in follow-up.  She states she is feeling a lot better.  She still has some shortness of breath and fatigue but that is improving.  She felt a lot better after getting the antibiotic and steroids.  She cannot really feel her heart racing or skipping.  Again she does feel some shortness of air and tiredness.  She denies any lower extremity edema, she has not had any dizziness, lightheadedness, syncope or presyncopal episodes.  She denies any chest pain or chest pressure.  She states she has DELORES but cannot tolerate the mask.  Epic states that she is on Lopressor 75 mg twice daily, patient does not know what dose of medicine she is on, however, discharge stated she was supposed to take Lopressor 100 twice a day.  She is still mildly tachycardic with a heart rate of 105 and remains in atrial fibrillation.  I have sent in a prescription for Lopressor 100 mg twice daily.     I saw her 3/8/2023 for 2-week follow-up.  She denies any palpitations or lower extremity edema.  She feels short of breath, dizzy and fatigued.  She states that is worse in the last 2 weeks.  She denies any chest pain or chest pressure.  She is compliant with her CPAP.  She  feels terrible.  She is not tolerating the metoprolol.  She felt so bad last night she did not take it at all because she states it wakes her up at night.  She appears weak and fatigued today.   She is not tolerating the metoprolol, we will stop that.  She wants to go back and try carvedilol and her diltiazem which we will do.       I saw her 3/15/2023  for 1 week follow-up.  She states she cannot tolerate the Coreg as it made her short of breath so she went back to the metoprolol.  She is doing well on the diltiazem.  She denies any palpitations, she cannot feel her heart racing or skipping.  She denies any lower extremity edema, dizziness or lightheadedness.  She has not had any syncope or presyncopal episodes.  She denies any chest pain or chest pressure or fatigue.  She is compliant with her CPAP.  She does still have shortness of breath with exertion.  She remains in atrial fibrillation with a heart rate of 90.  She is having frequent PVCs.  She does state, however, that she feels so much better on the diltiazem.    She is still feeling palpitations and fatigue mostly.  She really has not had any shortness of breath but she is very bothered with fatigue with the increased heart rate.  She denies any lower extremity edema, dizziness or lightheadedness.  She has not had any chest pain or chest pressure.  She is taking her medications as directed but she states she could not take the diltiazem every 8 hours because it upset her belly.     Past Medical History:   Diagnosis Date   • CAD (coronary artery disease)    • Cancer     skin, right breast   • CHF (congestive heart failure)    • Coronary artery disease involving native coronary artery of native heart without angina pectoris 11/1/2018   • Heart murmur    • History of myocardial infarction 11/1/2018   • Hypertension    • Myocardial infarction     PER PT    • NICM (nonischemic cardiomyopathy) 7/8/2020   • DELORES on CPAP     AHI 15/h   • Sinus tachycardia    • SOB  (shortness of breath)        Past Surgical History:   Procedure Laterality Date   • BUNIONECTOMY Bilateral    • CARDIAC CATHETERIZATION     • CARDIAC CATHETERIZATION N/A 2020    Procedure: Right and Left Heart Cath;  Surgeon: Bin Chacko MD;  Location:  [facility];  Service: Cardiovascular   • CARDIAC CATHETERIZATION N/A 2020    Procedure: Coronary angiography;  Surgeon: Bin Chacko MD;  Location:  [facility];  Service: Cardiovascular   • CARDIAC CATHETERIZATION N/A 2020    Procedure: Left ventriculography;  Surgeon: Bin Chacko MD;  Location:  [facility];  Service: Cardiovascular   • CATARACT EXTRACTION Left 2022   • COLONOSCOPY N/A 2022    Procedure: COLONOSCOPY WITH BIOPSIES; POLYPECTOMY;  Surgeon: Lorne Esquivel MD;  Location:  [facility];  Service: Gastroenterology;  Laterality: N/A;  POLYP  ULCERATIVE COLITIS   • ENDOSCOPY N/A 2022    Procedure: ESOPHAGOGASTRODUODENOSCOPY WITH BIOPSIES;  Surgeon: Lorne Esquivel MD;  Location: [facility];  Service: Gastroenterology;  Laterality: N/A;  GASTRITIS  ESOPHAGEAL REFLUX  HIATAL HERNIA  ESOPHAGEAL CANDIDIASIS   • MASTECTOMY COMPLETE / SIMPLE  2019   • SHOULDER SURGERY Right    • SKIN CANCER EXCISION      ABOVE THE LIP.        Social History     Socioeconomic History   • Marital status:    • Number of children: 5   Tobacco Use   • Smoking status: Former     Types: Cigarettes     Quit date: 1973     Years since quittin.4   • Smokeless tobacco: Never   • Tobacco comments:     CAFFEINE USE: 1 CUP DAILY   Vaping Use   • Vaping Use: Never used   Substance and Sexual Activity   • Alcohol use: No   • Drug use: No   • Sexual activity: Defer       Family History   Problem Relation Age of Onset   • Hypertension Sister    • Cancer Sister    • Heart disease Brother 65        VALVE REPLACEMENT    • Hypertension Brother    •  Hypertension Sister    • Hypertension Sister    • Hypertension Brother    • Diabetes Brother    • Hypertension Brother    • Cancer Brother    • Hypertension Brother    • Hypertension Brother    • Hypertension Brother        The following portion of the patient's history were reviewed and updated as appropriate: past medical history, past surgical history, past social history, past family history, allergies, current medications, and problem list.    Review of Systems   Constitutional: Positive for malaise/fatigue. Negative for diaphoresis and fever.   HENT: Negative for congestion, hearing loss, hoarse voice, nosebleeds and sore throat.    Eyes: Negative for photophobia, vision loss in left eye, vision loss in right eye and visual disturbance.   Cardiovascular: Positive for irregular heartbeat and palpitations. Negative for chest pain, dyspnea on exertion, leg swelling, near-syncope, orthopnea, paroxysmal nocturnal dyspnea and syncope.   Respiratory: Negative for cough, hemoptysis, shortness of breath, sleep disturbances due to breathing, snoring, sputum production and wheezing.    Endocrine: Negative for cold intolerance, heat intolerance, polydipsia, polyphagia and polyuria.   Hematologic/Lymphatic: Negative for bleeding problem. Does not bruise/bleed easily.   Skin: Negative for color change, dry skin, poor wound healing, rash and suspicious lesions.   Musculoskeletal: Negative for arthritis, back pain, falls, gout, joint pain, joint swelling, muscle cramps, muscle weakness and myalgias.   Gastrointestinal: Negative for bloating, abdominal pain, constipation, diarrhea, dysphagia, melena, nausea and vomiting.   Neurological: Negative for excessive daytime sleepiness, dizziness, headaches, light-headedness, loss of balance, numbness, paresthesias, seizures, vertigo and weakness.   Psychiatric/Behavioral: Negative for depression, memory loss and substance abuse. The patient is not nervous/anxious.        Allergies    Allergen Reactions   • Nsaids GI Bleeding     CROHN'S DISEASE   • Adhesive Tape Other (See Comments)     BLISTERS UNDER TAGADERM         Current Outpatient Medications:   •  albuterol sulfate  (90 Base) MCG/ACT inhaler, Inhale 2 puffs Every 4 (Four) Hours As Needed for Wheezing or Shortness of Air. Use with chamber, Disp: 8.5 g, Rfl: 0  •  apixaban (ELIQUIS) 5 MG tablet tablet, Take 1 tablet by mouth Every 12 (Twelve) Hours. Indications: Atrial Fibrillation, Disp: 60 tablet, Rfl: 1  •  B Complex Vitamins (VITAMIN B COMPLEX PO), Take 1 tablet by mouth Daily., Disp: , Rfl:   •  cholecalciferol (VITAMIN D3) 25 MCG (1000 UT) tablet, Take 4 tablets by mouth Daily., Disp: , Rfl:   •  CRANBERRY EXTRACT PO, Take 1 capsule by mouth Daily., Disp: , Rfl:   •  dilTIAZem (Cardizem) 30 MG tablet, Take 1 tablet by mouth 3 (Three) Times a Day., Disp: 90 tablet, Rfl: 6  •  furosemide (LASIX) 20 MG tablet, Take 1 tablet by mouth Daily. Take 1 tab in the morning and 1/2 tab at 2 pm (Patient taking differently: Take 1 tablet by mouth Daily. 1.5 TAB DAILY), Disp: 135 tablet, Rfl: 3  •  HYDROcodone-acetaminophen (NORCO) 5-325 MG per tablet, Take 1 tablet by mouth 2 (Two) Times a Day As Needed for Severe Pain., Disp: 10 tablet, Rfl: 0  •  Loratadine-D 24HR  MG per 24 hr tablet, TAKE ONE TABLET BY MOUTH DAILY, Disp: 30 tablet, Rfl: 3  •  MEGARED OMEGA-3 KRILL OIL PO, Take 1 capsule by mouth Daily., Disp: , Rfl:   •  metoprolol succinate XL (TOPROL-XL) 100 MG 24 hr tablet, Take 1 tablet by mouth Daily. Takes a half tablet twice daily, Disp: , Rfl:   •  omeprazole (priLOSEC) 40 MG capsule, Take 1 capsule by mouth Daily., Disp: 90 capsule, Rfl: 3  •  PROBIOTIC PRODUCT PO, Take 2 capsules by mouth Daily., Disp: , Rfl:   •  rosuvastatin (Crestor) 5 MG tablet, Take 1 tablet by mouth Daily., Disp: 90 tablet, Rfl: 2  •  sacubitril-valsartan (Entresto) 24-26 MG tablet, Take 1 tablet by mouth 2 (Two) Times a Day., Disp: 180 tablet,  "Rfl: 3       Objective:     Vitals:    04/12/23 1328   BP: 132/80   Pulse: 102   Resp: 16   SpO2: 96%   Weight: 81.6 kg (180 lb)   Height: 167.6 cm (66\")     Body mass index is 29.05 kg/m².      Vitals reviewed.   Constitutional:       General: Not in acute distress.     Appearance: Well-developed and not in distress.   Eyes:      General:         Right eye: No discharge.         Left eye: No discharge.      Conjunctiva/sclera: Conjunctivae normal.   HENT:      Head: Normocephalic and atraumatic.      Right Ear: External ear normal.      Left Ear: External ear normal.      Nose: Nose normal.   Neck:      Thyroid: No thyromegaly.      Vascular: No JVD.      Trachea: No tracheal deviation.      Lymphadenopathy: No cervical adenopathy.   Pulmonary:      Effort: Pulmonary effort is normal. No respiratory distress.      Breath sounds: Normal breath sounds. No wheezing. No rales.   Chest:      Chest wall: Not tender to palpatation.   Cardiovascular:      Normal rate. Frequent ectopic beats. Irregularly irregular rhythm.      No gallop.   Pulses:     Intact distal pulses.   Edema:     Peripheral edema absent.   Abdominal:      General: There is no distension.      Palpations: Abdomen is soft.      Tenderness: There is no abdominal tenderness.   Musculoskeletal: Normal range of motion.         General: No tenderness or deformity.      Cervical back: Normal range of motion and neck supple. Skin:     General: Skin is warm and dry.      Findings: No erythema or rash.   Neurological:      Mental Status: Alert and oriented to person, place, and time.      Coordination: Coordination normal.   Psychiatric:         Attention and Perception: Attention normal.         Behavior: Behavior normal.         Thought Content: Thought content normal.         Judgment: Judgment normal.               ECG 12 Lead    Date/Time: 4/12/2023 1:37 PM  Performed by: Nan Brian APRN  Authorized by: Nan Brian APRN   Comparison: compared with " previous ECG from 3/15/2023  Similar to previous ECG  Rhythm: atrial fibrillation  Ectopy: bigeminy  Rate: normal  Conduction: right bundle branch block  ST Segments: ST segments normal  T Waves: T waves normal  QRS axis: left  Other findings: left ventricular hypertrophy    Clinical impression: abnormal EKG              Assessment:       Diagnosis Plan   1. Coronary artery disease involving native coronary artery of native heart without angina pectoris        2. History of myocardial infarction        3. NICM (nonischemic cardiomyopathy)        4. Chronic systolic congestive heart failure        5. New onset atrial fibrillation        6. Primary hypertension        7. Other hyperlipidemia               Plan:       1.  Newly diagnosed symptomatic atrial fibrillation with rapid ventricular response-  She has been started on Eliquis.  Her [id number] score is 6.  Limited echo shows EF 51-50% with Left ventricular diastolic function is consistent with (grade II w/high LAP) pseudonormalization.  She cannot tolerate carvedilol secondary to shortness of breath.  She states she actually feels fairly decent on the metoprolol and states that makes a big difference.  She cannot tolerate taking the diltiazem 30 mg every 8 hours.  She has just been taking 30 mg twice daily.  We are going to increase that up to 60 mg twice daily for better heart rate control.    2.  CHF - appears euvolemic     3.  Prior history of nonischemic cardiomyopathy on Coreg , Entresto and oral Lasix .  Echho in December with low normal left ventricular ejection fraction.  Repeat echo from 2/14/2023 showed recovery of function with an EF 51 to 55%.     4.  History of nonobstructive coronary artery-denies angina     5.  Essential hypertension -well-controlled     6. CKD -Labs from 2/20/2023 so a creatinine of 1.12.     7.  Hypercholesterolemia -continue lipid-lowering therapy, currently on rosuvastatin 5 mg daily.     8.  Corona virus - into right lower  lobe pneumonia for which she received antibiotics and short course of steroids.   Recovered    Increase diltiazem to 60 mg twice daily.  RTO in 2 weeks for BP and EKG check       As always, it has been a pleasure to participate in your patient's care. Thank you.       Sincerely,       PATI [facility]      Current Outpatient Medications:   •  albuterol sulfate  (90 Base) MCG/ACT inhaler, Inhale 2 puffs Every 4 (Four) Hours As Needed for Wheezing or Shortness of Air. Use with chamber, Disp: 8.5 g, Rfl: 0  •  apixaban (ELIQUIS) 5 MG tablet tablet, Take 1 tablet by mouth Every 12 (Twelve) Hours. Indications: Atrial Fibrillation, Disp: 60 tablet, Rfl: 1  •  B Complex Vitamins (VITAMIN B COMPLEX PO), Take 1 tablet by mouth Daily., Disp: , Rfl:   •  cholecalciferol (VITAMIN D3) 25 MCG (1000 UT) tablet, Take 4 tablets by mouth Daily., Disp: , Rfl:   •  CRANBERRY EXTRACT PO, Take 1 capsule by mouth Daily., Disp: , Rfl:   •  dilTIAZem (Cardizem) 30 MG tablet, Take 2 tablets by mouth 2 (Two) Times a Day., Disp: 120 tablet, Rfl: 6  •  furosemide (LASIX) 20 MG tablet, Take 1 tablet by mouth Daily. Take 1 tab in the morning and 1/2 tab at 2 pm (Patient taking differently: Take 1 tablet by mouth Daily. 1.5 TAB DAILY), Disp: 135 tablet, Rfl: 3  •  HYDROcodone-acetaminophen (NORCO) 5-325 MG per tablet, Take 1 tablet by mouth 2 (Two) Times a Day As Needed for Severe Pain., Disp: 10 tablet, Rfl: 0  •  Loratadine-D 24HR  MG per 24 hr tablet, TAKE ONE TABLET BY MOUTH DAILY, Disp: 30 tablet, Rfl: 3  •  MEGARED OMEGA-3 KRILL OIL PO, Take 1 capsule by mouth Daily., Disp: , Rfl:   •  metoprolol succinate XL (TOPROL-XL) 100 MG 24 hr tablet, Take 1 tablet by mouth Daily. Takes a half tablet twice daily, Disp: , Rfl:   •  omeprazole (priLOSEC) 40 MG capsule, Take 1 capsule by mouth Daily., Disp: 90 capsule, Rfl: 3  •  PROBIOTIC PRODUCT PO, Take 2 capsules by mouth Daily., Disp: , Rfl:   •  rosuvastatin (Crestor) 5 MG tablet, Take  1 tablet by mouth Daily., Disp: 90 tablet, Rfl: 2  •  sacubitril-valsartan (Entresto) 24-26 MG tablet, Take 1 tablet by mouth 2 (Two) Times a Day., Disp: 180 tablet, Rfl: 3      Dictated utilizing Dragon dictation

## 2023-04-12 NOTE — PROGRESS NOTES
Date of Office Visit: 2023  Encounter Provider: PATI Eagle  Place of Service: [facility] CARDIOLOGY  Patient Name: Effie Donnelly  :1945  Primary Cardiologist: Dr. Brand    CC:  1 month follow up    Dear Dr. Hawley    HPI: Effie Donnelly is a pleasant 77 y.o. female who presents 2023 for cardiac follow up.  I have reviewed her past medical records including notes, labs and testing in preparation for today's visit.    Dr. Brand saw  her initially 2018. She was previously seen at [facility].  She has a history of CAD and myocardial infarction.  She had an echocardiogram in November that showed normal function.  She does have a history of an abnormal EKG with LVH as well as diffuse repolarization changes.  She just had an EKG performed at [facility] there was unchanged from prior EKGs.     Cath 2020: Findings:  1. Coronary Artery Anatomy:  Dominance: Left  Left Main: Angiographically normal  Left Anterior Descending: The LAD is somewhat small in caliber size.  Contains a 30 to 40% proximal segment stenosis followed by luminal irregularities distally.  There are no appreciable diagonals from the LAD.  Circumflex Artery: Large in caliber size.  Luminal irregularities throughout supplies a mid and inferior marginal branch vessels.  Also supplies a left-sided PDA which contains luminal regularities.  Ramus: Large ramus almost takes over for LAD and is branching and bifurcating.  Luminal irregularities throughout  Right Coronary Artery: Nondominant  2. Hemodynamics:  Right Atrium: 8/5/4 mmHg  Right Ventricle: 31/2/5 mmHg  Pulmonary Artery: 34/11/23 mmHg  Pulmonary Wedge: 22/21/20 mmHg  Left Ventricle: 184/16/23 mmHg  Aorta: 178/64/107 mmHg  Cardiac Output/Index: 3.53 L/min 1.92 L/min/m2  PA Sat: 66 %  AO Sat: 96 %   3. Left Ventriculogram:  Ejection Fraction: 20 %  Wall Motion: Global  Mitral Regurgitation: None   Conclusions:  1. Left dominant  system with diminutive LAD which may explain her stress test findings but otherwise nonischemic cardiomyopathy with only a 30 to 40% proximal LAD stenoses.  2. Currently elevated left-sided filling pressures with EDP of 23 mmHg  3. Currently reduced cardiac output and index of 3.5 and 1.9     I saw her 2/25/2022 for 3-month follow-up.  She denies any chest pain chest pressure.  She states her shortness of breath remains the same is been unchanged for the past year and a half.  She continues to complain of fatigue.  She has not had any dizziness, lightheadedness, syncope or presyncopal episodes.  She denies any lower extremity edema.  She denies any palpitations, she states she cannot feel her heart racing or skipping.  She states over the last 2 weeks she has been fainting in the mornings.  She states is associated with high blood sugar.  She states she self decreased her carvedilol to 12.5 mg twice a day and all of the symptoms went away.  I have asked her to follow-up with you about her blood sugars.  Her blood pressure today is within normal limits.  She states she has a cold and feels like she is congested in her chest.  She does have some mild crackles in her lung bases.  I have reviewed her recent labs.  Her most recent labs were from 2/8/2022.  CMP revealed a total glucose of 64, sodium 142, potassium 4.6 and a creatinine 1.22.  She had labs performed on January 11 that showed a CMP with a glucose of 86, sodium 140, potassium 4.3 and her creatinine up to 1.38.  TSH 3.850.  CBC unremarkable.  Lipid profile showed total cholesterol 224, HDL 67, , triglycerides 106.  Of note in December 2021, CMP showed a glucose of 93 and a creatinine of 1.17. I increased her lasix and ordered an echo.      3/2/2022 echo Interpretation Summary  • The left ventricular cavity is moderately dilated.  • Left ventricular ejection fraction appears to be 56 - 60%. Left ventricular systolic function is normal.  • There is  moderate concentric left ventricular hypertrophy. There is severe apical hypertrophy of the apical segments. 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  • Normal right ventricular cavity size and systolic function noted.  • The left atrial cavity is severely dilated.  • Mild tricuspid valve regurgitation is present.  • Calculated right ventricular systolic pressure from tricuspid regurgitation is 25 mmHg.  • There is mild pulmonic valve regurgitation present.  • There is no evidence of pericardial effusion     She did have labs 4/20/2022 that showed CMP with a creatinine 1.35, TSH 5.340.  Lipid panel revealed total cholesterol 220, HDL 56,  and triglycerides 118.  CBC unremarkable.     She had a repeat BMP on 5/17/2022 that showed a creatinine of 1.45.     I saw her in May 2022 for 3-month follow-up.  She states after 2 days of the increased in the Lasix, she had a lot of back pain so she stopped it.  She is just back to the 20 mg of Lasix daily.  Has noticed above, her creatinine did go up even on the lower dose of the Lasix.  She is taking her medications as directed.  Her blood pressure is well controlled.  She denies any palpitations, lower extremity edema, dizziness or lightheadedness.  She has not had any syncope or presyncopal episodes.  She denies any chest pain or chest pressure.  She does complain of fatigue and chronic shortness of breath.  That is unchanged.  She does not use her CPAP, she does have 1 at home but states she feels like she does not need it.  She denies any snoring, orthopnea, PND or daytime sleepiness.  I did discuss the correlation between untreated sleep apnea and cardiomyopathy.     She had appointment with Dr. Brand in December which was turned into a telehealth secondary to direct exposure from a family member living in her home.  She stated that at the time, she did not have  any COVID symptoms.  She is fully vaccinated and boosted.  She had issues with pneumonia at the beginning of the month but tested negative for COVID at that time.  She still very short of breath, that has been ongoing.  No chest pain or chest discomfort.  She had a chest x-ray at the beginning of the month that showed no infiltrates, no evidence of congestive heart failure.  She was getting very short of breath with activity even before she had pneumonia.  He did order an echo.     12/22/2022  Interpretation Summary  •  Left ventricular systolic function is normal.  •  Left ventricular wall thickness is consistent with mild concentric hypertrophy.  •  Left ventricular diastolic function was normal.  •  There is mild, bileaflet mitral valve thickening present.  •  Estimated right ventricular systolic pressure from tricuspid regurgitation is normal (<35 mmHg).     She presented to the ED on 12/13/2023 with shortness of breath and not feeling well.  She was found to be in atrial fibrillation with RVR, this was a new diagnosis.  She did receive IV diltiazem in the emergency room.  She was transitioned to oral Lopressor and was placed on Eliquis as her [id number] score was 6.  Her heart rate responded well and she was titrated to metoprolol 100 mg twice daily.  She remained on her sacubitril-valsartan and her regular home regimen.  Aspirin was discontinued as she is now on Eliquis given attempt to decrease her bleeding risk.  She was also found to be positive for acute coronavirus infection and she was treated symptomatically.  She was satting well on room air and did not require oxygen.     2/14/2023 Interpretation Summary  •  Left ventricular ejection fraction appears to be 51 - 55%.  •  Sigmoid-shaped ventricular septum is present.Prominent left ventricular false tendon.  •  Left ventricular diastolic function is consistent with (grade II w/high LAP) pseudonormalization.  •  Estimated right ventricular systolic  pressure from tricuspid regurgitation is mildly elevated (35-45 mmHg).     She had to return back to the ED on with continued shortness of air and productive cough.  She was diagnosed with right lower lobe pneumonia and started on antibiotics and a short course of steroids.     I saw her 2/22/2023 in follow-up.  She states she is feeling a lot better.  She still has some shortness of breath and fatigue but that is improving.  She felt a lot better after getting the antibiotic and steroids.  She cannot really feel her heart racing or skipping.  Again she does feel some shortness of air and tiredness.  She denies any lower extremity edema, she has not had any dizziness, lightheadedness, syncope or presyncopal episodes.  She denies any chest pain or chest pressure.  She states she has DELORES but cannot tolerate the mask.  Epic states that she is on Lopressor 75 mg twice daily, patient does not know what dose of medicine she is on, however, discharge stated she was supposed to take Lopressor 100 twice a day.  She is still mildly tachycardic with a heart rate of 105 and remains in atrial fibrillation.  I have sent in a prescription for Lopressor 100 mg twice daily.     I saw her 3/8/2023 for 2-week follow-up.  She denies any palpitations or lower extremity edema.  She feels short of breath, dizzy and fatigued.  She states that is worse in the last 2 weeks.  She denies any chest pain or chest pressure.  She is compliant with her CPAP.  She feels terrible.  She is not tolerating the metoprolol.  She felt so bad last night she did not take it at all because she states it wakes her up at night.  She appears weak and fatigued today.   She is not tolerating the metoprolol, we will stop that.  She wants to go back and try carvedilol and her diltiazem which we will do.       I saw her 3/15/2023  for 1 week follow-up.  She states she cannot tolerate the Coreg as it made her short of breath so she went back to the metoprolol.  She is  doing well on the diltiazem.  She denies any palpitations, she cannot feel her heart racing or skipping.  She denies any lower extremity edema, dizziness or lightheadedness.  She has not had any syncope or presyncopal episodes.  She denies any chest pain or chest pressure or fatigue.  She is compliant with her CPAP.  She does still have shortness of breath with exertion.  She remains in atrial fibrillation with a heart rate of 90.  She is having frequent PVCs.  She does state, however, that she feels so much better on the diltiazem.    She is still feeling palpitations and fatigue mostly.  She really has not had any shortness of breath but she is very bothered with fatigue with the increased heart rate.  She denies any lower extremity edema, dizziness or lightheadedness.  She has not had any chest pain or chest pressure.  She is taking her medications as directed but she states she could not take the diltiazem every 8 hours because it upset her belly.     Past Medical History:   Diagnosis Date   • CAD (coronary artery disease)    • Cancer     skin, right breast   • CHF (congestive heart failure)    • Coronary artery disease involving native coronary artery of native heart without angina pectoris 11/1/2018   • Heart murmur    • History of myocardial infarction 11/1/2018   • Hypertension    • Myocardial infarction     PER PT    • NICM (nonischemic cardiomyopathy) 7/8/2020   • DELORES on CPAP     AHI 15/h   • Sinus tachycardia    • SOB (shortness of breath)        Past Surgical History:   Procedure Laterality Date   • BUNIONECTOMY Bilateral    • CARDIAC CATHETERIZATION     • CARDIAC CATHETERIZATION N/A 01/14/2020    Procedure: Right and Left Heart Cath;  Surgeon: Bin Chacko MD;  Location: [facility] CATH INVASIVE LOCATION;  Service: Cardiovascular   • CARDIAC CATHETERIZATION N/A 01/14/2020    Procedure: Coronary angiography;  Surgeon: Bin Chacko MD;  Location: [facility] CATH INVASIVE LOCATION;  Service:  Cardiovascular   • CARDIAC CATHETERIZATION N/A 2020    Procedure: Left ventriculography;  Surgeon: Bin Chacko MD;  Location: [facility] CATH INVASIVE LOCATION;  Service: Cardiovascular   • CATARACT EXTRACTION Left 2022   • COLONOSCOPY N/A 2022    Procedure: COLONOSCOPY WITH BIOPSIES; POLYPECTOMY;  Surgeon: Lorne Esquivel MD;  Location:  [facility] OR;  Service: Gastroenterology;  Laterality: N/A;  POLYP  ULCERATIVE COLITIS   • ENDOSCOPY N/A 2022    Procedure: ESOPHAGOGASTRODUODENOSCOPY WITH BIOPSIES;  Surgeon: Lorne Esquivel MD;  Location:  [facility] OR;  Service: Gastroenterology;  Laterality: N/A;  GASTRITIS  ESOPHAGEAL REFLUX  HIATAL HERNIA  ESOPHAGEAL CANDIDIASIS   • MASTECTOMY COMPLETE / SIMPLE  2019   • SHOULDER SURGERY Right    • SKIN CANCER EXCISION      ABOVE THE LIP.        Social History     Socioeconomic History   • Marital status:    • Number of children: 5   Tobacco Use   • Smoking status: Former     Types: Cigarettes     Quit date: 1973     Years since quittin.4   • Smokeless tobacco: Never   • Tobacco comments:     CAFFEINE USE: 1 CUP DAILY   Vaping Use   • Vaping Use: Never used   Substance and Sexual Activity   • Alcohol use: No   • Drug use: No   • Sexual activity: Defer       Family History   Problem Relation Age of Onset   • Hypertension Sister    • Cancer Sister    • Heart disease Brother 65        VALVE REPLACEMENT    • Hypertension Brother    • Hypertension Sister    • Hypertension Sister    • Hypertension Brother    • Diabetes Brother    • Hypertension Brother    • Cancer Brother    • Hypertension Brother    • Hypertension Brother    • Hypertension Brother        The following portion of the patient's history were reviewed and updated as appropriate: past medical history, past surgical history, past social history, past family history, allergies, current medications, and problem list.    Review of Systems   Constitutional:  Positive for malaise/fatigue. Negative for diaphoresis and fever.   HENT: Negative for congestion, hearing loss, hoarse voice, nosebleeds and sore throat.    Eyes: Negative for photophobia, vision loss in left eye, vision loss in right eye and visual disturbance.   Cardiovascular: Positive for irregular heartbeat and palpitations. Negative for chest pain, dyspnea on exertion, leg swelling, near-syncope, orthopnea, paroxysmal nocturnal dyspnea and syncope.   Respiratory: Negative for cough, hemoptysis, shortness of breath, sleep disturbances due to breathing, snoring, sputum production and wheezing.    Endocrine: Negative for cold intolerance, heat intolerance, polydipsia, polyphagia and polyuria.   Hematologic/Lymphatic: Negative for bleeding problem. Does not bruise/bleed easily.   Skin: Negative for color change, dry skin, poor wound healing, rash and suspicious lesions.   Musculoskeletal: Negative for arthritis, back pain, falls, gout, joint pain, joint swelling, muscle cramps, muscle weakness and myalgias.   Gastrointestinal: Negative for bloating, abdominal pain, constipation, diarrhea, dysphagia, melena, nausea and vomiting.   Neurological: Negative for excessive daytime sleepiness, dizziness, headaches, light-headedness, loss of balance, numbness, paresthesias, seizures, vertigo and weakness.   Psychiatric/Behavioral: Negative for depression, memory loss and substance abuse. The patient is not nervous/anxious.        Allergies   Allergen Reactions   • Nsaids GI Bleeding     CROHN'S DISEASE   • Adhesive Tape Other (See Comments)     BLISTERS UNDER TAGADERM         Current Outpatient Medications:   •  albuterol sulfate  (90 Base) MCG/ACT inhaler, Inhale 2 puffs Every 4 (Four) Hours As Needed for Wheezing or Shortness of Air. Use with chamber, Disp: 8.5 g, Rfl: 0  •  apixaban (ELIQUIS) 5 MG tablet tablet, Take 1 tablet by mouth Every 12 (Twelve) Hours. Indications: Atrial Fibrillation, Disp: 60 tablet,  "Rfl: 1  •  B Complex Vitamins (VITAMIN B COMPLEX PO), Take 1 tablet by mouth Daily., Disp: , Rfl:   •  cholecalciferol (VITAMIN D3) 25 MCG (1000 UT) tablet, Take 4 tablets by mouth Daily., Disp: , Rfl:   •  CRANBERRY EXTRACT PO, Take 1 capsule by mouth Daily., Disp: , Rfl:   •  dilTIAZem (Cardizem) 30 MG tablet, Take 1 tablet by mouth 3 (Three) Times a Day., Disp: 90 tablet, Rfl: 6  •  furosemide (LASIX) 20 MG tablet, Take 1 tablet by mouth Daily. Take 1 tab in the morning and 1/2 tab at 2 pm (Patient taking differently: Take 1 tablet by mouth Daily. 1.5 TAB DAILY), Disp: 135 tablet, Rfl: 3  •  HYDROcodone-acetaminophen (NORCO) 5-325 MG per tablet, Take 1 tablet by mouth 2 (Two) Times a Day As Needed for Severe Pain., Disp: 10 tablet, Rfl: 0  •  Loratadine-D 24HR  MG per 24 hr tablet, TAKE ONE TABLET BY MOUTH DAILY, Disp: 30 tablet, Rfl: 3  •  MEGARED OMEGA-3 KRILL OIL PO, Take 1 capsule by mouth Daily., Disp: , Rfl:   •  metoprolol succinate XL (TOPROL-XL) 100 MG 24 hr tablet, Take 1 tablet by mouth Daily. Takes a half tablet twice daily, Disp: , Rfl:   •  omeprazole (priLOSEC) 40 MG capsule, Take 1 capsule by mouth Daily., Disp: 90 capsule, Rfl: 3  •  PROBIOTIC PRODUCT PO, Take 2 capsules by mouth Daily., Disp: , Rfl:   •  rosuvastatin (Crestor) 5 MG tablet, Take 1 tablet by mouth Daily., Disp: 90 tablet, Rfl: 2  •  sacubitril-valsartan (Entresto) 24-26 MG tablet, Take 1 tablet by mouth 2 (Two) Times a Day., Disp: 180 tablet, Rfl: 3        Objective:     Vitals:    04/12/23 1328   BP: 132/80   Pulse: 102   Resp: 16   SpO2: 96%   Weight: 81.6 kg (180 lb)   Height: 167.6 cm (66\")     Body mass index is 29.05 kg/m².      Vitals reviewed.   Constitutional:       General: Not in acute distress.     Appearance: Well-developed and not in distress.   Eyes:      General:         Right eye: No discharge.         Left eye: No discharge.      Conjunctiva/sclera: Conjunctivae normal.   HENT:      Head: Normocephalic and " atraumatic.      Right Ear: External ear normal.      Left Ear: External ear normal.      Nose: Nose normal.   Neck:      Thyroid: No thyromegaly.      Vascular: No JVD.      Trachea: No tracheal deviation.      Lymphadenopathy: No cervical adenopathy.   Pulmonary:      Effort: Pulmonary effort is normal. No respiratory distress.      Breath sounds: Normal breath sounds. No wheezing. No rales.   Chest:      Chest wall: Not tender to palpatation.   Cardiovascular:      Normal rate. Frequent ectopic beats. Irregularly irregular rhythm.      No gallop.   Pulses:     Intact distal pulses.   Edema:     Peripheral edema absent.   Abdominal:      General: There is no distension.      Palpations: Abdomen is soft.      Tenderness: There is no abdominal tenderness.   Musculoskeletal: Normal range of motion.         General: No tenderness or deformity.      Cervical back: Normal range of motion and neck supple. Skin:     General: Skin is warm and dry.      Findings: No erythema or rash.   Neurological:      Mental Status: Alert and oriented to person, place, and time.      Coordination: Coordination normal.   Psychiatric:         Attention and Perception: Attention normal.         Behavior: Behavior normal.         Thought Content: Thought content normal.         Judgment: Judgment normal.               ECG 12 Lead    Date/Time: 4/12/2023 1:37 PM  Performed by: Nan Brian APRN  Authorized by: Nan Brian APRN   Comparison: compared with previous ECG from 3/15/2023  Similar to previous ECG  Rhythm: atrial fibrillation  Ectopy: [email or web address]  Rate: normal  Conduction: right bundle branch block  ST Segments: ST segments normal  T Waves: T waves normal  QRS axis: left  Other findings: left ventricular hypertrophy    Clinical impression: abnormal EKG              Assessment:       Diagnosis Plan   1. Coronary artery disease involving native coronary artery of native heart without angina pectoris        2. History of myocardial  infarction        3. NICM (nonischemic cardiomyopathy)        4. Chronic systolic congestive heart failure        5. New onset atrial fibrillation        6. Primary hypertension        7. Other hyperlipidemia               Plan:       1.  Newly diagnosed symptomatic atrial fibrillation with rapid ventricular response-  She has been started on Eliquis.  Her [id number] score is 6.  Limited echo shows EF 51-50% with Left ventricular diastolic function is consistent with (grade II w/high LAP) pseudonormalization.  She cannot tolerate carvedilol secondary to shortness of breath.  She states she actually feels fairly decent on the metoprolol and states that makes a big difference.  She cannot tolerate taking the diltiazem 30 mg every 8 hours.  She has just been taking 30 mg twice daily.  We are going to increase that up to 60 mg twice daily for better heart rate control.    2.  CHF - appears euvolemic     3.  Prior history of nonischemic cardiomyopathy on Coreg , Entresto and oral Lasix .  Echho in December with low normal left ventricular ejection fraction.  Repeat echo from 2/14/2023 showed recovery of function with an EF 51 to 55%.     4.  History of nonobstructive coronary artery-denies angina     5.  Essential hypertension -well-controlled     6. CKD -Labs from 2/20/2023 so a creatinine of 1.12.     7.  Hypercholesterolemia -continue lipid-lowering therapy, currently on rosuvastatin 5 mg daily.     8.  Corona virus - into right lower lobe pneumonia for which she received antibiotics and short course of steroids.   Recovered    Increase diltiazem to 60 mg twice daily.  RTO in 2 weeks for BP and EKG check       As always, it has been a pleasure to participate in your patient's care. Thank you.       Sincerely,       PATI Eagle      Current Outpatient Medications:   •  albuterol sulfate  (90 Base) MCG/ACT inhaler, Inhale 2 puffs Every 4 (Four) Hours As Needed for Wheezing or Shortness of Air. Use with  chamber, Disp: 8.5 g, Rfl: 0  •  apixaban (ELIQUIS) 5 MG tablet tablet, Take 1 tablet by mouth Every 12 (Twelve) Hours. Indications: Atrial Fibrillation, Disp: 60 tablet, Rfl: 1  •  B Complex Vitamins (VITAMIN B COMPLEX PO), Take 1 tablet by mouth Daily., Disp: , Rfl:   •  cholecalciferol (VITAMIN D3) 25 MCG (1000 UT) tablet, Take 4 tablets by mouth Daily., Disp: , Rfl:   •  CRANBERRY EXTRACT PO, Take 1 capsule by mouth Daily., Disp: , Rfl:   •  dilTIAZem (Cardizem) 30 MG tablet, Take 2 tablets by mouth 2 (Two) Times a Day., Disp: 120 tablet, Rfl: 6  •  furosemide (LASIX) 20 MG tablet, Take 1 tablet by mouth Daily. Take 1 tab in the morning and 1/2 tab at 2 pm (Patient taking differently: Take 1 tablet by mouth Daily. 1.5 TAB DAILY), Disp: 135 tablet, Rfl: 3  •  HYDROcodone-acetaminophen (NORCO) 5-325 MG per tablet, Take 1 tablet by mouth 2 (Two) Times a Day As Needed for Severe Pain., Disp: 10 tablet, Rfl: 0  •  Loratadine-D 24HR  MG per 24 hr tablet, TAKE ONE TABLET BY MOUTH DAILY, Disp: 30 tablet, Rfl: 3  •  MEGARED OMEGA-3 KRILL OIL PO, Take 1 capsule by mouth Daily., Disp: , Rfl:   •  metoprolol succinate XL (TOPROL-XL) 100 MG 24 hr tablet, Take 1 tablet by mouth Daily. Takes a half tablet twice daily, Disp: , Rfl:   •  omeprazole (priLOSEC) 40 MG capsule, Take 1 capsule by mouth Daily., Disp: 90 capsule, Rfl: 3  •  PROBIOTIC PRODUCT PO, Take 2 capsules by mouth Daily., Disp: , Rfl:   •  rosuvastatin (Crestor) 5 MG tablet, Take 1 tablet by mouth Daily., Disp: 90 tablet, Rfl: 2  •  sacubitril-valsartan (Entresto) 24-26 MG tablet, Take 1 tablet by mouth 2 (Two) Times a Day., Disp: 180 tablet, Rfl: 3      Dictated utilizing Dragon dictation

## 2023-04-17 RX ORDER — SACUBITRIL AND VALSARTAN 24; 26 MG/1; MG/1
TABLET, FILM COATED ORAL
Qty: 180 TABLET | Refills: 3 | Status: SHIPPED | OUTPATIENT
Start: 2023-04-17

## 2023-04-26 ENCOUNTER — CLINICAL SUPPORT (OUTPATIENT)
Dept: CARDIOLOGY | Facility: CLINIC | Age: 78
End: 2023-04-26
Payer: MEDICARE

## 2023-04-26 VITALS
DIASTOLIC BLOOD PRESSURE: 86 MMHG | HEART RATE: 61 BPM | SYSTOLIC BLOOD PRESSURE: 124 MMHG | RESPIRATION RATE: 16 BRPM | OXYGEN SATURATION: 94 %

## 2023-04-26 DIAGNOSIS — I48.91 NEW ONSET ATRIAL FIBRILLATION: Primary | ICD-10-CM

## 2023-04-26 PROCEDURE — 93000 ELECTROCARDIOGRAM COMPLETE: CPT | Performed by: INTERNAL MEDICINE

## 2023-04-26 NOTE — PROGRESS NOTES
Procedure     ECG 12 Lead    Date/Time: 4/26/2023 2:06 PM  Performed by: Jose Maria Brand III, MD  Authorized by: Jose Maria Brand III, MD   Comparison: compared with previous ECG   Comparison to previous ECG: Rate well controlled  Rhythm: atrial fibrillation  Ectopy: unifocal PVCs  Rate: normal  Conduction: incomplete right bundle branch block  QRS axis: left  Other findings: non-specific ST-T wave changes and left ventricular hypertrophy    Clinical impression: abnormal EKG

## 2023-04-27 RX ORDER — DILTIAZEM HYDROCHLORIDE 60 MG/1
60 TABLET, FILM COATED ORAL 2 TIMES DAILY
Qty: 60 TABLET | Refills: 3 | Status: SHIPPED | OUTPATIENT
Start: 2023-04-27

## 2023-05-15 RX ORDER — APIXABAN 5 MG/1
TABLET, FILM COATED ORAL
Qty: 60 TABLET | Refills: 1 | Status: SHIPPED | OUTPATIENT
Start: 2023-05-15

## 2023-05-15 RX ORDER — FUROSEMIDE 20 MG/1
TABLET ORAL
Qty: 135 TABLET | Refills: 3 | Status: SHIPPED | OUTPATIENT
Start: 2023-05-15

## 2023-05-16 ENCOUNTER — OFFICE VISIT (OUTPATIENT)
Dept: FAMILY MEDICINE CLINIC | Facility: CLINIC | Age: 78
End: 2023-05-16
Payer: MEDICARE

## 2023-05-16 VITALS
BODY MASS INDEX: 29.25 KG/M2 | DIASTOLIC BLOOD PRESSURE: 80 MMHG | HEART RATE: 50 BPM | WEIGHT: 182 LBS | OXYGEN SATURATION: 97 % | SYSTOLIC BLOOD PRESSURE: 124 MMHG | HEIGHT: 66 IN | RESPIRATION RATE: 18 BRPM

## 2023-05-16 DIAGNOSIS — E78.49 OTHER HYPERLIPIDEMIA: ICD-10-CM

## 2023-05-16 DIAGNOSIS — I10 PRIMARY HYPERTENSION: ICD-10-CM

## 2023-05-16 DIAGNOSIS — N18.31 STAGE 3A CHRONIC KIDNEY DISEASE: ICD-10-CM

## 2023-05-16 DIAGNOSIS — I50.22 CHRONIC SYSTOLIC CONGESTIVE HEART FAILURE: Primary | ICD-10-CM

## 2023-05-16 DIAGNOSIS — R53.82 CHRONIC FATIGUE: ICD-10-CM

## 2023-05-16 DIAGNOSIS — J01.90 ACUTE NON-RECURRENT SINUSITIS, UNSPECIFIED LOCATION: ICD-10-CM

## 2023-05-16 DIAGNOSIS — K21.9 GASTROESOPHAGEAL REFLUX DISEASE WITHOUT ESOPHAGITIS: ICD-10-CM

## 2023-05-16 RX ORDER — AZITHROMYCIN 250 MG/1
TABLET, FILM COATED ORAL
Qty: 6 TABLET | Refills: 0 | Status: SHIPPED | OUTPATIENT
Start: 2023-05-16

## 2023-05-16 NOTE — PROGRESS NOTES
Subjective   Effie Donnelly is a 77 y.o. female.     Chief Complaint   Patient presents with   • Follow-up     Meds and labs       History of Present Illness   Patient seen follow-up for chronic systolic CHF, chronic fatigue, primary hypertension, hyperlipidemia, GERD, chronic kidney disease stage III, new onset atrial fib.  Patient reports she has seen a new cardiologist at [facility].  Her medications has been changed.  She is also going to see Dr. Abraham for her arrhythmia.  Patient currently on metoprolol 50 twice a day, she is supposed take 100 twice a day that was too much for her.  She is also on Cardizem 60 twice a day.  On Eliquis now.  Reports she was on Coreg that was causing her to be more weaker.  The following portions of the patient's history were reviewed and updated as appropriate: allergies, current medications, past family history, past medical history, past social history, past surgical history and problem list.    Review of Systems   Constitutional: Positive for fatigue. Negative for activity change, appetite change and fever.   Eyes: Negative for blurred vision and double vision.   Respiratory: Negative.  Negative for shortness of breath.    Cardiovascular: Negative for chest pain, palpitations and leg swelling.   Gastrointestinal: Negative.    Genitourinary: Negative for hematuria.   Neurological: Negative for dizziness, syncope, light-headedness and headache.   Psychiatric/Behavioral: Negative for self-injury and suicidal ideas. The patient is not nervous/anxious.        Allergies   Allergen Reactions   • Nsaids GI Bleeding     CROHN'S DISEASE   • Adhesive Tape Other (See Comments)     BLISTERS UNDER TAGADERM       Current Outpatient Medications on File Prior to Visit   Medication Sig Dispense Refill   • albuterol sulfate  (90 Base) MCG/ACT inhaler Inhale 2 puffs Every 4 (Four) Hours As Needed for Wheezing or Shortness of Air. Use with chamber 8.5 g 0   • B Complex Vitamins (VITAMIN B  COMPLEX PO) Take 1 tablet by mouth Daily.     • cholecalciferol (VITAMIN D3) 25 MCG (1000 UT) tablet Take 4 tablets by mouth Daily.     • CRANBERRY EXTRACT PO Take 1 capsule by mouth Daily.     • dilTIAZem (CARDIZEM) 60 MG tablet Take 1 tablet by mouth 2 (Two) Times a Day. 60 tablet 3   • Eliquis 5 MG tablet tablet TAKE ONE TABLET BY MOUTH EVERY 12 HOURS 60 tablet 1   • Entresto 24-26 MG tablet TAKE ONE TABLET BY MOUTH TWICE A  tablet 3   • furosemide (LASIX) 20 MG tablet TAKE ONE TABLET BY MOUTH EVERY MORNING AND TAKE 1/2 TABLET BY MOUTH AT 2 P.M. 135 tablet 3   • HYDROcodone-acetaminophen (NORCO) 5-325 MG per tablet Take 1 tablet by mouth 2 (Two) Times a Day As Needed for Severe Pain. 10 tablet 0   • Loratadine-D 24HR  MG per 24 hr tablet TAKE ONE TABLET BY MOUTH DAILY 30 tablet 3   • MEGARED OMEGA-3 KRILL OIL PO Take 1 capsule by mouth Daily.     • metoprolol succinate XL (TOPROL-XL) 100 MG 24 hr tablet Take 1 tablet by mouth Daily. Takes a half tablet twice daily     • omeprazole (priLOSEC) 40 MG capsule Take 1 capsule by mouth Daily. 90 capsule 3   • PROBIOTIC PRODUCT PO Take 2 capsules by mouth Daily.     • rosuvastatin (Crestor) 5 MG tablet Take 1 tablet by mouth Daily. 90 tablet 2     No current facility-administered medications on file prior to visit.       Family History   Problem Relation Age of Onset   • Hypertension Sister    • Cancer Sister    • Heart disease Brother 65        VALVE REPLACEMENT    • Hypertension Brother    • Hypertension Sister    • Hypertension Sister    • Hypertension Brother    • Diabetes Brother    • Hypertension Brother    • Cancer Brother    • Hypertension Brother    • Hypertension Brother    • Hypertension Brother        Past Medical History:   Diagnosis Date   • CAD (coronary artery disease)    • Cancer     skin, right breast   • CHF (congestive heart failure)    • Coronary artery disease involving native coronary artery of native heart without angina pectoris  2018   • Heart murmur    • History of myocardial infarction 2018   • Hypertension    • Myocardial infarction     PER PT    • NICM (nonischemic cardiomyopathy) 2020   • DELORES on CPAP     AHI 15/h   • Sinus tachycardia    • SOB (shortness of breath)        Past Surgical History:   Procedure Laterality Date   • BUNIONECTOMY Bilateral    • CARDIAC CATHETERIZATION     • CARDIAC CATHETERIZATION N/A 2020    Procedure: Right and Left Heart Cath;  Surgeon: Bin Chacko MD;  Location:  [facility];  Service: Cardiovascular   • CARDIAC CATHETERIZATION N/A 2020    Procedure: Coronary angiography;  Surgeon: Bin Chacko MD;  Location:  [facility];  Service: Cardiovascular   • CARDIAC CATHETERIZATION N/A 2020    Procedure: Left ventriculography;  Surgeon: Bin Chacko MD;  Location:  [facility];  Service: Cardiovascular   • CATARACT EXTRACTION Left 2022   • COLONOSCOPY N/A 2022    Procedure: COLONOSCOPY WITH BIOPSIES; POLYPECTOMY;  Surgeon: Lorne Esquivel MD;  Location: [facility] OR;  Service: Gastroenterology;  Laterality: N/A;  POLYP  ULCERATIVE COLITIS   • ENDOSCOPY N/A 2022    Procedure: ESOPHAGOGASTRODUODENOSCOPY WITH BIOPSIES;  Surgeon: Lorne Esquivel MD;  Location: [facility] OR;  Service: Gastroenterology;  Laterality: N/A;  GASTRITIS  ESOPHAGEAL REFLUX  HIATAL HERNIA  ESOPHAGEAL CANDIDIASIS   • MASTECTOMY COMPLETE / SIMPLE  2019   • SHOULDER SURGERY Right    • SKIN CANCER EXCISION      ABOVE THE LIP.        Social History     Socioeconomic History   • Marital status:    • Number of children: 5   Tobacco Use   • Smoking status: Former     Types: Cigarettes     Quit date: 1973     Years since quittin.5   • Smokeless tobacco: Never   • Tobacco comments:     CAFFEINE USE: 1 CUP DAILY   Vaping Use   • Vaping Use: Never used   Substance and Sexual Activity   • Alcohol  "use: No   • Drug use: No   • Sexual activity: Defer       Patient Active Problem List   Diagnosis   • History of myocardial infarction   • Coronary artery disease involving native coronary artery of native heart without angina pectoris   • Ulcerative colitis   • HTN (hypertension)   • GERD (gastroesophageal reflux disease)   • DJD (degenerative joint disease)   • Ductal 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   • Anemia   • Other hyperlipidemia   • NICM (nonischemic cardiomyopathy)   • DELORES on CPAP   • Snoring   • Class 1 obesity due to excess calories without serious comorbidity with body mass index (BMI) of 31.0 to 31.9 in adult   • Chronic systolic congestive heart failure   • Esophageal dysphagia   • Stage 3a chronic kidney disease   • New onset atrial fibrillation       /80 (BP Location: Left arm, Patient Position: Sitting)   Pulse 50   Resp 18   Ht 167.6 cm (66\")   Wt 82.6 kg (182 lb)   SpO2 97%   BMI 29.38 kg/m²   Body mass index is 29.38 kg/m².    Objective   Physical Exam  Vitals and nursing note reviewed.   Constitutional:       Appearance: She is well-developed.   Eyes:      Pupils: Pupils are equal, round, and reactive to light.   Neck:      Thyroid: No thyromegaly.      Vascular: No JVD.      Trachea: No tracheal deviation.   Cardiovascular:      Rate and Rhythm: Normal rate and regular rhythm.      Heart sounds: No murmur heard.  Pulmonary:      Effort: Pulmonary effort is normal. No respiratory distress.      Breath sounds: Normal breath sounds. No wheezing.   Abdominal:      General: Bowel sounds are normal. There is no distension.      Palpations: Abdomen is soft. There is no mass.      Tenderness: There is no abdominal tenderness. There is no right CVA tenderness, left CVA tenderness, guarding or rebound.      Hernia: No hernia is present.   Musculoskeletal:      Cervical back: Normal range of motion and neck supple.   Lymphadenopathy:      Cervical: No cervical adenopathy. "   Neurological:      General: No focal deficit present.      Mental Status: She is alert and oriented to person, place, and time.   Psychiatric:         Mood and Affect: Mood normal.           Assessment & Plan   Diagnoses and all orders for this visit:    1. Chronic systolic congestive heart failure (Primary)  -     CBC & Differential  -     Comprehensive Metabolic Panel  -     CK  -     Lipid Panel With / Chol / HDL Ratio  -     TSH  -     T4, Free    2. Chronic fatigue  -     CBC & Differential  -     Comprehensive Metabolic Panel  -     CK  -     Lipid Panel With / Chol / HDL Ratio  -     TSH  -     T4, Free    3. Primary hypertension  -     CBC & Differential  -     Comprehensive Metabolic Panel  -     CK  -     Lipid Panel With / Chol / HDL Ratio  -     TSH  -     T4, Free    4. Other hyperlipidemia  -     CBC & Differential  -     Comprehensive Metabolic Panel  -     CK  -     Lipid Panel With / Chol / HDL Ratio  -     TSH  -     T4, Free    5. Gastroesophageal reflux disease without esophagitis  -     CBC & Differential  -     Comprehensive Metabolic Panel  -     CK  -     Lipid Panel With / Chol / HDL Ratio  -     TSH  -     T4, Free    6. Stage 3a chronic kidney disease  -     CBC & Differential  -     Comprehensive Metabolic Panel  -     CK  -     Lipid Panel With / Chol / HDL Ratio  -     TSH  -     T4, Free    7. Acute non-recurrent sinusitis, unspecified location    Other orders  -     azithromycin (ZITHROMAX) 250 MG tablet; Take 2 tablets the first day, then 1 tablet daily for 4 days.  Dispense: 6 tablet; Refill: 0    Continue diet and exercise.  Continue taking all current medications.  Keep follow-up with specialist.  Z-Aubrey given for sinus infection.  Patient already on Claritin-D.  Continue Eliquis, diltiazem 60 twice a day, Lasix, Entresto, metoprolol 50 twice a day, omeprazole.  Omeprazole being her GERD symptoms.  Continue Crestor.  Patient reports that she was given another medication that she  has not started.  Does not know the name.  Discussed with patient to write down all medications, organize her medications.  She is at risk of mixing of medications.  I will see her back in 3 months time.  All problems are chronic, A-fib and CHF are still ongoing problems.  EHR dragon/transcription disclaimer:  Part of this note are created by electronic transcription/translation of spoken language to printed text and thus may lead to erroneous, or at times, nonsensical words or phrases inadvertently transcribed.  Although I have reviewed for such errors, some may still exist.

## 2023-05-17 LAB
ALBUMIN SERPL-MCNC: 4.5 G/DL (ref 3.5–5.2)
ALBUMIN/GLOB SERPL: 1.7 G/DL
ALP SERPL-CCNC: 120 U/L (ref 39–117)
ALT SERPL-CCNC: 8 U/L (ref 1–33)
AST SERPL-CCNC: <5 U/L (ref 1–32)
BASOPHILS # BLD AUTO: 0.04 10*3/MM3 (ref 0–0.2)
BASOPHILS NFR BLD AUTO: 0.9 % (ref 0–1.5)
BILIRUB SERPL-MCNC: 0.6 MG/DL (ref 0–1.2)
BUN SERPL-MCNC: 27 MG/DL (ref 8–23)
BUN/CREAT SERPL: 17 (ref 7–25)
CALCIUM SERPL-MCNC: 9.9 MG/DL (ref 8.6–10.5)
CHLORIDE SERPL-SCNC: 105 MMOL/L (ref 98–107)
CHOLEST SERPL-MCNC: 161 MG/DL (ref 0–200)
CHOLEST/HDLC SERPL: 2.52 {RATIO}
CK SERPL-CCNC: 107 U/L (ref 20–180)
CO2 SERPL-SCNC: 28.7 MMOL/L (ref 22–29)
CREAT SERPL-MCNC: 1.59 MG/DL (ref 0.57–1)
EGFRCR SERPLBLD CKD-EPI 2021: 33.5 ML/MIN/1.73
EOSINOPHIL # BLD AUTO: 0.16 10*3/MM3 (ref 0–0.4)
EOSINOPHIL NFR BLD AUTO: 3.4 % (ref 0.3–6.2)
ERYTHROCYTE [DISTWIDTH] IN BLOOD BY AUTOMATED COUNT: 13.5 % (ref 12.3–15.4)
GLOBULIN SER CALC-MCNC: 2.6 GM/DL
GLUCOSE SERPL-MCNC: 99 MG/DL (ref 65–99)
HCT VFR BLD AUTO: 47.9 % (ref 34–46.6)
HDLC SERPL-MCNC: 64 MG/DL (ref 40–60)
HGB BLD-MCNC: 15.6 G/DL (ref 12–15.9)
IMM GRANULOCYTES # BLD AUTO: 0.01 10*3/MM3 (ref 0–0.05)
IMM GRANULOCYTES NFR BLD AUTO: 0.2 % (ref 0–0.5)
LDLC SERPL CALC-MCNC: 78 MG/DL (ref 0–100)
LYMPHOCYTES # BLD AUTO: 1.85 10*3/MM3 (ref 0.7–3.1)
LYMPHOCYTES NFR BLD AUTO: 39.5 % (ref 19.6–45.3)
MCH RBC QN AUTO: 28.8 PG (ref 26.6–33)
MCHC RBC AUTO-ENTMCNC: 32.6 G/DL (ref 31.5–35.7)
MCV RBC AUTO: 88.4 FL (ref 79–97)
MONOCYTES # BLD AUTO: 0.44 10*3/MM3 (ref 0.1–0.9)
MONOCYTES NFR BLD AUTO: 9.4 % (ref 5–12)
NEUTROPHILS # BLD AUTO: 2.18 10*3/MM3 (ref 1.7–7)
NEUTROPHILS NFR BLD AUTO: 46.6 % (ref 42.7–76)
NRBC BLD AUTO-RTO: 0 /100 WBC (ref 0–0.2)
PLATELET # BLD AUTO: 252 10*3/MM3 (ref 140–450)
POTASSIUM SERPL-SCNC: 5 MMOL/L (ref 3.5–5.2)
PROT SERPL-MCNC: 7.1 G/DL (ref 6–8.5)
RBC # BLD AUTO: 5.42 10*6/MM3 (ref 3.77–5.28)
SODIUM SERPL-SCNC: 142 MMOL/L (ref 136–145)
T4 FREE SERPL-MCNC: 1.47 NG/DL (ref 0.93–1.7)
TRIGL SERPL-MCNC: 107 MG/DL (ref 0–150)
TSH SERPL DL<=0.005 MIU/L-ACNC: 4.72 UIU/ML (ref 0.27–4.2)
VLDLC SERPL CALC-MCNC: 19 MG/DL (ref 5–40)
WBC # BLD AUTO: 4.68 10*3/MM3 (ref 3.4–10.8)

## 2023-05-18 ENCOUNTER — TELEPHONE (OUTPATIENT)
Dept: FAMILY MEDICINE CLINIC | Facility: CLINIC | Age: 78
End: 2023-05-18
Payer: MEDICARE

## 2023-05-18 NOTE — TELEPHONE ENCOUNTER
----- Message from Terrence Hawley MD sent at 5/17/2023  1:08 PM EDT -----  Please call patient with results.  Chol ok. Kidney test little higher. She is already has appt with renal md.  Thyroid slightly off, not enough to start meds

## 2023-08-22 ENCOUNTER — OFFICE VISIT (OUTPATIENT)
Dept: FAMILY MEDICINE CLINIC | Facility: CLINIC | Age: 78
End: 2023-08-22
Payer: MEDICARE

## 2023-08-22 ENCOUNTER — LAB (OUTPATIENT)
Dept: LAB | Facility: HOSPITAL | Age: 78
End: 2023-08-22
Payer: MEDICARE

## 2023-08-22 VITALS
DIASTOLIC BLOOD PRESSURE: 60 MMHG | RESPIRATION RATE: 10 BRPM | OXYGEN SATURATION: 96 % | BODY MASS INDEX: 29.57 KG/M2 | TEMPERATURE: 97.4 F | SYSTOLIC BLOOD PRESSURE: 140 MMHG | HEIGHT: 66 IN | HEART RATE: 50 BPM | WEIGHT: 184 LBS

## 2023-08-22 DIAGNOSIS — I10 PRIMARY HYPERTENSION: ICD-10-CM

## 2023-08-22 DIAGNOSIS — I50.22 CHRONIC SYSTOLIC CONGESTIVE HEART FAILURE: ICD-10-CM

## 2023-08-22 DIAGNOSIS — N18.31 STAGE 3A CHRONIC KIDNEY DISEASE: ICD-10-CM

## 2023-08-22 DIAGNOSIS — E78.49 OTHER HYPERLIPIDEMIA: ICD-10-CM

## 2023-08-22 DIAGNOSIS — Z12.31 ENCOUNTER FOR SCREENING MAMMOGRAM FOR MALIGNANT NEOPLASM OF BREAST: ICD-10-CM

## 2023-08-22 DIAGNOSIS — I42.8 NICM (NONISCHEMIC CARDIOMYOPATHY): Primary | ICD-10-CM

## 2023-08-22 LAB
ALBUMIN SERPL-MCNC: 4.2 G/DL (ref 3.5–5.2)
ALBUMIN/GLOB SERPL: 1.4 G/DL
ALP SERPL-CCNC: 118 U/L (ref 39–117)
ALT SERPL W P-5'-P-CCNC: 22 U/L (ref 1–33)
ANION GAP SERPL CALCULATED.3IONS-SCNC: 11 MMOL/L (ref 5–15)
AST SERPL-CCNC: 26 U/L (ref 1–32)
BASOPHILS # BLD AUTO: 0.04 10*3/MM3 (ref 0–0.2)
BASOPHILS NFR BLD AUTO: 0.8 % (ref 0–1.5)
BILIRUB SERPL-MCNC: 0.6 MG/DL (ref 0–1.2)
BUN SERPL-MCNC: 23 MG/DL (ref 8–23)
BUN/CREAT SERPL: 15.1 (ref 7–25)
CALCIUM SPEC-SCNC: 9.7 MG/DL (ref 8.6–10.5)
CHLORIDE SERPL-SCNC: 103 MMOL/L (ref 98–107)
CHOLEST SERPL-MCNC: 165 MG/DL (ref 0–200)
CK SERPL-CCNC: 110 U/L (ref 20–180)
CO2 SERPL-SCNC: 25 MMOL/L (ref 22–29)
CREAT SERPL-MCNC: 1.52 MG/DL (ref 0.57–1)
DEPRECATED RDW RBC AUTO: 43.9 FL (ref 37–54)
EGFRCR SERPLBLD CKD-EPI 2021: 35.4 ML/MIN/1.73
EOSINOPHIL # BLD AUTO: 0.3 10*3/MM3 (ref 0–0.4)
EOSINOPHIL NFR BLD AUTO: 5.7 % (ref 0.3–6.2)
ERYTHROCYTE [DISTWIDTH] IN BLOOD BY AUTOMATED COUNT: 13.9 % (ref 12.3–15.4)
GLOBULIN UR ELPH-MCNC: 2.9 GM/DL
GLUCOSE SERPL-MCNC: 92 MG/DL (ref 65–99)
HCT VFR BLD AUTO: 44 % (ref 34–46.6)
HDLC SERPL QL: 2.46
HDLC SERPL-MCNC: 67 MG/DL (ref 40–60)
HGB BLD-MCNC: 15 G/DL (ref 12–15.9)
IMM GRANULOCYTES # BLD AUTO: 0.03 10*3/MM3 (ref 0–0.05)
IMM GRANULOCYTES NFR BLD AUTO: 0.6 % (ref 0–0.5)
LDLC SERPL CALC-MCNC: 79 MG/DL (ref 0–100)
LYMPHOCYTES # BLD AUTO: 1.42 10*3/MM3 (ref 0.7–3.1)
LYMPHOCYTES NFR BLD AUTO: 27 % (ref 19.6–45.3)
MCH RBC QN AUTO: 29.8 PG (ref 26.6–33)
MCHC RBC AUTO-ENTMCNC: 34.1 G/DL (ref 31.5–35.7)
MCV RBC AUTO: 87.3 FL (ref 79–97)
MONOCYTES # BLD AUTO: 0.54 10*3/MM3 (ref 0.1–0.9)
MONOCYTES NFR BLD AUTO: 10.3 % (ref 5–12)
NEUTROPHILS NFR BLD AUTO: 2.93 10*3/MM3 (ref 1.7–7)
NEUTROPHILS NFR BLD AUTO: 55.6 % (ref 42.7–76)
NRBC BLD AUTO-RTO: 0 /100 WBC (ref 0–0.2)
PLATELET # BLD AUTO: 230 10*3/MM3 (ref 140–450)
PMV BLD AUTO: 11.7 FL (ref 6–12)
POTASSIUM SERPL-SCNC: 4.4 MMOL/L (ref 3.5–5.2)
PROT SERPL-MCNC: 7.1 G/DL (ref 6–8.5)
RBC # BLD AUTO: 5.04 10*6/MM3 (ref 3.77–5.28)
SODIUM SERPL-SCNC: 139 MMOL/L (ref 136–145)
TRIGL SERPL-MCNC: 105 MG/DL (ref 0–150)
TSH SERPL DL<=0.05 MIU/L-ACNC: 4.1 UIU/ML (ref 0.27–4.2)
VLDLC SERPL-MCNC: 19 MG/DL (ref 5–40)
WBC NRBC COR # BLD: 5.26 10*3/MM3 (ref 3.4–10.8)

## 2023-08-22 PROCEDURE — 99214 OFFICE O/P EST MOD 30 MIN: CPT | Performed by: INTERNAL MEDICINE

## 2023-08-22 PROCEDURE — 1159F MED LIST DOCD IN RCRD: CPT | Performed by: INTERNAL MEDICINE

## 2023-08-22 PROCEDURE — 85025 COMPLETE CBC W/AUTO DIFF WBC: CPT | Performed by: INTERNAL MEDICINE

## 2023-08-22 PROCEDURE — 80053 COMPREHEN METABOLIC PANEL: CPT | Performed by: INTERNAL MEDICINE

## 2023-08-22 PROCEDURE — 80061 LIPID PANEL: CPT | Performed by: INTERNAL MEDICINE

## 2023-08-22 PROCEDURE — 1160F RVW MEDS BY RX/DR IN RCRD: CPT | Performed by: INTERNAL MEDICINE

## 2023-08-22 PROCEDURE — 3077F SYST BP >= 140 MM HG: CPT | Performed by: INTERNAL MEDICINE

## 2023-08-22 PROCEDURE — 82550 ASSAY OF CK (CPK): CPT | Performed by: INTERNAL MEDICINE

## 2023-08-22 PROCEDURE — 3078F DIAST BP <80 MM HG: CPT | Performed by: INTERNAL MEDICINE

## 2023-08-22 PROCEDURE — 84443 ASSAY THYROID STIM HORMONE: CPT | Performed by: INTERNAL MEDICINE

## 2023-08-22 RX ORDER — METOPROLOL TARTRATE 50 MG/1
50 TABLET, FILM COATED ORAL 2 TIMES DAILY
COMMUNITY

## 2023-08-22 NOTE — PROGRESS NOTES
Subjective   Effie Donnelly is a 77 y.o. female.     Chief Complaint   Patient presents with    Hypertension   Hyperlipidemia, paroxysmal atrial fibs, nonischemic cardiomyopathy, chronic kidney disease stage III, CHF.    Hypertension  Pertinent negatives include no blurred vision, chest pain, palpitations or shortness of breath.    Patient here follow-up for hypertension, hyperlipidemia, paroxysmal atrial fibs, nonischemic cardiomyopathy, chronic kidney disease, CHF.  Denies any chest pain shortness of breath.  She had appoint with nephrology she canceled it.  Patient recently had cardioversion done.  She is seeing [facility] cardiology now.  She was told by cardiology to cut down her diltiazem to 30 twice a day, however patient reports that she felt tired, weak, hurting all over so she increased back to 60 twice a day.  The following portions of the patient's history were reviewed and updated as appropriate: allergies, current medications, past family history, past medical history, past social history, past surgical history and problem list.    Review of Systems   Constitutional:  Negative for activity change, appetite change, fatigue and fever.   Eyes:  Negative for blurred vision and double vision.   Respiratory: Negative.  Negative for shortness of breath.    Cardiovascular:  Negative for chest pain, palpitations and leg swelling.   Gastrointestinal: Negative.    Genitourinary:  Negative for hematuria.   Neurological:  Negative for dizziness, syncope, light-headedness and headache.   Psychiatric/Behavioral:  Negative for agitation.      Allergies   Allergen Reactions    Nsaids GI Bleeding     CROHN'S DISEASE    Carvedilol Other (See Comments)     Syncope    Adhesive Tape Other (See Comments)     BLISTERS UNDER TAGADERM       Current Outpatient Medications on File Prior to Visit   Medication Sig Dispense Refill    albuterol sulfate  (90 Base) MCG/ACT inhaler Inhale 2 puffs Every 4 (Four) Hours As Needed for  Wheezing or Shortness of Air. Use with chamber 8.5 g 0    apixaban (Eliquis) 5 MG tablet tablet Take 1 tablet by mouth Every 12 (Twelve) Hours. 180 tablet 1    B Complex Vitamins (VITAMIN B COMPLEX PO) Take 1 tablet by mouth Daily.      cholecalciferol (VITAMIN D3) 25 MCG (1000 UT) tablet Take 4 tablets by mouth Daily.      CRANBERRY EXTRACT PO Take 1 capsule by mouth Daily.      dilTIAZem (CARDIZEM) 60 MG tablet Take 1 tablet by mouth 2 (Two) Times a Day. 60 tablet 3    Entresto 24-26 MG tablet TAKE ONE TABLET BY MOUTH TWICE A  tablet 3    furosemide (LASIX) 20 MG tablet TAKE ONE TABLET BY MOUTH EVERY MORNING AND TAKE 1/2 TABLET BY MOUTH AT 2 P.M. 135 tablet 3    Loratadine-D 24HR  MG per 24 hr tablet TAKE ONE TABLET BY MOUTH DAILY 30 tablet 3    metoprolol tartrate (LOPRESSOR) 50 MG tablet Take 1 tablet by mouth 2 (Two) Times a Day.      omeprazole (priLOSEC) 40 MG capsule Take 1 capsule by mouth Daily. 90 capsule 3    PROBIOTIC PRODUCT PO Take 2 capsules by mouth Daily.      rosuvastatin (Crestor) 5 MG tablet Take 1 tablet by mouth Daily. 90 tablet 2    azithromycin (ZITHROMAX) 250 MG tablet Take 2 tablets the first day, then 1 tablet daily for 4 days. 6 tablet 0    HYDROcodone-acetaminophen (NORCO) 5-325 MG per tablet Take 1 tablet by mouth 2 (Two) Times a Day As Needed for Severe Pain. 10 tablet 0    MEGARED OMEGA-3 KRILL OIL PO Take 1 capsule by mouth Daily.      metoprolol succinate XL (TOPROL-XL) 100 MG 24 hr tablet Take 1 tablet by mouth Daily. Takes a half tablet twice daily       No current facility-administered medications on file prior to visit.       Family History   Problem Relation Age of Onset    Hypertension Sister     Cancer Sister     Heart disease Brother 65        VALVE REPLACEMENT     Hypertension Brother     Hypertension Sister     Hypertension Sister     Hypertension Brother     Diabetes Brother     Hypertension Brother     Cancer Brother     Hypertension Brother      Hypertension Brother     Hypertension Brother        Past Medical History:   Diagnosis Date    CAD (coronary artery disease)     Cancer     skin, right breast    CHF (congestive heart failure)     Coronary artery disease involving native coronary artery of native heart without angina pectoris 11/1/2018    Heart murmur     History of myocardial infarction 11/1/2018    Hypertension     Myocardial infarction     PER PT     NICM (nonischemic cardiomyopathy) 7/8/2020    DELORES on CPAP     AHI 15/h    Sinus tachycardia     SOB (shortness of breath)        Past Surgical History:   Procedure Laterality Date    BUNIONECTOMY Bilateral     CARDIAC CATHETERIZATION      CARDIAC CATHETERIZATION N/A 01/14/2020    Procedure: Right and Left Heart Cath;  Surgeon: Bin Chacko MD;  Location:  [facility];  Service: Cardiovascular    CARDIAC CATHETERIZATION N/A 01/14/2020    Procedure: Coronary angiography;  Surgeon: Bin Chacko MD;  Location:  [facility];  Service: Cardiovascular    CARDIAC CATHETERIZATION N/A 01/14/2020    Procedure: Left ventriculography;  Surgeon: Bin Chacko MD;  Location:  [facility];  Service: Cardiovascular    CARDIOVERSION  07/12/2023    CATARACT EXTRACTION Left 04/26/2022    COLONOSCOPY N/A 03/23/2022    Procedure: COLONOSCOPY WITH BIOPSIES; POLYPECTOMY;  Surgeon: Lorne Esquivel MD;  Location: [facility] OR;  Service: Gastroenterology;  Laterality: N/A;  POLYP  ULCERATIVE COLITIS    ENDOSCOPY N/A 03/23/2022    Procedure: ESOPHAGOGASTRODUODENOSCOPY WITH BIOPSIES;  Surgeon: Lorne Esquivel MD;  Location:  [facility] OR;  Service: Gastroenterology;  Laterality: N/A;  GASTRITIS  ESOPHAGEAL REFLUX  HIATAL HERNIA  ESOPHAGEAL CANDIDIASIS    MASTECTOMY COMPLETE / SIMPLE  06/2019    SHOULDER SURGERY Right     SKIN CANCER EXCISION      ABOVE THE LIP.        Social History     Socioeconomic History    Marital status:     Number  "of children: 5   Tobacco Use    Smoking status: Former     Types: Cigarettes     Quit date: 1973     Years since quittin.8    Smokeless tobacco: Never    Tobacco comments:     CAFFEINE USE: 1 CUP DAILY   Vaping Use    Vaping Use: Never used   Substance and Sexual Activity    Alcohol use: No    Drug use: No    Sexual activity: Defer       Patient Active Problem List   Diagnosis    History of myocardial infarction    Coronary artery disease involving native coronary artery of native heart without angina pectoris    Ulcerative colitis    HTN (hypertension)    GERD (gastroesophageal reflux disease)    DJD (degenerative joint disease)    Ductal 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    Anemia    Other hyperlipidemia    NICM (nonischemic cardiomyopathy)    DELORES on CPAP    Snoring    Class 1 obesity due to excess calories without serious comorbidity with body mass index (BMI) of 31.0 to 31.9 in adult    Chronic systolic congestive heart failure    Esophageal dysphagia    Stage 3a chronic kidney disease    New onset atrial fibrillation       /60   Pulse 50   Temp 97.4 øF (36.3 øC)   Resp 10   Ht 167.6 cm (66\")   Wt 83.5 kg (184 lb)   SpO2 96%   BMI 29.70 kg/mý   Body mass index is 29.7 kg/mý.    Objective   Physical Exam  Vitals and nursing note reviewed.   Constitutional:       Appearance: She is well-developed.   Eyes:      Extraocular Movements: Extraocular movements intact.      Pupils: Pupils are equal, round, and reactive to light.   Neck:      Thyroid: No thyromegaly.      Vascular: No JVD.      Trachea: No tracheal deviation.   Cardiovascular:      Rate and Rhythm: Normal rate and regular rhythm.      Heart sounds: No murmur heard.  Pulmonary:      Effort: Pulmonary effort is normal. No respiratory distress.      Breath sounds: Normal breath sounds. No wheezing.   Abdominal:      General: Bowel sounds are normal. There is no distension.      Palpations: Abdomen is soft. There is no mass.      " Tenderness: There is no abdominal tenderness. There is no right CVA tenderness, left CVA tenderness, guarding or rebound.      Hernia: No hernia is present.   Musculoskeletal:      Cervical back: Normal range of motion and neck supple.   Lymphadenopathy:      Cervical: No cervical adenopathy.   Neurological:      General: No focal deficit present.      Mental Status: She is alert and oriented to person, place, and time.   Psychiatric:         Mood and Affect: Mood normal.         Behavior: Behavior normal.         Assessment & Plan   Diagnoses and all orders for this visit:    1. NICM (nonischemic cardiomyopathy) (Primary)  -     CBC & Differential  -     CK  -     Comprehensive Metabolic Panel  -     Lipid Panel With / Chol / HDL Ratio  -     TSH    2. Primary hypertension  -     CBC & Differential  -     CK  -     Comprehensive Metabolic Panel  -     Lipid Panel With / Chol / HDL Ratio  -     TSH    3. Other hyperlipidemia  -     CBC & Differential  -     CK  -     Comprehensive Metabolic Panel  -     Lipid Panel With / Chol / HDL Ratio  -     TSH    4. Chronic systolic congestive heart failure  -     CBC & Differential  -     CK  -     Comprehensive Metabolic Panel  -     Lipid Panel With / Chol / HDL Ratio  -     TSH    5. Stage 3a chronic kidney disease  -     CBC & Differential  -     CK  -     Comprehensive Metabolic Panel  -     Lipid Panel With / Chol / HDL Ratio  -     TSH    6. Encounter for screening mammogram for malignant neoplasm of breast  -     Mammo Screening Digital Tomosynthesis Bilateral With CAD; Future    Continue diet and exercise.  Continue checking blood pressure.  Follow-up with the specialist.  Continue Eliquis, albuterol, Cardizem, Entresto, Lasix, metoprolol, Lopressor.  Patient also on Prilosec is helping her GERD symptoms.  Discussed with patient regarding vaccinations.  Mammogram ordered.  All problems are chronic and stable.  She needs continued follow-up.  On exam, her heart was  regular rate rhythm.  Return in 3 months time.  EHR dragon/transcription disclaimer:  Part of this note are created by electronic transcription/translation of spoken language to printed text and thus may lead to erroneous, or at times, nonsensical words or phrases inadvertently transcribed.  Although I have reviewed for such errors, some may still exist.

## 2023-10-20 ENCOUNTER — HOSPITAL ENCOUNTER (EMERGENCY)
Facility: HOSPITAL | Age: 78
Discharge: LEFT BEFORE DISCHARGE | End: 2023-10-20
Attending: EMERGENCY MEDICINE
Payer: MEDICARE

## 2023-10-20 ENCOUNTER — HOSPITAL ENCOUNTER (OUTPATIENT)
Dept: CT IMAGING | Facility: HOSPITAL | Age: 78
Discharge: HOME | End: 2023-10-20
Admitting: INTERNAL MEDICINE
Payer: MEDICARE

## 2023-10-20 VITALS
HEART RATE: 54 BPM | TEMPERATURE: 98 F | SYSTOLIC BLOOD PRESSURE: 156 MMHG | DIASTOLIC BLOOD PRESSURE: 71 MMHG | BODY MASS INDEX: 29.57 KG/M2 | HEIGHT: 66 IN | RESPIRATION RATE: 18 BRPM | WEIGHT: 184 LBS | OXYGEN SATURATION: 95 %

## 2023-10-20 DIAGNOSIS — I27.20 PULMONARY HTN: ICD-10-CM

## 2023-10-20 DIAGNOSIS — R06.02 SHORTNESS OF BREATH: Primary | ICD-10-CM

## 2023-10-20 DIAGNOSIS — J18.9 PNEUMONIA DUE TO INFECTIOUS ORGANISM, UNSPECIFIED LATERALITY, UNSPECIFIED PART OF LUNG: ICD-10-CM

## 2023-10-20 DIAGNOSIS — I50.9 CONGESTIVE HEART FAILURE, UNSPECIFIED HF CHRONICITY, UNSPECIFIED HEART FAILURE TYPE: ICD-10-CM

## 2023-10-20 DIAGNOSIS — J06.9 UPPER RESPIRATORY TRACT INFECTION, UNSPECIFIED TYPE: ICD-10-CM

## 2023-10-20 LAB
ALBUMIN SERPL-MCNC: 4.1 G/DL (ref 3.5–5.2)
ALBUMIN/GLOB SERPL: 1.3 G/DL
ALP SERPL-CCNC: 114 U/L (ref 39–117)
ALT SERPL W P-5'-P-CCNC: 14 U/L (ref 1–33)
ANION GAP SERPL CALCULATED.3IONS-SCNC: 10.9 MMOL/L (ref 5–15)
AST SERPL-CCNC: 17 U/L (ref 1–32)
BASOPHILS # BLD AUTO: 0.01 10*3/MM3 (ref 0–0.2)
BASOPHILS NFR BLD AUTO: 0.1 % (ref 0–1.5)
BILIRUB SERPL-MCNC: 0.5 MG/DL (ref 0–1.2)
BUN SERPL-MCNC: 21 MG/DL (ref 8–23)
BUN/CREAT SERPL: 14 (ref 7–25)
CALCIUM SPEC-SCNC: 8.6 MG/DL (ref 8.6–10.5)
CHLORIDE SERPL-SCNC: 102 MMOL/L (ref 98–107)
CO2 SERPL-SCNC: 21.1 MMOL/L (ref 22–29)
CREAT SERPL-MCNC: 1.5 MG/DL (ref 0.57–1)
D DIMER PPP FEU-MCNC: <0.27 MCGFEU/ML (ref 0–0.77)
DEPRECATED RDW RBC AUTO: 46.3 FL (ref 37–54)
EGFRCR SERPLBLD CKD-EPI 2021: 35.7 ML/MIN/1.73
EOSINOPHIL # BLD AUTO: 0.31 10*3/MM3 (ref 0–0.4)
EOSINOPHIL NFR BLD AUTO: 4.2 % (ref 0.3–6.2)
ERYTHROCYTE [DISTWIDTH] IN BLOOD BY AUTOMATED COUNT: 13.3 % (ref 12.3–15.4)
GEN 5 2HR TROPONIN T REFLEX: 24 NG/L
GLOBULIN UR ELPH-MCNC: 3.1 GM/DL
GLUCOSE SERPL-MCNC: 84 MG/DL (ref 65–99)
HCT VFR BLD AUTO: 42.1 % (ref 34–46.6)
HGB BLD-MCNC: 13.4 G/DL (ref 12–15.9)
HOLD SPECIMEN: NORMAL
HOLD SPECIMEN: NORMAL
IMM GRANULOCYTES # BLD AUTO: 0.01 10*3/MM3 (ref 0–0.05)
IMM GRANULOCYTES NFR BLD AUTO: 0.1 % (ref 0–0.5)
LYMPHOCYTES # BLD AUTO: 1.28 10*3/MM3 (ref 0.7–3.1)
LYMPHOCYTES NFR BLD AUTO: 17.3 % (ref 19.6–45.3)
MCH RBC QN AUTO: 29.5 PG (ref 26.6–33)
MCHC RBC AUTO-ENTMCNC: 31.8 G/DL (ref 31.5–35.7)
MCV RBC AUTO: 92.7 FL (ref 79–97)
MONOCYTES # BLD AUTO: 0.81 10*3/MM3 (ref 0.1–0.9)
MONOCYTES NFR BLD AUTO: 11 % (ref 5–12)
NEUTROPHILS NFR BLD AUTO: 4.97 10*3/MM3 (ref 1.7–7)
NEUTROPHILS NFR BLD AUTO: 67.3 % (ref 42.7–76)
NT-PROBNP SERPL-MCNC: 3970 PG/ML (ref 0–1800)
PLATELET # BLD AUTO: 206 10*3/MM3 (ref 140–450)
PMV BLD AUTO: 11.8 FL (ref 6–12)
POTASSIUM SERPL-SCNC: 4.3 MMOL/L (ref 3.5–5.2)
PROCALCITONIN SERPL-MCNC: 0.05 NG/ML (ref 0–0.25)
PROT SERPL-MCNC: 7.2 G/DL (ref 6–8.5)
QT INTERVAL: 587 MS
QTC INTERVAL: 514 MS
RBC # BLD AUTO: 4.54 10*6/MM3 (ref 3.77–5.28)
SODIUM SERPL-SCNC: 134 MMOL/L (ref 136–145)
TROPONIN T DELTA: -3 NG/L
TROPONIN T SERPL HS-MCNC: 27 NG/L
WBC NRBC COR # BLD: 7.39 10*3/MM3 (ref 3.4–10.8)
WHOLE BLOOD HOLD COAG: NORMAL
WHOLE BLOOD HOLD SPECIMEN: NORMAL

## 2023-10-20 PROCEDURE — 71250 CT THORAX DX C-: CPT

## 2023-10-20 PROCEDURE — 85025 COMPLETE CBC W/AUTO DIFF WBC: CPT | Performed by: EMERGENCY MEDICINE

## 2023-10-20 PROCEDURE — 85379 FIBRIN DEGRADATION QUANT: CPT | Performed by: EMERGENCY MEDICINE

## 2023-10-20 PROCEDURE — 93010 ELECTROCARDIOGRAM REPORT: CPT | Performed by: INTERNAL MEDICINE

## 2023-10-20 PROCEDURE — 84145 PROCALCITONIN (PCT): CPT | Performed by: EMERGENCY MEDICINE

## 2023-10-20 PROCEDURE — 80053 COMPREHEN METABOLIC PANEL: CPT | Performed by: EMERGENCY MEDICINE

## 2023-10-20 PROCEDURE — 36415 COLL VENOUS BLD VENIPUNCTURE: CPT

## 2023-10-20 PROCEDURE — 84484 ASSAY OF TROPONIN QUANT: CPT | Performed by: EMERGENCY MEDICINE

## 2023-10-20 PROCEDURE — 99283 EMERGENCY DEPT VISIT LOW MDM: CPT

## 2023-10-20 PROCEDURE — 83880 ASSAY OF NATRIURETIC PEPTIDE: CPT | Performed by: EMERGENCY MEDICINE

## 2023-10-20 PROCEDURE — 93005 ELECTROCARDIOGRAM TRACING: CPT | Performed by: EMERGENCY MEDICINE

## 2023-10-20 RX ORDER — FUROSEMIDE 20 MG/1
TABLET ORAL
Qty: 135 TABLET | Refills: 0 | Status: SHIPPED | OUTPATIENT
Start: 2023-10-20

## 2023-10-20 RX ORDER — FUROSEMIDE 20 MG/1
TABLET ORAL
Qty: 135 TABLET | Refills: 0 | Status: SHIPPED | OUTPATIENT
Start: 2023-10-20 | End: 2023-10-20 | Stop reason: SDUPTHER

## 2023-10-20 RX ORDER — POTASSIUM CHLORIDE 20 MEQ/1
20 TABLET, EXTENDED RELEASE ORAL 2 TIMES DAILY
Qty: 14 TABLET | Refills: 0 | Status: SHIPPED | OUTPATIENT
Start: 2023-10-20 | End: 2023-10-20 | Stop reason: SDUPTHER

## 2023-10-20 RX ORDER — DOXYCYCLINE 100 MG/1
100 CAPSULE ORAL 2 TIMES DAILY
Qty: 20 CAPSULE | Refills: 0 | Status: SHIPPED | OUTPATIENT
Start: 2023-10-20

## 2023-10-20 RX ORDER — POTASSIUM CHLORIDE 20 MEQ/1
20 TABLET, EXTENDED RELEASE ORAL 2 TIMES DAILY
Qty: 14 TABLET | Refills: 0 | Status: SHIPPED | OUTPATIENT
Start: 2023-10-20 | End: 2023-10-20

## 2023-10-20 RX ORDER — FUROSEMIDE 40 MG/1
40 TABLET ORAL ONCE
Status: DISCONTINUED | OUTPATIENT
Start: 2023-10-20 | End: 2023-10-20 | Stop reason: HOSPADM

## 2023-10-20 NOTE — ED NOTES
"Followed pt to parking lot and asked her to reconsider talking to doctor and she refused and said \"no, I'm leaving\" and proceeded to walk to her car; md notified   "

## 2023-10-20 NOTE — ED PROVIDER NOTES
Subjective   History of Present Illness  History of Present Illness    Chief complaint: Fever chills and cough    Location: Home    Quality/Severity: Gray sputum    Timing/Onset/Duration: Started 1 week ago    Modifying Factors: Does not seem to be getting better    Associated Symptoms: No headache.  Patient's had a Tmax of 100.  Patient's had chills.  Patient has had a cough productive of gray sputum.  No chest pain.  Patient has had some shortness of breath.  No abdominal pain.  No diarrhea or burning when she urinates.  No nausea or vomiting.    Narrative: This 78-year-old white female with a history of MI, CHF, nonischemic cardiomyopathy, hypertension, right breast cancer, struct of sleep apnea, who is a former smoker, and does not drink alcohol or take illicit drugs, presents with  Patient seen at urgent care on 1018 and 23 with a diagnosis of exposure to streptococcal pharyngitis, viral URI with cough, rhinorrhea, nasal congestion.  Rapid strep screen was negative.  Patient prescribed Tessalon perles and Flunisolide.  Patient instructed to follow-up with Dr. Hawley.  The patient had a CT scan this morning and is being followed by pulmonary.  The patient's cardiologist is Dr. Brand.  Patient states that she has had 2 rapid strep and COVID test that are negative.      PCP:Trerence Hawley MD    Cardiology:Sunday    Pulmonary:Leticia      Review of Systems      Past Medical History:   Diagnosis Date    CAD (coronary artery disease)     Cancer     skin, right breast    CHF (congestive heart failure)     Coronary artery disease involving native coronary artery of native heart without angina pectoris 11/1/2018    Heart murmur     History of myocardial infarction 11/1/2018    Hypertension     Myocardial infarction     PER PT     NICM (nonischemic cardiomyopathy) 7/8/2020    DELORES on CPAP     AHI 15/h    Sinus tachycardia     SOB (shortness of breath)        Allergies   Allergen Reactions    Nsaids GI Bleeding      CROHN'S DISEASE    Carvedilol Other (See Comments)     Syncope    Adhesive Tape Other (See Comments)     BLISTERS UNDER TAGADERM       Past Surgical History:   Procedure Laterality Date    BUNIONECTOMY Bilateral     CARDIAC CATHETERIZATION      CARDIAC CATHETERIZATION N/A 01/14/2020    Procedure: Right and Left Heart Cath;  Surgeon: Bin Chacko MD;  Location:  [facility];  Service: Cardiovascular    CARDIAC CATHETERIZATION N/A 01/14/2020    Procedure: Coronary angiography;  Surgeon: Bin Chacko MD;  Location:  [facility];  Service: Cardiovascular    CARDIAC CATHETERIZATION N/A 01/14/2020    Procedure: Left ventriculography;  Surgeon: Bin Chacko MD;  Location:  [facility];  Service: Cardiovascular    CARDIOVERSION  07/12/2023    CATARACT EXTRACTION Left 04/26/2022    COLONOSCOPY N/A 03/23/2022    Procedure: COLONOSCOPY WITH BIOPSIES; POLYPECTOMY;  Surgeon: Lorne Esquivel MD;  Location:  [facility];  Service: Gastroenterology;  Laterality: N/A;  POLYP  ULCERATIVE COLITIS    ENDOSCOPY N/A 03/23/2022    Procedure: ESOPHAGOGASTRODUODENOSCOPY WITH BIOPSIES;  Surgeon: Lorne Esquivel MD;  Location:  [facility];  Service: Gastroenterology;  Laterality: N/A;  GASTRITIS  ESOPHAGEAL REFLUX  HIATAL HERNIA  ESOPHAGEAL CANDIDIASIS    MASTECTOMY COMPLETE / SIMPLE  06/2019    SHOULDER SURGERY Right     SKIN CANCER EXCISION      ABOVE THE LIP.        Family History   Problem Relation Age of Onset    Hypertension Sister     Cancer Sister     Heart disease Brother 65        VALVE REPLACEMENT     Hypertension Brother     Hypertension Sister     Hypertension Sister     Hypertension Brother     Diabetes Brother     Hypertension Brother     Cancer Brother     Hypertension Brother     Hypertension Brother     Hypertension Brother        Social History     Socioeconomic History    Marital status:     Number of children: 5   Tobacco Use     Smoking status: Former     Types: Cigarettes     Quit date: 1973     Years since quittin.0    Smokeless tobacco: Never    Tobacco comments:     CAFFEINE USE: 1 CUP DAILY   Vaping Use    Vaping Use: Never used   Substance and Sexual Activity    Alcohol use: No    Drug use: No    Sexual activity: Defer           Objective   Physical Exam  Vitals (The temperature is 98 °F, pulse 53, respirations 20, /69, room air pulse ox 98%.) and nursing note reviewed.   Constitutional:       Appearance: Normal appearance.   HENT:      Head: Normocephalic and atraumatic.      Right Ear: Tympanic membrane normal.      Left Ear: Tympanic membrane normal.      Nose: Nose normal.      Mouth/Throat:      Mouth: Mucous membranes are moist.   Cardiovascular:      Rate and Rhythm: Normal rate and regular rhythm.      Pulses: Normal pulses.      Heart sounds: Normal heart sounds. No murmur heard.     No friction rub. No gallop.   Pulmonary:      Effort: Pulmonary effort is normal.      Breath sounds: No rales (Posterior inferior lung field).   Abdominal:      General: Abdomen is flat. Bowel sounds are normal. There is no distension.      Palpations: Abdomen is soft. There is no mass.      Tenderness: There is no abdominal tenderness. There is no guarding or rebound.      Hernia: No hernia is present.   Musculoskeletal:         General: No swelling or tenderness. Normal range of motion.      Cervical back: Normal range of motion and neck supple.   Skin:     General: Skin is warm and dry.      Capillary Refill: Capillary refill takes less than 2 seconds.      Findings: No rash.   Neurological:      General: No focal deficit present.      Mental Status: She is alert and oriented to person, place, and time.       Procedures           ED Course  ED Course as of 10/20/23 1441   Fri Oct 20, 2023   1138 The D-dimer is less than 0.27 and normal.    The proBNP is 3970 and elevated. [RC]   1139 On 2023 the BMP was 3930.  The BNP is  slightly elevated today. [RC]   1139 HS Troponin T(!): 27  The high-sensitivity troponin is 27 and elevated. [RC]   1139 The serum creatinine is 1.5, sodium 134, CO2 21, GFR 35, CMP is otherwise unremarkable. [RC]   1139 The CBC is unremarkable. [RC]   1140 On 2/13/2023 the proBNP was 21. [RC]   1345 The 2 high-sensitivity troponin is 24 with a delta of -3. [RC]   1438 Procalcitonin is 0.05 and normal. [RC]      ED Course User Index  [RC] Abdoulaye Plasencia MD      10:15 EDT, 10/20/23:  Echocardiogram 2/14/2023Interpretation Summary         Left ventricular ejection fraction appears to be 51 - 55%.    Sigmoid-shaped ventricular septum is present.Prominent left ventricular false tendon.    Left ventricular diastolic function is consistent with (grade II w/high LAP) pseudonormalization.    Estimated right ventricular systolic pressure from tricuspid regurgitation is mildly elevated (35-45 mmHg).    Holter monitor 3/23/2021:Interpretation Summary    An abnormal monitor study.  Multiple supraventricular tachycardias reported. The longest episode lasted for 6 hours and 56 minutes with an average heart rate of 116. P wave morphology during some of the reported SVT looked l more like underlying sinus rhythm P waves uggesting sinus tachycardia.  Total PVC burden of 1.9% reported but no ventricular runs.  No patient diaries submitted    Cardiac cath 1/13/2020:Conclusions:  Left dominant system with diminutive LAD which may explain her stress test findings but otherwise nonischemic cardiomyopathy with only a 30 to 40% proximal LAD stenoses.  Currently elevated left-sided filling pressures with EDP of 23 mmHg  Currently reduced cardiac output and index of 3.5 and 1.9     Recommendations:   Further optimization of congestive heart failure regimen and diuresis per primary cardiologist    The EKG was obtained at 1057 read by me at 1058.  EKG shows a sinus bradycardia with rate of 46.  There is a left bundle branch block.  The MI  is 215 the QRS is prolonged at 127 ms, the corrected QT is 514 ms.  There is no ectopy.  There is no evidence of acute ST elevation or depression.  Ms and prolonged.  EKG today compared to EKG dated 4/26/2023 is essentially unchanged except in V3 on the EKG from April 2023 the T wave in V3 is upright and not flipped down.  Otherwise the EKG is essentially unchanged.     13:47 EDT, 10/20/23: Outpatient CT of the chest results from today are as follows:  Narrative & Impression   EXAMINATION:  CHEST CT WITHOUT CONTRAST 10/20/2023 11:04 AM     ACCESSION: [id number]     INDICATION: PNEUMONIA; J18.9-Pneumonia, unspecified organism;  I27.20-Pulmonary hypertension, unspecified     COMPARISON: 2/20/2023 CT     TECHNIQUE: CT scan from the lung apices through the lung bases without  IV contrast material. Axial, coronal and sagittal reconstructions  obtained. Radiation dose reduction techniques were utilized, which may  include automated exposure control and/or exposure modulation based on  body size.     FINDINGS:       Lungs and airways: The central airways are patent. Unchanged tiny  pulmonary nodules in the right upper lobe. No suspicious pulmonary  nodule or mass. No bronchial wall thickening or bronchiectasis., In the  peripheral posterior right lower lobe, there is ill-defined groundglass  opacity as seen on axial images 47 through 49. There is adjacent right  lower lobe atelectasis. There is also atelectasis in the right middle  lobe and lingula. Scattered calcified granulomas.     Mediastinum and lymph nodes: No mediastinal mass. No lymphadenopathy.  Small hiatal hernia.     Pleura: No pleural thickening, effusion or pneumothorax.     Cardiovascular: Heart is unchanged in size. Pericardium unremarkable.  Atherosclerotic calcifications of the aorta and branch vessels as well  as the coronary arteries. Mitral annular calcification.     Soft tissues and bones: Right mastectomy. Chest wall soft tissues are  otherwise  unremarkable. Normal thyroid gland. Degenerative changes of  the thoracolumbar spine. No evidence of fracture or suspicious osseous  lesion. Prior right rotator cuff repair.     Upper abdomen: Bilateral renal cysts.        IMPRESSION:  Ill-defined groundglass opacity in the posterior right lower lobe,  adjacent to right lower lobe atelectasis. This could represent  hypoventilatory change, less likely infection. Otherwise no focal  airspace consolidation. No suspicious pulmonary nodule or mass.     Small hiatal hernia.              This report was finalized on 10/20/2023 12:01 PM by Chase Riley MD.       14:41 EDT, 10/20/23: The patient was reassessed.  She has no new complaints.  Her vital signs were reviewed and are stable.  I do not feel that the infiltrate noted on the CAT scan is due to pneumonia.  The patient has a normal white count and no fever and procalcitonin is normal.  COVID flu is negative.    14:42 EDT, 10/20/23:  The patient's diagnosis of shortness of breath and CHF and URI was discussed with her.  We will give the patient a dose of Lasix 40 mg here in the emergency department.  The patient will have her Lasix increased to 20 mg p.o. twice daily.  The patient should follow-up with Dr. Brand and her pulmonary doctor next week.  She should return to the emergency department if she is worse anyway at all.  The patient will be given a prescription for supplemental potassium.  All the patient's questions were answered she will be discharged in good condition.    14:54 EDT, 10/20/23: The patient does not want the Lasix.  She has an appointment with another cardiologist next week, not Dr. Brand.  She does have an appointment set with Dr. Kelly.  The patient asked about an antibiotic.  She was informed that she was not showing signs indicative of a bacterial infection.  Before  we could conclude the discussion she decided she wanted to leave.  She states she will continue to take her Lasix as previously  prescribed.  I attempted to have the nurse to go back to see if we could discuss putting her on an antibiotic further and she said that she did not want to come back and  she left the emergency department before disposition was complete.           15:22 EDT, 10/20/23: Talk with the patient by telephone.  In further discussion I will send in a prescription for doxycycline to [facility] pharmacy.  The patient states that she will follow-up with her primary care provider and cardiologist and pulmonary specialist.  All of her questions were answered.                             Medical Decision Making      Final diagnoses:   Shortness of breath   Congestive heart failure, unspecified HF chronicity, unspecified heart failure type   Upper respiratory tract infection, unspecified type       ED Disposition  ED Disposition       None            No follow-up provider specified.       Medication List      No changes were made to your prescriptions during this visit.            Abdoulaye Plasencia MD  10/20/23 151       Abdoulaye Plasencia MD  10/20/23 4547

## 2023-10-20 NOTE — ED NOTES
Pt refused lasix- md eckert notified; piv removed due to pt demanding I take it out so she can leave; md eckert notified and to bedside but pt leaving ama during his discussion

## 2023-11-16 ENCOUNTER — TRANSCRIBE ORDERS (OUTPATIENT)
Dept: CT IMAGING | Facility: HOSPITAL | Age: 78
End: 2023-11-16
Payer: MEDICARE

## 2023-11-16 DIAGNOSIS — J18.9 PNEUMONIA DUE TO INFECTIOUS ORGANISM, UNSPECIFIED LATERALITY, UNSPECIFIED PART OF LUNG: Primary | ICD-10-CM

## 2024-01-02 RX ORDER — APIXABAN 5 MG/1
5 TABLET, FILM COATED ORAL EVERY 12 HOURS
Qty: 180 TABLET | Refills: 1 | Status: SHIPPED | OUTPATIENT
Start: 2024-01-02

## 2024-03-11 ENCOUNTER — HOSPITAL ENCOUNTER (OUTPATIENT)
Dept: CT IMAGING | Facility: HOSPITAL | Age: 79
Discharge: HOME | End: 2024-03-11
Admitting: INTERNAL MEDICINE
Payer: MEDICARE

## 2024-03-11 DIAGNOSIS — J18.9 PNEUMONIA DUE TO INFECTIOUS ORGANISM, UNSPECIFIED LATERALITY, UNSPECIFIED PART OF LUNG: ICD-10-CM

## 2024-03-11 PROCEDURE — 71250 CT THORAX DX C-: CPT

## 2024-05-28 ENCOUNTER — OFFICE VISIT (OUTPATIENT)
Dept: FAMILY MEDICINE CLINIC | Facility: CLINIC | Age: 79
End: 2024-05-28
Payer: MEDICARE

## 2024-05-28 VITALS
HEART RATE: 47 BPM | TEMPERATURE: 98.2 F | RESPIRATION RATE: 18 BRPM | BODY MASS INDEX: 30.22 KG/M2 | DIASTOLIC BLOOD PRESSURE: 82 MMHG | OXYGEN SATURATION: 96 % | SYSTOLIC BLOOD PRESSURE: 152 MMHG | HEIGHT: 66 IN | WEIGHT: 188 LBS

## 2024-05-28 DIAGNOSIS — I25.10 CORONARY ARTERY DISEASE INVOLVING NATIVE CORONARY ARTERY OF NATIVE HEART WITHOUT ANGINA PECTORIS: Chronic | ICD-10-CM

## 2024-05-28 DIAGNOSIS — I25.2 HISTORY OF MYOCARDIAL INFARCTION: Primary | Chronic | ICD-10-CM

## 2024-05-28 DIAGNOSIS — I10 PRIMARY HYPERTENSION: ICD-10-CM

## 2024-05-28 DIAGNOSIS — I42.8 NICM (NONISCHEMIC CARDIOMYOPATHY): Chronic | ICD-10-CM

## 2024-05-28 DIAGNOSIS — I50.22 CHRONIC SYSTOLIC CONGESTIVE HEART FAILURE: ICD-10-CM

## 2024-05-28 DIAGNOSIS — E78.49 OTHER HYPERLIPIDEMIA: ICD-10-CM

## 2024-05-28 DIAGNOSIS — N18.31 STAGE 3A CHRONIC KIDNEY DISEASE: ICD-10-CM

## 2024-05-28 PROCEDURE — 1126F AMNT PAIN NOTED NONE PRSNT: CPT | Performed by: INTERNAL MEDICINE

## 2024-05-28 PROCEDURE — 99214 OFFICE O/P EST MOD 30 MIN: CPT | Performed by: INTERNAL MEDICINE

## 2024-05-28 PROCEDURE — 1160F RVW MEDS BY RX/DR IN RCRD: CPT | Performed by: INTERNAL MEDICINE

## 2024-05-28 PROCEDURE — 3077F SYST BP >= 140 MM HG: CPT | Performed by: INTERNAL MEDICINE

## 2024-05-28 PROCEDURE — 1159F MED LIST DOCD IN RCRD: CPT | Performed by: INTERNAL MEDICINE

## 2024-05-28 PROCEDURE — 3079F DIAST BP 80-89 MM HG: CPT | Performed by: INTERNAL MEDICINE

## 2024-05-28 RX ORDER — LORATADINE 10 MG/1
CAPSULE, LIQUID FILLED ORAL
COMMUNITY

## 2024-05-28 RX ORDER — EMPAGLIFLOZIN 10 MG/1
10 TABLET, FILM COATED ORAL DAILY
COMMUNITY
Start: 2024-05-10

## 2024-05-28 NOTE — PROGRESS NOTES
Subjective   Effie Donnelly is a 78 y.o. female.     Chief Complaint   Patient presents with    Hypertension    Hyperlipidemia     CHF, nonischemic cardiomyopathy, chronic kidney disease stage III  History of Present Illness   Patient here follow-up for hypertension, hyperlipidemia, nonischemic cardiomyopathy, chronic kidney disease stage III.  No chest pain.  No nausea vomiting abdominal pain.  I have not seen patient for several months, per patient she had [employer] insurance problems.  Complains of ankle swelling, increasing shortness of breath.  No chest pain, cough, congestion, fever or chills.  Relates her swelling of the ankles and shortness of breath to Jardiance.  This was started recently.  The following portions of the patient's history were reviewed and updated as appropriate: allergies, current medications, past family history, past medical history, past social history, past surgical history and problem list.    Review of Systems   Constitutional:  Negative for activity change, appetite change, fatigue and fever.   Eyes:  Negative for blurred vision and double vision.   Respiratory: Negative.  Positive for shortness of breath. Negative for apnea, cough, choking and chest tightness.    Cardiovascular:  Positive for leg swelling. Negative for chest pain and palpitations.   Gastrointestinal: Negative.    Neurological:  Negative for dizziness, syncope, light-headedness and headache.       Allergies   Allergen Reactions    Nsaids GI Bleeding     CROHN'S DISEASE    Carvedilol Other (See Comments)     Syncope    Adhesive Tape Other (See Comments)     BLISTERS UNDER TAGADERM       Current Outpatient Medications on File Prior to Visit   Medication Sig Dispense Refill    albuterol sulfate  (90 Base) MCG/ACT inhaler Inhale 2 puffs Every 4 (Four) Hours As Needed for Wheezing or Shortness of Air. Use with chamber 8.5 g 0    amiodarone (PACERONE) 200 MG tablet Take 1 tablet by mouth Daily.      B Complex Vitamins  (VITAMIN B COMPLEX PO) Take 1 tablet by mouth Daily.      cholecalciferol (VITAMIN D3) 25 MCG (1000 UT) tablet Take 4 tablets by mouth Daily.      CRANBERRY EXTRACT PO Take 1 capsule by mouth Daily.      Eliquis 5 MG tablet tablet TAKE ONE TABLET BY MOUTH EVERY 12 HOURS 180 tablet 1    Entresto 24-26 MG tablet TAKE ONE TABLET BY MOUTH TWICE A  tablet 3    flunisolide (NASALIDE) 25 MCG/ACT (0.025%) solution nasal spray Inhale 2 sprays Every 12 (Twelve) Hours. 25 mL 0    furosemide (LASIX) 20 MG tablet Take your Lasix 20 mg in the morning and the half milligram tablet at 2 PM as you were taking prior to arrival at the emergency department. 135 tablet 0    Jardiance 10 MG tablet tablet Take 1 tablet by mouth Daily.      Loratadine 10 MG capsule Take  by mouth.      metoprolol succinate XL (TOPROL-XL) 100 MG 24 hr tablet Take 1 tablet by mouth Daily. Takes a half tablet twice daily      omeprazole (priLOSEC) 40 MG capsule Take 1 capsule by mouth Daily. 90 capsule 3    PROBIOTIC PRODUCT PO Take 2 capsules by mouth Daily.      rosuvastatin (Crestor) 5 MG tablet Take 1 tablet by mouth Daily. 90 tablet 2    dilTIAZem (CARDIZEM) 30 MG tablet  (Patient not taking: Reported on 5/28/2024)      dilTIAZem (CARDIZEM) 60 MG tablet Take 1 tablet by mouth 2 (Two) Times a Day. (Patient not taking: Reported on 5/28/2024) 60 tablet 3    Loratadine-D 24HR  MG per 24 hr tablet TAKE ONE TABLET BY MOUTH DAILY (Patient not taking: Reported on 5/28/2024) 30 tablet 3    MEGARED OMEGA-3 KRILL OIL PO Take 1 capsule by mouth Daily. (Patient not taking: Reported on 5/28/2024)      metoprolol tartrate (LOPRESSOR) 100 MG tablet Take 1 tablet by mouth 2 (Two) Times a Day. (Patient not taking: Reported on 5/28/2024)      promethazine-dextromethorphan (PROMETHAZINE-DM) 6.25-15 MG/5ML syrup Take 5 mL by mouth 4 (Four) Times a Day As Needed for Cough. (Patient not taking: Reported on 5/28/2024) 118 mL 0     No current facility-administered  medications on file prior to visit.       Family History   Problem Relation Age of Onset    Hypertension Sister     Cancer Sister     Heart disease Brother 65        VALVE REPLACEMENT     Hypertension Brother     Hypertension Sister     Hypertension Sister     Hypertension Brother     Diabetes Brother     Hypertension Brother     Cancer Brother     Hypertension Brother     Hypertension Brother     Hypertension Brother        Past Medical History:   Diagnosis Date    CAD (coronary artery disease)     Cancer     skin, right breast    CHF (congestive heart failure)     Coronary artery disease involving native coronary artery of native heart without angina pectoris 11/1/2018    Heart murmur     History of myocardial infarction 11/1/2018    Hypertension     Myocardial infarction     PER PT     NICM (nonischemic cardiomyopathy) 7/8/2020    DELORES on CPAP     AHI 15/h    Sinus tachycardia     SOB (shortness of breath)        Past Surgical History:   Procedure Laterality Date    BUNIONECTOMY Bilateral     CARDIAC CATHETERIZATION      CARDIAC CATHETERIZATION N/A 01/14/2020    Procedure: Right and Left Heart Cath;  Surgeon: Bin Chacko MD;  Location:  [facility];  Service: Cardiovascular    CARDIAC CATHETERIZATION N/A 01/14/2020    Procedure: Coronary angiography;  Surgeon: Bin Chacko MD;  Location:  [facility];  Service: Cardiovascular    CARDIAC CATHETERIZATION N/A 01/14/2020    Procedure: Left ventriculography;  Surgeon: Bin Chacko MD;  Location:  [facility];  Service: Cardiovascular    CARDIOVERSION  07/12/2023    CATARACT EXTRACTION Left 04/26/2022    COLONOSCOPY N/A 03/23/2022    Procedure: COLONOSCOPY WITH BIOPSIES; POLYPECTOMY;  Surgeon: Lorne Esquivel MD;  [facility];  Service: Gastroenterology;  Laterality: N/A;  POLYP  ULCERATIVE COLITIS    ENDOSCOPY N/A 03/23/2022    Procedure: ESOPHAGOGASTRODUODENOSCOPY WITH BIOPSIES;   "Surgeon: Lorne Esquivel MD;  Location: [facility];  Service: Gastroenterology;  Laterality: N/A;  GASTRITIS  ESOPHAGEAL REFLUX  HIATAL HERNIA  ESOPHAGEAL CANDIDIASIS    MASTECTOMY COMPLETE / SIMPLE  2019    SHOULDER SURGERY Right     SKIN CANCER EXCISION      ABOVE THE LIP.        Social History     Socioeconomic History    Marital status:     Number of children: 5   Tobacco Use    Smoking status: Former     Current packs/day: 0.00     Types: Cigarettes     Quit date: 1973     Years since quittin.6     Passive exposure: Past    Smokeless tobacco: Never    Tobacco comments:     CAFFEINE USE: 1 CUP DAILY   Vaping Use    Vaping status: Never Used   Substance and Sexual Activity    Alcohol use: No    Drug use: No    Sexual activity: Defer       Patient Active Problem List   Diagnosis    History of myocardial infarction    Coronary artery disease involving native coronary artery of native heart without angina pectoris    Ulcerative colitis    HTN (hypertension)    GERD (gastroesophageal reflux disease)    DJD (degenerative joint disease)    Ductal 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    Anemia    Other hyperlipidemia    NICM (nonischemic cardiomyopathy)    DELORES on CPAP    Snoring    Class 1 obesity due to excess calories without serious comorbidity with body mass index (BMI) of 31.0 to 31.9 in adult    Chronic systolic congestive heart failure    Esophageal dysphagia    Stage 3a chronic kidney disease    New onset atrial fibrillation       /82   Pulse (!) 47   Temp 98.2 °F (36.8 °C)   Resp 18   Ht 167.6 cm (65.98\")   Wt 85.3 kg (188 lb)   SpO2 96%   BMI 30.36 kg/m²   Body mass index is 30.36 kg/m².    Objective   Physical Exam  Vitals and nursing note reviewed.   Constitutional:       Appearance: She is well-developed.   Eyes:      Pupils: Pupils are equal, round, and reactive to light.   Neck:      Thyroid: No thyromegaly.      Vascular: No JVD.      Trachea: No tracheal " deviation.   Cardiovascular:      Rate and Rhythm: Normal rate and regular rhythm.      Pulses: Normal pulses.      Heart sounds: Murmur heard.      Comments: Trace ankle edema  Pulmonary:      Effort: Pulmonary effort is normal. No respiratory distress.      Breath sounds: Normal breath sounds. No wheezing.      Comments: Faint crackles at the bases  Abdominal:      General: Bowel sounds are normal. There is no distension.      Palpations: Abdomen is soft. There is no mass.      Tenderness: There is no abdominal tenderness. There is no right CVA tenderness, left CVA tenderness, guarding or rebound.      Hernia: No hernia is present.   Musculoskeletal:      Cervical back: Normal range of motion and neck supple.   Lymphadenopathy:      Cervical: No cervical adenopathy.   Neurological:      General: No focal deficit present.      Mental Status: She is alert and oriented to person, place, and time.   Psychiatric:         Mood and Affect: Mood normal.         Behavior: Behavior normal.           Assessment & Plan   Diagnoses and all orders for this visit:    1. History of myocardial infarction (Primary)  -     CBC & Differential  -     CK  -     Comprehensive Metabolic Panel  -     Lipid Panel With / Chol / HDL Ratio  -     TSH    2. Coronary artery disease involving native coronary artery of native heart without angina pectoris  -     CBC & Differential  -     CK  -     Comprehensive Metabolic Panel  -     Lipid Panel With / Chol / HDL Ratio  -     TSH    3. NICM (nonischemic cardiomyopathy)  -     CBC & Differential  -     CK  -     Comprehensive Metabolic Panel  -     Lipid Panel With / Chol / HDL Ratio  -     TSH    4. Primary hypertension  -     CBC & Differential  -     CK  -     Comprehensive Metabolic Panel  -     Lipid Panel With / Chol / HDL Ratio  -     TSH    5. Other hyperlipidemia  -     CBC & Differential  -     CK  -     Comprehensive Metabolic Panel  -     Lipid Panel With / Chol / HDL Ratio  -      TSH    6. Chronic systolic congestive heart failure    7. Stage 3a chronic kidney disease    Continue diet and exercise.  Continue checking blood pressure.  Take extra Lasix 20 mg today, and tomorrow.  If symptoms of shortness of breath not better she needs to come back to the office or go to the ER.  Medication list updated.  Patient taking Pacerone, albuterol, Eliquis, Entresto, Lasix, Jardiance, Claritin-D, metoprolol, Prilosec.  Prilosec is being her GERD symptoms.  Also on Crestor.  All her problems are chronic and stable.  I will see her back if does not get better otherwise 3 months.  Follow-up with cardiology, also with nephrology.  Patient to stay on Jardiance for now.  EHR dragon/transcription disclaimer:  Part of this note are created by electronic transcription/translation of spoken language to printed text and thus may lead to erroneous, or at times, nonsensical words or phrases inadvertently transcribed.  Although I have reviewed for such errors, some may still exist.

## 2024-05-29 ENCOUNTER — CLINICAL SUPPORT (OUTPATIENT)
Dept: FAMILY MEDICINE CLINIC | Facility: CLINIC | Age: 79
End: 2024-05-29
Payer: MEDICARE

## 2024-05-29 DIAGNOSIS — E78.49 OTHER HYPERLIPIDEMIA: ICD-10-CM

## 2024-05-29 DIAGNOSIS — I10 PRIMARY HYPERTENSION: ICD-10-CM

## 2024-05-29 DIAGNOSIS — N18.31 STAGE 3A CHRONIC KIDNEY DISEASE: Primary | ICD-10-CM

## 2024-05-29 DIAGNOSIS — I50.22 CHRONIC SYSTOLIC CONGESTIVE HEART FAILURE: ICD-10-CM

## 2024-05-29 PROCEDURE — 36415 COLL VENOUS BLD VENIPUNCTURE: CPT | Performed by: INTERNAL MEDICINE

## 2024-05-29 NOTE — PROGRESS NOTES
Venipuncture Blood Specimen Collection  Venipuncture performed in left arm by Leila Doshi MA with good hemostasis. Patient tolerated the procedure well without complications.   05/29/24   Leila Doshi MA

## 2024-05-30 LAB
ALBUMIN SERPL-MCNC: 4 G/DL (ref 3.5–5.2)
ALBUMIN/GLOB SERPL: 1.7 G/DL
ALP SERPL-CCNC: 124 U/L (ref 39–117)
ALT SERPL-CCNC: 22 U/L (ref 1–33)
AST SERPL-CCNC: 24 U/L (ref 1–32)
BASOPHILS # BLD AUTO: 0.03 10*3/MM3 (ref 0–0.2)
BASOPHILS NFR BLD AUTO: 0.6 % (ref 0–1.5)
BILIRUB SERPL-MCNC: 0.5 MG/DL (ref 0–1.2)
BUN SERPL-MCNC: 28 MG/DL (ref 8–23)
BUN/CREAT SERPL: 17.8 (ref 7–25)
CALCIUM SERPL-MCNC: 8.8 MG/DL (ref 8.6–10.5)
CHLORIDE SERPL-SCNC: 105 MMOL/L (ref 98–107)
CHOLEST SERPL-MCNC: 163 MG/DL (ref 0–200)
CHOLEST/HDLC SERPL: 2.55 {RATIO}
CK SERPL-CCNC: 140 U/L (ref 20–180)
CO2 SERPL-SCNC: 26.9 MMOL/L (ref 22–29)
CREAT SERPL-MCNC: 1.57 MG/DL (ref 0.57–1)
EGFRCR SERPLBLD CKD-EPI 2021: 33.8 ML/MIN/1.73
EOSINOPHIL # BLD AUTO: 0.17 10*3/MM3 (ref 0–0.4)
EOSINOPHIL NFR BLD AUTO: 3.7 % (ref 0.3–6.2)
ERYTHROCYTE [DISTWIDTH] IN BLOOD BY AUTOMATED COUNT: 13.5 % (ref 12.3–15.4)
GLOBULIN SER CALC-MCNC: 2.3 GM/DL
GLUCOSE SERPL-MCNC: 79 MG/DL (ref 65–99)
HCT VFR BLD AUTO: 41.4 % (ref 34–46.6)
HDLC SERPL-MCNC: 64 MG/DL (ref 40–60)
HGB BLD-MCNC: 13 G/DL (ref 12–15.9)
IMM GRANULOCYTES # BLD AUTO: 0.01 10*3/MM3 (ref 0–0.05)
IMM GRANULOCYTES NFR BLD AUTO: 0.2 % (ref 0–0.5)
LDLC SERPL CALC-MCNC: 81 MG/DL (ref 0–100)
LYMPHOCYTES # BLD AUTO: 1.33 10*3/MM3 (ref 0.7–3.1)
LYMPHOCYTES NFR BLD AUTO: 28.8 % (ref 19.6–45.3)
MCH RBC QN AUTO: 28.8 PG (ref 26.6–33)
MCHC RBC AUTO-ENTMCNC: 31.4 G/DL (ref 31.5–35.7)
MCV RBC AUTO: 91.8 FL (ref 79–97)
MONOCYTES # BLD AUTO: 0.6 10*3/MM3 (ref 0.1–0.9)
MONOCYTES NFR BLD AUTO: 13 % (ref 5–12)
NEUTROPHILS # BLD AUTO: 2.48 10*3/MM3 (ref 1.7–7)
NEUTROPHILS NFR BLD AUTO: 53.7 % (ref 42.7–76)
NRBC BLD AUTO-RTO: 0 /100 WBC (ref 0–0.2)
PLATELET # BLD AUTO: 209 10*3/MM3 (ref 140–450)
POTASSIUM SERPL-SCNC: 4.4 MMOL/L (ref 3.5–5.2)
PROT SERPL-MCNC: 6.3 G/DL (ref 6–8.5)
RBC # BLD AUTO: 4.51 10*6/MM3 (ref 3.77–5.28)
SODIUM SERPL-SCNC: 143 MMOL/L (ref 136–145)
TRIGL SERPL-MCNC: 99 MG/DL (ref 0–150)
TSH SERPL DL<=0.005 MIU/L-ACNC: 5.17 UIU/ML (ref 0.27–4.2)
VLDLC SERPL CALC-MCNC: 18 MG/DL (ref 5–40)
WBC # BLD AUTO: 4.62 10*3/MM3 (ref 3.4–10.8)

## 2024-05-30 RX ORDER — LEVOTHYROXINE SODIUM 0.03 MG/1
25 TABLET ORAL DAILY
Qty: 30 TABLET | Refills: 3 | Status: SHIPPED | OUTPATIENT
Start: 2024-05-30

## 2024-07-03 NOTE — TELEPHONE ENCOUNTER
Called and left a VM for patient to call office back to get clarification on if she is still wanting to see Brand or if she has switched to a new cardiologist.

## 2024-07-08 RX ORDER — APIXABAN 5 MG/1
5 TABLET, FILM COATED ORAL EVERY 12 HOURS
Qty: 180 TABLET | Refills: 1 | OUTPATIENT
Start: 2024-07-08

## 2024-07-15 RX ORDER — APIXABAN 5 MG/1
5 TABLET, FILM COATED ORAL EVERY 12 HOURS
Qty: 180 TABLET | Refills: 1 | Status: SHIPPED | OUTPATIENT
Start: 2024-07-15

## 2024-07-15 NOTE — TELEPHONE ENCOUNTER
Rx Refill Note  Requested Prescriptions     Pending Prescriptions Disp Refills    Eliquis 5 MG tablet tablet [Pharmacy Med Name: ELIQUIS 5 MG TABLET] 180 tablet 1     Sig: TAKE ONE TABLET BY MOUTH EVERY 12 HOURS      Last office visit with prescribing clinician: 12/1/2022   Last telemedicine visit with prescribing clinician: Visit date not found   Next office visit with prescribing clinician: Visit date not found                         Would you like a call back once the refill request has been completed: [] Yes [] No    If the office needs to give you a call back, can they leave a voicemail: [] Yes [] No    Selene Rodriguez CMA  07/15/24, 08:18 EDT

## 2024-09-04 ENCOUNTER — OFFICE VISIT (OUTPATIENT)
Dept: FAMILY MEDICINE CLINIC | Facility: CLINIC | Age: 79
End: 2024-09-04
Payer: MEDICARE

## 2024-09-04 VITALS
RESPIRATION RATE: 18 BRPM | TEMPERATURE: 98.4 F | DIASTOLIC BLOOD PRESSURE: 82 MMHG | HEIGHT: 66 IN | BODY MASS INDEX: 31.18 KG/M2 | SYSTOLIC BLOOD PRESSURE: 160 MMHG | HEART RATE: 78 BPM | OXYGEN SATURATION: 96 % | WEIGHT: 194 LBS

## 2024-09-04 DIAGNOSIS — K52.9 COLITIS: ICD-10-CM

## 2024-09-04 DIAGNOSIS — I25.10 CORONARY ARTERY DISEASE INVOLVING NATIVE CORONARY ARTERY OF NATIVE HEART WITHOUT ANGINA PECTORIS: Primary | Chronic | ICD-10-CM

## 2024-09-04 DIAGNOSIS — I48.11 LONGSTANDING PERSISTENT ATRIAL FIBRILLATION: ICD-10-CM

## 2024-09-04 DIAGNOSIS — E78.49 OTHER HYPERLIPIDEMIA: ICD-10-CM

## 2024-09-04 DIAGNOSIS — N18.31 STAGE 3A CHRONIC KIDNEY DISEASE: ICD-10-CM

## 2024-09-04 DIAGNOSIS — I50.22 CHRONIC SYSTOLIC CONGESTIVE HEART FAILURE: ICD-10-CM

## 2024-09-04 DIAGNOSIS — I10 PRIMARY HYPERTENSION: ICD-10-CM

## 2024-09-04 PROBLEM — I48.91 NEW ONSET ATRIAL FIBRILLATION: Status: RESOLVED | Noted: 2023-02-13 | Resolved: 2024-09-04

## 2024-09-04 PROCEDURE — 1126F AMNT PAIN NOTED NONE PRSNT: CPT | Performed by: INTERNAL MEDICINE

## 2024-09-04 PROCEDURE — 3077F SYST BP >= 140 MM HG: CPT | Performed by: INTERNAL MEDICINE

## 2024-09-04 PROCEDURE — 3079F DIAST BP 80-89 MM HG: CPT | Performed by: INTERNAL MEDICINE

## 2024-09-04 PROCEDURE — 1170F FXNL STATUS ASSESSED: CPT | Performed by: INTERNAL MEDICINE

## 2024-09-04 PROCEDURE — 99214 OFFICE O/P EST MOD 30 MIN: CPT | Performed by: INTERNAL MEDICINE

## 2024-09-04 RX ORDER — PREDNISONE 10 MG/1
TABLET ORAL
COMMUNITY
Start: 2024-08-31 | End: 2024-10-05

## 2024-09-04 RX ORDER — METOPROLOL SUCCINATE 25 MG/1
25 TABLET, EXTENDED RELEASE ORAL DAILY
COMMUNITY
Start: 2024-08-26 | End: 2025-08-26

## 2024-09-04 NOTE — PROGRESS NOTES
Subjective   Effie Donnelly is a 78 y.o. female.     Chief Complaint   Patient presents with    Medicare Wellness-subsequent   Hypertension, hyperlipidemia, nonischemic cardiomyopathy, coronary disease, chronic systolic congestive heart failure, chronic kidney disease, history of atrial fibrillation.  Patient has a history of ulcerative colitis patient has seen Dr. Esquivel before    History of Present Illness   Patient is seen in follow-up for hypertension hyperlipidemia, reactive disease, systolic CHF, chronic kidney disease, atrial clip.  Patient has no chest pain or shortness of breath.  Patient was seen in the ER at [facility] on August 31, 2024 for abdominal pain, diarrhea was found with Ulcerative colitis on CAT scan.  CAT scan showed diffuse colitis.  Patient reports she is still having on and off fever, no further diarrhea, she stopped her Jardiance relates symptoms of Jardiance.  Her major complaint is overall weakness, not feeling better, fever.  She also has increased abdominal pain.  Patient reports she had diarrhea when she was taking Jardiance, when she stopped she was constipated and took Metamucil.  Now she is having regular bowel movements  The following portions of the patient's history were reviewed and updated as appropriate: allergies, current medications, past family history, past medical history, past social history, past surgical history and problem list.    Review of Systems   Constitutional:  Positive for activity change and fever. Negative for appetite change and fatigue.   Eyes:  Negative for blurred vision and double vision.   Respiratory: Negative.  Negative for shortness of breath.    Cardiovascular:  Negative for chest pain, palpitations and leg swelling.   Gastrointestinal:  Positive for abdominal pain. Negative for abdominal distention, anal bleeding, blood in stool, constipation, diarrhea, nausea, vomiting, GERD and indigestion.   Genitourinary:  Negative for dysuria and  hematuria.   Neurological:  Negative for dizziness, syncope, light-headedness and headache.   Psychiatric/Behavioral: Negative.         Allergies   Allergen Reactions    Nsaids GI Bleeding     CROHN'S DISEASE    Carvedilol Other (See Comments)     Syncope    Adhesive Tape Other (See Comments)     BLISTERS UNDER TAGADERM       Current Outpatient Medications on File Prior to Visit   Medication Sig Dispense Refill    albuterol sulfate  (90 Base) MCG/ACT inhaler Inhale 2 puffs Every 4 (Four) Hours As Needed for Wheezing or Shortness of Air. Use with chamber 8.5 g 0    amiodarone (PACERONE) 200 MG tablet Take 1 tablet by mouth Daily.      B Complex Vitamins (VITAMIN B COMPLEX PO) Take 1 tablet by mouth Daily.      cholecalciferol (VITAMIN D3) 25 MCG (1000 UT) tablet Take 4 tablets by mouth Daily.      CRANBERRY EXTRACT PO Take 1 capsule by mouth Daily.      Eliquis 5 MG tablet tablet TAKE ONE TABLET BY MOUTH EVERY 12 HOURS 180 tablet 1    Entresto 24-26 MG tablet TAKE ONE TABLET BY MOUTH TWICE A  tablet 3    flunisolide (NASALIDE) 25 MCG/ACT (0.025%) solution nasal spray Inhale 2 sprays Every 12 (Twelve) Hours. 25 mL 0    furosemide (LASIX) 20 MG tablet Take your Lasix 20 mg in the morning and the half milligram tablet at 2 PM as you were taking prior to arrival at the emergency department. 135 tablet 0    Loratadine 10 MG capsule Take  by mouth.      metoprolol succinate XL (TOPROL-XL) 25 MG 24 hr tablet Take 1 tablet by mouth Daily.      omeprazole (priLOSEC) 40 MG capsule Take 1 capsule by mouth Daily. 90 capsule 3    predniSONE (DELTASONE) 10 MG tablet Take  by mouth.      PROBIOTIC PRODUCT PO Take 2 capsules by mouth Daily.      rosuvastatin (Crestor) 5 MG tablet Take 1 tablet by mouth Daily. 90 tablet 2    dilTIAZem (CARDIZEM) 30 MG tablet  (Patient not taking: Reported on 5/28/2024)      dilTIAZem (CARDIZEM) 60 MG tablet Take 1 tablet by mouth 2 (Two) Times a Day. (Patient not taking: Reported on  5/28/2024) 60 tablet 3    Jardiance 10 MG tablet tablet Take 1 tablet by mouth Daily. (Patient not taking: Reported on 9/4/2024)      levothyroxine (Synthroid) 25 MCG tablet Take 1 tablet by mouth Daily. (Patient not taking: Reported on 9/4/2024) 30 tablet 3    Loratadine-D 24HR  MG per 24 hr tablet TAKE ONE TABLET BY MOUTH DAILY (Patient not taking: Reported on 5/28/2024) 30 tablet 3    MEGARED OMEGA-3 KRILL OIL PO Take 1 capsule by mouth Daily. (Patient not taking: Reported on 5/28/2024)      metoprolol succinate XL (TOPROL-XL) 100 MG 24 hr tablet Take 1 tablet by mouth Daily. Takes a half tablet twice daily (Patient not taking: Reported on 9/4/2024)      metoprolol tartrate (LOPRESSOR) 100 MG tablet Take 1 tablet by mouth 2 (Two) Times a Day. (Patient not taking: Reported on 5/28/2024)      promethazine-dextromethorphan (PROMETHAZINE-DM) 6.25-15 MG/5ML syrup Take 5 mL by mouth 4 (Four) Times a Day As Needed for Cough. (Patient not taking: Reported on 5/28/2024) 118 mL 0     No current facility-administered medications on file prior to visit.       Family History   Problem Relation Age of Onset    Hypertension Sister     Cancer Sister     Heart disease Brother 65        VALVE REPLACEMENT     Hypertension Brother     Hypertension Sister     Hypertension Sister     Hypertension Brother     Diabetes Brother     Hypertension Brother     Cancer Brother     Hypertension Brother     Hypertension Brother     Hypertension Brother        Past Medical History:   Diagnosis Date    CAD (coronary artery disease)     Cancer     skin, right breast    CHF (congestive heart failure)     Coronary artery disease involving native coronary artery of native heart without angina pectoris 11/1/2018    Heart murmur     History of myocardial infarction 11/1/2018    Hypertension     Myocardial infarction     PER PT     NICM (nonischemic cardiomyopathy) 7/8/2020    DELORES on CPAP     AHI 15/h    Sinus tachycardia     SOB (shortness of  breath)        Past Surgical History:   Procedure Laterality Date    BUNIONECTOMY Bilateral     CARDIAC CATHETERIZATION      CARDIAC CATHETERIZATION N/A 2020    Procedure: Right and Left Heart Cath;  Surgeon: Bin Chacko MD;  Location:  [facility];  Service: Cardiovascular    CARDIAC CATHETERIZATION N/A 2020    Procedure: Coronary angiography;  Surgeon: Bin Chacko MD;  Location:  [facility];  Service: Cardiovascular    CARDIAC CATHETERIZATION N/A 2020    Procedure: Left ventriculography;  Surgeon: Bin Chacko MD;  Location:  [facility];  Service: Cardiovascular    CARDIOVERSION  2023    CATARACT EXTRACTION Left 2022    COLONOSCOPY N/A 2022    Procedure: COLONOSCOPY WITH BIOPSIES; POLYPECTOMY;  Surgeon: Lorne Esquivel MD;  Location: [facility] OR;  Service: Gastroenterology;  Laterality: N/A;  POLYP  ULCERATIVE COLITIS    ENDOSCOPY N/A 2022    Procedure: ESOPHAGOGASTRODUODENOSCOPY WITH BIOPSIES;  Surgeon: Lorne Esquivel MD;  Location: [facility] OR;  Service: Gastroenterology;  Laterality: N/A;  GASTRITIS  ESOPHAGEAL REFLUX  HIATAL HERNIA  ESOPHAGEAL CANDIDIASIS    MASTECTOMY COMPLETE / SIMPLE  2019    SHOULDER SURGERY Right     SKIN CANCER EXCISION      ABOVE THE LIP.        Social History     Socioeconomic History    Marital status:     Number of children: 5   Tobacco Use    Smoking status: Former     Current packs/day: 0.00     Types: Cigarettes     Quit date: 1973     Years since quittin.8     Passive exposure: Past    Smokeless tobacco: Never    Tobacco comments:     CAFFEINE USE: 1 CUP DAILY   Vaping Use    Vaping status: Never Used   Substance and Sexual Activity    Alcohol use: No    Drug use: No    Sexual activity: Defer       Patient Active Problem List   Diagnosis    History of myocardial infarction    Coronary artery disease involving native coronary  "artery of native heart without angina pectoris    Ulcerative colitis    HTN (hypertension)    GERD (gastroesophageal reflux disease)    DJD (degenerative joint disease)    Ductal 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    Anemia    Other hyperlipidemia    NICM (nonischemic cardiomyopathy)    DELORES on CPAP    Snoring    Class 1 obesity due to excess calories without serious comorbidity with body mass index (BMI) of 31.0 to 31.9 in adult    Chronic systolic congestive heart failure    Esophageal dysphagia    Stage 3a chronic kidney disease    Longstanding persistent atrial fibrillation       /82   Pulse 78   Temp 98.4 °F (36.9 °C)   Resp 18   Ht 167.6 cm (65.98\")   Wt 88 kg (194 lb)   SpO2 96%   BMI 31.33 kg/m²   Body mass index is 31.33 kg/m².    Objective   Physical Exam  Vitals and nursing note reviewed.   Constitutional:       Appearance: She is well-developed.   Eyes:      Pupils: Pupils are equal, round, and reactive to light.   Neck:      Thyroid: No thyromegaly.      Vascular: No JVD.      Trachea: No tracheal deviation.   Cardiovascular:      Rate and Rhythm: Normal rate and regular rhythm.      Heart sounds: No murmur heard.  Pulmonary:      Effort: Pulmonary effort is normal. No respiratory distress.      Breath sounds: Normal breath sounds. No wheezing.   Abdominal:      General: Bowel sounds are normal. There is no distension.      Palpations: Abdomen is soft. There is no mass.      Tenderness: There is no abdominal tenderness. There is no right CVA tenderness, left CVA tenderness, guarding or rebound.      Hernia: No hernia is present.   Musculoskeletal:      Cervical back: Normal range of motion and neck supple.   Lymphadenopathy:      Cervical: No cervical adenopathy.   Neurological:      General: No focal deficit present.      Mental Status: She is alert and oriented to person, place, and time.   Psychiatric:         Mood and Affect: Mood normal.         Behavior: Behavior normal. "           Assessment & Plan   Diagnoses and all orders for this visit:    1. Coronary artery disease involving native coronary artery of native heart without angina pectoris (Primary)    2. Primary hypertension    3. Chronic systolic congestive heart failure    4. Other hyperlipidemia    5. Stage 3a chronic kidney disease    6. Colitis    7. Longstanding persistent atrial fibrillation    Discussed with patient symptoms are worse need to rule out perforation.  She is taking steroids as prescribed.  I will highly recommend patient go to the hospital.  She was debating about it.  I have called and notified [facility].  Patient wants to go to [facility].  Continue rest of medication.  Previously she has seen Dr. Esquivel diagnosed with Crohn's versus ulcerative colitis.  Per patient last colonoscopy was totally normal.  She had no symptoms for last 18 years.  She took herself off Jardiance.  EHR dragon/transcription disclaimer:  Part of this note are created by electronic transcription/translation of spoken language to printed text and thus may lead to erroneous, or at times, nonsensical words or phrases inadvertently transcribed.  Although I have reviewed for such errors, some may still exist.

## 2024-10-10 ENCOUNTER — OFFICE VISIT (OUTPATIENT)
Dept: FAMILY MEDICINE CLINIC | Facility: CLINIC | Age: 79
End: 2024-10-10
Payer: MEDICARE

## 2024-10-10 VITALS
SYSTOLIC BLOOD PRESSURE: 152 MMHG | WEIGHT: 185 LBS | OXYGEN SATURATION: 92 % | HEART RATE: 68 BPM | HEIGHT: 66 IN | DIASTOLIC BLOOD PRESSURE: 70 MMHG | TEMPERATURE: 97.8 F | BODY MASS INDEX: 29.73 KG/M2

## 2024-10-10 DIAGNOSIS — K51.90 ULCERATIVE COLITIS WITHOUT COMPLICATIONS, UNSPECIFIED LOCATION: Primary | ICD-10-CM

## 2024-10-10 DIAGNOSIS — R05.8 POST-VIRAL COUGH SYNDROME: ICD-10-CM

## 2024-10-10 DIAGNOSIS — R11.0 NAUSEA: ICD-10-CM

## 2024-10-10 PROCEDURE — 3077F SYST BP >= 140 MM HG: CPT | Performed by: PHYSICIAN ASSISTANT

## 2024-10-10 PROCEDURE — 99213 OFFICE O/P EST LOW 20 MIN: CPT | Performed by: PHYSICIAN ASSISTANT

## 2024-10-10 PROCEDURE — 3078F DIAST BP <80 MM HG: CPT | Performed by: PHYSICIAN ASSISTANT

## 2024-10-10 PROCEDURE — 1126F AMNT PAIN NOTED NONE PRSNT: CPT | Performed by: PHYSICIAN ASSISTANT

## 2024-10-10 RX ORDER — GUAIFENESIN/DEXTROMETHORPHAN 100-10MG/5
5 SYRUP ORAL 3 TIMES DAILY PRN
Qty: 118 ML | Refills: 0 | Status: SHIPPED | OUTPATIENT
Start: 2024-10-10 | End: 2024-10-17

## 2024-10-10 RX ORDER — ONDANSETRON 4 MG/1
4 TABLET, FILM COATED ORAL EVERY 8 HOURS PRN
Qty: 21 TABLET | Refills: 0 | Status: SHIPPED | OUTPATIENT
Start: 2024-10-10 | End: 2024-10-24

## 2024-10-10 NOTE — PROGRESS NOTES
"Chief Complaint  Fatigue (3 weeks ), Nausea (3 weeks), and Cough (3 weeks )    Subjective        Effie Donnelly presents to [facility]  History of Present Illness    Effie is a 79-year-old female presenting today with complaints of nausea.  She has a history of ulcerative colitis.  She was seen in the emergency department on 8/31/2024, had a CT scan that showed diffuse colitis.  She was discharged on prednisone.  Patient then followed up on 9/4/2024 with Dr. Hawley who sent her back to the emergency department due to concern for perforation.  Patient had another CT scan that did not show any colonic wall thickening to suggest active colitis.  She has recently finished her prednisone taper.  She has a follow-up scheduled with GI on 11/13/2024.  She states her abdominal pain has completely resolved but she is having lingering nausea.  She states it is worse in the morning, waxes and wanes throughout the day but has hindered her ability to eat therefore she has had excessive fatigue the last 3 weeks.  She denies any fever, chills, abdominal pain, blood in the stool.      Effie is also complaining of a cough for 3 weeks.  She took her last dose of amoxicillin that was given to her on 9/29/2024 for an upper respiratory infection at the urgent care.  She states she is still coughing but not having any other symptoms such as congestion, sore throat, fever, earache.    Objective   Vital Signs:  /70   Pulse 68   Temp 97.8 °F (36.6 °C)   Ht 167.6 cm (65.98\")   Wt 83.9 kg (185 lb)   SpO2 92%   BMI 29.88 kg/m²   Estimated body mass index is 29.88 kg/m² as calculated from the following:    Height as of this encounter: 167.6 cm (65.98\").    Weight as of this encounter: 83.9 kg (185 lb).          Physical Exam  Constitutional:       General: She is not in acute distress.     Appearance: Normal appearance. She is not toxic-appearing.   HENT:      Head: Normocephalic and atraumatic.      Right " Ear: Tympanic membrane and ear canal normal.      Left Ear: Tympanic membrane and ear canal normal.      Nose: No congestion or rhinorrhea.      Mouth/Throat:      Pharynx: No posterior oropharyngeal erythema.   Eyes:      Conjunctiva/sclera: Conjunctivae normal.   Cardiovascular:      Rate and Rhythm: Normal rate and regular rhythm.      Pulses: Normal pulses.      Heart sounds: Normal heart sounds. No murmur heard.     No gallop.   Pulmonary:      Effort: Pulmonary effort is normal. No respiratory distress.      Breath sounds: Normal breath sounds. No wheezing.   Abdominal:      General: Abdomen is flat. There is no distension.      Palpations: Abdomen is soft.      Tenderness: There is no abdominal tenderness.   Skin:     General: Skin is warm and dry.   Neurological:      General: No focal deficit present.      Mental Status: She is alert and oriented to person, place, and time.   Psychiatric:         Mood and Affect: Mood normal.         Behavior: Behavior normal.         Thought Content: Thought content normal.         Judgment: Judgment normal.        Result Review :                Assessment and Plan   Diagnoses and all orders for this visit:    1. Ulcerative colitis without complications, unspecified location (Primary)    2. Post-viral cough syndrome  -     guaiFENesin-dextromethorphan (ROBITUSSIN DM) 100-10 MG/5ML syrup; Take 5 mL by mouth 3 (Three) Times a Day As Needed for Cough for up to 7 days.  Dispense: 118 mL; Refill: 0    3. Nausea  -     ondansetron (Zofran) 4 MG tablet; Take 1 tablet by mouth Every 8 (Eight) Hours As Needed for Nausea or Vomiting for up to 14 days.  Dispense: 21 tablet; Refill: 0    Explained to patient she could be having nausea due to to the antibiotics she was on, I will send in Zofran for her to take as needed until she follows up with GI in November.  Also educated patient on importance of proper sleep hygiene, the family member accompanying her states she does sleep with  the TV on and has been going to bed late, has not been getting adequate sleep which may be contributing to her fatigue.  All patient questions answered         Follow Up   Return in about 2 months (around 12/10/2024) for BP follow up.  Patient was given instructions and counseling regarding her condition or for health maintenance advice. Please see specific information pulled into the AVS if appropriate.

## 2024-11-13 ENCOUNTER — OFFICE VISIT (OUTPATIENT)
Dept: GASTROENTEROLOGY | Facility: CLINIC | Age: 79
End: 2024-11-13
Payer: MEDICARE

## 2024-11-13 VITALS
SYSTOLIC BLOOD PRESSURE: 142 MMHG | BODY MASS INDEX: 29.47 KG/M2 | DIASTOLIC BLOOD PRESSURE: 82 MMHG | WEIGHT: 183.4 LBS | HEIGHT: 66 IN

## 2024-11-13 DIAGNOSIS — K51.90 ULCERATIVE COLITIS WITHOUT COMPLICATIONS, UNSPECIFIED LOCATION: Primary | ICD-10-CM

## 2024-11-13 DIAGNOSIS — K21.9 GASTROESOPHAGEAL REFLUX DISEASE WITHOUT ESOPHAGITIS: ICD-10-CM

## 2024-11-13 RX ORDER — MESALAMINE 1.2 G/1
4800 TABLET, DELAYED RELEASE ORAL
Qty: 120 TABLET | Refills: 3 | Status: SHIPPED | OUTPATIENT
Start: 2024-11-13 | End: 2024-11-15

## 2024-11-13 RX ORDER — METOPROLOL SUCCINATE 25 MG/1
25 TABLET, EXTENDED RELEASE ORAL DAILY
COMMUNITY

## 2024-11-13 NOTE — PROGRESS NOTES
PATIENT INFORMATION  Effie Donnelly       - 1945    CHIEF COMPLAINT  Chief Complaint   Patient presents with    Abdominal Pain    Ulcerative Colitis    Diarrhea       HISTORY OF PRESENT ILLNESS    Here today for ER follow-up    Started jardiance and diarrhea start the next day, lasted for 6 weeks but colon a mess, quit on  and next day to ER. At this time CT showed active inflammation consistent with crohns and received prednisone taper and feeling better now. Rectal bleeding with recent episode. Bowels moving 1-2 times a day without cramping. Daily probiotic, no fiber supplement. CRP >15. Reviewed with patient and will start mesalamine, check in in 3 mos for CRP and fecal ward, colonoscopy due in March, would not wait if unnecessary.  Avoid NSAIDs.    Indigestion during flare and started on omeprazole again and that got rid of it.    3/23/2022 EGD with reactive gastropathy, acute candida  esophagitis.  3/23/2022 Colonoscopy with 0 adenomas, recall in 3 yrs for long standing crohns that was inactive. Colzal 15 yrs ago.        REVIEWED PERTINENT RESULTS/ LABS  Lab Results   Component Value Date    CASEREPORT  2022     Surgical Pathology Report                         Case: [id number]                                  Authorizing Provider:  Lorne Esquivel        Collected:           2022 12:38 PM                                 MD Sveta                                                                   Ordering Location:     [facility]   Received:            2022 03:00 PM                                 OR                                                                           Pathologist:           Lorena Grewal MD                                                          Specimens:   1) - Stomach, bx                                                                                    2) - Esophagus, Distal, bx                                                                           3) - Large Intestine, ILEOCECAL VALVE POLYP                                                         4) - Large Intestine, Right / Ascending Colon, BX                                                   5) - Large Intestine, Transverse Colon, BX                                                          6) - Large Intestine, Sigmoid Colon, BX                                                             7) - Large Intestine, Rectum, BX                                                           FINALDX  03/23/2022     1. Stomach, Biopsy:   A. Gastric antral mucosa with reactive gastropathy.   B. Negative for H. pylori-type organisms on routine stain.    2. Esophagus, Distal, Biopsy:   A. Acute candida esophagitis.   B. Negative for intestinal metaplasia.   C. PAS-F special stain performed on block 2A is POSITIVE for hyphal structures, morphologically consistent       with Candida species (control reacted appropriately).    3. Ileocecal Valve, Polyp, Polypectomy:   A. Fragments of hyperplastic polyp.    4. Ascending Colon, Biopsy:   A. Colonic mucosa with mild architectural distortion (see comment).   B. Negative for granulomas, viral cytopathic effect and dysplasia.     5. Sigmoid Colon, Biopsy:   A. Colonic mucosa with no significant histopathologic changes.   B. Negative for granulomas, viral cytopathic effect and dysplasia.    6. Sigmoid Colon, Biopsy:   A. Colonic mucosa with no significant histopathologic changes.   B. Negative for granulomas, viral cytopathic effect and dysplasia.    7. Rectum, Biopsy:   A. Colorectal mucosa with no significant histopathologic changes.   B. Negative for granulomas, viral cytopathic effect and dysplasia.    pandab/radha        Lab Results   Component Value Date    HGB 12.8 09/04/2024    MCV 87.9 09/04/2024     09/04/2024    ALT 30 07/30/2024    AST 31 07/30/2024    HGBA1C 5.50 02/13/2023    INR 1.8 07/12/2023    TRIG 99 05/29/2024      No results found.    REVIEW OF  SYSTEMS  Review of Systems   Constitutional:  Negative for activity change, chills, fever and unexpected weight change.   HENT:  Negative for congestion.    Eyes:  Negative for visual disturbance.   Respiratory:  Negative for shortness of breath.    Cardiovascular:  Negative for chest pain and palpitations.   Gastrointestinal:  Positive for abdominal distention, abdominal pain, anal bleeding, diarrhea and nausea. Negative for blood in stool.   Endocrine: Negative for cold intolerance and heat intolerance.   Genitourinary:  Negative for hematuria.   Musculoskeletal:  Negative for gait problem.   Skin:  Negative for color change.   Allergic/Immunologic: Negative for immunocompromised state.   Neurological:  Negative for weakness and light-headedness.   Hematological:  Negative for adenopathy.   Psychiatric/Behavioral:  Negative for sleep disturbance. The patient is not nervous/anxious.          ACTIVE PROBLEMS  Patient Active Problem List    Diagnosis     Longstanding persistent atrial fibrillation [I48.11]     Stage 3a chronic kidney disease [N18.31]     Esophageal dysphagia [R13.19]     DELORES on CPAP [G47.33]     Snoring [R06.83]     Class 1 obesity due to excess calories without serious comorbidity with body mass index (BMI) of 31.0 to 31.9 in adult [E66.811, E66.09, Z68.31]     Chronic systolic congestive heart failure [I50.22]     NICM (nonischemic cardiomyopathy) [I42.8]     Other hyperlipidemia [E78.49]     Ulcerative colitis [K51.90]     HTN (hypertension) [I10]     GERD (gastroesophageal reflux disease) [K21.9]     DJD (degenerative joint disease) [M19.90]     Anemia [D64.9]     Ductal 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 [D05.11]     History of myocardial infarction [I25.2]     Coronary artery disease involving native coronary artery of native heart without angina pectoris [I25.10]          PAST MEDICAL HISTORY  Past Medical History:   Diagnosis Date    CAD (coronary artery disease)     Cancer     skin,  right breast    CHF (congestive heart failure)     Coronary artery disease involving native coronary artery of native heart without angina pectoris 11/1/2018    Heart murmur     History of myocardial infarction 11/1/2018    Hypertension     Myocardial infarction     PER PT     NICM (nonischemic cardiomyopathy) 7/8/2020    DELORES on CPAP     AHI 15/h    Sinus tachycardia     SOB (shortness of breath)          SURGICAL HISTORY  Past Surgical History:   Procedure Laterality Date    BUNIONECTOMY Bilateral     CARDIAC CATHETERIZATION      CARDIAC CATHETERIZATION N/A 01/14/2020    Procedure: Right and Left Heart Cath;  Surgeon: Bin Chacko MD;  Location:  [facility];  Service: Cardiovascular    CARDIAC CATHETERIZATION N/A 01/14/2020    Procedure: Coronary angiography;  Surgeon: Bin Chacko MD;  Location:  [facility];  Service: Cardiovascular    CARDIAC CATHETERIZATION N/A 01/14/2020    Procedure: Left ventriculography;  Surgeon: Bin Chacko MD;  Location:  [facility];  Service: Cardiovascular    CARDIOVERSION  07/12/2023    CATARACT EXTRACTION Left 04/26/2022    COLONOSCOPY N/A 03/23/2022    Procedure: COLONOSCOPY WITH BIOPSIES; POLYPECTOMY;  Surgeon: Lorne Esquivel MD;  Location:  [facility];  Service: Gastroenterology;  Laterality: N/A;  POLYP  ULCERATIVE COLITIS    ENDOSCOPY N/A 03/23/2022    Procedure: ESOPHAGOGASTRODUODENOSCOPY WITH BIOPSIES;  Surgeon: Lorne Esquivle MD;  Location:  [facility];  Service: Gastroenterology;  Laterality: N/A;  GASTRITIS  ESOPHAGEAL REFLUX  HIATAL HERNIA  ESOPHAGEAL CANDIDIASIS    MASTECTOMY COMPLETE / SIMPLE  06/2019    SHOULDER SURGERY Right     SKIN CANCER EXCISION      ABOVE THE LIP.          FAMILY HISTORY  Family History   Problem Relation Age of Onset    Hypertension Sister     Cancer Sister     Heart disease Brother 65        VALVE REPLACEMENT     Hypertension Brother     Hypertension  Sister     Hypertension Sister     Hypertension Brother     Diabetes Brother     Hypertension Brother     Cancer Brother     Hypertension Brother     Hypertension Brother     Hypertension Brother          SOCIAL HISTORY  Social History     Occupational History    Occupation: RETIRED    Tobacco Use    Smoking status: Former     Current packs/day: 0.00     Types: Cigarettes     Quit date: 1973     Years since quittin.0     Passive exposure: Past    Smokeless tobacco: Never    Tobacco comments:     CAFFEINE USE: 1 CUP DAILY   Vaping Use    Vaping status: Never Used   Substance and Sexual Activity    Alcohol use: No    Drug use: No    Sexual activity: Defer         CURRENT MEDICATIONS    Current Outpatient Medications:     albuterol sulfate  (90 Base) MCG/ACT inhaler, Inhale 2 puffs Every 4 (Four) Hours As Needed for Wheezing or Shortness of Air. Use with chamber, Disp: 8.5 g, Rfl: 0    amiodarone (PACERONE) 200 MG tablet, Take 1 tablet by mouth Daily., Disp: , Rfl:     B Complex Vitamins (VITAMIN B COMPLEX PO), Take 1 tablet by mouth Daily., Disp: , Rfl:     cholecalciferol (VITAMIN D3) 25 MCG (1000 UT) tablet, Take 4 tablets by mouth Daily., Disp: , Rfl:     CRANBERRY EXTRACT PO, Take 1 capsule by mouth Daily., Disp: , Rfl:     dicyclomine (BENTYL) 10 MG capsule, Take 1 capsule by mouth 4 (Four) Times a Day., Disp: , Rfl:     Eliquis 5 MG tablet tablet, TAKE ONE TABLET BY MOUTH EVERY 12 HOURS, Disp: 180 tablet, Rfl: 1    flunisolide (NASALIDE) 25 MCG/ACT (0.025%) solution nasal spray, Inhale 2 sprays Every 12 (Twelve) Hours., Disp: 25 mL, Rfl: 0    furosemide (LASIX) 20 MG tablet, Take your Lasix 20 mg in the morning and the half milligram tablet at 2 PM as you were taking prior to arrival at the emergency department., Disp: 135 tablet, Rfl: 0    Loratadine 10 MG capsule, Take  by mouth., Disp: , Rfl:     metoprolol succinate XL (TOPROL-XL) 25 MG 24 hr tablet, Take 1 tablet by mouth  "Daily., Disp: , Rfl:     PROBIOTIC PRODUCT PO, Take 2 capsules by mouth Daily., Disp: , Rfl:     rosuvastatin (Crestor) 5 MG tablet, Take 1 tablet by mouth Daily., Disp: 90 tablet, Rfl: 2    sacubitril-valsartan (Entresto) 24-26 MG tablet, Take 1 tablet by mouth 2 (Two) Times a Day., Disp: , Rfl:     mesalamine (Lialda) 1.2 g EC tablet, Take 4 tablets by mouth Daily With Breakfast., Disp: 120 tablet, Rfl: 3    omeprazole (priLOSEC) 20 MG capsule, Take 1 capsule by mouth Every Morning., Disp: , Rfl:     ALLERGIES  Carvedilol, Nsaids, and Adhesive tape    VITALS  Vitals:    11/13/24 1508   BP: 142/82   BP Location: Left arm   Patient Position: Sitting   Cuff Size: Adult   Weight: 83.2 kg (183 lb 6.4 oz)   Height: 167.6 cm (66\")       PHYSICAL EXAM  Debilities/Disabilities Identified: None  Emotional Behavior: Appropriate  Wt Readings from Last 3 Encounters:   11/13/24 83.2 kg (183 lb 6.4 oz)   10/10/24 83.9 kg (185 lb)   09/04/24 88 kg (194 lb)     Ht Readings from Last 1 Encounters:   11/13/24 167.6 cm (66\")     Body mass index is 29.6 kg/m².  Physical Exam  Constitutional:       General: She is not in acute distress.     Appearance: Normal appearance. She is not ill-appearing.   HENT:      Head: Normocephalic and atraumatic.      Mouth/Throat:      Mouth: Mucous membranes are moist.      Pharynx: No posterior oropharyngeal erythema.   Eyes:      General: No scleral icterus.  Cardiovascular:      Rate and Rhythm: Normal rate and regular rhythm.      Heart sounds: Normal heart sounds.   Pulmonary:      Effort: Pulmonary effort is normal.      Breath sounds: Normal breath sounds.   Abdominal:      General: Abdomen is flat. Bowel sounds are normal. There is no distension.      Palpations: Abdomen is soft. There is no mass.      Tenderness: There is no abdominal tenderness. There is no guarding or rebound. Negative signs include Del Angel's sign.      Hernia: No hernia is present.   Musculoskeletal:      Cervical back: Neck " supple.   Skin:     General: Skin is warm.      Capillary Refill: Capillary refill takes less than 2 seconds.   Neurological:      General: No focal deficit present.      Mental Status: She is alert and oriented to person, place, and time.   Psychiatric:         Mood and Affect: Mood normal.         Behavior: Behavior normal.         Thought Content: Thought content normal.         Judgment: Judgment normal.         CLINICAL DATA REVIEWED   reviewed previous lab results and integrated with today's visit, reviewed notes from other physicians and/or last GI encounter, reviewed previous endoscopy results and available photos, reviewed surgical pathology results from previous biopsies    ASSESSMENT  Diagnoses and all orders for this visit:    Ulcerative colitis without complications, unspecified location  -     mesalamine (Lialda) 1.2 g EC tablet; Take 4 tablets by mouth Daily With Breakfast.    Gastroesophageal reflux disease without esophagitis    Other orders  -     omeprazole (priLOSEC) 20 MG capsule; Take 1 capsule by mouth Every Morning.  -     metoprolol succinate XL (TOPROL-XL) 25 MG 24 hr tablet; Take 1 tablet by mouth Daily.          PLAN    UC, start mesalamine, CRP and fecal ward in 3 mos, colon due in March, reviewed risks with inadequate control and monitoring.  GERD: Continue omeprazole, do not stop    Return in about 3 months (around 2/13/2025).    I have discussed the above plan with the patient.  They verbalize understanding and are in agreement with the plan.  They have been advised to contact the office for any questions, concerns, or changes related to their health.

## 2024-11-14 ENCOUNTER — TELEPHONE (OUTPATIENT)
Dept: GASTROENTEROLOGY | Facility: CLINIC | Age: 79
End: 2024-11-14
Payer: MEDICARE

## 2024-11-15 RX ORDER — MESALAMINE 1.2 G/1
4800 TABLET, DELAYED RELEASE ORAL
Qty: 120 TABLET | Refills: 4 | Status: SHIPPED | OUTPATIENT
Start: 2024-11-15

## 2024-12-12 ENCOUNTER — OFFICE VISIT (OUTPATIENT)
Dept: FAMILY MEDICINE CLINIC | Facility: CLINIC | Age: 79
End: 2024-12-12
Payer: MEDICARE

## 2024-12-12 VITALS
WEIGHT: 180 LBS | BODY MASS INDEX: 28.93 KG/M2 | SYSTOLIC BLOOD PRESSURE: 138 MMHG | DIASTOLIC BLOOD PRESSURE: 68 MMHG | OXYGEN SATURATION: 98 % | HEIGHT: 66 IN | RESPIRATION RATE: 18 BRPM | TEMPERATURE: 97.7 F | HEART RATE: 57 BPM

## 2024-12-12 DIAGNOSIS — I50.22 CHRONIC SYSTOLIC CONGESTIVE HEART FAILURE: ICD-10-CM

## 2024-12-12 DIAGNOSIS — E78.49 OTHER HYPERLIPIDEMIA: ICD-10-CM

## 2024-12-12 DIAGNOSIS — I25.10 CORONARY ARTERY DISEASE INVOLVING NATIVE CORONARY ARTERY OF NATIVE HEART WITHOUT ANGINA PECTORIS: Primary | Chronic | ICD-10-CM

## 2024-12-12 DIAGNOSIS — K21.9 GASTROESOPHAGEAL REFLUX DISEASE WITHOUT ESOPHAGITIS: ICD-10-CM

## 2024-12-12 DIAGNOSIS — I10 PRIMARY HYPERTENSION: ICD-10-CM

## 2024-12-12 DIAGNOSIS — N18.31 STAGE 3A CHRONIC KIDNEY DISEASE: ICD-10-CM

## 2024-12-12 NOTE — PROGRESS NOTES
Subjective   Effie Donnelly is a 79 y.o. female.     Chief Complaint   Patient presents with    Medicare Wellness-Initial Visit   Hypertension, hyperlipidemia, CAD    History of Present Illness   Patient here follow-up for hypertension, hyperlipidemia, CAD, GERD, chronic kidney stage III.  No chest pain shortness of breath.  Had CHF.  No nausea vomiting.  Went to the list of medication patient's taking.  Patient has ulcerative colitis not taking mesalamine.  Per last GI note patient is to be on mesalamine however patient reports insurance did not cover.  Previously probably Jardiance caused her inflammation of her extremity colitis in August 2024.  The following portions of the patient's history were reviewed and updated as appropriate: allergies, current medications, past family history, past medical history, past social history, past surgical history and problem list.    Review of Systems   Constitutional:  Negative for activity change, appetite change, fatigue and fever.   Eyes:  Negative for blurred vision and double vision.   Respiratory: Negative.  Negative for shortness of breath.    Cardiovascular:  Negative for chest pain, palpitations and leg swelling.   Gastrointestinal: Negative.    Neurological:  Negative for dizziness, syncope, light-headedness and headache.       Allergies   Allergen Reactions    Carvedilol Other (See Comments)     Syncope    Nsaids GI Bleeding     CROHN'S DISEASE    Jardiance [Empagliflozin] GI Intolerance    Adhesive Tape Other (See Comments)     BLISTERS UNDER TAGADERM       Current Outpatient Medications on File Prior to Visit   Medication Sig Dispense Refill    albuterol sulfate  (90 Base) MCG/ACT inhaler Inhale 2 puffs Every 4 (Four) Hours As Needed for Wheezing or Shortness of Air. Use with chamber 8.5 g 0    amiodarone (PACERONE) 200 MG tablet Take 1 tablet by mouth Daily.      CRANBERRY EXTRACT PO Take 1 capsule by mouth Daily.      Eliquis 5 MG tablet tablet TAKE ONE  TABLET BY MOUTH EVERY 12 HOURS 180 tablet 1    flunisolide (NASALIDE) 25 MCG/ACT (0.025%) solution nasal spray Inhale 2 sprays Every 12 (Twelve) Hours. 25 mL 0    furosemide (LASIX) 20 MG tablet Take your Lasix 20 mg in the morning and the half milligram tablet at 2 PM as you were taking prior to arrival at the emergency department. 135 tablet 0    Loratadine 10 MG capsule Take  by mouth.      metoprolol succinate XL (TOPROL-XL) 25 MG 24 hr tablet Take 1 tablet by mouth Daily.      omeprazole (priLOSEC) 20 MG capsule Take 1 capsule by mouth Every Morning. 90 capsule 3    PROBIOTIC PRODUCT PO Take 2 capsules by mouth Daily.      rosuvastatin (Crestor) 5 MG tablet Take 1 tablet by mouth Daily. 90 tablet 2    sacubitril-valsartan (Entresto) 24-26 MG tablet Take 1 tablet by mouth 2 (Two) Times a Day.      B Complex Vitamins (VITAMIN B COMPLEX PO) Take 1 tablet by mouth Daily. (Patient not taking: Reported on 12/12/2024)      cholecalciferol (VITAMIN D3) 25 MCG (1000 UT) tablet Take 4 tablets by mouth Daily. (Patient not taking: Reported on 12/12/2024)      dicyclomine (BENTYL) 10 MG capsule Take 1 capsule by mouth 4 (Four) Times a Day. (Patient not taking: Reported on 12/12/2024)      mesalamine (LIALDA) 1.2 g EC tablet Take 4 tablets by mouth Daily With Breakfast. 120 tablet 4     No current facility-administered medications on file prior to visit.       Family History   Problem Relation Age of Onset    Hypertension Sister     Cancer Sister     Heart disease Brother 65        VALVE REPLACEMENT     Hypertension Brother     Hypertension Sister     Hypertension Sister     Hypertension Brother     Diabetes Brother     Hypertension Brother     Cancer Brother     Hypertension Brother     Hypertension Brother     Hypertension Brother        Past Medical History:   Diagnosis Date    CAD (coronary artery disease)     Cancer     skin, right breast    CHF (congestive heart failure)     Coronary artery disease involving native  coronary artery of native heart without angina pectoris 2018    Heart murmur     History of myocardial infarction 2018    Hypertension     Myocardial infarction     PER PT     NICM (nonischemic cardiomyopathy) 2020    DELORES on CPAP     AHI 15/h    Sinus tachycardia     SOB (shortness of breath)        Past Surgical History:   Procedure Laterality Date    BUNIONECTOMY Bilateral     CARDIAC CATHETERIZATION      CARDIAC CATHETERIZATION N/A 2020    Procedure: Right and Left Heart Cath;  Surgeon: Bin Chacko MD;  Location:  [facility];  Service: Cardiovascular    CARDIAC CATHETERIZATION N/A 2020    Procedure: Coronary angiography;  Surgeon: Bin Chacko MD;  Location:  [facility];  Service: Cardiovascular    CARDIAC CATHETERIZATION N/A 2020    Procedure: Left ventriculography;  Surgeon: Bin Chacko MD;  Location:  [facility];  Service: Cardiovascular    CARDIOVERSION  2023    CATARACT EXTRACTION Left 2022    COLONOSCOPY N/A 2022    Procedure: COLONOSCOPY WITH BIOPSIES; POLYPECTOMY;  Surgeon: Lorne Esquivel MD;  Location: [facility] OR;  Service: Gastroenterology;  Laterality: N/A;  POLYP  ULCERATIVE COLITIS    ENDOSCOPY N/A 2022    Procedure: ESOPHAGOGASTRODUODENOSCOPY WITH BIOPSIES;  Surgeon: Lorne Esquivel MD;  Location: [facility] OR;  Service: Gastroenterology;  Laterality: N/A;  GASTRITIS  ESOPHAGEAL REFLUX  HIATAL HERNIA  ESOPHAGEAL CANDIDIASIS    MASTECTOMY COMPLETE / SIMPLE  2019    SHOULDER SURGERY Right     SKIN CANCER EXCISION      ABOVE THE LIP.        Social History     Socioeconomic History    Marital status:     Number of children: 5   Tobacco Use    Smoking status: Former     Current packs/day: 0.00     Types: Cigarettes     Quit date: 1973     Years since quittin.1     Passive exposure: Past    Smokeless tobacco: Never    Tobacco comments:  "    CAFFEINE USE: 1 CUP DAILY   Vaping Use    Vaping status: Never Used   Substance and Sexual Activity    Alcohol use: No    Drug use: No    Sexual activity: Defer       Patient Active Problem List   Diagnosis    History of myocardial infarction    Coronary artery disease involving native coronary artery of native heart without angina pectoris    Ulcerative colitis    HTN (hypertension)    GERD (gastroesophageal reflux disease)    DJD (degenerative joint disease)    Ductal 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    Anemia    Other hyperlipidemia    NICM (nonischemic cardiomyopathy)    DELORES on CPAP    Snoring    Class 1 obesity due to excess calories without serious comorbidity with body mass index (BMI) of 31.0 to 31.9 in adult    Chronic systolic congestive heart failure    Esophageal dysphagia    Stage 3a chronic kidney disease    Longstanding persistent atrial fibrillation       /68   Pulse 57   Temp 97.7 °F (36.5 °C)   Resp 18   Ht 167.6 cm (65.98\")   Wt 81.6 kg (180 lb)   SpO2 98%   BMI 29.07 kg/m²   Body mass index is 29.07 kg/m².    Objective   Physical Exam  Vitals and nursing note reviewed.   Constitutional:       Appearance: She is well-developed.   Eyes:      Pupils: Pupils are equal, round, and reactive to light.   Neck:      Thyroid: No thyromegaly.      Vascular: No JVD.      Trachea: No tracheal deviation.   Cardiovascular:      Rate and Rhythm: Normal rate and regular rhythm.      Heart sounds: No murmur heard.  Pulmonary:      Effort: Pulmonary effort is normal. No respiratory distress.      Breath sounds: Normal breath sounds. No wheezing.   Abdominal:      General: Bowel sounds are normal. There is no distension.      Palpations: Abdomen is soft. There is no mass.      Tenderness: There is no abdominal tenderness. There is no right CVA tenderness, left CVA tenderness, guarding or rebound.      Hernia: No hernia is present.   Musculoskeletal:      Cervical back: Normal range of motion " and neck supple.   Lymphadenopathy:      Cervical: No cervical adenopathy.   Neurological:      General: No focal deficit present.      Mental Status: She is alert and oriented to person, place, and time.           Assessment & Plan   Diagnoses and all orders for this visit:    1. Coronary artery disease involving native coronary artery of native heart without angina pectoris (Primary)  -     CBC & Differential  -     CK  -     Comprehensive Metabolic Panel  -     Lipid Panel With / Chol / HDL Ratio  -     TSH    2. Primary hypertension  -     CBC & Differential  -     CK  -     Comprehensive Metabolic Panel  -     Lipid Panel With / Chol / HDL Ratio  -     TSH    3. Other hyperlipidemia  -     CBC & Differential  -     CK  -     Comprehensive Metabolic Panel  -     Lipid Panel With / Chol / HDL Ratio  -     TSH    4. Gastroesophageal reflux disease without esophagitis  -     CBC & Differential  -     CK  -     Comprehensive Metabolic Panel  -     Lipid Panel With / Chol / HDL Ratio  -     TSH    5. Stage 3a chronic kidney disease  -     CBC & Differential  -     CK  -     Comprehensive Metabolic Panel  -     Lipid Panel With / Chol / HDL Ratio  -     TSH  -     Ambulatory Referral to Nephrology    6. Chronic systolic congestive heart failure    Continue diet and exercise.  Check blood pressure at home.  Continue inhaler, Pacerone, Bentyl, Eliquis, Lasix, metoprolol, Crestor, Entresto.  Patient is on Claritin on and off.  Continue Lasix.  All her problems are chronic and stable at this time.  Patient to let GI know that she is not on mesalamine.  She has not seen nephrology for long time, she has seen Dr. Ryan Presley before but wants to stay in [place].  Referral made.  I will see her back in 3 months.  Per patient she has taken pneumonia vaccine at [facility] in October.  EHR dragon/transcription disclaimer:  Part of this note are created by electronic transcription/translation of spoken language to printed text and thus  may lead to erroneous, or at times, nonsensical words or phrases inadvertently transcribed.  Although I have reviewed for such errors, some may still exist.

## 2024-12-12 NOTE — ASSESSMENT & PLAN NOTE
Orders:    CBC & Differential    CK    Comprehensive Metabolic Panel    Lipid Panel With / Chol / HDL Ratio    TSH    Ambulatory Referral to Nephrology

## 2024-12-12 NOTE — ASSESSMENT & PLAN NOTE
Orders:    CBC & Differential    CK    Comprehensive Metabolic Panel    Lipid Panel With / Chol / HDL Ratio    TSH

## 2024-12-12 NOTE — PROGRESS NOTES
Venipuncture Blood Specimen Collection  Venipuncture performed in left arm by Leila Doshi MA with good hemostasis. Patient tolerated the procedure well without complications.   12/12/24   Leila Doshi MA

## 2024-12-12 NOTE — PROGRESS NOTES
Subjective   The ABCs of the Annual Wellness Visit  Medicare Wellness Visit      Effie Donnelly is a 79 y.o. patient who presents for a Medicare Wellness Visit.    The following portions of the patient's history were reviewed and   updated as appropriate: allergies, current medications, past family history, past medical history, past social history, past surgical history, and problem list.    Compared to one year ago, the patient's physical   health is the same.  Compared to one year ago, the patient's mental   health is the same.    Recent Hospitalizations:  She was not admitted to the hospital during the last year.     Current Medical Providers:  Patient Care Team:  Terrence Hawley MD as PCP - General (Internal Medicine)  Kun Decker MD (Cardiology)  Lorne Esquivel MD as Consulting Physician (Gastroenterology)    Outpatient Medications Prior to Visit   Medication Sig Dispense Refill    albuterol sulfate  (90 Base) MCG/ACT inhaler Inhale 2 puffs Every 4 (Four) Hours As Needed for Wheezing or Shortness of Air. Use with chamber 8.5 g 0    amiodarone (PACERONE) 200 MG tablet Take 1 tablet by mouth Daily.      CRANBERRY EXTRACT PO Take 1 capsule by mouth Daily.      Eliquis 5 MG tablet tablet TAKE ONE TABLET BY MOUTH EVERY 12 HOURS 180 tablet 1    flunisolide (NASALIDE) 25 MCG/ACT (0.025%) solution nasal spray Inhale 2 sprays Every 12 (Twelve) Hours. 25 mL 0    furosemide (LASIX) 20 MG tablet Take your Lasix 20 mg in the morning and the half milligram tablet at 2 PM as you were taking prior to arrival at the emergency department. 135 tablet 0    Loratadine 10 MG capsule Take  by mouth.      metoprolol succinate XL (TOPROL-XL) 25 MG 24 hr tablet Take 1 tablet by mouth Daily.      omeprazole (priLOSEC) 20 MG capsule Take 1 capsule by mouth Every Morning. 90 capsule 3    PROBIOTIC PRODUCT PO Take 2 capsules by mouth Daily.      rosuvastatin (Crestor) 5 MG tablet Take 1 tablet by mouth Daily. 90  tablet 2    sacubitril-valsartan (Entresto) 24-26 MG tablet Take 1 tablet by mouth 2 (Two) Times a Day.      B Complex Vitamins (VITAMIN B COMPLEX PO) Take 1 tablet by mouth Daily. (Patient not taking: Reported on 12/12/2024)      cholecalciferol (VITAMIN D3) 25 MCG (1000 UT) tablet Take 4 tablets by mouth Daily. (Patient not taking: Reported on 12/12/2024)      dicyclomine (BENTYL) 10 MG capsule Take 1 capsule by mouth 4 (Four) Times a Day. (Patient not taking: Reported on 12/12/2024)      mesalamine (LIALDA) 1.2 g EC tablet Take 4 tablets by mouth Daily With Breakfast. 120 tablet 4     No facility-administered medications prior to visit.     No opioid medication identified on active medication list. I have reviewed chart for other potential  high risk medication/s and harmful drug interactions in the elderly.      Aspirin is not on active medication list.  Aspirin use is not indicated based on review of current medical condition/s. Risk of harm outweighs potential benefits.  .    Patient Active Problem List   Diagnosis    History of myocardial infarction    Coronary artery disease involving native coronary artery of native heart without angina pectoris    Ulcerative colitis    HTN (hypertension)    GERD (gastroesophageal reflux disease)    DJD (degenerative joint disease)    Ductal 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    Anemia    Other hyperlipidemia    NICM (nonischemic cardiomyopathy)    DELORES on CPAP    Snoring    Class 1 obesity due to excess calories without serious comorbidity with body mass index (BMI) of 31.0 to 31.9 in adult    Chronic systolic congestive heart failure    Esophageal dysphagia    Stage 3a chronic kidney disease    Longstanding persistent atrial fibrillation     Advance Care Planning Advance Directive is not on file.  ACP discussion was held with the patient during this visit. Patient does not have an advance directive, declines further assistance.            Objective   Vitals:     "24 1024   BP: 138/68   Pulse: 57   Resp: 18   Temp: 97.7 °F (36.5 °C)   SpO2: 98%   Weight: 81.6 kg (180 lb)   Height: 167.6 cm (65.98\")       Estimated body mass index is 29.07 kg/m² as calculated from the following:    Height as of this encounter: 167.6 cm (65.98\").    Weight as of this encounter: 81.6 kg (180 lb).    BMI is >= 25 and <30. (Overweight) The following options were offered after discussion;: weight loss educational material (shared in after visit summary)       Does the patient have evidence of cognitive impairment? No                                                                                               Health  Risk Assessment    Smoking Status:  Social History     Tobacco Use   Smoking Status Former    Current packs/day: 0.00    Types: Cigarettes    Quit date: 1973    Years since quittin.1    Passive exposure: Past   Smokeless Tobacco Never   Tobacco Comments    CAFFEINE USE: 1 CUP DAILY     Alcohol Consumption:  Social History     Substance and Sexual Activity   Alcohol Use No       Fall Risk Screen  STEADI Fall Risk Assessment was completed, and patient is at LOW risk for falls.Assessment completed on:2024    Depression Screening   Little interest or pleasure in doing things? Not at all   Feeling down, depressed, or hopeless? Not at all   PHQ-2 Total Score 0      Health Habits and Functional and Cognitive Screenin/12/2024    10:28 AM   Functional & Cognitive Status   Do you have difficulty preparing food and eating? No   Do you have difficulty bathing yourself, getting dressed or grooming yourself? No   Do you have difficulty using the toilet? No   Do you have difficulty moving around from place to place? No   Do you have trouble with steps or getting out of a bed or a chair? No   Current Diet Well Balanced Diet   Dental Exam Up to date   Eye Exam Up to date   Exercise (times per week) 3 times per week   Current Exercises Include Walking   Do you need help " using the phone?  No   Are you deaf or do you have serious difficulty hearing?  No   Do you need help to go to places out of walking distance? No   Do you need help shopping? No   Do you need help preparing meals?  No   Do you need help with housework?  No   Do you need help with laundry? No   Do you need help taking your medications? No   Do you need help managing money? No   Do you ever drive or ride in a car without wearing a seat belt? No   Have you felt unusual stress, anger or loneliness in the last month? No   Who do you live with? Child   If you need help, do you have trouble finding someone available to you? No   Have you been bothered in the last four weeks by sexual problems? No   Do you have difficulty concentrating, remembering or making decisions? No           Age-appropriate Screening Schedule:  Refer to the list below for future screening recommendations based on patient's age, sex and/or medical conditions. Orders for these recommended tests are listed in the plan section. The patient has been provided with a written plan.    Health Maintenance List  Health Maintenance   Topic Date Due    DXA SCAN  Never done    Pneumococcal Vaccine 65+ (1 of 2 - PCV) Never done    TDAP/TD VACCINES (1 - Tdap) Never done    ANNUAL WELLNESS VISIT  Never done    RSV Vaccine - Adults (1 - 1-dose 75+ series) Never done    BMI FOLLOWUP  08/08/2023    LIPID PANEL  05/29/2025    COLORECTAL CANCER SCREENING  03/23/2032    HEPATITIS C SCREENING  Completed    COVID-19 Vaccine  Completed    INFLUENZA VACCINE  Completed    ZOSTER VACCINE  Completed    MAMMOGRAM  Discontinued                                                                                                                                                CMS Preventative Services Quick Reference  Risk Factors Identified During Encounter  Fall Risk-High or Moderate: Discussed Fall Prevention in the home    The above risks/problems have been discussed with the  patient.  Pertinent information has been shared with the patient in the After Visit Summary.  An After Visit Summary and PPPS were made available to the patient.    Follow Up:   Next Medicare Wellness visit to be scheduled in 1 year.     Assessment & Plan  Coronary artery disease involving native coronary artery of native heart without angina pectoris      Orders:    CBC & Differential    CK    Comprehensive Metabolic Panel    Lipid Panel With / Chol / HDL Ratio    TSH    Primary hypertension      Orders:    CBC & Differential    CK    Comprehensive Metabolic Panel    Lipid Panel With / Chol / HDL Ratio    TSH    Other hyperlipidemia       Orders:    CBC & Differential    CK    Comprehensive Metabolic Panel    Lipid Panel With / Chol / HDL Ratio    TSH    Gastroesophageal reflux disease without esophagitis    Orders:    CBC & Differential    CK    Comprehensive Metabolic Panel    Lipid Panel With / Chol / HDL Ratio    TSH    Stage 3a chronic kidney disease      Orders:    CBC & Differential    CK    Comprehensive Metabolic Panel    Lipid Panel With / Chol / HDL Ratio    TSH    Ambulatory Referral to Nephrology    Chronic systolic congestive heart failure               Continue diet and exercise.  Continue current medication.  Vaccinations discussed.  Per patient she has taken the pneumonia vaccine in October 2024.  Recommend getting Tdap.  Colonoscopy as scheduled by GI for ulcerative colitis surveillance.  Keep pathways clear and lighted.  Fall precautions.    Follow Up:   Return in about 3 months (around 3/12/2025).

## 2024-12-13 LAB
ALBUMIN SERPL-MCNC: 4.2 G/DL (ref 3.8–4.8)
ALP SERPL-CCNC: 98 IU/L (ref 44–121)
ALT SERPL-CCNC: 17 IU/L (ref 0–32)
AST SERPL-CCNC: 27 IU/L (ref 0–40)
BASOPHILS # BLD AUTO: 0 X10E3/UL (ref 0–0.2)
BASOPHILS NFR BLD AUTO: 1 %
BILIRUB SERPL-MCNC: 0.5 MG/DL (ref 0–1.2)
BUN SERPL-MCNC: 26 MG/DL (ref 8–27)
BUN/CREAT SERPL: 16 (ref 12–28)
CALCIUM SERPL-MCNC: 9.2 MG/DL (ref 8.7–10.3)
CHLORIDE SERPL-SCNC: 104 MMOL/L (ref 96–106)
CHOLEST SERPL-MCNC: 170 MG/DL (ref 100–199)
CHOLEST/HDLC SERPL: 2.8 RATIO (ref 0–4.4)
CK SERPL-CCNC: 92 U/L (ref 32–182)
CO2 SERPL-SCNC: 26 MMOL/L (ref 20–29)
CREAT SERPL-MCNC: 1.58 MG/DL (ref 0.57–1)
EGFRCR SERPLBLD CKD-EPI 2021: 33 ML/MIN/1.73
EOSINOPHIL # BLD AUTO: 0.3 X10E3/UL (ref 0–0.4)
EOSINOPHIL NFR BLD AUTO: 6 %
ERYTHROCYTE [DISTWIDTH] IN BLOOD BY AUTOMATED COUNT: 13 % (ref 11.7–15.4)
GLOBULIN SER CALC-MCNC: 2.4 G/DL (ref 1.5–4.5)
GLUCOSE SERPL-MCNC: 90 MG/DL (ref 70–99)
HCT VFR BLD AUTO: 43.8 % (ref 34–46.6)
HDLC SERPL-MCNC: 60 MG/DL
HGB BLD-MCNC: 13.6 G/DL (ref 11.1–15.9)
IMM GRANULOCYTES # BLD AUTO: 0 X10E3/UL (ref 0–0.1)
IMM GRANULOCYTES NFR BLD AUTO: 0 %
LDLC SERPL CALC-MCNC: 87 MG/DL (ref 0–99)
LYMPHOCYTES # BLD AUTO: 1.3 X10E3/UL (ref 0.7–3.1)
LYMPHOCYTES NFR BLD AUTO: 30 %
MCH RBC QN AUTO: 28.8 PG (ref 26.6–33)
MCHC RBC AUTO-ENTMCNC: 31.1 G/DL (ref 31.5–35.7)
MCV RBC AUTO: 93 FL (ref 79–97)
MONOCYTES # BLD AUTO: 0.4 X10E3/UL (ref 0.1–0.9)
MONOCYTES NFR BLD AUTO: 8 %
NEUTROPHILS # BLD AUTO: 2.4 X10E3/UL (ref 1.4–7)
NEUTROPHILS NFR BLD AUTO: 55 %
PLATELET # BLD AUTO: 209 X10E3/UL (ref 150–450)
POTASSIUM SERPL-SCNC: 4.3 MMOL/L (ref 3.5–5.2)
PROT SERPL-MCNC: 6.6 G/DL (ref 6–8.5)
RBC # BLD AUTO: 4.73 X10E6/UL (ref 3.77–5.28)
SODIUM SERPL-SCNC: 143 MMOL/L (ref 134–144)
TRIGL SERPL-MCNC: 130 MG/DL (ref 0–149)
TSH SERPL DL<=0.005 MIU/L-ACNC: 4.33 UIU/ML (ref 0.45–4.5)
VLDLC SERPL CALC-MCNC: 23 MG/DL (ref 5–40)
WBC # BLD AUTO: 4.4 X10E3/UL (ref 3.4–10.8)

## 2025-01-08 RX ORDER — APIXABAN 5 MG/1
5 TABLET, FILM COATED ORAL
Qty: 180 TABLET | Refills: 1 | OUTPATIENT
Start: 2025-01-08

## 2025-01-13 ENCOUNTER — TRANSCRIBE ORDERS (OUTPATIENT)
Dept: ADMINISTRATIVE | Facility: HOSPITAL | Age: 80
End: 2025-01-13
Payer: MEDICARE

## 2025-01-13 ENCOUNTER — LAB (OUTPATIENT)
Dept: LAB | Facility: HOSPITAL | Age: 80
End: 2025-01-13
Payer: MEDICARE

## 2025-01-13 DIAGNOSIS — N18.31 CHRONIC KIDNEY DISEASE (CKD) STAGE G3A/A1, MODERATELY DECREASED GLOMERULAR FILTRATION RATE (GFR) BETWEEN 45-59 ML/MIN/1.73 SQUARE METER AND ALBUMINURIA CREATININE RATIO LESS THAN 30 MG/G (CMS/H*: Primary | ICD-10-CM

## 2025-01-13 DIAGNOSIS — N18.31 CHRONIC KIDNEY DISEASE (CKD) STAGE G3A/A1, MODERATELY DECREASED GLOMERULAR FILTRATION RATE (GFR) BETWEEN 45-59 ML/MIN/1.73 SQUARE METER AND ALBUMINURIA CREATININE RATIO LESS THAN 30 MG/G (CMS/H*: ICD-10-CM

## 2025-01-13 LAB
ALBUMIN SERPL-MCNC: 4 G/DL (ref 3.5–5.2)
ALBUMIN UR-MCNC: 3.2 MG/DL
ANION GAP SERPL CALCULATED.3IONS-SCNC: 11.8 MMOL/L (ref 5–15)
BACTERIA UR QL AUTO: ABNORMAL /HPF
BILIRUB UR QL STRIP: NEGATIVE
BUN SERPL-MCNC: 19 MG/DL (ref 8–23)
BUN/CREAT SERPL: 12.8 (ref 7–25)
CALCIUM SPEC-SCNC: 9.1 MG/DL (ref 8.6–10.5)
CHLORIDE SERPL-SCNC: 104 MMOL/L (ref 98–107)
CLARITY UR: CLEAR
CO2 SERPL-SCNC: 26.2 MMOL/L (ref 22–29)
COLOR UR: YELLOW
CREAT SERPL-MCNC: 1.49 MG/DL (ref 0.57–1)
CREAT UR-MCNC: 126.1 MG/DL
CREAT UR-MCNC: 126.1 MG/DL
EGFRCR SERPLBLD CKD-EPI 2021: 35.8 ML/MIN/1.73
GLUCOSE SERPL-MCNC: 94 MG/DL (ref 65–99)
GLUCOSE UR STRIP-MCNC: NEGATIVE MG/DL
HGB UR QL STRIP.AUTO: NEGATIVE
HYALINE CASTS UR QL AUTO: ABNORMAL /LPF
KETONES UR QL STRIP: NEGATIVE
LEUKOCYTE ESTERASE UR QL STRIP.AUTO: ABNORMAL
MICROALBUMIN/CREAT UR: 25.4 MG/G (ref 0–29)
NITRITE UR QL STRIP: NEGATIVE
PH UR STRIP.AUTO: 6 [PH] (ref 5–8)
PHOSPHATE SERPL-MCNC: 3.7 MG/DL (ref 2.5–4.5)
POTASSIUM SERPL-SCNC: 3.7 MMOL/L (ref 3.5–5.2)
PROT ?TM UR-MCNC: 16.2 MG/DL
PROT UR QL STRIP: ABNORMAL
PROT/CREAT UR: 128.5 MG/G CREA (ref 0–200)
RBC # UR STRIP: ABNORMAL /HPF
REF LAB TEST METHOD: ABNORMAL
SODIUM SERPL-SCNC: 142 MMOL/L (ref 136–145)
SP GR UR STRIP: 1.02 (ref 1–1.03)
SQUAMOUS #/AREA URNS HPF: ABNORMAL /HPF
UROBILINOGEN UR QL STRIP: ABNORMAL
WBC # UR STRIP: ABNORMAL /HPF

## 2025-01-13 PROCEDURE — 81001 URINALYSIS AUTO W/SCOPE: CPT

## 2025-01-13 PROCEDURE — 82570 ASSAY OF URINE CREATININE: CPT

## 2025-01-13 PROCEDURE — 84156 ASSAY OF PROTEIN URINE: CPT

## 2025-01-13 PROCEDURE — 82043 UR ALBUMIN QUANTITATIVE: CPT

## 2025-01-13 PROCEDURE — 80069 RENAL FUNCTION PANEL: CPT

## 2025-01-13 PROCEDURE — 36415 COLL VENOUS BLD VENIPUNCTURE: CPT

## 2025-01-13 RX ORDER — APIXABAN 5 MG/1
5 TABLET, FILM COATED ORAL
Qty: 180 TABLET | Refills: 1 | OUTPATIENT
Start: 2025-01-13

## 2025-01-21 RX ORDER — APIXABAN 5 MG/1
5 TABLET, FILM COATED ORAL
Qty: 180 TABLET | Refills: 1 | OUTPATIENT
Start: 2025-01-21

## 2025-02-13 ENCOUNTER — TELEPHONE (OUTPATIENT)
Dept: GASTROENTEROLOGY | Facility: CLINIC | Age: 80
End: 2025-02-13
Payer: MEDICARE

## 2025-02-13 ENCOUNTER — TELEPHONE (OUTPATIENT)
Dept: GASTROENTEROLOGY | Facility: CLINIC | Age: 80
End: 2025-02-13

## 2025-02-13 ENCOUNTER — OFFICE VISIT (OUTPATIENT)
Dept: GASTROENTEROLOGY | Facility: CLINIC | Age: 80
End: 2025-02-13
Payer: MEDICARE

## 2025-02-13 VITALS
WEIGHT: 185 LBS | SYSTOLIC BLOOD PRESSURE: 130 MMHG | BODY MASS INDEX: 29.73 KG/M2 | DIASTOLIC BLOOD PRESSURE: 82 MMHG | HEIGHT: 66 IN

## 2025-02-13 DIAGNOSIS — K51.90 ULCERATIVE COLITIS WITHOUT COMPLICATIONS, UNSPECIFIED LOCATION: ICD-10-CM

## 2025-02-13 DIAGNOSIS — K21.9 GASTROESOPHAGEAL REFLUX DISEASE WITHOUT ESOPHAGITIS: Primary | ICD-10-CM

## 2025-02-13 RX ORDER — ONDANSETRON 4 MG/1
1 TABLET, FILM COATED ORAL
COMMUNITY
Start: 2024-09-04

## 2025-02-13 RX ORDER — MESALAMINE 1.2 G/1
4800 TABLET, DELAYED RELEASE ORAL
Qty: 120 TABLET | Refills: 4 | Status: SHIPPED | OUTPATIENT
Start: 2025-02-13

## 2025-02-13 NOTE — PROGRESS NOTES
PATIENT INFORMATION  Effie Donnelly       - 1945    CHIEF COMPLAINT  Chief Complaint   Patient presents with    Ulcerative Colitis    Heartburn       HISTORY OF PRESENT ILLNESS    Here today for Crohns follow-up     Per LOV: Started jardiance and diarrhea start the next day, lasted for 6 weeks but colon a mess, quit on  and next day to ER. At this time CT showed active inflammation consistent with crohns and received prednisone taper and feeling better now. Rectal bleeding with recent episode. Bowels moving 1-2 times a day without cramping. Daily probiotic, no fiber supplement. CRP >15. Reviewed with patient and will start mesalamine, check in in 3 mos for CRP and fecal ward, colonoscopy due in March. Avoid NSAIDs.    Initiated mesalamine which previously controlled patient. Patient never started lialda and denies diarrhea, abdominal cramping, pain. Reports lialda could not be filled and she never called us for notification. Feels like she is doing well now.     20 mg omeprazole daily, no recurrent HB, indigestion or other concerns.     3/23/2022 EGD with reactive gastropathy, acute candida  esophagitis.  3/23/2022 Colonoscopy with 0 adenomas, recall in 3 yrs for long standing crohns that was inactive. Colzal 15 yrs ago.             REVIEWED PERTINENT RESULTS/ LABS  Lab Results   Component Value Date    CASEREPORT  2022     Surgical Pathology Report                         Case: [id number]                                  Authorizing Provider:  Lorne Esquivel        Collected:           2022 12:38 PM                                 MD Sveta                                                                   Ordering Location:     [facility]   Received:            2022 03:00 PM                                 OR                                                                           Pathologist:           Lorena Grewal MD                                                           Specimens:   1) - Stomach, bx                                                                                    2) - Esophagus, Distal, bx                                                                          3) - Large Intestine, ILEOCECAL VALVE POLYP                                                         4) - Large Intestine, Right / Ascending Colon, BX                                                   5) - Large Intestine, Transverse Colon, BX                                                          6) - Large Intestine, Sigmoid Colon, BX                                                             7) - Large Intestine, Rectum, BX                                                           FINALDX  03/23/2022     1. Stomach, Biopsy:   A. Gastric antral mucosa with reactive gastropathy.   B. Negative for H. pylori-type organisms on routine stain.    2. Esophagus, Distal, Biopsy:   A. Acute candida esophagitis.   B. Negative for intestinal metaplasia.   C. PAS-F special stain performed on block 2A is POSITIVE for hyphal structures, morphologically consistent       with Candida species (control reacted appropriately).    3. Ileocecal Valve, Polyp, Polypectomy:   A. Fragments of hyperplastic polyp.    4. Ascending Colon, Biopsy:   A. Colonic mucosa with mild architectural distortion (see comment).   B. Negative for granulomas, viral cytopathic effect and dysplasia.     5. Sigmoid Colon, Biopsy:   A. Colonic mucosa with no significant histopathologic changes.   B. Negative for granulomas, viral cytopathic effect and dysplasia.    6. Sigmoid Colon, Biopsy:   A. Colonic mucosa with no significant histopathologic changes.   B. Negative for granulomas, viral cytopathic effect and dysplasia.    7. Rectum, Biopsy:   A. Colorectal mucosa with no significant histopathologic changes.   B. Negative for granulomas, viral cytopathic effect and dysplasia.    jab/radha        Lab Results   Component Value Date    [facility]  13.6 12/12/2024    MCV 93 12/12/2024     12/12/2024    ALT 17 12/12/2024    AST 27 12/12/2024    HGBA1C 5.50 02/13/2023    INR 1.8 07/12/2023    TRIG 130 12/12/2024      No results found.    REVIEW OF SYSTEMS  Review of Systems      ACTIVE PROBLEMS  Patient Active Problem List    Diagnosis     Longstanding persistent atrial fibrillation [I48.11]     Stage 3a chronic kidney disease [N18.31]     Esophageal dysphagia [R13.19]     DELORES on CPAP [G47.33]     Snoring [R06.83]     Class 1 obesity due to excess calories without serious comorbidity with body mass index (BMI) of 31.0 to 31.9 in adult [E66.811, E66.09, Z68.31]     Chronic systolic congestive heart failure [I50.22]     NICM (nonischemic cardiomyopathy) [I42.8]     Other hyperlipidemia [E78.49]     Ulcerative colitis [K51.90]     HTN (hypertension) [I10]     GERD (gastroesophageal reflux disease) [K21.9]     DJD (degenerative joint disease) [M19.90]     Anemia [D64.9]     Ductal 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 [D05.11]     History of myocardial infarction [I25.2]     Coronary artery disease involving native coronary artery of native heart without angina pectoris [I25.10]          PAST MEDICAL HISTORY  Past Medical History:   Diagnosis Date    CAD (coronary artery disease)     Cancer     skin, right breast    CHF (congestive heart failure)     Coronary artery disease involving native coronary artery of native heart without angina pectoris 11/1/2018    Heart murmur     History of myocardial infarction 11/1/2018    Hypertension     Myocardial infarction     PER PT     NICM (nonischemic cardiomyopathy) 7/8/2020    DELORES on CPAP     AHI 15/h    Sinus tachycardia     SOB (shortness of breath)          SURGICAL HISTORY  Past Surgical History:   Procedure Laterality Date    BUNIONECTOMY Bilateral     CARDIAC CATHETERIZATION      CARDIAC CATHETERIZATION N/A 01/14/2020    Procedure: Right and Left Heart Cath;  Surgeon: Bin Chacko MD;  Location: [facility]  CATH INVASIVE LOCATION;  Service: Cardiovascular    CARDIAC CATHETERIZATION N/A 2020    Procedure: Coronary angiography;  Surgeon: Bin Chacko MD;  Location:  [facility] CATH INVASIVE LOCATION;  Service: Cardiovascular    CARDIAC CATHETERIZATION N/A 2020    Procedure: Left ventriculography;  Surgeon: Bin Chacko MD;  Location:  [facility] CATH INVASIVE LOCATION;  Service: Cardiovascular    CARDIOVERSION  2023    CATARACT EXTRACTION Left 2022    COLONOSCOPY N/A 2022    Procedure: COLONOSCOPY WITH BIOPSIES; POLYPECTOMY;  Surgeon: Lorne Esquivel MD;  Location:  [facility] OR;  Service: Gastroenterology;  Laterality: N/A;  POLYP  ULCERATIVE COLITIS    ENDOSCOPY N/A 2022    Procedure: ESOPHAGOGASTRODUODENOSCOPY WITH BIOPSIES;  Surgeon: Lorne Esquievl MD;  Location:  [facility] OR;  Service: Gastroenterology;  Laterality: N/A;  GASTRITIS  ESOPHAGEAL REFLUX  HIATAL HERNIA  ESOPHAGEAL CANDIDIASIS    MASTECTOMY COMPLETE / SIMPLE  2019    SHOULDER SURGERY Right     SKIN CANCER EXCISION      ABOVE THE LIP.          FAMILY HISTORY  Family History   Problem Relation Age of Onset    Hypertension Sister     Cancer Sister     Heart disease Brother 65        VALVE REPLACEMENT     Hypertension Brother     Hypertension Sister     Hypertension Sister     Hypertension Brother     Diabetes Brother     Hypertension Brother     Cancer Brother     Hypertension Brother     Hypertension Brother     Hypertension Brother          SOCIAL HISTORY  Social History     Occupational History    Occupation: RETIRED    Tobacco Use    Smoking status: Former     Current packs/day: 0.00     Types: Cigarettes     Quit date: 1973     Years since quittin.3     Passive exposure: Past    Smokeless tobacco: Never    Tobacco comments:     CAFFEINE USE: 1 CUP DAILY   Vaping Use    Vaping status: Never Used   Substance and Sexual Activity    Alcohol use: No    Drug use: No     Sexual activity: Defer         CURRENT MEDICATIONS    Current Outpatient Medications:     albuterol sulfate  (90 Base) MCG/ACT inhaler, Inhale 2 puffs Every 4 (Four) Hours As Needed for Wheezing or Shortness of Air. Use with chamber, Disp: 8.5 g, Rfl: 0    amiodarone (PACERONE) 200 MG tablet, Take 1 tablet by mouth Daily., Disp: , Rfl:     B Complex Vitamins (VITAMIN B COMPLEX PO), Take 1 tablet by mouth Daily., Disp: , Rfl:     cholecalciferol (VITAMIN D3) 25 MCG (1000 UT) tablet, Take 4 tablets by mouth Daily., Disp: , Rfl:     CRANBERRY EXTRACT PO, Take 1 capsule by mouth Daily., Disp: , Rfl:     Eliquis 5 MG tablet tablet, TAKE ONE TABLET BY MOUTH EVERY 12 HOURS, Disp: 180 tablet, Rfl: 1    flunisolide (NASALIDE) 25 MCG/ACT (0.025%) solution nasal spray, Inhale 2 sprays Every 12 (Twelve) Hours., Disp: 25 mL, Rfl: 0    furosemide (LASIX) 20 MG tablet, Take your Lasix 20 mg in the morning and the half milligram tablet at 2 PM as you were taking prior to arrival at the emergency department., Disp: 135 tablet, Rfl: 0    Loratadine 10 MG capsule, Take  by mouth., Disp: , Rfl:     mesalamine (LIALDA) 1.2 g EC tablet, Take 4 tablets by mouth Daily With Breakfast., Disp: 120 tablet, Rfl: 4    metoprolol succinate XL (TOPROL-XL) 25 MG 24 hr tablet, Take 1 tablet by mouth Daily., Disp: , Rfl:     omeprazole (priLOSEC) 20 MG capsule, Take 1 capsule by mouth Every Morning., Disp: 90 capsule, Rfl: 3    ondansetron (ZOFRAN) 4 MG tablet, Take 1 tablet by mouth., Disp: , Rfl:     PROBIOTIC PRODUCT PO, Take 2 capsules by mouth Daily., Disp: , Rfl:     rosuvastatin (Crestor) 5 MG tablet, Take 1 tablet by mouth Daily., Disp: 90 tablet, Rfl: 2    sacubitril-valsartan (Entresto) 24-26 MG tablet, Take 1 tablet by mouth 2 (Two) Times a Day., Disp: , Rfl:     dicyclomine (BENTYL) 10 MG capsule, Take 1 capsule by mouth 4 (Four) Times a Day. (Patient not taking: Reported on 2/13/2025), Disp: , Rfl:     ALLERGIES  Carvedilol,  "Nsaids, Jardiance [empagliflozin], and Adhesive tape    VITALS  Vitals:    02/13/25 1100   BP: 130/82   BP Location: Left arm   Patient Position: Sitting   Cuff Size: Adult   Weight: 83.9 kg (185 lb)   Height: 167.6 cm (66\")       PHYSICAL EXAM  Debilities/Disabilities Identified: None  Emotional Behavior: Appropriate  Wt Readings from Last 3 Encounters:   02/13/25 83.9 kg (185 lb)   12/12/24 81.6 kg (180 lb)   11/13/24 83.2 kg (183 lb 6.4 oz)     Ht Readings from Last 1 Encounters:   02/13/25 167.6 cm (66\")     Body mass index is 29.86 kg/m².  Physical Exam  Constitutional:       General: She is not in acute distress.     Appearance: Normal appearance. She is not ill-appearing.   HENT:      Head: Normocephalic and atraumatic.      Mouth/Throat:      Mouth: Mucous membranes are moist.      Pharynx: No posterior oropharyngeal erythema.   Eyes:      General: No scleral icterus.  Cardiovascular:      Rate and Rhythm: Normal rate and regular rhythm.      Heart sounds: Normal heart sounds.   Pulmonary:      Effort: Pulmonary effort is normal.      Breath sounds: Normal breath sounds.   Abdominal:      General: Abdomen is flat. Bowel sounds are normal. There is no distension.      Palpations: Abdomen is soft. There is no mass.      Tenderness: There is no abdominal tenderness. There is no guarding or rebound. Negative signs include Del Angel's sign.      Hernia: No hernia is present.   Musculoskeletal:      Cervical back: Neck supple.   Skin:     General: Skin is warm.      Capillary Refill: Capillary refill takes less than 2 seconds.   Neurological:      General: No focal deficit present.      Mental Status: She is alert and oriented to person, place, and time.   Psychiatric:         Mood and Affect: Mood normal.         Behavior: Behavior normal.         Thought Content: Thought content normal.         Judgment: Judgment normal.         CLINICAL DATA REVIEWED   reviewed previous lab results and integrated with today's " visit, reviewed notes from other physicians and/or last GI encounter, reviewed previous endoscopy results and available photos, reviewed surgical pathology results from previous biopsies    ASSESSMENT  Diagnoses and all orders for this visit:    Gastroesophageal reflux disease without esophagitis    Ulcerative colitis without complications, unspecified location  -     Case Request; Standing  -     Case Request    Other orders  -     ondansetron (ZOFRAN) 4 MG tablet; Take 1 tablet by mouth.  -     Follow Anesthesia Guidelines / Protocol; Future  -     Verify bowel prep was successful; Standing  -     Give tap water enema if bowel prep was insufficient; Standing  -     mesalamine (LIALDA) 1.2 g EC tablet; Take 4 tablets by mouth Daily With Breakfast.          PLAN    Schedule for colon  Will run PA for mesalamine  GERD: Continue daily PPI    No follow-ups on file.    I have discussed the above plan with the patient.  They verbalize understanding and are in agreement with the plan.  They have been advised to contact the office for any questions, concerns, or changes related to their health.

## 2025-02-13 NOTE — TELEPHONE ENCOUNTER
Faxed for surgery clearance for her eliquis to [facility] Dr Kun Decker via fax number

## 2025-02-13 NOTE — H&P (VIEW-ONLY)
PATIENT INFORMATION  Effie Donnelly       - 1945    CHIEF COMPLAINT  Chief Complaint   Patient presents with    Ulcerative Colitis    Heartburn       HISTORY OF PRESENT ILLNESS    Here today for Crohns follow-up     Per LOV: Started jardiance and diarrhea start the next day, lasted for 6 weeks but colon a mess, quit on  and next day to ER. At this time CT showed active inflammation consistent with crohns and received prednisone taper and feeling better now. Rectal bleeding with recent episode. Bowels moving 1-2 times a day without cramping. Daily probiotic, no fiber supplement. CRP >15. Reviewed with patient and will start mesalamine, check in in 3 mos for CRP and fecal ward, colonoscopy due in March. Avoid NSAIDs.    Initiated mesalamine which previously controlled patient. Patient never started lialda and denies diarrhea, abdominal cramping, pain. Reports lialda could not be filled and she never called us for notification. Feels like she is doing well now.     20 mg omeprazole daily, no recurrent HB, indigestion or other concerns.     3/23/2022 EGD with reactive gastropathy, acute candida  esophagitis.  3/23/2022 Colonoscopy with 0 adenomas, recall in 3 yrs for long standing crohns that was inactive. Colzal 15 yrs ago.             REVIEWED PERTINENT RESULTS/ LABS  Lab Results   Component Value Date    CASEREPORT  2022     Surgical Pathology Report                         Case: [id number]                                  Authorizing Provider:  Lorne Esquivel        Collected:           2022 12:38 PM                                 MD Sveta                                                                   Ordering Location:     [facility]   Received:            2022 03:00 PM                                 OR                                                                           Pathologist:           Lorena Grewal MD                                                           Specimens:   1) - Stomach, bx                                                                                    2) - Esophagus, Distal, bx                                                                          3) - Large Intestine, ILEOCECAL VALVE POLYP                                                         4) - Large Intestine, Right / Ascending Colon, BX                                                   5) - Large Intestine, Transverse Colon, BX                                                          6) - Large Intestine, Sigmoid Colon, BX                                                             7) - Large Intestine, Rectum, BX                                                           FINALDX  03/23/2022     1. Stomach, Biopsy:   A. Gastric antral mucosa with reactive gastropathy.   B. Negative for H. pylori-type organisms on routine stain.    2. Esophagus, Distal, Biopsy:   A. Acute candida esophagitis.   B. Negative for intestinal metaplasia.   C. PAS-F special stain performed on block 2A is POSITIVE for hyphal structures, morphologically consistent       with Candida species (control reacted appropriately).    3. Ileocecal Valve, Polyp, Polypectomy:   A. Fragments of hyperplastic polyp.    4. Ascending Colon, Biopsy:   A. Colonic mucosa with mild architectural distortion (see comment).   B. Negative for granulomas, viral cytopathic effect and dysplasia.     5. Sigmoid Colon, Biopsy:   A. Colonic mucosa with no significant histopathologic changes.   B. Negative for granulomas, viral cytopathic effect and dysplasia.    6. Sigmoid Colon, Biopsy:   A. Colonic mucosa with no significant histopathologic changes.   B. Negative for granulomas, viral cytopathic effect and dysplasia.    7. Rectum, Biopsy:   A. Colorectal mucosa with no significant histopathologic changes.   B. Negative for granulomas, viral cytopathic effect and dysplasia.    jab/radha        Lab Results   Component Value Date    [facility]  13.6 12/12/2024    MCV 93 12/12/2024     12/12/2024    ALT 17 12/12/2024    AST 27 12/12/2024    HGBA1C 5.50 02/13/2023    INR 1.8 07/12/2023    TRIG 130 12/12/2024      No results found.    REVIEW OF SYSTEMS  Review of Systems      ACTIVE PROBLEMS  Patient Active Problem List    Diagnosis     Longstanding persistent atrial fibrillation [I48.11]     Stage 3a chronic kidney disease [N18.31]     Esophageal dysphagia [R13.19]     DELORES on CPAP [G47.33]     Snoring [R06.83]     Class 1 obesity due to excess calories without serious comorbidity with body mass index (BMI) of 31.0 to 31.9 in adult [E66.811, E66.09, Z68.31]     Chronic systolic congestive heart failure [I50.22]     NICM (nonischemic cardiomyopathy) [I42.8]     Other hyperlipidemia [E78.49]     Ulcerative colitis [K51.90]     HTN (hypertension) [I10]     GERD (gastroesophageal reflux disease) [K21.9]     DJD (degenerative joint disease) [M19.90]     Anemia [D64.9]     Ductal 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 [D05.11]     History of myocardial infarction [I25.2]     Coronary artery disease involving native coronary artery of native heart without angina pectoris [I25.10]          PAST MEDICAL HISTORY  Past Medical History:   Diagnosis Date    CAD (coronary artery disease)     Cancer     skin, right breast    CHF (congestive heart failure)     Coronary artery disease involving native coronary artery of native heart without angina pectoris 11/1/2018    Heart murmur     History of myocardial infarction 11/1/2018    Hypertension     Myocardial infarction     PER PT     NICM (nonischemic cardiomyopathy) 7/8/2020    DELORES on CPAP     AHI 15/h    Sinus tachycardia     SOB (shortness of breath)          SURGICAL HISTORY  Past Surgical History:   Procedure Laterality Date    BUNIONECTOMY Bilateral     CARDIAC CATHETERIZATION      CARDIAC CATHETERIZATION N/A 01/14/2020    Procedure: Right and Left Heart Cath;  Surgeon: Bin Chacko MD;  Location: [facility]  CATH INVASIVE LOCATION;  Service: Cardiovascular    CARDIAC CATHETERIZATION N/A 2020    Procedure: Coronary angiography;  Surgeon: Bin Chacko MD;  Location:  [facility] CATH INVASIVE LOCATION;  Service: Cardiovascular    CARDIAC CATHETERIZATION N/A 2020    Procedure: Left ventriculography;  Surgeon: Bin Chacko MD;  Location:  [facility] CATH INVASIVE LOCATION;  Service: Cardiovascular    CARDIOVERSION  2023    CATARACT EXTRACTION Left 2022    COLONOSCOPY N/A 2022    Procedure: COLONOSCOPY WITH BIOPSIES; POLYPECTOMY;  Surgeon: Lorne Esquivel MD;  Location:  [facility] OR;  Service: Gastroenterology;  Laterality: N/A;  POLYP  ULCERATIVE COLITIS    ENDOSCOPY N/A 2022    Procedure: ESOPHAGOGASTRODUODENOSCOPY WITH BIOPSIES;  Surgeon: Lorne Esquivel MD;  Location:  [facility] OR;  Service: Gastroenterology;  Laterality: N/A;  GASTRITIS  ESOPHAGEAL REFLUX  HIATAL HERNIA  ESOPHAGEAL CANDIDIASIS    MASTECTOMY COMPLETE / SIMPLE  2019    SHOULDER SURGERY Right     SKIN CANCER EXCISION      ABOVE THE LIP.          FAMILY HISTORY  Family History   Problem Relation Age of Onset    Hypertension Sister     Cancer Sister     Heart disease Brother 65        VALVE REPLACEMENT     Hypertension Brother     Hypertension Sister     Hypertension Sister     Hypertension Brother     Diabetes Brother     Hypertension Brother     Cancer Brother     Hypertension Brother     Hypertension Brother     Hypertension Brother          SOCIAL HISTORY  Social History     Occupational History    Occupation: RETIRED    Tobacco Use    Smoking status: Former     Current packs/day: 0.00     Types: Cigarettes     Quit date: 1973     Years since quittin.3     Passive exposure: Past    Smokeless tobacco: Never    Tobacco comments:     CAFFEINE USE: 1 CUP DAILY   Vaping Use    Vaping status: Never Used   Substance and Sexual Activity    Alcohol use: No    Drug use: No     Sexual activity: Defer         CURRENT MEDICATIONS    Current Outpatient Medications:     albuterol sulfate  (90 Base) MCG/ACT inhaler, Inhale 2 puffs Every 4 (Four) Hours As Needed for Wheezing or Shortness of Air. Use with chamber, Disp: 8.5 g, Rfl: 0    amiodarone (PACERONE) 200 MG tablet, Take 1 tablet by mouth Daily., Disp: , Rfl:     B Complex Vitamins (VITAMIN B COMPLEX PO), Take 1 tablet by mouth Daily., Disp: , Rfl:     cholecalciferol (VITAMIN D3) 25 MCG (1000 UT) tablet, Take 4 tablets by mouth Daily., Disp: , Rfl:     CRANBERRY EXTRACT PO, Take 1 capsule by mouth Daily., Disp: , Rfl:     Eliquis 5 MG tablet tablet, TAKE ONE TABLET BY MOUTH EVERY 12 HOURS, Disp: 180 tablet, Rfl: 1    flunisolide (NASALIDE) 25 MCG/ACT (0.025%) solution nasal spray, Inhale 2 sprays Every 12 (Twelve) Hours., Disp: 25 mL, Rfl: 0    furosemide (LASIX) 20 MG tablet, Take your Lasix 20 mg in the morning and the half milligram tablet at 2 PM as you were taking prior to arrival at the emergency department., Disp: 135 tablet, Rfl: 0    Loratadine 10 MG capsule, Take  by mouth., Disp: , Rfl:     mesalamine (LIALDA) 1.2 g EC tablet, Take 4 tablets by mouth Daily With Breakfast., Disp: 120 tablet, Rfl: 4    metoprolol succinate XL (TOPROL-XL) 25 MG 24 hr tablet, Take 1 tablet by mouth Daily., Disp: , Rfl:     omeprazole (priLOSEC) 20 MG capsule, Take 1 capsule by mouth Every Morning., Disp: 90 capsule, Rfl: 3    ondansetron (ZOFRAN) 4 MG tablet, Take 1 tablet by mouth., Disp: , Rfl:     PROBIOTIC PRODUCT PO, Take 2 capsules by mouth Daily., Disp: , Rfl:     rosuvastatin (Crestor) 5 MG tablet, Take 1 tablet by mouth Daily., Disp: 90 tablet, Rfl: 2    sacubitril-valsartan (Entresto) 24-26 MG tablet, Take 1 tablet by mouth 2 (Two) Times a Day., Disp: , Rfl:     dicyclomine (BENTYL) 10 MG capsule, Take 1 capsule by mouth 4 (Four) Times a Day. (Patient not taking: Reported on 2/13/2025), Disp: , Rfl:     ALLERGIES  Carvedilol,  "Nsaids, Jardiance [empagliflozin], and Adhesive tape    VITALS  Vitals:    02/13/25 1100   BP: 130/82   BP Location: Left arm   Patient Position: Sitting   Cuff Size: Adult   Weight: 83.9 kg (185 lb)   Height: 167.6 cm (66\")       PHYSICAL EXAM  Debilities/Disabilities Identified: None  Emotional Behavior: Appropriate  Wt Readings from Last 3 Encounters:   02/13/25 83.9 kg (185 lb)   12/12/24 81.6 kg (180 lb)   11/13/24 83.2 kg (183 lb 6.4 oz)     Ht Readings from Last 1 Encounters:   02/13/25 167.6 cm (66\")     Body mass index is 29.86 kg/m².  Physical Exam  Constitutional:       General: She is not in acute distress.     Appearance: Normal appearance. She is not ill-appearing.   HENT:      Head: Normocephalic and atraumatic.      Mouth/Throat:      Mouth: Mucous membranes are moist.      Pharynx: No posterior oropharyngeal erythema.   Eyes:      General: No scleral icterus.  Cardiovascular:      Rate and Rhythm: Normal rate and regular rhythm.      Heart sounds: Normal heart sounds.   Pulmonary:      Effort: Pulmonary effort is normal.      Breath sounds: Normal breath sounds.   Abdominal:      General: Abdomen is flat. Bowel sounds are normal. There is no distension.      Palpations: Abdomen is soft. There is no mass.      Tenderness: There is no abdominal tenderness. There is no guarding or rebound. Negative signs include Del Angel's sign.      Hernia: No hernia is present.   Musculoskeletal:      Cervical back: Neck supple.   Skin:     General: Skin is warm.      Capillary Refill: Capillary refill takes less than 2 seconds.   Neurological:      General: No focal deficit present.      Mental Status: She is alert and oriented to person, place, and time.   Psychiatric:         Mood and Affect: Mood normal.         Behavior: Behavior normal.         Thought Content: Thought content normal.         Judgment: Judgment normal.         CLINICAL DATA REVIEWED   reviewed previous lab results and integrated with today's " visit, reviewed notes from other physicians and/or last GI encounter, reviewed previous endoscopy results and available photos, reviewed surgical pathology results from previous biopsies    ASSESSMENT  Diagnoses and all orders for this visit:    Gastroesophageal reflux disease without esophagitis    Ulcerative colitis without complications, unspecified location  -     Case Request; Standing  -     Case Request    Other orders  -     ondansetron (ZOFRAN) 4 MG tablet; Take 1 tablet by mouth.  -     Follow Anesthesia Guidelines / Protocol; Future  -     Verify bowel prep was successful; Standing  -     Give tap water enema if bowel prep was insufficient; Standing  -     mesalamine (LIALDA) 1.2 g EC tablet; Take 4 tablets by mouth Daily With Breakfast.          PLAN    Schedule for colon  Will run PA for mesalamine  GERD: Continue daily PPI    No follow-ups on file.    I have discussed the above plan with the patient.  They verbalize understanding and are in agreement with the plan.  They have been advised to contact the office for any questions, concerns, or changes related to their health.

## 2025-02-13 NOTE — TELEPHONE ENCOUNTER
FAXED SURGERY CLEARANCE TO [facility] VIA FAX NUMBER [phone]  COLONOSCOPY [place] ON 2-24-25

## 2025-02-13 NOTE — TELEPHONE ENCOUNTER
Sent PA for Roel through [facility]     PA Case: [id number], Status: Approved, Coverage Starts on: 1/1/2025 12:00:00 AM, Coverage Ends on: 12/31/2025 12:00:00 AM. Questions? Contact [phone].

## 2025-02-20 ENCOUNTER — TELEPHONE (OUTPATIENT)
Dept: GASTROENTEROLOGY | Facility: CLINIC | Age: 80
End: 2025-02-20
Payer: MEDICARE

## 2025-02-20 NOTE — TELEPHONE ENCOUNTER
Looking into patient chart under care everywhere found this note from provider:    Kun Decker MD - 02/17/2025 5:16 PM EST  Formatting of this note might be different from the original.  Acceptable to proceed with colonoscopy and hold Eliquis if required. Can hold 2 days prior and resume soon as possible afterward.  Electronically signed by Kun Decker MD at 02/17/2025 5:16 PM EST     Called pt to inform her of the recommendations and pt voiced understanding.

## 2025-02-21 ENCOUNTER — ANESTHESIA EVENT (OUTPATIENT)
Dept: PERIOP | Facility: HOSPITAL | Age: 80
End: 2025-02-21
Payer: MEDICARE

## 2025-02-24 ENCOUNTER — ANESTHESIA (OUTPATIENT)
Dept: PERIOP | Facility: HOSPITAL | Age: 80
End: 2025-02-24
Payer: MEDICARE

## 2025-02-24 ENCOUNTER — HOSPITAL ENCOUNTER (OUTPATIENT)
Facility: HOSPITAL | Age: 80
Setting detail: HOSPITAL OUTPATIENT SURGERY
Discharge: HOME | End: 2025-02-24
Attending: INTERNAL MEDICINE | Admitting: INTERNAL MEDICINE
Payer: MEDICARE

## 2025-02-24 VITALS
RESPIRATION RATE: 14 BRPM | HEART RATE: 48 BPM | SYSTOLIC BLOOD PRESSURE: 168 MMHG | DIASTOLIC BLOOD PRESSURE: 56 MMHG | OXYGEN SATURATION: 98 % | WEIGHT: 180.6 LBS | TEMPERATURE: 97.8 F | BODY MASS INDEX: 29.15 KG/M2

## 2025-02-24 DIAGNOSIS — K51.90 ULCERATIVE COLITIS WITHOUT COMPLICATIONS, UNSPECIFIED LOCATION: ICD-10-CM

## 2025-02-24 PROCEDURE — 45380 COLONOSCOPY AND BIOPSY: CPT | Performed by: INTERNAL MEDICINE

## 2025-02-24 PROCEDURE — 25010000002 PROPOFOL 200 MG/20ML EMULSION

## 2025-02-24 PROCEDURE — 45385 COLONOSCOPY W/LESION REMOVAL: CPT | Performed by: INTERNAL MEDICINE

## 2025-02-24 PROCEDURE — 88305 TISSUE EXAM BY PATHOLOGIST: CPT | Performed by: INTERNAL MEDICINE

## 2025-02-24 PROCEDURE — 25010000002 LIDOCAINE PF 1% 1 % SOLUTION

## 2025-02-24 RX ORDER — ONDANSETRON 2 MG/ML
4 INJECTION INTRAMUSCULAR; INTRAVENOUS ONCE AS NEEDED
Status: DISCONTINUED | OUTPATIENT
Start: 2025-02-24 | End: 2025-02-24 | Stop reason: HOSPADM

## 2025-02-24 RX ORDER — SODIUM CHLORIDE, SODIUM LACTATE, POTASSIUM CHLORIDE, CALCIUM CHLORIDE 600; 310; 30; 20 MG/100ML; MG/100ML; MG/100ML; MG/100ML
100 INJECTION, SOLUTION INTRAVENOUS ONCE
Status: DISCONTINUED | OUTPATIENT
Start: 2025-02-24 | End: 2025-02-24 | Stop reason: HOSPADM

## 2025-02-24 RX ORDER — SODIUM CHLORIDE 9 MG/ML
40 INJECTION, SOLUTION INTRAVENOUS AS NEEDED
Status: DISCONTINUED | OUTPATIENT
Start: 2025-02-24 | End: 2025-02-24 | Stop reason: HOSPADM

## 2025-02-24 RX ORDER — SODIUM CHLORIDE, SODIUM LACTATE, POTASSIUM CHLORIDE, CALCIUM CHLORIDE 600; 310; 30; 20 MG/100ML; MG/100ML; MG/100ML; MG/100ML
9 INJECTION, SOLUTION INTRAVENOUS CONTINUOUS
Status: DISCONTINUED | OUTPATIENT
Start: 2025-02-24 | End: 2025-02-24 | Stop reason: HOSPADM

## 2025-02-24 RX ORDER — LIDOCAINE HYDROCHLORIDE 10 MG/ML
0.5 INJECTION, SOLUTION EPIDURAL; INFILTRATION; INTRACAUDAL; PERINEURAL ONCE AS NEEDED
Status: DISCONTINUED | OUTPATIENT
Start: 2025-02-24 | End: 2025-02-24 | Stop reason: HOSPADM

## 2025-02-24 RX ORDER — PROPOFOL 10 MG/ML
INJECTION, EMULSION INTRAVENOUS AS NEEDED
Status: DISCONTINUED | OUTPATIENT
Start: 2025-02-24 | End: 2025-02-24 | Stop reason: SURG

## 2025-02-24 RX ORDER — SODIUM CHLORIDE 0.9 % (FLUSH) 0.9 %
10 SYRINGE (ML) INJECTION AS NEEDED
Status: DISCONTINUED | OUTPATIENT
Start: 2025-02-24 | End: 2025-02-24 | Stop reason: HOSPADM

## 2025-02-24 RX ORDER — LIDOCAINE HYDROCHLORIDE 10 MG/ML
INJECTION, SOLUTION EPIDURAL; INFILTRATION; INTRACAUDAL; PERINEURAL AS NEEDED
Status: DISCONTINUED | OUTPATIENT
Start: 2025-02-24 | End: 2025-02-24 | Stop reason: SURG

## 2025-02-24 RX ORDER — SODIUM CHLORIDE 9 MG/ML
INJECTION, SOLUTION INTRAMUSCULAR; INTRAVENOUS; SUBCUTANEOUS AS NEEDED
Status: DISCONTINUED | OUTPATIENT
Start: 2025-02-24 | End: 2025-02-24 | Stop reason: SURG

## 2025-02-24 RX ORDER — SODIUM CHLORIDE 0.9 % (FLUSH) 0.9 %
10 SYRINGE (ML) INJECTION EVERY 12 HOURS SCHEDULED
Status: DISCONTINUED | OUTPATIENT
Start: 2025-02-24 | End: 2025-02-24 | Stop reason: HOSPADM

## 2025-02-24 RX ADMIN — SODIUM CHLORIDE 10 ML: 9 INJECTION, SOLUTION INTRAMUSCULAR; INTRAVENOUS; SUBCUTANEOUS at 10:10

## 2025-02-24 RX ADMIN — LIDOCAINE HYDROCHLORIDE 30 MG: 10 INJECTION, SOLUTION EPIDURAL; INFILTRATION; INTRACAUDAL; PERINEURAL at 09:42

## 2025-02-24 RX ADMIN — PROPOFOL 450 MG: 10 INJECTION, EMULSION INTRAVENOUS at 09:46

## 2025-02-24 NOTE — ANESTHESIA POSTPROCEDURE EVALUATION
Patient: Effie Donnelly    Procedure Summary       Date: 02/24/25 Room / Location: [facility] ENDOSCOPY 1 /  [facility] OR    Anesthesia Start: 0940 Anesthesia Stop: 1014    Procedure: COLONOSCOPY WITH POLYPECTOMY and biopsy Diagnosis:       Ulcerative colitis without complications, unspecified location      Colon polyps      (Ulcerative colitis without complications, unspecified location [K51.90])    Surgeons: Lorne Esquivel MD Provider: Felicia Hernandez CRNA    Anesthesia Type: MAC ASA Status: 3            Anesthesia Type: MAC    Vitals  Vitals Value Taken Time   /56 02/24/25 1040   Temp 97.8 °F (36.6 °C) 02/24/25 1015   Pulse 46 02/24/25 1043   Resp 16 02/24/25 1030   SpO2 75 % 02/24/25 1043   Vitals shown include unfiled device data.        Post Anesthesia Care and Evaluation    Patient location during evaluation: PHASE II  Patient participation: complete - patient participated  Level of consciousness: awake and alert  Pain score: 0  Pain management: adequate    Airway patency: patent  Anesthetic complications: No anesthetic complications  PONV Status: none  Cardiovascular status: acceptable  Respiratory status: acceptable  Hydration status: acceptable

## 2025-02-24 NOTE — ANESTHESIA PREPROCEDURE EVALUATION
Anesthesia Evaluation     Patient summary reviewed and Nursing notes reviewed   no history of anesthetic complications:   NPO Solid Status: > 8 hours  NPO Liquid Status: > 8 hours           Airway   Mallampati: II  TM distance: <3 FB  Neck ROM: full  No difficulty expected  Dental - normal exam     Pulmonary - normal exam    breath sounds clear to auscultation  (+) ,sleep apnea  (-) shortness of breath  Cardiovascular - normal exam  Exercise tolerance: poor (<4 METS)    ECG reviewed  Patient on routine beta blocker and Beta blocker given within 24 hours of surgery  Rhythm: regular  Rate: normal    (+) hypertension 2 medications or greater, valvular problems/murmurs murmur, past MI , CAD, dysrhythmias Atrial Fib, CHF , hyperlipidemia    ROS comment: EKG 10/20/23:  - ABNORMAL ECG -  Sinus bradycardia  LVH with repolarization abnormality  c/w prior ecg, a fib is no longer seen    ECHO 2/13/23:  Interpretation Summary  ·  Left ventricular ejection fraction appears to be 51 - 55%.  ·  Sigmoid-shaped ventricular septum is present.Prominent left ventricular false tendon.  ·  Left ventricular diastolic function is consistent with (grade II w/high LAP) pseudonormalization.  ·  Estimated right ventricular systolic pressure from tricuspid regurgitation is mildly elevated (35-45 mmHg).    Neuro/Psych- negative ROS  GI/Hepatic/Renal/Endo    (+) obesity, GERD well controlled, renal disease- CRI    Musculoskeletal     Abdominal  - normal exam   Substance History - negative use     OB/GYN          Other   arthritis,   history of cancer remission    ROS/Med Hx Other: Off Eliquis 3 days                    Anesthesia Plan    ASA 3     MAC     intravenous induction     Anesthetic plan, risks, benefits, and alternatives have been provided, discussed and informed consent has been obtained with: patient.  Pre-procedure education provided  Use of blood products discussed with patient  Consented to blood products.    Plan discussed with  CRNA.      CODE STATUS:

## 2025-02-24 NOTE — BRIEF OP NOTE
COLONOSCOPY WITH POLYPECTOMY  Progress Note    Effie Donnelly  2/24/2025    Pre-op Diagnosis:   Ulcerative colitis without complications, unspecified location [K51.90]       Post-Op Diagnosis Codes:     * Ulcerative colitis without complications, unspecified location [K51.90]     * Colon polyps [K63.5]    Procedure(s):      Procedure(s):  COLONOSCOPY WITH POLYPECTOMY and biopsy              Surgeon(s):  Lorne Esquivel MD    Anesthesia: Monitored Anesthesia Care    Staff:   Circulator: Danyel Tineo RN  Scrub Person: Fallon Barclay MA; Lejeune, Symphony       Estimated Blood Loss: none    Urine Voided: * No values recorded between 2/24/2025  9:40 AM and 2/24/2025 10:11 AM *    Specimens:                Specimens       ID Source Type Tests Collected By Collected At Frozen?    A Large Intestine, Transverse Colon Polyp TISSUE PATHOLOGY EXAM   Lorne Esquivel MD 2/24/25 0954 No    Description: x3    This specimen was not marked as sent.    B Large Intestine, Right / Ascending Colon Tissue TISSUE PATHOLOGY EXAM   Lorne Esquivel MD 2/24/25 0958 No    Description: biopsy    This specimen was not marked as sent.    C Large Intestine, Transverse Colon Tissue TISSUE PATHOLOGY EXAM   Lorne Esquivel MD 2/24/25 1004 No    Description: biopsy    This specimen was not marked as sent.    D Large Intestine, Sigmoid Colon Tissue TISSUE PATHOLOGY EXAM   Lorne Esquivel MD 2/24/25 1007 No    Description: biopsy    This specimen was not marked as sent.    E Large Intestine, Rectum Tissue TISSUE PATHOLOGY EXAM   Lorne Esquivel MD 2/24/25 1009 No    Description: biopsy    This specimen was not marked as sent.              Drains: * No LDAs found *    Findings: Colon to Cecum Good Prep  Polyps-3-Cold Snare x 1, Biopsy  Pseudopolyps in transverse Colon  Micro-Colitis Biopsies ( R,T,Sig,Rectal)      Complications: None          Lorne Esquivel MD     Date:  2/24/2025  Time: 10:11 EST

## 2025-02-24 NOTE — INTERVAL H&P NOTE
Vital Signs  BP (!) 181/60   Pulse 51   Temp 98.6 °F (37 °C)   Resp 10   Wt 81.9 kg (180 lb 9.6 oz)   SpO2 97%   BMI 29.15 kg/m²     H&P reviewed. The patient was examined and there are no changes to the H&P.

## 2025-02-25 ENCOUNTER — TELEPHONE (OUTPATIENT)
Dept: FAMILY MEDICINE CLINIC | Facility: CLINIC | Age: 80
End: 2025-02-25
Payer: MEDICARE

## 2025-02-25 DIAGNOSIS — E78.49 OTHER HYPERLIPIDEMIA: Primary | ICD-10-CM

## 2025-02-25 LAB
CYTO UR: NORMAL
LAB AP CASE REPORT: NORMAL
PATH REPORT.FINAL DX SPEC: NORMAL
PATH REPORT.GROSS SPEC: NORMAL

## 2025-02-25 RX ORDER — ROSUVASTATIN CALCIUM 10 MG/1
10 TABLET, COATED ORAL DAILY
Qty: 90 TABLET | Refills: 1 | Status: SHIPPED | OUTPATIENT
Start: 2025-02-25

## 2025-02-25 NOTE — TELEPHONE ENCOUNTER
Caller: Effie Donnelly    Relationship to patient: Self    Best call back number: [phone]      Patient is needing: PATIENT CALLING REGARDING HER ROSUVASTATIN. IT SAYS ONCE DAILY BUT SHE WAS TAKING TWICE DAILY AND NOW SHE IS OUT. PLEASE CALL BACK TO DISCUSS DOSE AND CALLING IN NEW PRESCRIPTION

## 2025-02-25 NOTE — TELEPHONE ENCOUNTER
SENDING NEW RX               Terrence Hawley MD  12/13/2024  2:24 PM EST       Please call patient with results.   Continue diet and exercise.  Bad cholesterol is not to the goal.  Recommend increasing Crestor to 10 mg q.h.s..  She can take 2 of the 5. Or she needs to   Improve on her diet.  Kidney tests are about the same, she needs to follow-up with the nephrologist

## 2025-02-26 ENCOUNTER — TELEPHONE (OUTPATIENT)
Dept: GASTROENTEROLOGY | Facility: CLINIC | Age: 80
End: 2025-02-26
Payer: MEDICARE

## 2025-02-26 RX ORDER — HYDROCORTISONE 25 MG/G
1 OINTMENT TOPICAL 2 TIMES DAILY
Qty: 30 G | Refills: 11 | Status: SHIPPED | OUTPATIENT
Start: 2025-02-26

## 2025-02-26 NOTE — TELEPHONE ENCOUNTER
PT SAID DR QURESHI TOLD HER MONDAY AT HER COLONOSCOPY HE WAS CALLING IN A CREAM FOR HER HEMORRHOIDS. SHE SAID SHE DOESN'T HAVE ANYTHING CALLED IN YET AND SHE WAS JUST CHECKING    IBETH HINTON IF THERE IS A SCRIPT PLEASE

## 2025-03-18 ENCOUNTER — OFFICE VISIT (OUTPATIENT)
Dept: FAMILY MEDICINE CLINIC | Facility: CLINIC | Age: 80
End: 2025-03-18
Payer: MEDICARE

## 2025-03-18 VITALS
BODY MASS INDEX: 29.77 KG/M2 | DIASTOLIC BLOOD PRESSURE: 68 MMHG | SYSTOLIC BLOOD PRESSURE: 154 MMHG | RESPIRATION RATE: 16 BRPM | WEIGHT: 185.2 LBS | HEIGHT: 66 IN | HEART RATE: 53 BPM | OXYGEN SATURATION: 99 %

## 2025-03-18 DIAGNOSIS — E78.49 OTHER HYPERLIPIDEMIA: ICD-10-CM

## 2025-03-18 DIAGNOSIS — I10 PRIMARY HYPERTENSION: ICD-10-CM

## 2025-03-18 DIAGNOSIS — I48.0 PAROXYSMAL ATRIAL FIBRILLATION: ICD-10-CM

## 2025-03-18 DIAGNOSIS — K21.9 GASTROESOPHAGEAL REFLUX DISEASE WITHOUT ESOPHAGITIS: ICD-10-CM

## 2025-03-18 DIAGNOSIS — I42.8 NICM (NONISCHEMIC CARDIOMYOPATHY): Chronic | ICD-10-CM

## 2025-03-18 DIAGNOSIS — K51.90 ULCERATIVE COLITIS WITHOUT COMPLICATIONS, UNSPECIFIED LOCATION: ICD-10-CM

## 2025-03-18 DIAGNOSIS — I25.10 CORONARY ARTERY DISEASE INVOLVING NATIVE CORONARY ARTERY OF NATIVE HEART WITHOUT ANGINA PECTORIS: Primary | Chronic | ICD-10-CM

## 2025-03-18 PROCEDURE — 3078F DIAST BP <80 MM HG: CPT | Performed by: INTERNAL MEDICINE

## 2025-03-18 PROCEDURE — 1159F MED LIST DOCD IN RCRD: CPT | Performed by: INTERNAL MEDICINE

## 2025-03-18 PROCEDURE — 1126F AMNT PAIN NOTED NONE PRSNT: CPT | Performed by: INTERNAL MEDICINE

## 2025-03-18 PROCEDURE — 3077F SYST BP >= 140 MM HG: CPT | Performed by: INTERNAL MEDICINE

## 2025-03-18 PROCEDURE — 99214 OFFICE O/P EST MOD 30 MIN: CPT | Performed by: INTERNAL MEDICINE

## 2025-03-18 PROCEDURE — 1160F RVW MEDS BY RX/DR IN RCRD: CPT | Performed by: INTERNAL MEDICINE

## 2025-03-18 RX ORDER — ALBUTEROL SULFATE 90 UG/1
2 INHALANT RESPIRATORY (INHALATION) EVERY 4 HOURS PRN
Qty: 8.5 G | Refills: 5 | Status: SHIPPED | OUTPATIENT
Start: 2025-03-18

## 2025-03-18 NOTE — PROGRESS NOTES
Subjective   Effie Donnelly is a 79 y.o. female.     Chief Complaint   Patient presents with    Primary Care Follow-Up     3 MONTH CHECK UP     Cough     COUGH THAT HASN'T GONE AWAY. HAS BEEN DEALING WITH IT FOR THE PAST YEAR    Follow-up CAD, nonischemic cardiomyopathy, primary hypertension, hyperlipidemia, GERD, ulcerative colitis.  Paroxysmal atrial fibs    Primary Care Follow-UpAssociated symptoms include: cough. Pertinent negatives include no shortness of breath, no wheezing and no choking.   Cough  Pertinent negatives include no shortness of breath or wheezing.      Patient seen follow-up for CAD, and ICM, primary hypertension, hyperlipidemia, GERD, ulcerative colitis.  She has chronic sinus problems, not able to take Claritin-D.  History of Present Illness  The patient is a 77-year-old male who presents for evaluation of chronic cough, atrial fibrillation, Crohn's disease, and kidney disease.    He has been experiencing a persistent cough for the past year, which he attributes to sinus drainage. He reports constant postnasal drip but maintains that his respiratory function is not compromised. The expectorated sputum is described as light-colored. He has not undergone any allergy testing. His pulmonologist informed him that his previous episode of pneumonia resulted in minor lung scarring. He has exhausted his supply of inhalers. He recalls an improvement in his condition while on Claritin-D and wonders if discontinuing it may have contributed to the persistence of his symptoms. He does not smoke and reports no exposure to secondhand smoke. He is currently on plain Claritin and Nasonex or Flonase.    He is currently on a regimen of metoprolol 25 mg once daily, which he reports has been effective in managing his heart rate. He is also taking amiodarone and Entresto. He is under the care of Dr. Decker, a cardiologist, and is scheduled for a follow-up visit in a year. He underwent cardioversion to address atrial  fibrillation, which was successful. His medication list has been reduced by two drugs, and the dosage of metoprolol has been decreased to 25 mg. He has been advised against using Nasonex or Flonase due to his history of atrial fibrillation.    He recently consulted with his nephrologist, who confirmed the stability of his renal function. He experienced an adverse reaction to Jardiance, which led to its discontinuation. He had a colonoscopy almost 3 years ago, which revealed an infection likely due to Jardiance. He anticipates a change in his medication regimen during his next appointment on 04/08/2025. He had a polyp removed during his last colonoscopy and was informed that it was not cancerous but precancerous. He is scheduled for a repeat colonoscopy in 3 years.    He is on mesalamine 4 tablets once a day for his Crohn's disease. He had a colonoscopy almost 3 years ago, which revealed an infection likely due to Jardiance. He anticipates a change in his medication regimen during his next appointment on 04/08/2025.    SOCIAL HISTORY  He does not smoke.    ALLERGIES  The patient had a reaction to JARDIANCE.    MEDICATIONS  metoprolol, mesalamine, Crestor, amiodarone, Eliquis, Lasix, Prilosec, Entresto, Claritin, Nasonex    The following portions of the patient's history were reviewed and updated as appropriate: allergies, current medications, past family history, past medical history, past social history, past surgical history and problem list.    Review of Systems   Respiratory:  Positive for cough. Negative for apnea, choking, chest tightness, shortness of breath, wheezing and stridor.    Cardiovascular: Negative.    Gastrointestinal: Negative.    Neurological: Negative.        Allergies   Allergen Reactions    Carvedilol Other (See Comments)     Syncope    Nsaids GI Bleeding     CROHN'S DISEASE    Jardiance [Empagliflozin] GI Intolerance    Adhesive Tape Other (See Comments)     BLISTERS UNDER TAGADERM        Current Outpatient Medications on File Prior to Visit   Medication Sig Dispense Refill    amiodarone (PACERONE) 200 MG tablet Take 1 tablet by mouth Daily.      B Complex Vitamins (VITAMIN B COMPLEX PO) Take 1 tablet by mouth Daily.      cholecalciferol (VITAMIN D3) 25 MCG (1000 UT) tablet Take 4 tablets by mouth Daily.      CRANBERRY EXTRACT PO Take 1 capsule by mouth Daily.      Eliquis 5 MG tablet tablet TAKE ONE TABLET BY MOUTH EVERY 12 HOURS 180 tablet 1    furosemide (LASIX) 20 MG tablet Take your Lasix 20 mg in the morning and the half milligram tablet at 2 PM as you were taking prior to arrival at the emergency department. 135 tablet 0    mesalamine (LIALDA) 1.2 g EC tablet Take 4 tablets by mouth Daily With Breakfast. 120 tablet 4    metoprolol succinate XL (TOPROL-XL) 25 MG 24 hr tablet Take 1 tablet by mouth Daily.      omeprazole (priLOSEC) 20 MG capsule Take 1 capsule by mouth Every Morning. 90 capsule 3    PROBIOTIC PRODUCT PO Take 2 capsules by mouth Daily.      rosuvastatin (Crestor) 10 MG tablet Take 1 tablet by mouth Daily. 90 tablet 1    sacubitril-valsartan (Entresto) 24-26 MG tablet Take 1 tablet by mouth 2 (Two) Times a Day.      [DISCONTINUED] albuterol sulfate  (90 Base) MCG/ACT inhaler Inhale 2 puffs Every 4 (Four) Hours As Needed for Wheezing or Shortness of Air. Use with chamber 8.5 g 0    flunisolide (NASALIDE) 25 MCG/ACT (0.025%) solution nasal spray Inhale 2 sprays Every 12 (Twelve) Hours. 25 mL 0    hydrocortisone 2.5 % ointment Apply 1 Application topically to the appropriate area as directed 2 (Two) Times a Day. 30 g 11    ondansetron (ZOFRAN) 4 MG tablet Take 1 tablet by mouth.       No current facility-administered medications on file prior to visit.       Family History   Problem Relation Age of Onset    Hypertension Sister     Cancer Sister     Hypertension Sister     Hypertension Sister     Heart disease Brother 65        VALVE REPLACEMENT     Hypertension Brother      Hypertension Brother     Diabetes Brother     Hypertension Brother     Cancer Brother     Hypertension Brother     Hypertension Brother     Hypertension Brother     Malig Hyperthermia Neg Hx        Past Medical History:   Diagnosis Date    CAD (coronary artery disease)     Cancer     skin, right breast    CHF (congestive heart failure)     Coronary artery disease involving native coronary artery of native heart without angina pectoris 11/1/2018    Heart murmur     History of myocardial infarction 11/1/2018    Hypertension     Myocardial infarction     PER PT     NICM (nonischemic cardiomyopathy) 7/8/2020    DELORES on CPAP     AHI 15/h    Sinus tachycardia     SOB (shortness of breath)        Past Surgical History:   Procedure Laterality Date    BUNIONECTOMY Bilateral     CARDIAC CATHETERIZATION      CARDIAC CATHETERIZATION N/A 01/14/2020    Procedure: Right and Left Heart Cath;  Surgeon: Bin Chacko MD;  Location:  [facility];  Service: Cardiovascular    CARDIAC CATHETERIZATION N/A 01/14/2020    Procedure: Coronary angiography;  Surgeon: Bin Chacko MD;  Location:  [facility];  Service: Cardiovascular    CARDIAC CATHETERIZATION N/A 01/14/2020    Procedure: Left ventriculography;  Surgeon: Bin Chacko MD;  Location:  [facility];  Service: Cardiovascular    CARDIOVERSION  07/12/2023    CATARACT EXTRACTION Left 04/26/2022    COLONOSCOPY N/A 03/23/2022    Procedure: COLONOSCOPY WITH BIOPSIES; POLYPECTOMY;  Surgeon: Lorne Esquivel MD;  Location: [facility];  Service: Gastroenterology;  Laterality: N/A;  POLYP  ULCERATIVE COLITIS    COLONOSCOPY W/ POLYPECTOMY N/A 2/24/2025    Procedure: COLONOSCOPY WITH POLYPECTOMY and biopsy;  Surgeon: Lorne Esquivel MD;  Location: [facility] OR;  Service: Gastroenterology;  Laterality: N/A;  transverse polyp x3 with cold snare, right colon biopsy, transverse colon biopsy, sigmoid colon  "biopsy, rectal biopsy    ENDOSCOPY N/A 2022    Procedure: ESOPHAGOGASTRODUODENOSCOPY WITH BIOPSIES;  Surgeon: Lorne Esquivel MD;  Location: [facility];  Service: Gastroenterology;  Laterality: N/A;  GASTRITIS  ESOPHAGEAL REFLUX  HIATAL HERNIA  ESOPHAGEAL CANDIDIASIS    MASTECTOMY COMPLETE / SIMPLE  2019    SHOULDER SURGERY Right     SKIN CANCER EXCISION      ABOVE THE LIP.        Social History     Socioeconomic History    Marital status:     Number of children: 5   Tobacco Use    Smoking status: Former     Current packs/day: 0.00     Types: Cigarettes     Quit date: 1973     Years since quittin.4     Passive exposure: Past    Smokeless tobacco: Never    Tobacco comments:     CAFFEINE USE: 1 CUP DAILY   Vaping Use    Vaping status: Never Used   Substance and Sexual Activity    Alcohol use: No    Drug use: No    Sexual activity: Defer       Patient Active Problem List   Diagnosis    History of myocardial infarction    Coronary artery disease involving native coronary artery of native heart without angina pectoris    Ulcerative colitis    HTN (hypertension)    GERD (gastroesophageal reflux disease)    DJD (degenerative joint disease)    Ductal 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    Anemia    Other hyperlipidemia    NICM (nonischemic cardiomyopathy)    DELORES on CPAP    Snoring    Class 1 obesity due to excess calories without serious comorbidity with body mass index (BMI) of 31.0 to 31.9 in adult    Chronic systolic congestive heart failure    Esophageal dysphagia    Stage 3a chronic kidney disease    Paroxysmal atrial fibrillation    Longstanding persistent atrial fibrillation       /68 (BP Location: Left arm, Patient Position: Sitting, Cuff Size: Adult)   Pulse 53   Resp 16   Ht 167.6 cm (66\")   Wt 84 kg (185 lb 3.2 oz)   SpO2 99%   BMI 29.89 kg/m²   Body mass index is 29.89 kg/m².    Objective   Physical Exam  Vitals and nursing note reviewed.   Constitutional:  "      Appearance: She is well-developed.   Eyes:      Pupils: Pupils are equal, round, and reactive to light.   Neck:      Thyroid: No thyromegaly.      Vascular: No JVD.      Trachea: No tracheal deviation.   Cardiovascular:      Rate and Rhythm: Normal rate and regular rhythm.      Heart sounds: No murmur heard.  Pulmonary:      Effort: Pulmonary effort is normal. No respiratory distress.      Breath sounds: Normal breath sounds. No wheezing.   Abdominal:      General: Bowel sounds are normal. There is no distension.      Palpations: Abdomen is soft. There is no mass.      Tenderness: There is no abdominal tenderness. There is no right CVA tenderness, left CVA tenderness, guarding or rebound.      Hernia: No hernia is present.   Musculoskeletal:      Cervical back: Normal range of motion and neck supple.   Lymphadenopathy:      Cervical: No cervical adenopathy.   Neurological:      General: No focal deficit present.      Mental Status: She is alert and oriented to person, place, and time.           Assessment & Plan   Diagnoses and all orders for this visit:    1. Coronary artery disease involving native coronary artery of native heart without angina pectoris (Primary)  -     CBC & Differential  -     CK  -     Comprehensive Metabolic Panel  -     Lipid Panel With / Chol / HDL Ratio  -     TSH  -     Vitamin B12  -     Folate    2. NICM (nonischemic cardiomyopathy)  -     CBC & Differential  -     CK  -     Comprehensive Metabolic Panel  -     Lipid Panel With / Chol / HDL Ratio  -     TSH  -     Vitamin B12  -     Folate    3. Primary hypertension  -     CBC & Differential  -     CK  -     Comprehensive Metabolic Panel  -     Lipid Panel With / Chol / HDL Ratio  -     TSH  -     Vitamin B12  -     Folate    4. Other hyperlipidemia  -     CBC & Differential  -     CK  -     Comprehensive Metabolic Panel  -     Lipid Panel With / Chol / HDL Ratio  -     TSH  -     Vitamin B12  -     Folate    5. Gastroesophageal  reflux disease without esophagitis  -     CBC & Differential  -     CK  -     Comprehensive Metabolic Panel  -     Lipid Panel With / Chol / HDL Ratio  -     TSH  -     Vitamin B12  -     Folate    6. Ulcerative colitis without complications, unspecified location  -     CBC & Differential  -     CK  -     Comprehensive Metabolic Panel  -     Lipid Panel With / Chol / HDL Ratio  -     TSH  -     Vitamin B12  -     Folate    7. Paroxysmal atrial fibrillation    Other orders  -     albuterol sulfate  (90 Base) MCG/ACT inhaler; Inhale 2 puffs Every 4 (Four) Hours As Needed for Wheezing or Shortness of Air. Use with chamber  Dispense: 8.5 g; Refill: 5             Assessment & Plan  1. Chronic cough.  The chronic cough may be attributed to sinus issues, potentially exacerbated by allergies. The CAT scan from March did not reveal any significant findings. His current medication regimen does not include lisinopril, which is known to induce coughing. The use of Entresto is unlikely to be contributing to the cough. His lung sounds are within normal limits. The amiodarone he is taking can lead to pulmonary complications, including shortness of breath and fibrosis, but it is not associated with coughing. However, these potential side effects should be monitored. A prescription for an inhaler has been provided. He is advised to continue using Nasonex. He is also encouraged to schedule an appointment with his pulmonologist.    2. Atrial Fibrillation.  He is currently on metoprolol 25 mg once a day and amiodarone for heart rate control. He has been taken off two pills and had his metoprolol dosage reduced. He is also on Eliquis and Entresto. His heart rate is well-controlled, and he is not experiencing any atrial fibrillation at this time. He should continue his current medication regimen and follow up with his cardiologist as scheduled.    3.  Ulcerative colitis  He is currently taking mesalamine 4 tablets once a day. He  had a recent colonoscopy that showed active colitis and a tubular adenoma polyp, which is precancerous. He is scheduled for a follow-up appointment on 04/08/2025, where his medication may be adjusted. A repeat colonoscopy will be scheduled sooner than the usual 3-year interval due to the findings.    4. Kidney Disease.  His kidneys are stable as per his recent visit with the nephrologist. He had a reaction to Jardiance, which was discontinued. He should continue monitoring his kidney function and follow up with his nephrologist as needed.    PROCEDURE   A polyp was removed during the last colonoscopy.  Patient or patient representative verbalized consent for the use of Ambient Listening during the visit with  Terrence Hawley MD for chart documentation. 3/18/2025  12:15 EDT  Continue Crestor, Lasix, Eliquis, metoprolol, Prilosec, Entresto.  Patient is also on Pacerone.  Refilled albuterol hopefully will help her cough.  Regarding her cough offered her to get allergy testing done.  Patient wants to wait.  She has seen Dr. Kelly and will make an appointment.  Rest of problems are chronic and stable.  Keep follow-up with specialist.   EHR dragon/transcription disclaimer:  Part of this note may be an electronic transcription/translation of spoken language to printed text using the Dragon Dictation System.

## 2025-03-18 NOTE — PROGRESS NOTES
Venipuncture Blood Specimen Collection  Venipuncture performed in left arm by Leila Doshi MA with good hemostasis. Patient tolerated the procedure well without complications.   03/18/25   Leila Doshi MA

## 2025-03-19 LAB
ALBUMIN SERPL-MCNC: 4.1 G/DL (ref 3.5–5.2)
ALBUMIN/GLOB SERPL: 1.6 G/DL
ALP SERPL-CCNC: 252 U/L (ref 39–117)
ALT SERPL-CCNC: 49 U/L (ref 1–33)
AST SERPL-CCNC: 49 U/L (ref 1–32)
BASOPHILS # BLD AUTO: 0.05 10*3/MM3 (ref 0–0.2)
BASOPHILS NFR BLD AUTO: 0.9 % (ref 0–1.5)
BILIRUB SERPL-MCNC: 0.5 MG/DL (ref 0–1.2)
BUN SERPL-MCNC: 21 MG/DL (ref 8–23)
BUN/CREAT SERPL: 15 (ref 7–25)
CALCIUM SERPL-MCNC: 8.5 MG/DL (ref 8.6–10.5)
CHLORIDE SERPL-SCNC: 106 MMOL/L (ref 98–107)
CHOLEST SERPL-MCNC: 147 MG/DL (ref 0–200)
CHOLEST/HDLC SERPL: 2.49 {RATIO}
CK SERPL-CCNC: 130 U/L (ref 20–180)
CO2 SERPL-SCNC: 26 MMOL/L (ref 22–29)
CREAT SERPL-MCNC: 1.4 MG/DL (ref 0.57–1)
EGFRCR SERPLBLD CKD-EPI 2021: 38.6 ML/MIN/1.73
EOSINOPHIL # BLD AUTO: 0.38 10*3/MM3 (ref 0–0.4)
EOSINOPHIL NFR BLD AUTO: 6.9 % (ref 0.3–6.2)
ERYTHROCYTE [DISTWIDTH] IN BLOOD BY AUTOMATED COUNT: 13.4 % (ref 12.3–15.4)
FOLATE SERPL-MCNC: 8.55 NG/ML (ref 4.78–24.2)
GLOBULIN SER CALC-MCNC: 2.6 GM/DL
GLUCOSE SERPL-MCNC: 67 MG/DL (ref 65–99)
HCT VFR BLD AUTO: 41.1 % (ref 34–46.6)
HDLC SERPL-MCNC: 59 MG/DL (ref 40–60)
HGB BLD-MCNC: 13.1 G/DL (ref 12–15.9)
IMM GRANULOCYTES # BLD AUTO: 0.01 10*3/MM3 (ref 0–0.05)
IMM GRANULOCYTES NFR BLD AUTO: 0.2 % (ref 0–0.5)
LDLC SERPL CALC-MCNC: 64 MG/DL (ref 0–100)
LYMPHOCYTES # BLD AUTO: 1.42 10*3/MM3 (ref 0.7–3.1)
LYMPHOCYTES NFR BLD AUTO: 25.7 % (ref 19.6–45.3)
MCH RBC QN AUTO: 28.6 PG (ref 26.6–33)
MCHC RBC AUTO-ENTMCNC: 31.9 G/DL (ref 31.5–35.7)
MCV RBC AUTO: 89.7 FL (ref 79–97)
MONOCYTES # BLD AUTO: 0.54 10*3/MM3 (ref 0.1–0.9)
MONOCYTES NFR BLD AUTO: 9.8 % (ref 5–12)
NEUTROPHILS # BLD AUTO: 3.12 10*3/MM3 (ref 1.7–7)
NEUTROPHILS NFR BLD AUTO: 56.5 % (ref 42.7–76)
NRBC BLD AUTO-RTO: 0 /100 WBC (ref 0–0.2)
PLATELET # BLD AUTO: 220 10*3/MM3 (ref 140–450)
POTASSIUM SERPL-SCNC: 4.1 MMOL/L (ref 3.5–5.2)
PROT SERPL-MCNC: 6.7 G/DL (ref 6–8.5)
RBC # BLD AUTO: 4.58 10*6/MM3 (ref 3.77–5.28)
SODIUM SERPL-SCNC: 143 MMOL/L (ref 136–145)
TRIGL SERPL-MCNC: 141 MG/DL (ref 0–150)
TSH SERPL DL<=0.005 MIU/L-ACNC: 4.34 UIU/ML (ref 0.27–4.2)
VIT B12 SERPL-MCNC: 441 PG/ML (ref 211–946)
VLDLC SERPL CALC-MCNC: 24 MG/DL (ref 5–40)
WBC # BLD AUTO: 5.52 10*3/MM3 (ref 3.4–10.8)

## 2025-04-08 ENCOUNTER — OFFICE VISIT (OUTPATIENT)
Dept: GASTROENTEROLOGY | Facility: CLINIC | Age: 80
End: 2025-04-08
Payer: MEDICARE

## 2025-04-08 VITALS
HEIGHT: 66 IN | DIASTOLIC BLOOD PRESSURE: 84 MMHG | SYSTOLIC BLOOD PRESSURE: 144 MMHG | WEIGHT: 186.2 LBS | BODY MASS INDEX: 29.92 KG/M2

## 2025-04-08 DIAGNOSIS — K50.80 CROHN'S DISEASE OF BOTH SMALL AND LARGE INTESTINE WITHOUT COMPLICATION: Primary | ICD-10-CM

## 2025-04-08 DIAGNOSIS — R79.89 ELEVATED LFTS: ICD-10-CM

## 2025-04-08 DIAGNOSIS — K21.9 GASTROESOPHAGEAL REFLUX DISEASE, UNSPECIFIED WHETHER ESOPHAGITIS PRESENT: ICD-10-CM

## 2025-04-08 NOTE — PROGRESS NOTES
PATIENT INFORMATION  Effie Donnelly       - 1945    CHIEF COMPLAINT  Chief Complaint   Patient presents with    Follow-up     GERD; Ulcerative Colitis       HISTORY OF PRESENT ILLNESS  Here today for crohns follow-up    TSH very mildly elevated, PCP gave her synthroid a yr or so ago, but she stopped after a few weeks because it caused nausea.    Crohns: Resumed lialda about 6 weeks ago but then stopped 2 weeks ago after elevation in LFTs when PCP checked her labs. Reviewed that we can follow her LFTs and adjust treatment if necessary, but patient should call us if she has concerns before discontinuing the medication. Recommend resuming lialda and will assess LFTs over time. Will plan to check fecal ward and CRP after 3 months of consistent treatment, but noncompliance has mad this challenging.  Colzal 15 yrs ago  24 CRP 11.6  Bowels are skipping a few days and then can take miralax and yesterday had 3 BM in one day. Normal to skip 2, if skips 3 she will take something.  No cramping or bleeding.  Reviewed risks of uncontrolled IBD.    2025 colonoscopy 3/3 adenomas with mild to moderate colitis, but only back on mesalamine for 2 weeks.    20 mg omeprazole daily, no recurrent HB, indigestion or other concerns. Still doing well.      3/23/2022 EGD with reactive gastropathy, acute candida  esophagitis.  3/23/2022 Colonoscopy with 0 adenomas, recall in 3 yrs for long standing crohns that was inactive. Colzal 15 yrs ago.        REVIEWED PERTINENT RESULTS/ LABS  Lab Results   Component Value Date    CASEREPORT  2025     Surgical Pathology Report                         Case: [id number]                                  Authorizing Provider:  Lorne Esquivel        Collected:           2025 09:54 AM                                 MD Sveta                                                                   Ordering Location:     [facility]   Received:            2025 10:17  AM                                 OR                                                                           Pathologist:           Monica Jones MD                                                          Specimens:   1) - Large Intestine, Transverse Colon, x3                                                          2) - Large Intestine, Right / Ascending Colon, biopsy                                               3) - Large Intestine, Transverse Colon, biopsy                                                      4) - Large Intestine, Sigmoid Colon, biopsy                                                         5) - Large Intestine, Rectum, biopsy                                                       FINALDX  02/24/2025     1.  Colon, transverse, biopsy:   A.  Fragments of tubular adenoma.   B.  Patchy moderate focal active colitis.    2.  Colon, ascending, biopsy:   A.  Moderate chronic active colitis and repair.   B.  No dysplasia nor malignancy.   C.  No granulomata, diagnostic viral inclusions nor intestinal parasites identified.    3.  Colon, transverse, biopsy:   A.  Mild focal active colitis and repair.   B.  No dysplasia nor malignancy.   C.  No granulomata, diagnostic viral inclusions nor intestinal parasites identified.    4.  Colon, sigmoid, biopsy:   A.  Patchy mild to moderate chronic active colitis and repair.   B.  No dysplasia nor malignancy.   C.  No granulomata, diagnostic viral inclusions nor intestinal parasites identified.    5.  Colon, rectum, biopsy:   A.  Patchy mild to moderate chronic active colitis and repair.   B.  No dysplasia nor malignancy.   C.  No granulomata, diagnostic viral inclusions nor intestinal parasites identified.         Lab Results   Component Value Date    HGB 13.1 03/18/2025    MCV 89.7 03/18/2025     03/18/2025    ALT 49 (H) 03/18/2025    AST 49 (H) 03/18/2025    HGBA1C 5.50 02/13/2023    INR 1.8 07/12/2023    TRIG 141 03/18/2025      No results  found.    REVIEW OF SYSTEMS  Review of Systems      ACTIVE PROBLEMS  Patient Active Problem List    Diagnosis     Longstanding persistent atrial fibrillation [I48.11]     Paroxysmal atrial fibrillation [I48.0]     Stage 3a chronic kidney disease [N18.31]     Esophageal dysphagia [R13.19]     DELORES on CPAP [G47.33]     Snoring [R06.83]     Class 1 obesity due to excess calories without serious comorbidity with body mass index (BMI) of 31.0 to 31.9 in adult [E66.811, E66.09, Z68.31]     Chronic systolic congestive heart failure [I50.22]     NICM (nonischemic cardiomyopathy) [I42.8]     Other hyperlipidemia [E78.49]     Ulcerative colitis [K51.90]     HTN (hypertension) [I10]     GERD (gastroesophageal reflux disease) [K21.9]     DJD (degenerative joint disease) [M19.90]     Anemia [D64.9]     Ductal 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 [D05.11]     History of myocardial infarction [I25.2]     Coronary artery disease involving native coronary artery of native heart without angina pectoris [I25.10]          PAST MEDICAL HISTORY  Past Medical History:   Diagnosis Date    CAD (coronary artery disease)     Cancer     skin, right breast    CHF (congestive heart failure)     Coronary artery disease involving native coronary artery of native heart without angina pectoris 11/1/2018    Heart murmur     History of myocardial infarction 11/1/2018    Hypertension     Myocardial infarction     PER PT     NICM (nonischemic cardiomyopathy) 7/8/2020    DELORES on CPAP     AHI 15/h    Sinus tachycardia     SOB (shortness of breath)          SURGICAL HISTORY  Past Surgical History:   Procedure Laterality Date    BUNIONECTOMY Bilateral     CARDIAC CATHETERIZATION      CARDIAC CATHETERIZATION N/A 01/14/2020    Procedure: Right and Left Heart Cath;  Surgeon: Bin Chacko MD;  Location: [facility] CATH INVASIVE LOCATION;  Service: Cardiovascular    CARDIAC CATHETERIZATION N/A 01/14/2020    Procedure: Coronary angiography;  Surgeon:  Bin Chacko MD;  Location:  [facility] CATH INVASIVE LOCATION;  Service: Cardiovascular    CARDIAC CATHETERIZATION N/A 2020    Procedure: Left ventriculography;  Surgeon: Bin Chacko MD;  Location:  [facility] CATH INVASIVE LOCATION;  Service: Cardiovascular    CARDIOVERSION  2023    CATARACT EXTRACTION Left 2022    COLONOSCOPY N/A 2022    Procedure: COLONOSCOPY WITH BIOPSIES; POLYPECTOMY;  Surgeon: Lorne Esquivel MD;  Location: [facility] OR;  Service: Gastroenterology;  Laterality: N/A;  POLYP  ULCERATIVE COLITIS    COLONOSCOPY W/ POLYPECTOMY N/A 2025    Procedure: COLONOSCOPY WITH POLYPECTOMY and biopsy;  Surgeon: Lorne Esquivel MD;  Location: [facility] OR;  Service: Gastroenterology;  Laterality: N/A;  transverse polyp x3 with cold snare, right colon biopsy, transverse colon biopsy, sigmoid colon biopsy, rectal biopsy    ENDOSCOPY N/A 2022    Procedure: ESOPHAGOGASTRODUODENOSCOPY WITH BIOPSIES;  Surgeon: Lorne Esquivel MD;  Location: [facility] OR;  Service: Gastroenterology;  Laterality: N/A;  GASTRITIS  ESOPHAGEAL REFLUX  HIATAL HERNIA  ESOPHAGEAL CANDIDIASIS    MASTECTOMY COMPLETE / SIMPLE  2019    SHOULDER SURGERY Right     SKIN CANCER EXCISION      ABOVE THE LIP.          FAMILY HISTORY  Family History   Problem Relation Age of Onset    Hypertension Sister     Cancer Sister     Hypertension Sister     Hypertension Sister     Heart disease Brother 65        VALVE REPLACEMENT     Hypertension Brother     Hypertension Brother     Diabetes Brother     Hypertension Brother     Cancer Brother     Hypertension Brother     Hypertension Brother     Hypertension Brother     Malig Hyperthermia Neg Hx          SOCIAL HISTORY  Social History     Occupational History    Occupation: RETIRED    Tobacco Use    Smoking status: Former     Current packs/day: 0.00     Types: Cigarettes     Quit date: 1973     Years since quittin.4      Passive exposure: Past    Smokeless tobacco: Never    Tobacco comments:     CAFFEINE USE: 1 CUP DAILY   Vaping Use    Vaping status: Never Used   Substance and Sexual Activity    Alcohol use: No    Drug use: No    Sexual activity: Defer         CURRENT MEDICATIONS    Current Outpatient Medications:     albuterol sulfate  (90 Base) MCG/ACT inhaler, Inhale 2 puffs Every 4 (Four) Hours As Needed for Wheezing or Shortness of Air. Use with chamber, Disp: 8.5 g, Rfl: 5    amiodarone (PACERONE) 200 MG tablet, Take 1 tablet by mouth Daily., Disp: , Rfl:     B Complex Vitamins (VITAMIN B COMPLEX PO), Take 1 tablet by mouth Daily., Disp: , Rfl:     cholecalciferol (VITAMIN D3) 25 MCG (1000 UT) tablet, Take 4 tablets by mouth Daily., Disp: , Rfl:     CRANBERRY EXTRACT PO, Take 1 capsule by mouth Daily., Disp: , Rfl:     Eliquis 5 MG tablet tablet, TAKE ONE TABLET BY MOUTH EVERY 12 HOURS, Disp: 180 tablet, Rfl: 1    flunisolide (NASALIDE) 25 MCG/ACT (0.025%) solution nasal spray, Inhale 2 sprays Every 12 (Twelve) Hours., Disp: 25 mL, Rfl: 0    furosemide (LASIX) 20 MG tablet, Take your Lasix 20 mg in the morning and the half milligram tablet at 2 PM as you were taking prior to arrival at the emergency department., Disp: 135 tablet, Rfl: 0    hydrocortisone 2.5 % ointment, Apply 1 Application topically to the appropriate area as directed 2 (Two) Times a Day., Disp: 30 g, Rfl: 11    metoprolol succinate XL (TOPROL-XL) 25 MG 24 hr tablet, Take 1 tablet by mouth Daily., Disp: , Rfl:     omeprazole (priLOSEC) 20 MG capsule, Take 1 capsule by mouth Every Morning., Disp: 90 capsule, Rfl: 3    ondansetron (ZOFRAN) 4 MG tablet, Take 1 tablet by mouth., Disp: , Rfl:     PROBIOTIC PRODUCT PO, Take 2 capsules by mouth Daily., Disp: , Rfl:     rosuvastatin (Crestor) 10 MG tablet, Take 1 tablet by mouth Daily., Disp: 90 tablet, Rfl: 1    sacubitril-valsartan (Entresto) 24-26 MG tablet, Take 1 tablet by mouth 2 (Two) Times a Day.,  "Disp: , Rfl:     mesalamine (LIALDA) 1.2 g EC tablet, Take 4 tablets by mouth Daily With Breakfast. (Patient not taking: Reported on 4/8/2025), Disp: 120 tablet, Rfl: 4    ALLERGIES  Carvedilol, Nsaids, Jardiance [empagliflozin], and Adhesive tape    VITALS  Vitals:    04/08/25 0831   BP: 144/84   BP Location: Left arm   Patient Position: Sitting   Cuff Size: Adult   Weight: 84.5 kg (186 lb 3.2 oz)   Height: 167.6 cm (65.98\")       PHYSICAL EXAM  Debilities/Disabilities Identified: None  Emotional Behavior: Appropriate    Wt Readings from Last 3 Encounters:   04/08/25 84.5 kg (186 lb 3.2 oz)   03/18/25 84 kg (185 lb 3.2 oz)   02/24/25 81.9 kg (180 lb 9.6 oz)     Ht Readings from Last 1 Encounters:   04/08/25 167.6 cm (65.98\")     Body mass index is 30.07 kg/m².  Physical Exam  Constitutional:       General: She is not in acute distress.     Appearance: Normal appearance. She is not ill-appearing.   HENT:      Head: Normocephalic and atraumatic.      Mouth/Throat:      Mouth: Mucous membranes are moist.      Pharynx: No posterior oropharyngeal erythema.   Eyes:      General: No scleral icterus.  Cardiovascular:      Rate and Rhythm: Normal rate and regular rhythm.      Heart sounds: Normal heart sounds.   Pulmonary:      Effort: Pulmonary effort is normal.      Breath sounds: Normal breath sounds.   Abdominal:      General: Abdomen is flat. Bowel sounds are normal. There is no distension.      Palpations: Abdomen is soft. There is no mass.      Tenderness: There is no abdominal tenderness. There is no guarding or rebound. Negative signs include Del Angel's sign.      Hernia: No hernia is present.   Musculoskeletal:      Cervical back: Neck supple.   Skin:     General: Skin is warm.      Capillary Refill: Capillary refill takes less than 2 seconds.   Neurological:      General: No focal deficit present.      Mental Status: She is alert and oriented to person, place, and time.   Psychiatric:         Mood and Affect: Mood " normal.         Behavior: Behavior normal.         Thought Content: Thought content normal.         Judgment: Judgment normal.         CLINICAL DATA REVIEWED   reviewed previous lab results and integrated with today's visit, reviewed notes from other physicians and/or last GI encounter, reviewed previous endoscopy results and available photos, reviewed surgical pathology results from previous biopsies    ASSESSMENT  Diagnoses and all orders for this visit:    Crohn's disease of both small and large intestine without complication    Gastroesophageal reflux disease, unspecified whether esophagitis present    Elevated LFTs          PLAN    Crohns: resume lialda, will check inflammatory markers after 3 mos of consistent treatment  LFTs: reminder set to check PCP labs in a week, reviewed with patient that we will evaluate labs and cause of elevation of LFTs if persistent.  GERD: Continue current meds    Return in about 3 months (around 7/8/2025).    I have discussed the above plan with the patient.  They verbalize understanding and are in agreement with the plan.  They have been advised to contact the office for any questions, concerns, or changes related to their health.

## 2025-04-14 ENCOUNTER — OFFICE VISIT (OUTPATIENT)
Dept: FAMILY MEDICINE CLINIC | Facility: CLINIC | Age: 80
End: 2025-04-14
Payer: MEDICARE

## 2025-04-14 VITALS
HEIGHT: 66 IN | OXYGEN SATURATION: 98 % | BODY MASS INDEX: 29.25 KG/M2 | TEMPERATURE: 98.2 F | HEART RATE: 49 BPM | DIASTOLIC BLOOD PRESSURE: 76 MMHG | WEIGHT: 182 LBS | SYSTOLIC BLOOD PRESSURE: 122 MMHG | RESPIRATION RATE: 16 BRPM

## 2025-04-14 DIAGNOSIS — I25.10 CORONARY ARTERY DISEASE INVOLVING NATIVE CORONARY ARTERY OF NATIVE HEART WITHOUT ANGINA PECTORIS: ICD-10-CM

## 2025-04-14 DIAGNOSIS — R79.89 ELEVATED LFTS: ICD-10-CM

## 2025-04-14 DIAGNOSIS — I10 PRIMARY HYPERTENSION: Primary | ICD-10-CM

## 2025-04-14 PROCEDURE — 3074F SYST BP LT 130 MM HG: CPT | Performed by: INTERNAL MEDICINE

## 2025-04-14 PROCEDURE — 3078F DIAST BP <80 MM HG: CPT | Performed by: INTERNAL MEDICINE

## 2025-04-14 PROCEDURE — 36415 COLL VENOUS BLD VENIPUNCTURE: CPT | Performed by: INTERNAL MEDICINE

## 2025-04-14 PROCEDURE — 99214 OFFICE O/P EST MOD 30 MIN: CPT | Performed by: INTERNAL MEDICINE

## 2025-04-14 PROCEDURE — 1126F AMNT PAIN NOTED NONE PRSNT: CPT | Performed by: INTERNAL MEDICINE

## 2025-04-14 NOTE — PROGRESS NOTES
Subjective   Effie Donnelly is a 79 y.o. female.     Chief Complaint   Patient presents with    Primary Care Follow-Up     4 week follow up and re check labs        Primary Care Follow-UpPertinent negatives include no chest pain, no dizziness, no palpitations, no shortness of breath, no fatigue and no blurred vision.        History of Present Illness  The patient presents for evaluation of a cough, elevated liver enzymes, and hyperlipidemia.    He reports an improvement in his cough, attributing this to the use of a nasal spray.    He is scheduled to undergo blood work today. He speculates that his current medication regimen, which includes 4 pills taken every morning, may be contributing to his elevated liver enzymes.    He is currently on a daily dose of rosuvastatin 10 mg for cholesterol management.    MEDICATIONS  rosuvastatin    The following portions of the patient's history were reviewed and updated as appropriate: allergies, current medications, past family history, past medical history, past social history, past surgical history and problem list.    Review of Systems   Constitutional:  Negative for activity change, appetite change, fatigue and fever.   Eyes:  Negative for blurred vision and double vision.   Respiratory: Negative.  Negative for shortness of breath.    Cardiovascular:  Negative for chest pain, palpitations and leg swelling.   Gastrointestinal: Negative.    Neurological:  Negative for dizziness, syncope, light-headedness and headache.       Allergies   Allergen Reactions    Carvedilol Other (See Comments)     Syncope    Nsaids GI Bleeding     CROHN'S DISEASE    Jardiance [Empagliflozin] GI Intolerance    Adhesive Tape Other (See Comments)     BLISTERS UNDER TAGADERM       Current Outpatient Medications on File Prior to Visit   Medication Sig Dispense Refill    albuterol sulfate  (90 Base) MCG/ACT inhaler Inhale 2 puffs Every 4 (Four) Hours As Needed for Wheezing or Shortness of Air. Use  with chamber 8.5 g 5    amiodarone (PACERONE) 200 MG tablet Take 1 tablet by mouth Daily.      B Complex Vitamins (VITAMIN B COMPLEX PO) Take 1 tablet by mouth Daily.      cholecalciferol (VITAMIN D3) 25 MCG (1000 UT) tablet Take 4 tablets by mouth Daily.      CRANBERRY EXTRACT PO Take 1 capsule by mouth Daily.      Eliquis 5 MG tablet tablet TAKE ONE TABLET BY MOUTH EVERY 12 HOURS 180 tablet 1    furosemide (LASIX) 20 MG tablet Take your Lasix 20 mg in the morning and the half milligram tablet at 2 PM as you were taking prior to arrival at the emergency department. 135 tablet 0    metoprolol succinate XL (TOPROL-XL) 25 MG 24 hr tablet Take 1 tablet by mouth Daily.      omeprazole (priLOSEC) 20 MG capsule Take 1 capsule by mouth Every Morning. 90 capsule 3    PROBIOTIC PRODUCT PO Take 2 capsules by mouth Daily.      rosuvastatin (Crestor) 10 MG tablet Take 1 tablet by mouth Daily. 90 tablet 1    sacubitril-valsartan (Entresto) 24-26 MG tablet Take 1 tablet by mouth 2 (Two) Times a Day.      flunisolide (NASALIDE) 25 MCG/ACT (0.025%) solution nasal spray Inhale 2 sprays Every 12 (Twelve) Hours. 25 mL 0    hydrocortisone 2.5 % ointment Apply 1 Application topically to the appropriate area as directed 2 (Two) Times a Day. 30 g 11    mesalamine (LIALDA) 1.2 g EC tablet Take 4 tablets by mouth Daily With Breakfast. (Patient not taking: Reported on 4/8/2025) 120 tablet 4    ondansetron (ZOFRAN) 4 MG tablet Take 1 tablet by mouth.       No current facility-administered medications on file prior to visit.       Family History   Problem Relation Age of Onset    Hypertension Sister     Cancer Sister     Hypertension Sister     Hypertension Sister     Heart disease Brother 65        VALVE REPLACEMENT     Hypertension Brother     Hypertension Brother     Diabetes Brother     Hypertension Brother     Cancer Brother     Hypertension Brother     Hypertension Brother     Hypertension Brother     Malig Hyperthermia Neg Hx         Past Medical History:   Diagnosis Date    CAD (coronary artery disease)     Cancer     skin, right breast    CHF (congestive heart failure)     Coronary artery disease involving native coronary artery of native heart without angina pectoris 11/1/2018    Heart murmur     History of myocardial infarction 11/1/2018    Hypertension     Myocardial infarction     PER PT     NICM (nonischemic cardiomyopathy) 7/8/2020    DELORES on CPAP     AHI 15/h    Sinus tachycardia     SOB (shortness of breath)        Past Surgical History:   Procedure Laterality Date    BUNIONECTOMY Bilateral     CARDIAC CATHETERIZATION      CARDIAC CATHETERIZATION N/A 01/14/2020    Procedure: Right and Left Heart Cath;  Surgeon: Bin Chacko MD;  Location:  [facility] CATH INVASIVE LOCATION;  Service: Cardiovascular    CARDIAC CATHETERIZATION N/A 01/14/2020    Procedure: Coronary angiography;  Surgeon: Bin Chacko MD;  Location:  [facility] CATH INVASIVE LOCATION;  Service: Cardiovascular    CARDIAC CATHETERIZATION N/A 01/14/2020    Procedure: Left ventriculography;  Surgeon: Bin Chacko MD;  Location:  [facility] CATH INVASIVE LOCATION;  Service: Cardiovascular    CARDIOVERSION  07/12/2023    CATARACT EXTRACTION Left 04/26/2022    COLONOSCOPY N/A 03/23/2022    Procedure: COLONOSCOPY WITH BIOPSIES; POLYPECTOMY;  Surgeon: Lorne Esquivel MD;  Location: [facility] OR;  Service: Gastroenterology;  Laterality: N/A;  POLYP  ULCERATIVE COLITIS    COLONOSCOPY W/ POLYPECTOMY N/A 2/24/2025    Procedure: COLONOSCOPY WITH POLYPECTOMY and biopsy;  Surgeon: Lorne Esquivel MD;  Location: [facility] OR;  Service: Gastroenterology;  Laterality: N/A;  transverse polyp x3 with cold snare, right colon biopsy, transverse colon biopsy, sigmoid colon biopsy, rectal biopsy    ENDOSCOPY N/A 03/23/2022    Procedure: ESOPHAGOGASTRODUODENOSCOPY WITH BIOPSIES;  Surgeon: Lorne Esquivel MD;  Location: [facility] OR;  Service: Gastroenterology;   "Laterality: N/A;  GASTRITIS  ESOPHAGEAL REFLUX  HIATAL HERNIA  ESOPHAGEAL CANDIDIASIS    MASTECTOMY COMPLETE / SIMPLE  2019    SHOULDER SURGERY Right     SKIN CANCER EXCISION      ABOVE THE LIP.        Social History     Socioeconomic History    Marital status:     Number of children: 5   Tobacco Use    Smoking status: Former     Current packs/day: 0.00     Types: Cigarettes     Quit date: 1973     Years since quittin.4     Passive exposure: Past    Smokeless tobacco: Never    Tobacco comments:     CAFFEINE USE: 1 CUP DAILY   Vaping Use    Vaping status: Never Used   Substance and Sexual Activity    Alcohol use: No    Drug use: No    Sexual activity: Defer       Patient Active Problem List   Diagnosis    History of myocardial infarction    Coronary artery disease involving native coronary artery of native heart without angina pectoris    Ulcerative colitis    HTN (hypertension)    GERD (gastroesophageal reflux disease)    DJD (degenerative joint disease)    Ductal 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    Anemia    Other hyperlipidemia    NICM (nonischemic cardiomyopathy)    DELORES on CPAP    Snoring    Class 1 obesity due to excess calories without serious comorbidity with body mass index (BMI) of 31.0 to 31.9 in adult    Chronic systolic congestive heart failure    Esophageal dysphagia    Stage 3a chronic kidney disease    Paroxysmal atrial fibrillation    Longstanding persistent atrial fibrillation       /76 (BP Location: Left arm, Patient Position: Sitting, Cuff Size: Adult)   Pulse (!) 49   Temp 98.2 °F (36.8 °C) (Temporal)   Resp 16   Ht 167.6 cm (65.98\")   Wt 82.6 kg (182 lb)   SpO2 98%   BMI 29.39 kg/m²   Body mass index is 29.39 kg/m².    Objective   Physical Exam  Vitals and nursing note reviewed.   Constitutional:       Appearance: She is well-developed.   Eyes:      Pupils: Pupils are equal, round, and reactive to light.   Neck:      Thyroid: No thyromegaly.      Vascular: " No JVD.      Trachea: No tracheal deviation.   Cardiovascular:      Rate and Rhythm: Normal rate and regular rhythm.      Heart sounds: No murmur heard.  Pulmonary:      Effort: Pulmonary effort is normal. No respiratory distress.      Breath sounds: Normal breath sounds. No wheezing.   Abdominal:      General: Bowel sounds are normal. There is no distension.      Palpations: Abdomen is soft. There is no mass.      Tenderness: There is no abdominal tenderness. There is no right CVA tenderness, left CVA tenderness, guarding or rebound.      Hernia: No hernia is present.   Musculoskeletal:      Cervical back: Normal range of motion and neck supple.   Lymphadenopathy:      Cervical: No cervical adenopathy.   Neurological:      General: No focal deficit present.      Mental Status: She is alert and oriented to person, place, and time.           Assessment & Plan   Diagnoses and all orders for this visit:    1. Primary hypertension (Primary)  -     Comprehensive Metabolic Panel    2. Coronary artery disease involving native coronary artery of native heart without angina pectoris  -     Comprehensive Metabolic Panel    3. Elevated LFTs  -     Comprehensive Metabolic Panel             Assessment & Plan  1. Cough.  The cough has shown improvement with the use of a nasal spray. The potential side effect of Entresto causing a cough was discussed, but it was determined that the medication is necessary for heart health and will not be changed at this time.    2. Elevated liver enzymes.  Liver enzymes have shown a slight increase, which may be due to the medications taken every morning. Previous liver tests were normal, and a recent CAT scan of the abdomen revealed no changes in liver nodules and a small cyst. Blood work will be conducted today to monitor liver function. If the results remain stable or improve, no further action will be required.    3. Hyperlipidemia.  He is currently taking rosuvastatin 10 mg daily for  cholesterol management. Blood work will be conducted today to monitor cholesterol levels.    Follow-up  The patient will follow up in June.  Patient or patient representative verbalized consent for the use of Ambient Listening during the visit with  Terrence Hawley MD for chart documentation. 4/14/2025  11:36 EDT  Continue diet and exercise.  Patient is on Crestor we will continue at this time.  Continue all current medications.  I will see her back in 3 months time.  Patient cough has improved, no needs reason to change any medicine.  She has appointment with ENT for allergy testing  EHR dragon/transcription disclaimer:  Part of this note may be an electronic transcription/translation of spoken language to printed text using the Dragon Dictation System.

## 2025-04-14 NOTE — PROGRESS NOTES
Venipuncture Blood Specimen Collection  Venipuncture performed in LEFT ARM by Leila Doshi MA with good hemostasis. Patient tolerated the procedure well without complications.   04/14/25   Leila Doshi MA

## 2025-04-15 ENCOUNTER — RESULTS FOLLOW-UP (OUTPATIENT)
Dept: FAMILY MEDICINE CLINIC | Facility: CLINIC | Age: 80
End: 2025-04-15
Payer: MEDICARE

## 2025-04-15 DIAGNOSIS — E78.49 OTHER HYPERLIPIDEMIA: ICD-10-CM

## 2025-04-15 LAB
ALBUMIN SERPL-MCNC: 4.3 G/DL (ref 3.5–5.2)
ALBUMIN/GLOB SERPL: 1.6 G/DL
ALP SERPL-CCNC: 171 U/L (ref 39–117)
ALT SERPL-CCNC: 25 U/L (ref 1–33)
AST SERPL-CCNC: 29 U/L (ref 1–32)
BILIRUB SERPL-MCNC: 0.6 MG/DL (ref 0–1.2)
BUN SERPL-MCNC: 25 MG/DL (ref 8–23)
BUN/CREAT SERPL: 15.7 (ref 7–25)
CALCIUM SERPL-MCNC: 9.4 MG/DL (ref 8.6–10.5)
CHLORIDE SERPL-SCNC: 106 MMOL/L (ref 98–107)
CO2 SERPL-SCNC: 26.2 MMOL/L (ref 22–29)
CREAT SERPL-MCNC: 1.59 MG/DL (ref 0.57–1)
EGFRCR SERPLBLD CKD-EPI 2021: 33.1 ML/MIN/1.73
GLOBULIN SER CALC-MCNC: 2.7 GM/DL
GLUCOSE SERPL-MCNC: 85 MG/DL (ref 65–99)
POTASSIUM SERPL-SCNC: 4.1 MMOL/L (ref 3.5–5.2)
PROT SERPL-MCNC: 7 G/DL (ref 6–8.5)
SODIUM SERPL-SCNC: 143 MMOL/L (ref 136–145)

## 2025-04-15 RX ORDER — OMEPRAZOLE 20 MG/1
20 CAPSULE, DELAYED RELEASE ORAL EVERY MORNING
Qty: 90 CAPSULE | Refills: 3 | Status: SHIPPED | OUTPATIENT
Start: 2025-04-15

## 2025-04-15 RX ORDER — ROSUVASTATIN CALCIUM 10 MG/1
10 TABLET, COATED ORAL DAILY
Qty: 90 TABLET | Refills: 3 | Status: SHIPPED | OUTPATIENT
Start: 2025-04-15

## 2025-06-06 ENCOUNTER — TELEPHONE (OUTPATIENT)
Dept: GASTROENTEROLOGY | Facility: CLINIC | Age: 80
End: 2025-06-06
Payer: MEDICARE

## 2025-06-06 NOTE — TELEPHONE ENCOUNTER
mesalamine (LIALDA) 1.2 g EC tablet [[id number]] (Order [id number])     SHE CALLED [facility] FOR A REFILL  THEY TOLD HER SHE HAD IT FILLED 35 DAYS AGO  SHE SAYS ITS SUPPOSED TO BE EVERY 30 DAYS  SHE'S REQUESTING HELP AND A CALL BACK  [phone]

## 2025-06-06 NOTE — TELEPHONE ENCOUNTER
Tried to call pharmacy multiple times, unable to speak with someone in regards to patient's medication.   LVM notifying patient.

## 2025-06-09 NOTE — TELEPHONE ENCOUNTER
Spoke with patient, she is going to call [facility] to double check when she pick the prescription up last.

## 2025-06-30 ENCOUNTER — TRANSCRIBE ORDERS (OUTPATIENT)
Dept: ADMINISTRATIVE | Facility: HOSPITAL | Age: 80
End: 2025-06-30
Payer: MEDICARE

## 2025-06-30 ENCOUNTER — LAB (OUTPATIENT)
Dept: LAB | Facility: HOSPITAL | Age: 80
End: 2025-06-30
Payer: MEDICARE

## 2025-06-30 DIAGNOSIS — I50.32 CHRONIC DIASTOLIC HEART FAILURE: ICD-10-CM

## 2025-06-30 DIAGNOSIS — I12.9 HYPERTENSIVE NEPHROPATHY: ICD-10-CM

## 2025-06-30 DIAGNOSIS — N18.32 STAGE 3B CHRONIC KIDNEY DISEASE: Primary | ICD-10-CM

## 2025-06-30 DIAGNOSIS — N18.32 STAGE 3B CHRONIC KIDNEY DISEASE: ICD-10-CM

## 2025-06-30 LAB
ALBUMIN SERPL-MCNC: 4.2 G/DL (ref 3.5–5.2)
ALBUMIN UR-MCNC: 7.2 MG/DL
ANION GAP SERPL CALCULATED.3IONS-SCNC: 12.1 MMOL/L (ref 5–15)
BACTERIA UR QL AUTO: ABNORMAL /HPF
BILIRUB UR QL STRIP: NEGATIVE
BUN SERPL-MCNC: 25 MG/DL (ref 8–23)
BUN/CREAT SERPL: 16.2 (ref 7–25)
CALCIUM SPEC-SCNC: 9.1 MG/DL (ref 8.6–10.5)
CHLORIDE SERPL-SCNC: 108 MMOL/L (ref 98–107)
CLARITY UR: ABNORMAL
CO2 SERPL-SCNC: 20.9 MMOL/L (ref 22–29)
COLOR UR: YELLOW
CREAT SERPL-MCNC: 1.54 MG/DL (ref 0.57–1)
CREAT UR-MCNC: 135.9 MG/DL
CREAT UR-MCNC: 135.9 MG/DL
EGFRCR SERPLBLD CKD-EPI 2021: 34.4 ML/MIN/1.73
GLUCOSE SERPL-MCNC: 84 MG/DL (ref 65–99)
GLUCOSE UR STRIP-MCNC: NEGATIVE MG/DL
HGB UR QL STRIP.AUTO: NEGATIVE
HYALINE CASTS UR QL AUTO: ABNORMAL /LPF
KETONES UR QL STRIP: ABNORMAL
LEUKOCYTE ESTERASE UR QL STRIP.AUTO: ABNORMAL
MICROALBUMIN/CREAT UR: 53 MG/G (ref 0–29)
NITRITE UR QL STRIP: NEGATIVE
PH UR STRIP.AUTO: 5.5 [PH] (ref 5–8)
PHOSPHATE SERPL-MCNC: 3.5 MG/DL (ref 2.5–4.5)
POTASSIUM SERPL-SCNC: 4.3 MMOL/L (ref 3.5–5.2)
PROT ?TM UR-MCNC: 30.8 MG/DL
PROT UR QL STRIP: ABNORMAL
PROT/CREAT UR: 226.6 MG/G CREA (ref 0–200)
RBC # UR STRIP: ABNORMAL /HPF
REF LAB TEST METHOD: ABNORMAL
SODIUM SERPL-SCNC: 141 MMOL/L (ref 136–145)
SP GR UR STRIP: 1.02 (ref 1–1.03)
SQUAMOUS #/AREA URNS HPF: ABNORMAL /HPF
UROBILINOGEN UR QL STRIP: ABNORMAL
WBC # UR STRIP: ABNORMAL /HPF

## 2025-06-30 PROCEDURE — 80069 RENAL FUNCTION PANEL: CPT

## 2025-06-30 PROCEDURE — 82043 UR ALBUMIN QUANTITATIVE: CPT

## 2025-06-30 PROCEDURE — 36415 COLL VENOUS BLD VENIPUNCTURE: CPT

## 2025-06-30 PROCEDURE — 81001 URINALYSIS AUTO W/SCOPE: CPT

## 2025-06-30 PROCEDURE — 82570 ASSAY OF URINE CREATININE: CPT

## 2025-06-30 PROCEDURE — 84156 ASSAY OF PROTEIN URINE: CPT

## 2025-07-08 ENCOUNTER — OFFICE VISIT (OUTPATIENT)
Dept: GASTROENTEROLOGY | Facility: CLINIC | Age: 80
End: 2025-07-08
Payer: MEDICARE

## 2025-07-08 ENCOUNTER — LAB (OUTPATIENT)
Dept: LAB | Facility: HOSPITAL | Age: 80
End: 2025-07-08
Payer: MEDICARE

## 2025-07-08 VITALS
BODY MASS INDEX: 29.89 KG/M2 | DIASTOLIC BLOOD PRESSURE: 60 MMHG | HEIGHT: 66 IN | WEIGHT: 186 LBS | SYSTOLIC BLOOD PRESSURE: 120 MMHG

## 2025-07-08 DIAGNOSIS — I10 ESSENTIAL (PRIMARY) HYPERTENSION: ICD-10-CM

## 2025-07-08 DIAGNOSIS — K50.80 CROHN'S DISEASE OF BOTH SMALL AND LARGE INTESTINE WITHOUT COMPLICATION: ICD-10-CM

## 2025-07-08 DIAGNOSIS — Z11.59 ENCOUNTER FOR SCREENING FOR OTHER VIRAL DISEASES: ICD-10-CM

## 2025-07-08 DIAGNOSIS — K50.80 CROHN'S DISEASE OF BOTH SMALL AND LARGE INTESTINE WITHOUT COMPLICATION: Primary | ICD-10-CM

## 2025-07-08 LAB
ALBUMIN SERPL-MCNC: 4 G/DL (ref 3.5–5.2)
ALBUMIN/GLOB SERPL: 1.5 G/DL
ALP SERPL-CCNC: 141 U/L (ref 39–117)
ALT SERPL W P-5'-P-CCNC: 22 U/L (ref 1–33)
ANION GAP SERPL CALCULATED.3IONS-SCNC: 11.9 MMOL/L (ref 5–15)
AST SERPL-CCNC: 23 U/L (ref 1–32)
BASOPHILS # BLD AUTO: 0.03 10*3/MM3 (ref 0–0.2)
BASOPHILS NFR BLD AUTO: 0.6 % (ref 0–1.5)
BILIRUB SERPL-MCNC: 0.5 MG/DL (ref 0–1.2)
BUN SERPL-MCNC: 24 MG/DL (ref 8–23)
BUN/CREAT SERPL: 16.3 (ref 7–25)
CALCIUM SPEC-SCNC: 9.1 MG/DL (ref 8.6–10.5)
CHLORIDE SERPL-SCNC: 105 MMOL/L (ref 98–107)
CO2 SERPL-SCNC: 22.1 MMOL/L (ref 22–29)
CREAT SERPL-MCNC: 1.47 MG/DL (ref 0.57–1)
DEPRECATED RDW RBC AUTO: 44.3 FL (ref 37–54)
EGFRCR SERPLBLD CKD-EPI 2021: 36.4 ML/MIN/1.73
EOSINOPHIL # BLD AUTO: 0.23 10*3/MM3 (ref 0–0.4)
EOSINOPHIL NFR BLD AUTO: 4.9 % (ref 0.3–6.2)
ERYTHROCYTE [DISTWIDTH] IN BLOOD BY AUTOMATED COUNT: 13.1 % (ref 12.3–15.4)
GGT SERPL-CCNC: 47 U/L (ref 5–36)
GLOBULIN UR ELPH-MCNC: 2.7 GM/DL
GLUCOSE SERPL-MCNC: 69 MG/DL (ref 65–99)
HCT VFR BLD AUTO: 41.6 % (ref 34–46.6)
HGB BLD-MCNC: 13.1 G/DL (ref 12–15.9)
IMM GRANULOCYTES # BLD AUTO: 0.01 10*3/MM3 (ref 0–0.05)
IMM GRANULOCYTES NFR BLD AUTO: 0.2 % (ref 0–0.5)
LYMPHOCYTES # BLD AUTO: 1.37 10*3/MM3 (ref 0.7–3.1)
LYMPHOCYTES NFR BLD AUTO: 29 % (ref 19.6–45.3)
MCH RBC QN AUTO: 29 PG (ref 26.6–33)
MCHC RBC AUTO-ENTMCNC: 31.5 G/DL (ref 31.5–35.7)
MCV RBC AUTO: 92 FL (ref 79–97)
MONOCYTES # BLD AUTO: 0.45 10*3/MM3 (ref 0.1–0.9)
MONOCYTES NFR BLD AUTO: 9.5 % (ref 5–12)
NEUTROPHILS NFR BLD AUTO: 2.64 10*3/MM3 (ref 1.7–7)
NEUTROPHILS NFR BLD AUTO: 55.8 % (ref 42.7–76)
NRBC BLD AUTO-RTO: 0 /100 WBC (ref 0–0.2)
PLATELET # BLD AUTO: 219 10*3/MM3 (ref 140–450)
PMV BLD AUTO: 12.4 FL (ref 6–12)
POTASSIUM SERPL-SCNC: 3.9 MMOL/L (ref 3.5–5.2)
PROT SERPL-MCNC: 6.7 G/DL (ref 6–8.5)
RBC # BLD AUTO: 4.52 10*6/MM3 (ref 3.77–5.28)
SODIUM SERPL-SCNC: 139 MMOL/L (ref 136–145)
WBC NRBC COR # BLD AUTO: 4.73 10*3/MM3 (ref 3.4–10.8)

## 2025-07-08 PROCEDURE — 83520 IMMUNOASSAY QUANT NOS NONAB: CPT

## 2025-07-08 PROCEDURE — 80053 COMPREHEN METABOLIC PANEL: CPT

## 2025-07-08 PROCEDURE — 86704 HEP B CORE ANTIBODY TOTAL: CPT

## 2025-07-08 PROCEDURE — 82397 CHEMILUMINESCENT ASSAY: CPT

## 2025-07-08 PROCEDURE — 36415 COLL VENOUS BLD VENIPUNCTURE: CPT

## 2025-07-08 PROCEDURE — 82977 ASSAY OF GGT: CPT

## 2025-07-08 PROCEDURE — 85025 COMPLETE CBC W/AUTO DIFF WBC: CPT

## 2025-07-08 PROCEDURE — 86141 C-REACTIVE PROTEIN HS: CPT

## 2025-07-08 PROCEDURE — 86480 TB TEST CELL IMMUN MEASURE: CPT

## 2025-07-08 NOTE — PROGRESS NOTES
PATIENT INFORMATION  Effie Donnelly       - 1945    CHIEF COMPLAINT  Chief Complaint   Patient presents with    Follow-up       HISTORY OF PRESENT ILLNESS  Here today for crohns follow-up     TSH very mildly elevated, PCP gave her synthroid a yr or so ago, but she stopped after a few weeks because it caused nausea.     Crohns: She is back on lialda, was out for 4 days because difficulty with pharmacy, otherwise she has been consistent. Seed probiotic made her nauseous.   Colzal 15 yrs ago  24 CRP 11.6  Bowels are moving up to 4-5 times a day, no bleeding, mucous, cramping. Not taking anything for bowels.   Reviewed risks of uncontrolled IBD with patient.     2025 colonoscopy 3/3 adenomas with mild to moderate colitis, but only back on mesalamine for 2 weeks.     20 mg omeprazole daily, no recurrent HB, indigestion or other concerns. Denies breakthrough concerns.     3/23/2022 EGD with reactive gastropathy, acute candida  esophagitis.  3/23/2022 Colonoscopy with 0 adenomas, recall in 3 yrs for long standing crohns that was inactive. Colzal 15 yrs ago.          REVIEWED PERTINENT RESULTS/ LABS  Lab Results   Component Value Date    CASEREPORT  2025     Surgical Pathology Report                         Case: [id number]                                  Authorizing Provider:  Lorne Esquivel        Collected:           2025 09:54 AM                                 MD Sveta                                                                   Ordering Location:     [facility]   Received:            2025 10:17 AM                                 OR                                                                           Pathologist:           Monica Jones MD                                                          Specimens:   1) - Large Intestine, Transverse Colon, x3                                                          2) - Large Intestine, Right / Ascending  Colon, biopsy                                               3) - Large Intestine, Transverse Colon, biopsy                                                      4) - Large Intestine, Sigmoid Colon, biopsy                                                         5) - Large Intestine, Rectum, biopsy                                                       FINALDX  02/24/2025     1.  Colon, transverse, biopsy:   A.  Fragments of tubular adenoma.   B.  Patchy moderate focal active colitis.    2.  Colon, ascending, biopsy:   A.  Moderate chronic active colitis and repair.   B.  No dysplasia nor malignancy.   C.  No granulomata, diagnostic viral inclusions nor intestinal parasites identified.    3.  Colon, transverse, biopsy:   A.  Mild focal active colitis and repair.   B.  No dysplasia nor malignancy.   C.  No granulomata, diagnostic viral inclusions nor intestinal parasites identified.    4.  Colon, sigmoid, biopsy:   A.  Patchy mild to moderate chronic active colitis and repair.   B.  No dysplasia nor malignancy.   C.  No granulomata, diagnostic viral inclusions nor intestinal parasites identified.    5.  Colon, rectum, biopsy:   A.  Patchy mild to moderate chronic active colitis and repair.   B.  No dysplasia nor malignancy.   C.  No granulomata, diagnostic viral inclusions nor intestinal parasites identified.         Lab Results   Component Value Date    HGB 13.1 03/18/2025    MCV 89.7 03/18/2025     03/18/2025    ALT 25 04/14/2025    AST 29 04/14/2025    HGBA1C 5.50 02/13/2023    INR 1.8 07/12/2023    TRIG 141 03/18/2025      No results found.    REVIEW OF SYSTEMS  Review of Systems      ACTIVE PROBLEMS  Patient Active Problem List    Diagnosis     Longstanding persistent atrial fibrillation [I48.11]     Paroxysmal atrial fibrillation [I48.0]     Stage 3a chronic kidney disease [N18.31]     Esophageal dysphagia [R13.19]     DELORES on CPAP [G47.33]     Snoring [R06.83]     Class 1 obesity due to excess calories  without serious comorbidity with body mass index (BMI) of 31.0 to 31.9 in adult [E66.811, E66.09, Z68.31]     Chronic systolic congestive heart failure [I50.22]     NICM (nonischemic cardiomyopathy) [I42.8]     Other hyperlipidemia [E78.49]     Ulcerative colitis [K51.90]     HTN (hypertension) [I10]     GERD (gastroesophageal reflux disease) [K21.9]     DJD (degenerative joint disease) [M19.90]     Anemia [D64.9]     Ductal 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 [D05.11]     History of myocardial infarction [I25.2]     Coronary artery disease involving native coronary artery of native heart without angina pectoris [I25.10]          PAST MEDICAL HISTORY  Past Medical History:   Diagnosis Date    CAD (coronary artery disease)     Cancer     skin, right breast    CHF (congestive heart failure)     Coronary artery disease involving native coronary artery of native heart without angina pectoris 11/1/2018    Heart murmur     History of myocardial infarction 11/1/2018    Hypertension     Myocardial infarction     PER PT     NICM (nonischemic cardiomyopathy) 7/8/2020    DELORES on CPAP     AHI 15/h    Sinus tachycardia     SOB (shortness of breath)          SURGICAL HISTORY  Past Surgical History:   Procedure Laterality Date    BUNIONECTOMY Bilateral     CARDIAC CATHETERIZATION      CARDIAC CATHETERIZATION N/A 01/14/2020    Procedure: Right and Left Heart Cath;  Surgeon: Bin Chacko MD;  Location:  [facility];  Service: Cardiovascular    CARDIAC CATHETERIZATION N/A 01/14/2020    Procedure: Coronary angiography;  Surgeon: Bin Chacko MD;  Location:  [facility];  Service: Cardiovascular    CARDIAC CATHETERIZATION N/A 01/14/2020    Procedure: Left ventriculography;  Surgeon: Bin Chacko MD;  Location:  [facility];  Service: Cardiovascular    CARDIOVERSION  07/12/2023    CATARACT EXTRACTION Left 04/26/2022    COLONOSCOPY N/A 03/23/2022    Procedure:  COLONOSCOPY WITH BIOPSIES; POLYPECTOMY;  Surgeon: Lorne Esquivel MD;  Location: [facility] OR;  Service: Gastroenterology;  Laterality: N/A;  POLYP  ULCERATIVE COLITIS    COLONOSCOPY W/ POLYPECTOMY N/A 2025    Procedure: COLONOSCOPY WITH POLYPECTOMY and biopsy;  Surgeon: Lorne Esquivel MD;  Location: [facility] OR;  Service: Gastroenterology;  Laterality: N/A;  transverse polyp x3 with cold snare, right colon biopsy, transverse colon biopsy, sigmoid colon biopsy, rectal biopsy    ENDOSCOPY N/A 2022    Procedure: ESOPHAGOGASTRODUODENOSCOPY WITH BIOPSIES;  Surgeon: Lorne Esquivel MD;  Location: [facility] OR;  Service: Gastroenterology;  Laterality: N/A;  GASTRITIS  ESOPHAGEAL REFLUX  HIATAL HERNIA  ESOPHAGEAL CANDIDIASIS    MASTECTOMY COMPLETE / SIMPLE  2019    SHOULDER SURGERY Right     SKIN CANCER EXCISION      ABOVE THE LIP.          FAMILY HISTORY  Family History   Problem Relation Age of Onset    Hypertension Sister     Cancer Sister     Hypertension Sister     Hypertension Sister     Heart disease Brother 65        VALVE REPLACEMENT     Hypertension Brother     Hypertension Brother     Diabetes Brother     Hypertension Brother     Cancer Brother     Hypertension Brother     Hypertension Brother     Hypertension Brother     Malig Hyperthermia Neg Hx          SOCIAL HISTORY  Social History     Occupational History    Occupation: RETIRED    Tobacco Use    Smoking status: Former     Current packs/day: 0.00     Types: Cigarettes     Quit date: 1973     Years since quittin.7     Passive exposure: Past    Smokeless tobacco: Never    Tobacco comments:     CAFFEINE USE: 1 CUP DAILY   Vaping Use    Vaping status: Never Used   Substance and Sexual Activity    Alcohol use: No    Drug use: No    Sexual activity: Defer         CURRENT MEDICATIONS    Current Outpatient Medications:     albuterol sulfate  (90 Base) MCG/ACT inhaler, Inhale 2 puffs Every  4 (Four) Hours As Needed for Wheezing or Shortness of Air. Use with chamber, Disp: 8.5 g, Rfl: 5    amiodarone (PACERONE) 200 MG tablet, Take 1 tablet by mouth Daily., Disp: , Rfl:     apixaban (Eliquis) 5 MG tablet tablet, Take 1 tablet by mouth Every 12 (Twelve) Hours., Disp: 180 tablet, Rfl: 1    flunisolide (NASALIDE) 25 MCG/ACT (0.025%) solution nasal spray, Inhale 2 sprays Every 12 (Twelve) Hours., Disp: 25 mL, Rfl: 0    furosemide (LASIX) 20 MG tablet, Take your Lasix 20 mg in the morning and the half milligram tablet at 2 PM as you were taking prior to arrival at the emergency department., Disp: 135 tablet, Rfl: 0    hydrocortisone 2.5 % ointment, Apply 1 Application topically to the appropriate area as directed 2 (Two) Times a Day., Disp: 30 g, Rfl: 11    mesalamine (LIALDA) 1.2 g EC tablet, Take 4 tablets by mouth Daily With Breakfast., Disp: 120 tablet, Rfl: 4    metoprolol succinate XL (TOPROL-XL) 25 MG 24 hr tablet, Take 1 tablet by mouth Daily., Disp: , Rfl:     omeprazole (priLOSEC) 20 MG capsule, Take 1 capsule by mouth Every Morning., Disp: 90 capsule, Rfl: 3    ondansetron (ZOFRAN) 4 MG tablet, Take 1 tablet by mouth., Disp: , Rfl:     rosuvastatin (Crestor) 10 MG tablet, Take 1 tablet by mouth Daily., Disp: 90 tablet, Rfl: 3    sacubitril-valsartan (Entresto) 24-26 MG tablet, Take 1 tablet by mouth 2 (Two) Times a Day., Disp: , Rfl:     B Complex Vitamins (VITAMIN B COMPLEX PO), Take 1 tablet by mouth Daily. (Patient not taking: Reported on 7/8/2025), Disp: , Rfl:     cholecalciferol (VITAMIN D3) 25 MCG (1000 UT) tablet, Take 4 tablets by mouth Daily. (Patient not taking: Reported on 7/8/2025), Disp: , Rfl:     CRANBERRY EXTRACT PO, Take 1 capsule by mouth Daily. (Patient not taking: Reported on 7/8/2025), Disp: , Rfl:     ALLERGIES  Carvedilol, Nsaids, Jardiance [empagliflozin], and Adhesive tape    VITALS  Vitals:    07/08/25 0822   BP: 120/60   BP Location: Left arm   Patient Position: Sitting  "  Cuff Size: Adult   Weight: 84.4 kg (186 lb)   Height: 167.6 cm (65.98\")       PHYSICAL EXAM  Debilities/Disabilities Identified: None  Emotional Behavior: Appropriate  Wt Readings from Last 3 Encounters:   07/08/25 84.4 kg (186 lb)   04/14/25 82.6 kg (182 lb)   04/08/25 84.5 kg (186 lb 3.2 oz)     Ht Readings from Last 1 Encounters:   07/08/25 167.6 cm (65.98\")     Body mass index is 30.04 kg/m².  Physical Exam  Constitutional:       General: She is not in acute distress.     Appearance: Normal appearance. She is not ill-appearing.   HENT:      Head: Normocephalic and atraumatic.      Mouth/Throat:      Mouth: Mucous membranes are moist.      Pharynx: No posterior oropharyngeal erythema.   Eyes:      General: No scleral icterus.  Cardiovascular:      Rate and Rhythm: Normal rate and regular rhythm.      Heart sounds: Normal heart sounds.   Pulmonary:      Effort: Pulmonary effort is normal.      Breath sounds: Normal breath sounds.   Abdominal:      General: Abdomen is flat. Bowel sounds are normal. There is no distension.      Palpations: Abdomen is soft. There is no mass.      Tenderness: There is no abdominal tenderness. There is no guarding or rebound. Negative signs include Del Angel's sign.      Hernia: No hernia is present.   Musculoskeletal:      Cervical back: Neck supple.   Skin:     General: Skin is warm.      Capillary Refill: Capillary refill takes less than 2 seconds.   Neurological:      General: No focal deficit present.      Mental Status: She is alert and oriented to person, place, and time.   Psychiatric:         Mood and Affect: Mood normal.         Behavior: Behavior normal.         Thought Content: Thought content normal.         Judgment: Judgment normal.         CLINICAL DATA REVIEWED   reviewed previous lab results and integrated with today's visit, reviewed notes from other physicians and/or last GI encounter, reviewed previous endoscopy results and available photos, reviewed surgical " pathology results from previous biopsies    ASSESSMENT  Diagnoses and all orders for this visit:    Crohn's disease of both small and large intestine without complication  -     [facility] Monitr Crohn's Disease; Future  -     Comprehensive Metabolic Panel  -     Gamma GT  -     CBC & Differential  -     QuantiFERON-TB Gold Plus (Li-Hep); Future  -     Hepatitis B Core Antibody, Total    Essential (primary) hypertension  -     [facility] Monitr Crohn's Disease; Future    Encounter for screening for other viral diseases  -     Hepatitis B Core Antibody, Total          PLAN    Labs today, will consider remicade pending results    Return in about 3 months (around 10/8/2025).    I have discussed the above plan with the patient.  They verbalize understanding and are in agreement with the plan.  They have been advised to contact the office for any questions, concerns, or changes related to their health.

## 2025-07-09 LAB — HBV CORE AB SERPL QL IA: NEGATIVE

## 2025-07-11 DIAGNOSIS — R76.12 (QFT) QUANTIFERON-TB TEST REACTION WITHOUT ACTIVE TUBERCULOSIS: Primary | ICD-10-CM

## 2025-07-11 LAB
GAMMA INTERFERON BACKGROUND BLD IA-ACNC: 0.06 IU/ML
M TB IFN-G BLD-IMP: POSITIVE
M TB IFN-G CD4+ BCKGRND COR BLD-ACNC: 0.31 IU/ML
M TB IFN-G CD4+CD8+ BCKGRND COR BLD-ACNC: 0.49 IU/ML
MITOGEN IGNF BCKGRD COR BLD-ACNC: >10 IU/ML
QUANTIFERON INCUBATION: ABNORMAL
SERVICE CMNT-IMP: NORMAL

## 2025-07-22 LAB — REF LAB TEST METHOD: NORMAL

## 2025-07-28 ENCOUNTER — HOSPITAL ENCOUNTER (OUTPATIENT)
Dept: GENERAL RADIOLOGY | Facility: HOSPITAL | Age: 80
Discharge: HOME | End: 2025-07-28
Payer: MEDICARE

## 2025-07-28 DIAGNOSIS — R76.12 (QFT) QUANTIFERON-TB TEST REACTION WITHOUT ACTIVE TUBERCULOSIS: ICD-10-CM

## 2025-07-28 PROCEDURE — 71046 X-RAY EXAM CHEST 2 VIEWS: CPT

## 2025-08-11 ENCOUNTER — OFFICE VISIT (OUTPATIENT)
Dept: INFECTIOUS DISEASES | Facility: CLINIC | Age: 80
End: 2025-08-11
Payer: MEDICARE

## 2025-08-11 VITALS
HEIGHT: 66 IN | WEIGHT: 184.6 LBS | BODY MASS INDEX: 29.67 KG/M2 | DIASTOLIC BLOOD PRESSURE: 79 MMHG | RESPIRATION RATE: 16 BRPM | SYSTOLIC BLOOD PRESSURE: 169 MMHG | HEART RATE: 54 BPM | TEMPERATURE: 97.6 F

## 2025-08-11 DIAGNOSIS — K50.90 CROHN'S DISEASE WITHOUT COMPLICATION, UNSPECIFIED GASTROINTESTINAL TRACT LOCATION: ICD-10-CM

## 2025-08-11 DIAGNOSIS — Z22.7 TB LUNG, LATENT: Primary | ICD-10-CM

## 2025-08-13 ENCOUNTER — PATIENT ROUNDING (BHMG ONLY) (OUTPATIENT)
Dept: INFECTIOUS DISEASES | Facility: CLINIC | Age: 80
End: 2025-08-13
Payer: MEDICARE

## 2025-08-19 ENCOUNTER — TELEPHONE (OUTPATIENT)
Dept: FAMILY MEDICINE CLINIC | Facility: CLINIC | Age: 80
End: 2025-08-19
Payer: MEDICARE

## 2025-08-20 PROBLEM — I49.3 FREQUENT PVCS: Status: ACTIVE | Noted: 2023-06-18

## (undated) DEVICE — KT ORCA ORCAPOD DISP STRL

## (undated) DEVICE — BALN PRESS WEDGE 5F 110CM

## (undated) DEVICE — GLV SURG SENSICARE PI MIC PF SZ8 LF STRL

## (undated) DEVICE — GLIDESHEATH SLENDER STAINLESS STEEL KIT: Brand: GLIDESHEATH SLENDER

## (undated) DEVICE — KT MANIFLD CARDIAC

## (undated) DEVICE — BW-412T DISP COMBO CLEANING BRUSH: Brand: SINGLE USE COMBINATION CLEANING BRUSH

## (undated) DEVICE — SYR LL 3CC

## (undated) DEVICE — Device

## (undated) DEVICE — FRCP BX RADJAW4 NDL 2.8 240CM LG OG BX40

## (undated) DEVICE — VIAL FORMALIN CAP 10P 40ML

## (undated) DEVICE — SYR LL W/SCALE/MARK 3ML STRL

## (undated) DEVICE — SOL IRR H2O BO 1000ML STRL

## (undated) DEVICE — ADAPT CLN BIOGUARD AIR/H2O DISP

## (undated) DEVICE — THE BITE BLOCK MAXI, LATEX FREE STRAP IS USED TO PROTECT THE ENDOSCOPE INSERTION TUBE FROM BEING BITTEN BY THE PATIENT.

## (undated) DEVICE — HI-TORQUE BALANCE MIDDLEWEIGHT GUIDE WIRE .014 STRAIGHT TIP 3.0 CM X 190 CM: Brand: HI-TORQUE BALANCE MIDDLEWEIGHT

## (undated) DEVICE — SAFELINER SUCTION CANISTER 1000CC: Brand: DEROYAL

## (undated) DEVICE — PK CATH CARD 40

## (undated) DEVICE — RADIFOCUS OPTITORQUE ANGIOGRAPHIC CATHETER: Brand: OPTITORQUE

## (undated) DEVICE — INTRO SHEATH ART/FEM ENGAGE .035 6F12CM

## (undated) DEVICE — SNAR POLYP CAPTIVATOR/COLD STFF RND 10MM 240CM

## (undated) DEVICE — GLV SURG SENSICARE PI MIC PF SZ7.5 LF STRL

## (undated) DEVICE — LINER SURG CANSTR SXN S/RIGD 1500CC

## (undated) DEVICE — SPNG GZ WOVN 4X4IN 12PLY 10/BX STRL

## (undated) DEVICE — CATH DIAG IMPULSE M/ PK ANGL 6FR

## (undated) DEVICE — THE SINGLE USE ETRAP – POLYP TRAP IS USED FOR SUCTION RETRIEVAL OF ENDOSCOPICALLY REMOVED POLYPS.: Brand: ETRAP

## (undated) DEVICE — GW EMR FIX EXCHG J STD .035 3MM 260CM

## (undated) DEVICE — JACKT LAB F/R KNIT CUFF/COLR XLG BLU

## (undated) DEVICE — GLIDESHEATH BASIC HYDROPHILIC COATED INTRODUCER SHEATH: Brand: GLIDESHEATH

## (undated) DEVICE — CATH DIAG IMPULSE PIG 5F 100CM

## (undated) DEVICE — MYNXGRIP 6F/7F: Brand: MYNXGRIP